# Patient Record
Sex: FEMALE | Race: WHITE | Employment: OTHER | URBAN - METROPOLITAN AREA
[De-identification: names, ages, dates, MRNs, and addresses within clinical notes are randomized per-mention and may not be internally consistent; named-entity substitution may affect disease eponyms.]

---

## 2017-01-02 PROBLEM — R29.6 MULTIPLE FALLS: Status: ACTIVE | Noted: 2017-01-02

## 2018-07-27 ENCOUNTER — HOSPITAL ENCOUNTER (OUTPATIENT)
Age: 63
Discharge: HOME | End: 2018-07-27
Payer: COMMERCIAL

## 2018-07-27 ENCOUNTER — HOSPITAL ENCOUNTER (OUTPATIENT)
Dept: VASCULAR LAB | Age: 63
Discharge: HOME | End: 2018-07-27
Payer: COMMERCIAL

## 2018-07-27 ENCOUNTER — HOSPITAL ENCOUNTER (OUTPATIENT)
Dept: GENERAL RADIOLOGY | Age: 63
Discharge: HOME | End: 2018-07-27
Payer: COMMERCIAL

## 2018-07-27 ENCOUNTER — OFFICE VISIT (OUTPATIENT)
Dept: FAMILY MEDICINE CLINIC | Age: 63
End: 2018-07-27

## 2018-07-27 VITALS
TEMPERATURE: 98 F | DIASTOLIC BLOOD PRESSURE: 86 MMHG | HEIGHT: 62 IN | BODY MASS INDEX: 26.31 KG/M2 | HEART RATE: 84 BPM | WEIGHT: 143 LBS | SYSTOLIC BLOOD PRESSURE: 134 MMHG | OXYGEN SATURATION: 98 % | RESPIRATION RATE: 18 BRPM

## 2018-07-27 DIAGNOSIS — Z86.718 HISTORY OF DVT (DEEP VEIN THROMBOSIS): ICD-10-CM

## 2018-07-27 DIAGNOSIS — I10 ESSENTIAL HYPERTENSION: ICD-10-CM

## 2018-07-27 DIAGNOSIS — M79.661 RIGHT CALF PAIN: ICD-10-CM

## 2018-07-27 DIAGNOSIS — R06.02 SHORTNESS OF BREATH: ICD-10-CM

## 2018-07-27 DIAGNOSIS — Z86.73 HISTORY OF TIA (TRANSIENT ISCHEMIC ATTACK): ICD-10-CM

## 2018-07-27 DIAGNOSIS — E78.2 MIXED HYPERLIPIDEMIA: ICD-10-CM

## 2018-07-27 DIAGNOSIS — Z76.89 ENCOUNTER TO ESTABLISH CARE WITH NEW DOCTOR: Primary | ICD-10-CM

## 2018-07-27 DIAGNOSIS — R09.89 DIMINISHED PULSES IN LOWER EXTREMITY: ICD-10-CM

## 2018-07-27 DIAGNOSIS — Z98.890 HISTORY OF FASCIOTOMY: ICD-10-CM

## 2018-07-27 DIAGNOSIS — M25.561 CHRONIC PAIN OF BOTH KNEES: ICD-10-CM

## 2018-07-27 DIAGNOSIS — M89.8X8 STERNAL MASS: ICD-10-CM

## 2018-07-27 DIAGNOSIS — F17.210 CIGARETTE SMOKER: ICD-10-CM

## 2018-07-27 DIAGNOSIS — G89.29 CHRONIC PAIN OF BOTH KNEES: ICD-10-CM

## 2018-07-27 DIAGNOSIS — M25.562 CHRONIC PAIN OF BOTH KNEES: ICD-10-CM

## 2018-07-27 DIAGNOSIS — M25.472 LEFT ANKLE SWELLING: ICD-10-CM

## 2018-07-27 DIAGNOSIS — Z12.31 ENCOUNTER FOR SCREENING MAMMOGRAM FOR BREAST CANCER: ICD-10-CM

## 2018-07-27 DIAGNOSIS — Z00.00 HEALTHCARE MAINTENANCE: ICD-10-CM

## 2018-07-27 LAB
A/G RATIO: 1.4 (ref 1.1–2.2)
ALBUMIN SERPL-MCNC: 4.6 G/DL (ref 3.4–5)
ALP BLD-CCNC: 96 U/L (ref 40–129)
ALT SERPL-CCNC: 10 U/L (ref 10–40)
ANION GAP SERPL CALCULATED.3IONS-SCNC: 12 MMOL/L (ref 3–16)
AST SERPL-CCNC: 14 U/L (ref 15–37)
BASOPHILS ABSOLUTE: 0.1 K/UL (ref 0–0.2)
BASOPHILS RELATIVE PERCENT: 0.8 %
BILIRUB SERPL-MCNC: <0.2 MG/DL (ref 0–1)
BUN BLDV-MCNC: 10 MG/DL (ref 7–20)
CALCIUM SERPL-MCNC: 10.1 MG/DL (ref 8.3–10.6)
CHLORIDE BLD-SCNC: 98 MMOL/L (ref 99–110)
CHOLESTEROL, TOTAL: 214 MG/DL (ref 0–199)
CO2: 28 MMOL/L (ref 21–32)
CREAT SERPL-MCNC: 0.6 MG/DL (ref 0.6–1.2)
D DIMER: <200 NG/ML DDU (ref 0–229)
EOSINOPHILS ABSOLUTE: 0.1 K/UL (ref 0–0.6)
EOSINOPHILS RELATIVE PERCENT: 0.6 %
GFR AFRICAN AMERICAN: >60
GFR NON-AFRICAN AMERICAN: >60
GLOBULIN: 3.4 G/DL
GLUCOSE BLD-MCNC: 94 MG/DL (ref 70–99)
HCT VFR BLD CALC: 43.6 % (ref 36–48)
HDLC SERPL-MCNC: 48 MG/DL (ref 40–60)
HEMOGLOBIN: 14.5 G/DL (ref 12–16)
HEPATITIS C ANTIBODY INTERPRETATION: NORMAL
LDL CHOLESTEROL CALCULATED: ABNORMAL MG/DL
LDL CHOLESTEROL DIRECT: 122 MG/DL
LYMPHOCYTES ABSOLUTE: 2.6 K/UL (ref 1–5.1)
LYMPHOCYTES RELATIVE PERCENT: 24.8 %
MCH RBC QN AUTO: 30.7 PG (ref 26–34)
MCHC RBC AUTO-ENTMCNC: 33.2 G/DL (ref 31–36)
MCV RBC AUTO: 92.4 FL (ref 80–100)
MONOCYTES ABSOLUTE: 0.6 K/UL (ref 0–1.3)
MONOCYTES RELATIVE PERCENT: 5.7 %
NEUTROPHILS ABSOLUTE: 7.2 K/UL (ref 1.7–7.7)
NEUTROPHILS RELATIVE PERCENT: 68.1 %
PDW BLD-RTO: 14.6 % (ref 12.4–15.4)
PLATELET # BLD: 345 K/UL (ref 135–450)
PMV BLD AUTO: 8.6 FL (ref 5–10.5)
POTASSIUM SERPL-SCNC: 4.8 MMOL/L (ref 3.5–5.1)
RBC # BLD: 4.72 M/UL (ref 4–5.2)
SODIUM BLD-SCNC: 138 MMOL/L (ref 136–145)
TOTAL PROTEIN: 8 G/DL (ref 6.4–8.2)
TRIGL SERPL-MCNC: 360 MG/DL (ref 0–150)
TSH SERPL DL<=0.05 MIU/L-ACNC: 2.45 UIU/ML (ref 0.27–4.2)
VITAMIN D 25-HYDROXY: 27.2 NG/ML
VLDLC SERPL CALC-MCNC: ABNORMAL MG/DL
WBC # BLD: 10.6 K/UL (ref 4–11)

## 2018-07-27 PROCEDURE — 71046 X-RAY EXAM CHEST 2 VIEWS: CPT

## 2018-07-27 PROCEDURE — 93970 EXTREMITY STUDY: CPT

## 2018-07-27 PROCEDURE — 99205 OFFICE O/P NEW HI 60 MIN: CPT | Performed by: FAMILY MEDICINE

## 2018-07-27 PROCEDURE — 36415 COLL VENOUS BLD VENIPUNCTURE: CPT | Performed by: FAMILY MEDICINE

## 2018-07-27 PROCEDURE — 4004F PT TOBACCO SCREEN RCVD TLK: CPT | Performed by: FAMILY MEDICINE

## 2018-07-27 PROCEDURE — G8427 DOCREV CUR MEDS BY ELIG CLIN: HCPCS | Performed by: FAMILY MEDICINE

## 2018-07-27 PROCEDURE — 3017F COLORECTAL CA SCREEN DOC REV: CPT | Performed by: FAMILY MEDICINE

## 2018-07-27 PROCEDURE — G8419 CALC BMI OUT NRM PARAM NOF/U: HCPCS | Performed by: FAMILY MEDICINE

## 2018-07-27 RX ORDER — GABAPENTIN 400 MG/1
400 CAPSULE ORAL 3 TIMES DAILY
Qty: 90 CAPSULE | Refills: 5 | Status: SHIPPED | OUTPATIENT
Start: 2018-07-27 | End: 2019-02-07 | Stop reason: SDUPTHER

## 2018-07-27 RX ORDER — ATORVASTATIN CALCIUM 40 MG/1
40 TABLET, FILM COATED ORAL DAILY
Qty: 90 TABLET | Refills: 0 | Status: CANCELLED | OUTPATIENT
Start: 2018-07-27

## 2018-07-27 RX ORDER — ATORVASTATIN CALCIUM 20 MG/1
20 TABLET, FILM COATED ORAL NIGHTLY
Qty: 30 TABLET | Refills: 1 | Status: SHIPPED | OUTPATIENT
Start: 2018-07-27 | End: 2018-08-03 | Stop reason: SDUPTHER

## 2018-07-27 RX ORDER — LISINOPRIL 10 MG/1
10 TABLET ORAL 2 TIMES DAILY
COMMUNITY
End: 2018-07-27 | Stop reason: SDUPTHER

## 2018-07-27 RX ORDER — LISINOPRIL 10 MG/1
10 TABLET ORAL 2 TIMES DAILY
Qty: 30 TABLET | Refills: 1 | Status: SHIPPED | OUTPATIENT
Start: 2018-07-27 | End: 2019-02-07 | Stop reason: SDUPTHER

## 2018-07-27 RX ORDER — GABAPENTIN 400 MG/1
400 CAPSULE ORAL 3 TIMES DAILY
COMMUNITY
End: 2018-07-27 | Stop reason: SDUPTHER

## 2018-07-27 ASSESSMENT — PATIENT HEALTH QUESTIONNAIRE - PHQ9
SUM OF ALL RESPONSES TO PHQ9 QUESTIONS 1 & 2: 2
1. LITTLE INTEREST OR PLEASURE IN DOING THINGS: 0
2. FEELING DOWN, DEPRESSED OR HOPELESS: 2
SUM OF ALL RESPONSES TO PHQ QUESTIONS 1-9: 2

## 2018-07-27 ASSESSMENT — ENCOUNTER SYMPTOMS
APNEA: 0
CHOKING: 0
WHEEZING: 0
CONSTIPATION: 0
SHORTNESS OF BREATH: 1
TROUBLE SWALLOWING: 0
STRIDOR: 0
VOMITING: 0
BLOOD IN STOOL: 0
CHEST TIGHTNESS: 0
RHINORRHEA: 0
VOICE CHANGE: 0
SORE THROAT: 0
ABDOMINAL PAIN: 0
COLOR CHANGE: 1
BACK PAIN: 1
COUGH: 0
NAUSEA: 0
DIARRHEA: 0

## 2018-07-27 NOTE — PROGRESS NOTES
present. No thyromegaly present. Cardiovascular: Normal rate, regular rhythm, normal heart sounds and intact distal pulses. Exam reveals no gallop and no friction rub. No murmur heard. Pulmonary/Chest: Effort normal and breath sounds normal. No stridor. No respiratory distress. She has no wheezes. She has no rales. She exhibits mass and deformity. She exhibits no tenderness, no bony tenderness, no edema and no swelling. Abdominal: Soft. Bowel sounds are normal. She exhibits no distension. There is no tenderness. There is no rebound and no guarding. Musculoskeletal: She exhibits edema, tenderness and deformity. Right knee: She exhibits decreased range of motion. She exhibits no swelling, no effusion, no ecchymosis, no deformity, no laceration, no erythema, normal alignment, no LCL laxity, normal patellar mobility, no bony tenderness, normal meniscus and no MCL laxity. Tenderness (posteriorly) found. Medial joint line tenderness noted. No lateral joint line, no MCL, no LCL and no patellar tendon tenderness noted. Left knee: She exhibits normal range of motion, no swelling, no effusion, no ecchymosis, no deformity, no laceration, no erythema, normal alignment, no LCL laxity, normal patellar mobility, no bony tenderness, normal meniscus and no MCL laxity. Tenderness found. Medial joint line and lateral joint line tenderness noted. No MCL, no LCL and no patellar tendon tenderness noted. Lymphadenopathy:     She has no cervical adenopathy. Neurological: She is alert. No cranial nerve deficit. Skin: Skin is dry. No rash noted. She is not diaphoretic. There is erythema. No pallor. Psychiatric: She has a normal mood and affect. Her behavior is normal. Judgment and thought content normal.   Anxious, pressured speech sporadically. Nursing note and vitals reviewed. Assessment/Plan:  1. Encounter to establish care with new doctor    2.  Shortness of breath  5-6mo Hx with gradual

## 2018-07-28 LAB
ESTIMATED AVERAGE GLUCOSE: 122.6 MG/DL
HBA1C MFR BLD: 5.9 %
HIV AG/AB: NORMAL
HIV ANTIGEN: NORMAL
HIV-1 ANTIBODY: NORMAL
HIV-2 AB: NORMAL

## 2018-08-02 ENCOUNTER — HOSPITAL ENCOUNTER (OUTPATIENT)
Age: 63
Discharge: HOME | End: 2018-08-02
Payer: COMMERCIAL

## 2018-08-02 ENCOUNTER — HOSPITAL ENCOUNTER (OUTPATIENT)
Dept: GENERAL RADIOLOGY | Age: 63
Discharge: HOME | End: 2018-08-02
Payer: COMMERCIAL

## 2018-08-02 DIAGNOSIS — M25.561 CHRONIC PAIN OF BOTH KNEES: ICD-10-CM

## 2018-08-02 DIAGNOSIS — M25.562 CHRONIC PAIN OF BOTH KNEES: ICD-10-CM

## 2018-08-02 DIAGNOSIS — G89.29 CHRONIC PAIN OF BOTH KNEES: ICD-10-CM

## 2018-08-02 PROCEDURE — 73562 X-RAY EXAM OF KNEE 3: CPT

## 2018-08-02 NOTE — PROGRESS NOTES
states she took two hits for new years so she could sleep    Sexual activity: Not Currently     Other Topics Concern    Not on file     Social History Narrative    No narrative on file       Family History   Problem Relation Age of Onset    High Blood Pressure Mother     High Cholesterol Mother     Heart Disease Mother     Coronary Art Dis Mother     Heart Attack Father         59    Cirrhosis Sister     Alcohol Abuse Sister     Other Sister         \"colostomy bag due to hole in her colon\"   [employer] Sudden Death Brother         \"suicide\"    Colon Cancer Other         maternal uncle    Breast Cancer Other         multiple maternal aunts along with uterine cancer       Current Outpatient Prescriptions   Medication Sig Dispense Refill    DULoxetine (CYMBALTA) 30 MG extended release capsule Take 1 capsule by mouth daily 30 capsule 1    atorvastatin (LIPITOR) 20 MG tablet Take 1 tablet by mouth nightly 30 tablet 1    aspirin 81 MG tablet Take 1 tablet by mouth daily (with breakfast) 90 tablet 1    lisinopril (PRINIVIL;ZESTRIL) 10 MG tablet Take 1 tablet by mouth 2 times daily 30 tablet 1    gabapentin (NEURONTIN) 400 MG capsule Take 1 capsule by mouth 3 times daily for 30 days. . 90 capsule 5     No current facility-administered medications for this visit. There is no immunization history for the selected administration types on file for this patient. Allergies   Allergen Reactions    Codeine Hives     Tolerates Norco.       Review of Systems    /72 (Site: Left Arm, Position: Sitting, Cuff Size: Large Adult)   Pulse 89   Temp 98 °F (36.7 °C)   Wt 146 lb (66.2 kg)   SpO2 98%   BMI 27.14 kg/m²     Physical Exam   Constitutional: She is oriented to person, place, and time. She appears well-developed and well-nourished. No distress. HENT:   Head: Normocephalic and atraumatic. Eyes: EOM are normal. Pupils are equal, round, and reactive to light. Right eye exhibits no discharge.  Left eye exhibits no discharge. Neck: Normal range of motion. Neck supple. No thyromegaly present. Cardiovascular: Normal rate, regular rhythm, normal heart sounds and intact distal pulses. No murmur heard. Pulmonary/Chest: Effort normal and breath sounds normal. No respiratory distress. She has no wheezes. She has no rales. Abdominal: Soft. She exhibits no distension and no mass. There is tenderness (mild, during bimanual, central lower abd). There is no rebound and no guarding. Genitourinary: Vagina normal. Rectal exam shows external hemorrhoid. No breast swelling, tenderness, discharge or bleeding. There is no rash, tenderness, lesion or injury on the right labia. There is no rash, tenderness, lesion or injury on the left labia. Uterus is enlarged (mild) and tender (mild on bimanual). Uterus is not deviated and not fixed. Cervix exhibits friability (minimal). Cervix exhibits no motion tenderness and no discharge. Right adnexum displays no mass, no tenderness and no fullness. Left adnexum displays no mass, no tenderness and no fullness. No erythema, tenderness or bleeding in the vagina. No foreign body in the vagina. No signs of injury around the vagina. No vaginal discharge found. Musculoskeletal: Normal range of motion. She exhibits tenderness. She exhibits no edema or deformity. R knee wrapped with ACE, medial joint line tenderness. No change from last visit. Lymphadenopathy:     She has no cervical adenopathy. Right: Inguinal adenopathy present. Left: No inguinal adenopathy present. Neurological: She is alert and oriented to person, place, and time. No cranial nerve deficit. Skin: Skin is warm and dry. No rash noted. She is not diaphoretic. No erythema. No pallor. Scars from previous fasciotomy    Psychiatric: She has a normal mood and affect. Her behavior is normal.   Nursing note and vitals reviewed. Plan  1.  Recurrent pain of right knee  This was her biggest concern today, along with her chronic back pain. Reviewed b/l knee XRs in detail with pt: mild OA. Pain appears to be more patellofemoral pain. Encouraged stretching and PT, PRN tylenol (pt states she can not take NSAIDs). Will follow up in 2 weeks after PT, and consider intra-articular injection at that point +/- MRI given persistent medial joint line tenderness. - Ambulatory referral to Physical Therapy    2. Healthcare maintenance  - PAP SMEAR    3. Abnormality of cervix  Inc friability. Last abnormal PAP 20 years prior. - VAGINAL PATHOGENS PROBE *A  - C.trachomatis N.gonorrhoeae DNA, Thin Prep    4. Sternal mass  - CT CHEST LOW DOSE; Future  5. Current smoker  Lung Ca screening. Cut down but not ready to quit, smoked for 20 years  - CT CHEST LOW DOSE; Future    6. LAD (lymphadenopathy), inguinal  R sided palpated today on exam.  B/l noted on very recent US of LEs. No current infectious Sxs to explain. PAP done today without obvious abnormality. Colonscopy scheduled for 8/21, per pt. - CT ABDOMEN PELVIS W WO CONTRAST Additional Contrast? None; Future    7. Essential hypertension  WNL today. No current ACS sxs. 8. Chronic pain syndrome  - Avoid opiates. - DULoxetine (CYMBALTA) 30 MG extended release capsule; Take 1 capsule by mouth daily  Dispense: 30 capsule; Refill: 1    9. Chronic bilateral low back pain with bilateral sciatica  Pt states she has a Hx of ruptured disks and repeatedly requests MRI. No red flag Sxs to order at this time. PT referral made today and will evaluate bony structures on CT abd.   - She requests Lyrica today. She is already on Gabapentin, so will defer for now. She requests an increase in this as well, stating her \"last NP said she was going to increase this. \" I've recently refilled this. This will need CS paperwork completed at next visit + OARRS, and we will reassess Sxs after PT and CT. Should perform monofilament in the future as well.    - CT ABDOMEN PELVIS W WO CONTRAST Additional Contrast? None; Future    10. Moderate single current episode of major depressive disorder (HCC)  No SI/HI. Pt states she has tried \"all the medications and they do not work,\" including Prozac, Zoloft, Celexa, Lexapro. Refuses counseling. Stated that Katalina Olson 13 worked best for her anxiety. Will likely need increase to 60 mg on Cymbalta in the future  - DULoxetine (CYMBALTA) 30 MG extended release capsule; Take 1 capsule by mouth daily  Dispense: 30 capsule; Refill: 1    11. Prediabetes  Discussed Dx and healthy eating. Provided h/o.     12. Vitamin D deficiency  rec'd daily supplement D3 2551-2570 IU daily    Mixed Hyperlipidemia  , ASCVD 10 year risk 9.6%. Will increase statin to high intensity. 13. History of fasciotomy  14. History of DVT in adulthood  1/17, taking daily ASA. Completed       Return in about 2 weeks (around 8/17/2018) for right knee pain, if not improved. .   Will need CS agreement signed and OARRs report at next visit for Gabapentin. Follow up 4-5 weeks for Cymbalta/Depression/Anxiety. Avoid Benzos.

## 2018-08-03 ENCOUNTER — OFFICE VISIT (OUTPATIENT)
Dept: FAMILY MEDICINE CLINIC | Age: 63
End: 2018-08-03

## 2018-08-03 VITALS
WEIGHT: 146 LBS | BODY MASS INDEX: 27.14 KG/M2 | HEART RATE: 89 BPM | SYSTOLIC BLOOD PRESSURE: 118 MMHG | OXYGEN SATURATION: 98 % | TEMPERATURE: 98 F | DIASTOLIC BLOOD PRESSURE: 72 MMHG

## 2018-08-03 DIAGNOSIS — F32.1 MODERATE SINGLE CURRENT EPISODE OF MAJOR DEPRESSIVE DISORDER (HCC): ICD-10-CM

## 2018-08-03 DIAGNOSIS — N88.9 ABNORMALITY OF CERVIX: ICD-10-CM

## 2018-08-03 DIAGNOSIS — Z00.00 HEALTHCARE MAINTENANCE: ICD-10-CM

## 2018-08-03 DIAGNOSIS — I10 ESSENTIAL HYPERTENSION: ICD-10-CM

## 2018-08-03 DIAGNOSIS — M54.42 CHRONIC BILATERAL LOW BACK PAIN WITH BILATERAL SCIATICA: ICD-10-CM

## 2018-08-03 DIAGNOSIS — E55.9 VITAMIN D DEFICIENCY: ICD-10-CM

## 2018-08-03 DIAGNOSIS — Z86.73 PERSONAL HISTORY OF TIA (TRANSIENT ISCHEMIC ATTACK): ICD-10-CM

## 2018-08-03 DIAGNOSIS — Z86.718 HISTORY OF DVT IN ADULTHOOD: ICD-10-CM

## 2018-08-03 DIAGNOSIS — E78.2 MIXED HYPERLIPIDEMIA: ICD-10-CM

## 2018-08-03 DIAGNOSIS — F17.200 CURRENT SMOKER: ICD-10-CM

## 2018-08-03 DIAGNOSIS — M25.561 RECURRENT PAIN OF RIGHT KNEE: Primary | ICD-10-CM

## 2018-08-03 DIAGNOSIS — R59.0 LAD (LYMPHADENOPATHY), INGUINAL: ICD-10-CM

## 2018-08-03 DIAGNOSIS — G89.29 CHRONIC BILATERAL LOW BACK PAIN WITH BILATERAL SCIATICA: ICD-10-CM

## 2018-08-03 DIAGNOSIS — M89.8X8 STERNAL MASS: ICD-10-CM

## 2018-08-03 DIAGNOSIS — R73.03 PREDIABETES: ICD-10-CM

## 2018-08-03 DIAGNOSIS — G89.4 CHRONIC PAIN SYNDROME: ICD-10-CM

## 2018-08-03 DIAGNOSIS — Z98.890 HISTORY OF FASCIOTOMY: ICD-10-CM

## 2018-08-03 DIAGNOSIS — M54.41 CHRONIC BILATERAL LOW BACK PAIN WITH BILATERAL SCIATICA: ICD-10-CM

## 2018-08-03 PROCEDURE — 4004F PT TOBACCO SCREEN RCVD TLK: CPT | Performed by: FAMILY MEDICINE

## 2018-08-03 PROCEDURE — 3017F COLORECTAL CA SCREEN DOC REV: CPT | Performed by: FAMILY MEDICINE

## 2018-08-03 PROCEDURE — G8427 DOCREV CUR MEDS BY ELIG CLIN: HCPCS | Performed by: FAMILY MEDICINE

## 2018-08-03 PROCEDURE — G8419 CALC BMI OUT NRM PARAM NOF/U: HCPCS | Performed by: FAMILY MEDICINE

## 2018-08-03 PROCEDURE — 99214 OFFICE O/P EST MOD 30 MIN: CPT | Performed by: FAMILY MEDICINE

## 2018-08-03 RX ORDER — DULOXETIN HYDROCHLORIDE 30 MG/1
30 CAPSULE, DELAYED RELEASE ORAL DAILY
Qty: 30 CAPSULE | Refills: 1 | Status: SHIPPED | OUTPATIENT
Start: 2018-08-03 | End: 2018-10-15 | Stop reason: SDUPTHER

## 2018-08-03 RX ORDER — ATORVASTATIN CALCIUM 40 MG/1
40 TABLET, FILM COATED ORAL NIGHTLY
Qty: 90 TABLET | Refills: 1 | Status: SHIPPED | OUTPATIENT
Start: 2018-08-03 | End: 2019-02-08 | Stop reason: SDUPTHER

## 2018-08-03 NOTE — PATIENT INSTRUCTIONS
Vitamin D3 4662-0439 IU a day    Eat 5 - 6 servings of fruit per day. Locally grown fresh fruit has the most antioxidants. If they are not available, frozen fruit is the next best.     Eat more fresh vegetables, olive oil, and a handful of nuts (unsalted) every day. Make all your grains (breads, pasta, rice) 'whole grain' only to increase natural fiber in your diet     Fish has healthy omega-3 oils. Eating fish twice a week has been shown to improve health. If you take fish oil, take at least 1,000mg EPA + DHA a day (you must look at the food label on the back of the bottle and add up these omega-3s to know how much of this beneficial nutrient is contained in the product). There is more evidence that eating fish improves health more than taking fish oi supplements. Eating eggs has benefit if you eat the high omega-3 eggs. In these eggs, the yolks are good for you. At [employer], go to the [employer] food section and get the 660mg omega-3 eggs. These are really good for you. The chickens are fed fish, and the benefits of the fish are imparted into the egg yolk. If you have diabetes, you should probably avoid eggs. Current research shows that a pesco-vegetarian diet (eating a plant based diet with fish) is the best for improving longevity and reducing cardiovascular disease. Limit saturated fats, sugar and red meat. Avoid trans-fats and processed meat (e.g. Salami). Avoid fried foods, potato dishes and white (or enriched, processed) flour. Foods to avoid! Trans-fats - increases risk of heart disease, stroke, and development of diabetes     Processed meats (lunch meat, goetta, sausage, pepperoni, etc.. ) - increases risk of cancers     High fructose corn syrup (fructose, corn syrup) - increases risk of high blood pressure, obesity and diabetes     More information on healthful diets and recipes is available at www. choosemyplate.gov, or ww.mypyramid.gov     Enhance your foods with spices.  They are full of nutritional benefit and antioxidants. New research shows that fasting helps lower risk of breast cancer, diabetes, inflammation, cardiovascular disease, Alzheimers, and increases longevity. We don't have a lot of information on how much to fast, but even overnight fasts of 13 hours makes a difference. Consider skipping snacks after dinner. Exercise 1 hour a day at least 5 days a week. If you do not currently exercise, start slow by maybe walking 5 minutes out, 5 minutes back. Increase the amount of time you exercise every day by 2 - 5 minutes, as tolerated. Your goal should be to get to 1/2 - 1 hour a day. Exercise will help you control metabolic diseases, maintain independence, and reduce your risk for dementia. Weight training and resistance exercises have been shown to help preserve muscle mass and strength. It is recommended that these be done twice a week. Balance is important to prevent falls. An easy way to improve this is to stand on one leg at a time while you brush your teeth. Gently stretch your joints to maintain flexibilty. And maintain good posture to protect your spine. Patient Education        Prediabetes: Care Instructions  Your Care Instructions    Prediabetes is a warning sign that you are at risk for getting type 2 diabetes. It means that your blood sugar is higher than it should be. The food you eat turns into sugar, which your body uses for energy. Normally, an organ called the pancreas makes insulin, which allows the sugar in your blood to get into your body's cells. But when your body can't use insulin the right way, the sugar doesn't move into cells. It stays in your blood instead. This is called insulin resistance. The buildup of sugar in the blood causes prediabetes. The good news is that lifestyle changes may help you get your blood sugar back to normal and help you avoid or delay diabetes. Follow-up care is a key part of your treatment and safety.  Be sure If you do not have an account, please click on the \"Sign Up Now\" link. Current as of: December 7, 2017  Content Version: 11.6  © [id number] [employer]. Care instructions adapted under license by [facility] ([facility]). If you have questions about a medical condition or this instruction, always ask your healthcare professional. [employer] any warranty or liability for your use of this information. Patient Education          duloxetine  Pronunciation:  maricruz LACY 25 e teen  Brand:  Morales Grover  What is the most important information I should know about duloxetine? Do not take duloxetine within 5 days before or 14 days after you have used an MAO inhibitor, such as isocarboxazid, linezolid, methylene blue injection, phenelzine, rasagiline, selegiline, or tranylcypromine. Some young people have thoughts about suicide when first taking an antidepressant. Stay alert to changes in your mood or symptoms. Report any new or worsening symptoms to your doctor. Do not stop using duloxetine without first talking to your doctor. What is duloxetine? Duloxetine is a selective serotonin and norepinephrine reuptake inhibitor antidepressant (SSNRI). Duloxetine affects chemicals in the brain that may be unbalanced in people with depression. Duloxetine is used to treat major depressive disorder in adults. Duloxetine is also used to treat general anxiety disorder in adults and children who are at least 9years old. Duloxetine is also used in adults to treat fibromyalgia (a chronic pain disorder), or chronic muscle or joint pain (such as low back pain and osteoarthritis pain). Duloxetine is also used to treat pain caused by nerve damage in adults with diabetes (diabetic neuropathy). Duloxetine may also be used for purposes not listed in this medication guide. What should I discuss with my healthcare provider before taking duloxetine? You should not use duloxetine if you are allergic to it.   Do not take duloxetine within 5 days before or 14 days after you have used an MAO inhibitor, such as isocarboxazid, linezolid, methylene blue injection, phenelzine, rasagiline, selegiline, or tranylcypromine. A dangerous drug interaction could occur. Some medicines can interact with duloxetine and cause a serious condition called serotonin syndrome. Be sure your doctor knows if you also take stimulant medicine, opioid medicine, herbal products, or medicine for depression, mental illness, Parkinson's disease, migraine headaches, serious infections, or prevention of nausea and vomiting. Ask your doctor before making any changes in how or when you take your medications. To make sure duloxetine is safe for you, tell your doctor if you have ever had:  · liver or kidney disease;  · seizures or epilepsy;  · a bleeding or blood clotting disorder;  · high blood pressure;  · narrow-angle glaucoma;  · bipolar disorder (manic depression); or  · drug addiction or suicidal thoughts. Some young people have thoughts about suicide when first taking an antidepressant. Your doctor will need to check your progress at regular visits while you are using duloxetine. Your family or other caregivers should also be alert to changes in your mood or symptoms. It is not known whether duloxetine will harm an unborn baby. However, duloxetine may cause problems in a  if you take the medicine during the third trimester of pregnancy. Tell your doctor if you are pregnant or plan to become pregnant while using this medicine. If you are pregnant, your name may be listed on a pregnancy registry. This is to track the outcome of the pregnancy and to evaluate any effects of duloxetine on the baby. Duloxetine can pass into breast milk, but effects on the nursing baby are not known. Tell your doctor if you are breast-feeding. Duloxetine is not approved for use by anyone younger than 25years old. How should I take duloxetine?   Follow all

## 2018-08-04 LAB
CANDIDA SPECIES, DNA PROBE: NORMAL
GARDNERELLA VAGINALIS, DNA PROBE: NORMAL
TRICHOMONAS VAGINALIS DNA: NORMAL

## 2018-08-07 LAB
HPV COMMENT: NORMAL
HPV TYPE 16: NOT DETECTED
HPV TYPE 18: NOT DETECTED
HPVOH (OTHER TYPES): NOT DETECTED

## 2018-08-10 LAB
C. TRACHOMATIS DNA,THIN PREP: NEGATIVE
N. GONORRHOEAE DNA, THIN PREP: NEGATIVE

## 2018-08-20 ENCOUNTER — TELEPHONE (OUTPATIENT)
Dept: GASTROENTEROLOGY | Age: 63
End: 2018-08-20

## 2018-08-22 ENCOUNTER — TELEPHONE (OUTPATIENT)
Dept: FAMILY MEDICINE CLINIC | Age: 63
End: 2018-08-22

## 2018-08-23 NOTE — TELEPHONE ENCOUNTER
[facility] scheduling called again regarding the previous message  Louisa needs the order before pts test tomorrow

## 2018-08-24 DIAGNOSIS — Z12.2 ENCOUNTER FOR SCREENING FOR LUNG CANCER: Primary | ICD-10-CM

## 2018-08-28 ENCOUNTER — TELEPHONE (OUTPATIENT)
Dept: FAMILY MEDICINE CLINIC | Age: 63
End: 2018-08-28

## 2018-08-28 DIAGNOSIS — I10 ESSENTIAL HYPERTENSION: Primary | ICD-10-CM

## 2018-08-28 NOTE — TELEPHONE ENCOUNTER
Mayela from [address] [place] [address] called. Requesting order for BUN and Creatinine for pt. They are scheduled for a CT and previous blood work results are too old.

## 2018-08-30 ENCOUNTER — HOSPITAL ENCOUNTER (OUTPATIENT)
Dept: CT IMAGING | Age: 63
Discharge: HOME | End: 2018-08-30
Payer: COMMERCIAL

## 2018-08-30 DIAGNOSIS — R59.0 LAD (LYMPHADENOPATHY), INGUINAL: ICD-10-CM

## 2018-08-30 DIAGNOSIS — G89.29 CHRONIC BILATERAL LOW BACK PAIN WITH BILATERAL SCIATICA: ICD-10-CM

## 2018-08-30 DIAGNOSIS — F17.200 CURRENT SMOKER: ICD-10-CM

## 2018-08-30 DIAGNOSIS — M54.42 CHRONIC BILATERAL LOW BACK PAIN WITH BILATERAL SCIATICA: ICD-10-CM

## 2018-08-30 DIAGNOSIS — M54.41 CHRONIC BILATERAL LOW BACK PAIN WITH BILATERAL SCIATICA: ICD-10-CM

## 2018-08-30 DIAGNOSIS — M89.8X8 STERNAL MASS: ICD-10-CM

## 2018-08-30 PROCEDURE — 6360000004 HC RX CONTRAST MEDICATION: Performed by: FAMILY MEDICINE

## 2018-08-30 PROCEDURE — 74178 CT ABD&PLV WO CNTR FLWD CNTR: CPT

## 2018-08-30 PROCEDURE — 71250 CT THORAX DX C-: CPT

## 2018-08-30 PROCEDURE — G0297 LDCT FOR LUNG CA SCREEN: HCPCS

## 2018-08-30 RX ADMIN — IOPAMIDOL 75 ML: 755 INJECTION, SOLUTION INTRAVENOUS at 15:52

## 2018-08-31 ENCOUNTER — TELEPHONE (OUTPATIENT)
Dept: FAMILY MEDICINE CLINIC | Age: 63
End: 2018-08-31

## 2018-08-31 ENCOUNTER — TELEPHONE (OUTPATIENT)
Dept: PULMONOLOGY | Age: 63
End: 2018-08-31

## 2018-08-31 DIAGNOSIS — R91.1 LUNG NODULE: Primary | ICD-10-CM

## 2018-08-31 DIAGNOSIS — R91.1 PULMONARY NODULE, LEFT: Primary | ICD-10-CM

## 2018-09-02 PROBLEM — Z00.00 HEALTHCARE MAINTENANCE: Status: RESOLVED | Noted: 2018-08-03 | Resolved: 2018-09-02

## 2018-09-05 ENCOUNTER — TELEPHONE (OUTPATIENT)
Dept: CASE MANAGEMENT | Age: 63
End: 2018-09-05

## 2018-09-06 ENCOUNTER — TELEPHONE (OUTPATIENT)
Dept: PULMONOLOGY | Age: 63
End: 2018-09-06

## 2018-09-06 NOTE — TELEPHONE ENCOUNTER
Patient no showed for appt for 9/6/18. Called patient spoke with her sister to have her call office back regarding appt.

## 2018-09-11 ENCOUNTER — HOSPITAL ENCOUNTER (OUTPATIENT)
Dept: PET IMAGING | Age: 63
Discharge: HOME | End: 2018-09-11
Payer: COMMERCIAL

## 2018-09-11 ENCOUNTER — HOSPITAL ENCOUNTER (OUTPATIENT)
Age: 63
End: 2018-09-11
Payer: COMMERCIAL

## 2018-09-11 VITALS — WEIGHT: 154 LBS | BODY MASS INDEX: 25.66 KG/M2 | HEIGHT: 65 IN

## 2018-09-11 DIAGNOSIS — R91.1 PULMONARY NODULE, LEFT: ICD-10-CM

## 2018-09-11 PROCEDURE — A9552 F18 FDG: HCPCS | Performed by: INTERNAL MEDICINE

## 2018-09-11 PROCEDURE — 78815 PET IMAGE W/CT SKULL-THIGH: CPT

## 2018-09-11 PROCEDURE — 3430000000 HC RX DIAGNOSTIC RADIOPHARMACEUTICAL: Performed by: INTERNAL MEDICINE

## 2018-09-11 RX ORDER — FLUDEOXYGLUCOSE F 18 200 MCI/ML
15.37 INJECTION, SOLUTION INTRAVENOUS
Status: COMPLETED | OUTPATIENT
Start: 2018-09-11 | End: 2018-09-11

## 2018-09-11 RX ADMIN — FLUDEOXYGLUCOSE F 18 15.37 MILLICURIE: 200 INJECTION, SOLUTION INTRAVENOUS at 09:02

## 2018-09-11 NOTE — TELEPHONE ENCOUNTER
Patient aware of date time and prep of PET. Watch for results. Need to call patient to [facility] appt for results.

## 2018-09-27 ENCOUNTER — OFFICE VISIT (OUTPATIENT)
Dept: PULMONOLOGY | Age: 63
End: 2018-09-27
Payer: COMMERCIAL

## 2018-09-27 VITALS
SYSTOLIC BLOOD PRESSURE: 176 MMHG | HEIGHT: 65 IN | OXYGEN SATURATION: 98 % | DIASTOLIC BLOOD PRESSURE: 105 MMHG | BODY MASS INDEX: 22.99 KG/M2 | TEMPERATURE: 98.1 F | RESPIRATION RATE: 16 BRPM | HEART RATE: 70 BPM | WEIGHT: 138 LBS

## 2018-09-27 DIAGNOSIS — R06.09 DOE (DYSPNEA ON EXERTION): Primary | ICD-10-CM

## 2018-09-27 DIAGNOSIS — R91.1 PULMONARY NODULE: ICD-10-CM

## 2018-09-27 PROCEDURE — G8420 CALC BMI NORM PARAMETERS: HCPCS | Performed by: INTERNAL MEDICINE

## 2018-09-27 PROCEDURE — 99245 OFF/OP CONSLTJ NEW/EST HI 55: CPT | Performed by: INTERNAL MEDICINE

## 2018-09-27 PROCEDURE — G8427 DOCREV CUR MEDS BY ELIG CLIN: HCPCS | Performed by: INTERNAL MEDICINE

## 2018-09-27 PROCEDURE — 3017F COLORECTAL CA SCREEN DOC REV: CPT | Performed by: INTERNAL MEDICINE

## 2018-09-27 RX ORDER — NICOTINE 21 MG/24HR
1 PATCH, TRANSDERMAL 24 HOURS TRANSDERMAL EVERY 24 HOURS
Qty: 30 PATCH | Refills: 3 | Status: SHIPPED | OUTPATIENT
Start: 2018-09-27 | End: 2019-03-07

## 2018-09-27 NOTE — PATIENT INSTRUCTIONS
few pounds more. Plus, you can help prevent some weight gain by being more active and eating less. Taking the medicine bupropion might help control weight gain. What else can I do to improve my chances of quitting? -- You can:  ?Start exercising. ?Stay away from smokers and places that you associate with smoking. If people close to you smoke, ask them to quit with you. ? Keep gum, hard candy, or something to put in your mouth handy. If you get a craving for a cigarette, try one of these instead. ?Dont give up, even if you start smoking again. It takes most people a few tries before they succeed. You can also get help from a free phone line (0-686-QUIT-NOW) or go online to www.smokefree.gov. Your pulmonary team is here to help you quit.   Call for assistance [phone]

## 2018-09-27 NOTE — PROGRESS NOTES
Chief Complaint: lung nodule    Consulting provider: Allen Eisenberg DO    HPI: 66 y.o. female patient is being seen at the request of Mahsa Singh DO for a consultation regarding a lung nodule. The patient has a history of Smoking. The patient does not have SOB at rest but has LENTZ. Exercise tolerance on level ground is unlimited Stairs. She denies a chronic cough and denies wheezing. No weight loss. No fever/sweats. No family h/o lung cancer. The patient has smoked  1/2 PPD for 15 years.  and parents smoke. Environmental/chemical exposure: Asbestos exposure: no   Patient worked as a clerical worker. TB exposure: no    The information above included the review and summarization of old records. The history was obtained from these old records and from the patient directly. Outside information from Dr. Allison Person regarding  The nodule was reviewed. REVIEW OF SYSTEMS:    See HPI and scanned Review of Systems sheet. Past Medical History:   Diagnosis Date    Arterial occlusion     Chronic bilateral low back pain with bilateral sciatica 8/3/2018    Chronic bilateral low back pain with bilateral sciatica     Hyperlipidemia     Hypertension     Moderate single current episode of major depressive disorder ([address] 8/3/2018    Ruptured disk      Past Surgical History      Procedure Laterality Date     SECTION  1981    LEG SURGERY  2017    fasciotomy due to DVT, LLE     Social History:    TOBACCO:   reports that she has been smoking Cigarettes. She has a 20.00 pack-year smoking history. She has never used smokeless tobacco.  ETOH:   reports that she does not drink alcohol.     Family History  Family History   Problem Relation Age of Onset    High Blood Pressure Mother     High Cholesterol Mother     Heart Disease Mother     Coronary Art Dis Mother     Heart Attack Father         59    Cirrhosis Sister     Alcohol Abuse Sister     Other Sister         \"colostomy bag due to hole in her

## 2018-10-10 ENCOUNTER — TELEPHONE (OUTPATIENT)
Dept: CASE MANAGEMENT | Age: 63
End: 2018-10-10

## 2018-10-15 DIAGNOSIS — G89.4 CHRONIC PAIN SYNDROME: ICD-10-CM

## 2018-10-15 DIAGNOSIS — F32.1 MODERATE SINGLE CURRENT EPISODE OF MAJOR DEPRESSIVE DISORDER (HCC): ICD-10-CM

## 2018-10-15 RX ORDER — DULOXETIN HYDROCHLORIDE 30 MG/1
30 CAPSULE, DELAYED RELEASE ORAL DAILY
Qty: 30 CAPSULE | Refills: 1 | Status: SHIPPED | OUTPATIENT
Start: 2018-10-15 | End: 2019-02-07 | Stop reason: SDUPTHER

## 2018-10-15 NOTE — TELEPHONE ENCOUNTER
Last office visit 09/07/2018     Last written 08/03/2018, 30-days with 1 refill. Next office visit scheduled No future appt scheduled.      Requested Prescriptions     Pending Prescriptions Disp Refills    DULoxetine (CYMBALTA) 30 MG extended release capsule 30 capsule 1     Sig: Take 1 capsule by mouth daily

## 2018-10-23 ENCOUNTER — TELEPHONE (OUTPATIENT)
Dept: CASE MANAGEMENT | Age: 63
End: 2018-10-23

## 2018-12-12 ENCOUNTER — TELEPHONE (OUTPATIENT)
Dept: PULMONOLOGY | Age: 63
End: 2018-12-12

## 2018-12-12 NOTE — TELEPHONE ENCOUNTER
Per overdue result notes pt did not complete ct chest as scheduled  Pt is scheduled for an appt today 12/12/18. Attempted to reach pt no working numbers. 9/28/18    ASSESSMENT:  · LLL pulmonary nodule: 1.3 x 0.8 cm. Indeterminate etiology. No hypermetabolism on PET/CT. Malignancy is not completely excluded although is less likely now given the PET/CT results. · Emphysema  · LENTZ  · Tobacco abuse  · 9/11/18 PET CT:   Left lower lobe pulmonary nodule seen on recent CT scan is not significantly   hypermetabolic.  Recommend continued CT follow-up given that it is a new   finding and there is underlying emphysema.      PLAN:   · Repeat Chest CT in 3 months  · PFTs  · Tobacco cessation - she started at age 41. Interested in quitting  · Start NRT 14mg patch.

## 2018-12-13 ENCOUNTER — TELEPHONE (OUTPATIENT)
Dept: CASE MANAGEMENT | Age: 63
End: 2018-12-13

## 2019-02-07 ENCOUNTER — OFFICE VISIT (OUTPATIENT)
Dept: FAMILY MEDICINE CLINIC | Age: 64
End: 2019-02-07
Payer: COMMERCIAL

## 2019-02-07 VITALS
DIASTOLIC BLOOD PRESSURE: 90 MMHG | BODY MASS INDEX: 22.96 KG/M2 | OXYGEN SATURATION: 98 % | SYSTOLIC BLOOD PRESSURE: 150 MMHG | WEIGHT: 138 LBS | HEART RATE: 83 BPM | TEMPERATURE: 97.9 F

## 2019-02-07 DIAGNOSIS — G89.29 CHRONIC PAIN OF BOTH KNEES: ICD-10-CM

## 2019-02-07 DIAGNOSIS — Z72.0 TOBACCO ABUSE: ICD-10-CM

## 2019-02-07 DIAGNOSIS — Z00.00 HEALTHCARE MAINTENANCE: ICD-10-CM

## 2019-02-07 DIAGNOSIS — F32.1 MODERATE SINGLE CURRENT EPISODE OF MAJOR DEPRESSIVE DISORDER (HCC): ICD-10-CM

## 2019-02-07 DIAGNOSIS — R91.1 LUNG NODULE: ICD-10-CM

## 2019-02-07 DIAGNOSIS — F51.01 PRIMARY INSOMNIA: ICD-10-CM

## 2019-02-07 DIAGNOSIS — Z98.890 HISTORY OF FASCIOTOMY: ICD-10-CM

## 2019-02-07 DIAGNOSIS — G89.4 CHRONIC PAIN SYNDROME: ICD-10-CM

## 2019-02-07 DIAGNOSIS — M25.562 CHRONIC PAIN OF BOTH KNEES: ICD-10-CM

## 2019-02-07 DIAGNOSIS — J45.20 MILD INTERMITTENT ASTHMA WITHOUT COMPLICATION: Primary | ICD-10-CM

## 2019-02-07 DIAGNOSIS — I10 ESSENTIAL HYPERTENSION: ICD-10-CM

## 2019-02-07 DIAGNOSIS — E78.2 MIXED HYPERLIPIDEMIA: ICD-10-CM

## 2019-02-07 DIAGNOSIS — J06.9 VIRAL URI WITH COUGH: ICD-10-CM

## 2019-02-07 DIAGNOSIS — E55.9 VITAMIN D DEFICIENCY: ICD-10-CM

## 2019-02-07 DIAGNOSIS — Z12.31 ENCOUNTER FOR SCREENING MAMMOGRAM FOR BREAST CANCER: ICD-10-CM

## 2019-02-07 DIAGNOSIS — M25.561 CHRONIC PAIN OF BOTH KNEES: ICD-10-CM

## 2019-02-07 PROBLEM — G62.9 NEUROPATHY: Status: ACTIVE | Noted: 2017-05-02

## 2019-02-07 PROBLEM — I82.402 DEEP VEIN THROMBOSIS (DVT) OF LEFT LOWER EXTREMITY (HCC): Status: ACTIVE | Noted: 2017-03-02

## 2019-02-07 LAB
CHOLESTEROL, TOTAL: 206 MG/DL (ref 0–199)
HDLC SERPL-MCNC: 61 MG/DL (ref 40–60)
LDL CHOLESTEROL CALCULATED: 131 MG/DL
TRIGL SERPL-MCNC: 68 MG/DL (ref 0–150)
VLDLC SERPL CALC-MCNC: 14 MG/DL

## 2019-02-07 PROCEDURE — 90472 IMMUNIZATION ADMIN EACH ADD: CPT | Performed by: FAMILY MEDICINE

## 2019-02-07 PROCEDURE — 90732 PPSV23 VACC 2 YRS+ SUBQ/IM: CPT | Performed by: FAMILY MEDICINE

## 2019-02-07 PROCEDURE — 90471 IMMUNIZATION ADMIN: CPT | Performed by: FAMILY MEDICINE

## 2019-02-07 PROCEDURE — G8482 FLU IMMUNIZE ORDER/ADMIN: HCPCS | Performed by: FAMILY MEDICINE

## 2019-02-07 PROCEDURE — 90715 TDAP VACCINE 7 YRS/> IM: CPT | Performed by: FAMILY MEDICINE

## 2019-02-07 PROCEDURE — 3017F COLORECTAL CA SCREEN DOC REV: CPT | Performed by: FAMILY MEDICINE

## 2019-02-07 PROCEDURE — 99215 OFFICE O/P EST HI 40 MIN: CPT | Performed by: FAMILY MEDICINE

## 2019-02-07 PROCEDURE — 90686 IIV4 VACC NO PRSV 0.5 ML IM: CPT | Performed by: FAMILY MEDICINE

## 2019-02-07 PROCEDURE — G8420 CALC BMI NORM PARAMETERS: HCPCS | Performed by: FAMILY MEDICINE

## 2019-02-07 PROCEDURE — 4004F PT TOBACCO SCREEN RCVD TLK: CPT | Performed by: FAMILY MEDICINE

## 2019-02-07 PROCEDURE — G8427 DOCREV CUR MEDS BY ELIG CLIN: HCPCS | Performed by: FAMILY MEDICINE

## 2019-02-07 RX ORDER — TRAZODONE HYDROCHLORIDE 50 MG/1
50 TABLET ORAL NIGHTLY
Qty: 90 TABLET | Refills: 1 | Status: SHIPPED | OUTPATIENT
Start: 2019-02-07 | End: 2019-07-31 | Stop reason: SDUPTHER

## 2019-02-07 RX ORDER — ERGOCALCIFEROL 1.25 MG/1
50000 CAPSULE ORAL WEEKLY
Qty: 12 CAPSULE | Refills: 0 | Status: SHIPPED | OUTPATIENT
Start: 2019-02-07 | End: 2019-05-08 | Stop reason: SDUPTHER

## 2019-02-07 RX ORDER — CETIRIZINE HYDROCHLORIDE 10 MG/1
10 TABLET ORAL DAILY
Qty: 30 TABLET | Refills: 0 | Status: SHIPPED | OUTPATIENT
Start: 2019-02-07 | End: 2019-03-07 | Stop reason: SDUPTHER

## 2019-02-07 RX ORDER — GUAIFENESIN 600 MG/1
1200 TABLET, EXTENDED RELEASE ORAL 2 TIMES DAILY
Qty: 20 TABLET | Refills: 0 | Status: SHIPPED | OUTPATIENT
Start: 2019-02-07 | End: 2019-02-17

## 2019-02-07 RX ORDER — FLUTICASONE PROPIONATE 50 MCG
2 SPRAY, SUSPENSION (ML) NASAL DAILY
Qty: 1 BOTTLE | Refills: 0 | Status: SHIPPED | OUTPATIENT
Start: 2019-02-07 | End: 2019-03-07 | Stop reason: SDUPTHER

## 2019-02-07 RX ORDER — BENZONATATE 100 MG/1
100-200 CAPSULE ORAL 3 TIMES DAILY PRN
Qty: 60 CAPSULE | Refills: 0 | Status: SHIPPED | OUTPATIENT
Start: 2019-02-07 | End: 2019-02-14

## 2019-02-07 RX ORDER — DULOXETIN HYDROCHLORIDE 30 MG/1
30 CAPSULE, DELAYED RELEASE ORAL DAILY
Qty: 7 CAPSULE | Refills: 0 | Status: SHIPPED | OUTPATIENT
Start: 2019-02-07 | End: 2019-03-29 | Stop reason: ALTCHOICE

## 2019-02-07 RX ORDER — NICOTINE 21 MG/24HR
1 PATCH, TRANSDERMAL 24 HOURS TRANSDERMAL EVERY 24 HOURS
Qty: 30 PATCH | Refills: 0 | Status: SHIPPED | OUTPATIENT
Start: 2019-02-07 | End: 2019-03-07 | Stop reason: SDUPTHER

## 2019-02-07 RX ORDER — LISINOPRIL 40 MG/1
40 TABLET ORAL DAILY
Qty: 90 TABLET | Refills: 1 | Status: SHIPPED | OUTPATIENT
Start: 2019-02-07 | End: 2019-07-31 | Stop reason: SDUPTHER

## 2019-02-07 RX ORDER — DULOXETIN HYDROCHLORIDE 60 MG/1
60 CAPSULE, DELAYED RELEASE ORAL DAILY
Qty: 90 CAPSULE | Refills: 1 | Status: SHIPPED | OUTPATIENT
Start: 2019-02-07 | End: 2019-07-31 | Stop reason: SDUPTHER

## 2019-02-07 RX ORDER — ALBUTEROL SULFATE 90 UG/1
2 AEROSOL, METERED RESPIRATORY (INHALATION) EVERY 6 HOURS PRN
Qty: 1 INHALER | Refills: 3 | Status: SHIPPED | OUTPATIENT
Start: 2019-02-07 | End: 2019-05-30 | Stop reason: SDUPTHER

## 2019-02-07 ASSESSMENT — ENCOUNTER SYMPTOMS
EYE DISCHARGE: 0
PHOTOPHOBIA: 0
EYE PAIN: 0
SINUS PAIN: 0
ABDOMINAL PAIN: 0
APNEA: 0
BACK PAIN: 0
EYE ITCHING: 0
SHORTNESS OF BREATH: 0
BLOOD IN STOOL: 0
CHOKING: 0
SORE THROAT: 0
VOICE CHANGE: 0
VOMITING: 0
COLOR CHANGE: 0
STRIDOR: 0
TROUBLE SWALLOWING: 0
NAUSEA: 0
EYE REDNESS: 0
COUGH: 1
SINUS PRESSURE: 0
RHINORRHEA: 1
CHEST TIGHTNESS: 0
CONSTIPATION: 0
DIARRHEA: 0
WHEEZING: 0

## 2019-02-08 DIAGNOSIS — E78.2 MIXED HYPERLIPIDEMIA: ICD-10-CM

## 2019-02-08 RX ORDER — ATORVASTATIN CALCIUM 80 MG/1
80 TABLET, FILM COATED ORAL NIGHTLY
Qty: 90 TABLET | Refills: 1 | Status: SHIPPED | OUTPATIENT
Start: 2019-02-08 | End: 2019-07-31 | Stop reason: SDUPTHER

## 2019-02-08 RX ORDER — GABAPENTIN 400 MG/1
400 CAPSULE ORAL 3 TIMES DAILY
Qty: 90 CAPSULE | Refills: 5 | Status: SHIPPED | OUTPATIENT
Start: 2019-02-08 | End: 2019-07-31 | Stop reason: SDUPTHER

## 2019-03-29 ENCOUNTER — OFFICE VISIT (OUTPATIENT)
Dept: FAMILY MEDICINE CLINIC | Age: 64
End: 2019-03-29
Payer: COMMERCIAL

## 2019-03-29 VITALS
TEMPERATURE: 98 F | SYSTOLIC BLOOD PRESSURE: 130 MMHG | WEIGHT: 146 LBS | HEART RATE: 87 BPM | BODY MASS INDEX: 24.3 KG/M2 | DIASTOLIC BLOOD PRESSURE: 80 MMHG | OXYGEN SATURATION: 94 %

## 2019-03-29 DIAGNOSIS — M54.41 CHRONIC BILATERAL LOW BACK PAIN WITH BILATERAL SCIATICA: ICD-10-CM

## 2019-03-29 DIAGNOSIS — G89.29 CHRONIC BILATERAL LOW BACK PAIN WITH BILATERAL SCIATICA: ICD-10-CM

## 2019-03-29 DIAGNOSIS — M21.372 FOOT DROP, LEFT: ICD-10-CM

## 2019-03-29 DIAGNOSIS — R29.898 UPPER EXTREMITY WEAKNESS: Primary | ICD-10-CM

## 2019-03-29 DIAGNOSIS — M54.42 CHRONIC BILATERAL LOW BACK PAIN WITH BILATERAL SCIATICA: ICD-10-CM

## 2019-03-29 PROCEDURE — G8482 FLU IMMUNIZE ORDER/ADMIN: HCPCS | Performed by: FAMILY MEDICINE

## 2019-03-29 PROCEDURE — G8420 CALC BMI NORM PARAMETERS: HCPCS | Performed by: FAMILY MEDICINE

## 2019-03-29 PROCEDURE — 3017F COLORECTAL CA SCREEN DOC REV: CPT | Performed by: FAMILY MEDICINE

## 2019-03-29 PROCEDURE — 4004F PT TOBACCO SCREEN RCVD TLK: CPT | Performed by: FAMILY MEDICINE

## 2019-03-29 PROCEDURE — 99214 OFFICE O/P EST MOD 30 MIN: CPT | Performed by: FAMILY MEDICINE

## 2019-03-29 PROCEDURE — G8427 DOCREV CUR MEDS BY ELIG CLIN: HCPCS | Performed by: FAMILY MEDICINE

## 2019-03-29 RX ORDER — TRAMADOL HYDROCHLORIDE 50 MG/1
50 TABLET ORAL EVERY 4 HOURS PRN
Qty: 42 TABLET | Refills: 0 | Status: SHIPPED | OUTPATIENT
Start: 2019-03-29 | End: 2019-05-09 | Stop reason: SDUPTHER

## 2019-03-29 RX ORDER — PREDNISONE 50 MG/1
50 TABLET ORAL DAILY
Qty: 5 TABLET | Refills: 0 | Status: SHIPPED | OUTPATIENT
Start: 2019-03-29 | End: 2019-04-03

## 2019-03-29 NOTE — PROGRESS NOTES
3/29/2019    This is a 66 y.o. 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  Gabapentin not helping    Kept dropping stuff  Pen just fell out of her hands     Has to work to  her feet when walking  No incontinence  No saddle anesthesia  No unexplained fevers       Past Medical History:   Diagnosis Date    Arterial occlusion     Chronic bilateral low back pain with bilateral sciatica 8/3/2018    Chronic bilateral low back pain with bilateral sciatica     Hyperlipidemia     Hypertension     Moderate single current episode of major depressive disorder ([address] 8/3/2018    Ruptured disk        Past Surgical History:   Procedure Laterality Date     SECTION  1981    LEG SURGERY  2017    fasciotomy due to DVT, LLE       Social History     Socioeconomic History    Marital status:      Spouse name: Not on file    Number of children: Not on file    Years of education: Not on file    Highest education level: Not on file   Occupational History    Not on file   Social Needs    Financial resource strain: Not on file    Food insecurity:     Worry: Not on file     Inability: Not on file    Transportation needs:     Medical: Not on file     Non-medical: Not on file   Tobacco Use    Smoking status: Current Every Day Smoker     Packs/day: 1.00     Years: 20.00     Pack years: 20.00     Types: Cigarettes    Smokeless tobacco: Never Used    Tobacco comment: 20 - smokes 1/2 ppd   Substance and Sexual Activity    Alcohol use: No    Drug use: Yes     Types: Marijuana, Opiates      Comment: pt states she took two hits for new years so she could sleep    Sexual activity: Not Currently   Lifestyle    Physical activity:     Days per week: Not on file     Minutes per session: Not on file    Stress: Not on file   Relationships    Social connections:     Talks on phone: Not on file     Gets together: Not on file     Attends Orthodox service: Not on file     Active member of club or organization: Not on file     Attends meetings of clubs or organizations: Not on file     Relationship status: Not on file    Intimate partner violence:     Fear of current or ex partner: Not on file     Emotionally abused: Not on file     Physically abused: Not on file     Forced sexual activity: Not on file   Other Topics Concern    Not on file   Social History Narrative    Not on file       Family History   Problem Relation Age of Onset    High Blood Pressure Mother     High Cholesterol Mother     Heart Disease Mother     Coronary Art Dis Mother     Heart Attack Father         59    Cirrhosis Sister     Alcohol Abuse Sister     Other Sister         \"colostomy bag due to hole in her colon\"   [employer] Sudden Death Brother         \"suicide\"    Colon Cancer Other         maternal uncle    Breast Cancer Other         multiple maternal aunts along with uterine cancer       Current Outpatient Medications   Medication Sig Dispense Refill    gabapentin (NEURONTIN) 400 MG capsule Take 1 capsule by mouth 3 times daily for 30 days. . 90 capsule 5    atorvastatin (LIPITOR) 80 MG tablet Take 1 tablet by mouth nightly 90 tablet 1    DULoxetine (CYMBALTA) 60 MG extended release capsule Take 1 capsule by mouth daily 90 capsule 1    albuterol sulfate HFA (VENTOLIN HFA) 108 (90 Base) MCG/ACT inhaler Inhale 2 puffs into the lungs every 6 hours as needed for Wheezing 1 Inhaler 3    lisinopril (PRINIVIL;ZESTRIL) 40 MG tablet Take 1 tablet by mouth daily 90 tablet 1    vitamin D (ERGOCALCIFEROL) 94400 units CAPS capsule Take 1 capsule by mouth once a week for 12 doses 12 capsule 0    traZODone (DESYREL) 50 MG tablet Take 1 tablet by mouth nightly 90 tablet 1    aspirin 81 MG tablet Take 1 tablet by mouth daily (with breakfast) 90 tablet 1    ASPIRIN LOW DOSE 81 MG EC tablet TAKE 1 TABLET BY MOUTH DAILY 90 tablet 3    nicotine (NICODERM CQ) 14 MG/24HR Place 1 patch onto the skin every 24 hours 30 patch 0    cetirizine (ZYRTEC) 10 MG tablet TAKE 1 TABLET BY MOUTH DAILY 90 tablet 1    fluticasone (FLONASE) 50 MCG/ACT nasal spray SQUIRT 2 SPRAYS IN EACH NOSTRIL DAILY 16 g 2     No current facility-administered medications for this visit. Immunization History   Administered Date(s) Administered    Influenza, Quadv, 3 yrs and older, IM, PF (Fluzone 3 yrs and older or Afluria 5 yrs and older) 02/07/2019    Pneumococcal Polysaccharide ([id number]) 02/07/2019    Tdap (Boostrix, Adacel) 02/07/2019       Allergies   Allergen Reactions    Codeine Hives     Tolerates Norco.       Review of Systems   Constitutional: Negative for activity change, chills, diaphoresis, fatigue, fever and unexpected weight change. Respiratory: Negative for shortness of breath. Cardiovascular: Negative for chest pain and leg swelling. Gastrointestinal: Negative for abdominal pain, nausea and vomiting. Genitourinary: Negative for difficulty urinating. Musculoskeletal: Positive for arthralgias, back pain, gait problem, joint swelling and myalgias. Negative for neck pain and neck stiffness. Skin: Negative for color change, pallor, rash and wound. Neurological: Positive for weakness and numbness. Negative for headaches. Psychiatric/Behavioral: Positive for sleep disturbance. Negative for dysphoric mood. The patient is not nervous/anxious. /80 (Site: Left Upper Arm, Position: Sitting, Cuff Size: Medium Adult)   Pulse 87   Temp 98 °F (36.7 °C) (Oral)   Wt 146 lb (66.2 kg)   SpO2 94%   BMI 24.30 kg/m²     Physical Exam   Constitutional: She is oriented to person, place, and time. She appears well-developed and well-nourished. No distress. HENT:   Head: Normocephalic and atraumatic. Eyes: Pupils are equal, round, and reactive to light.  EOM are normal.   Neck: Normal

## 2019-04-08 ENCOUNTER — HOSPITAL ENCOUNTER (OUTPATIENT)
Dept: MRI IMAGING | Age: 64
Discharge: HOME | End: 2019-04-08
Payer: COMMERCIAL

## 2019-04-08 ENCOUNTER — HOSPITAL ENCOUNTER (OUTPATIENT)
Dept: CT IMAGING | Age: 64
Discharge: HOME | End: 2019-04-08
Payer: COMMERCIAL

## 2019-04-08 DIAGNOSIS — R29.898 UPPER EXTREMITY WEAKNESS: ICD-10-CM

## 2019-04-08 DIAGNOSIS — R91.1 LUNG NODULE: ICD-10-CM

## 2019-04-08 DIAGNOSIS — M21.372 FOOT DROP, LEFT: ICD-10-CM

## 2019-04-08 PROCEDURE — 72146 MRI CHEST SPINE W/O DYE: CPT

## 2019-04-08 PROCEDURE — 72148 MRI LUMBAR SPINE W/O DYE: CPT

## 2019-04-08 PROCEDURE — 71250 CT THORAX DX C-: CPT

## 2019-04-10 ENCOUNTER — TELEPHONE (OUTPATIENT)
Dept: FAMILY MEDICINE CLINIC | Age: 64
End: 2019-04-10

## 2019-04-19 ASSESSMENT — ENCOUNTER SYMPTOMS
ABDOMINAL PAIN: 0
COLOR CHANGE: 0
VOMITING: 0
NAUSEA: 0
SHORTNESS OF BREATH: 0
BACK PAIN: 1

## 2019-04-19 NOTE — PATIENT INSTRUCTIONS
For your back pain, at this time, I would advise:    -Gentle stretching, especially in the evening.   -Heat packs in the morning to loosen musculature, 20 minutes on and then 20 minutes off, as tolerated  -Ice packs in the evening, 20 minutes on and then 20 minutes off, as tolerated  -You can trial anti-inflammatory medication that is found over-the-counter: Advil, Motrin, Ibuprofen, OR Aleve 400-600 mg WITH FOOD every 6-8 hours as needed (unless you have a history of any abdominal bleeding, kidney dysfunction, or heart attack and have been told not to take this group of medications). -Continue to be as active as possible. -Improve core and leg strength, as tolerated. Please be seen in the office if:   -pain persists or worsens longer than 2 weeks despite all of your above efforts   -you have urinary or bowel incontinence  -you lose sensation in your inner thighs  -you have any unexplained weight loss/ fevers/ or night sweats  -you have difficulties walking due to weakness.

## 2019-05-09 DIAGNOSIS — M54.41 CHRONIC BILATERAL LOW BACK PAIN WITH BILATERAL SCIATICA: ICD-10-CM

## 2019-05-09 DIAGNOSIS — G89.29 CHRONIC BILATERAL LOW BACK PAIN WITH BILATERAL SCIATICA: ICD-10-CM

## 2019-05-09 DIAGNOSIS — M54.42 CHRONIC BILATERAL LOW BACK PAIN WITH BILATERAL SCIATICA: ICD-10-CM

## 2019-05-09 NOTE — TELEPHONE ENCOUNTER
.  Last office visit 2/7/2019     Last written  3-29-19 42 with 0     Next office visit scheduled 5/20/2019    Requested Prescriptions     Pending Prescriptions Disp Refills    traMADol (ULTRAM) 50 MG tablet [Pharmacy Med Name: traMADol HCl 50 MG Oral Tablet] 42 tablet      Sig: Take 1 tablet by mouth every 4 hours as needed for Pain for up to 7 days. Intended supply: 7 days.  Take lowest dose possible to manage pain

## 2019-05-10 ENCOUNTER — TELEPHONE (OUTPATIENT)
Dept: FAMILY MEDICINE CLINIC | Age: 64
End: 2019-05-10

## 2019-05-10 RX ORDER — TRAMADOL HYDROCHLORIDE 50 MG/1
50 TABLET ORAL EVERY 4 HOURS PRN
Qty: 42 TABLET | Refills: 0 | Status: SHIPPED | OUTPATIENT
Start: 2019-05-10 | End: 2019-06-06 | Stop reason: SDUPTHER

## 2019-05-10 NOTE — TELEPHONE ENCOUNTER
[facility] Report of Consultation    Patient Name: Zeferino Maxwell   YOB: 1966   Medical Record Number: [id number]   CSN: [id number]   Consulting Physician: Reinadlo Fitch M.D. Referring Physician: No ref.  provider found    Date of Cons This is not a long term prescription. I will refill one more time. Please let her know. Thanks. immunodeficiency virus infection) ([facility] [address] 2004   • Asthma    • Hyperlipidemia    • Hypothyroid        Past Surgical History:  History reviewed. No pertinent past surgical history.     Gynecologic History:  Obstetric History    No data available      Family Hi Gastrointestinal Normal - No nausea, vomiting, diarrhea, GI bleeding, or constipation. No change in bowel habits, no heartburn or early satiety. Genitorurinary  Normal - No hematuria, dysuria, increased frequency, urgency, hesitancy or incontinence. Normal - Alert and oriented times three. Coherent speech. Verbalizes understanding of our discussions today.        Laboratory:    Lab Results  Component Value Date   WBC 5.0 01/26/2017   RBC 3.87 01/26/2017   HGB 11.7 01/26/2017   HCT 36.4 01/26/2017   [facility] hospitalization and treatment could be accomplished in [facility]. I explained that her insurance situation severely limits where she can be treated and that [facility] may be the closest facility that has an oncologist that can see her.   I ordered a STAT ech

## 2019-05-20 ENCOUNTER — TELEPHONE (OUTPATIENT)
Dept: ORTHOPEDIC SURGERY | Age: 64
End: 2019-05-20

## 2019-05-20 ENCOUNTER — TELEPHONE (OUTPATIENT)
Dept: FAMILY MEDICINE CLINIC | Age: 64
End: 2019-05-20

## 2019-05-20 ENCOUNTER — OFFICE VISIT (OUTPATIENT)
Dept: FAMILY MEDICINE CLINIC | Age: 64
End: 2019-05-20
Payer: COMMERCIAL

## 2019-05-20 VITALS
SYSTOLIC BLOOD PRESSURE: 150 MMHG | OXYGEN SATURATION: 98 % | HEART RATE: 85 BPM | WEIGHT: 145 LBS | BODY MASS INDEX: 24.13 KG/M2 | DIASTOLIC BLOOD PRESSURE: 100 MMHG

## 2019-05-20 DIAGNOSIS — M54.40 CHRONIC BILATERAL LOW BACK PAIN WITH SCIATICA, SCIATICA LATERALITY UNSPECIFIED: ICD-10-CM

## 2019-05-20 DIAGNOSIS — I10 ESSENTIAL HYPERTENSION: ICD-10-CM

## 2019-05-20 DIAGNOSIS — G89.29 CHRONIC BILATERAL LOW BACK PAIN WITH SCIATICA, SCIATICA LATERALITY UNSPECIFIED: ICD-10-CM

## 2019-05-20 DIAGNOSIS — R91.1 LUNG NODULE: ICD-10-CM

## 2019-05-20 DIAGNOSIS — R93.7 BONE MARROW EDEMA: Primary | ICD-10-CM

## 2019-05-20 DIAGNOSIS — J43.2 CENTRILOBULAR EMPHYSEMA (HCC): ICD-10-CM

## 2019-05-20 DIAGNOSIS — R93.7 BONE MARROW EDEMA: ICD-10-CM

## 2019-05-20 LAB
A/G RATIO: 1.3 (ref 1.1–2.2)
ALBUMIN SERPL-MCNC: 4.6 G/DL (ref 3.4–5)
ALP BLD-CCNC: 94 U/L (ref 40–129)
ALT SERPL-CCNC: 12 U/L (ref 10–40)
ANION GAP SERPL CALCULATED.3IONS-SCNC: 15 MMOL/L (ref 3–16)
AST SERPL-CCNC: 18 U/L (ref 15–37)
BASOPHILS ABSOLUTE: 0.1 K/UL (ref 0–0.2)
BASOPHILS RELATIVE PERCENT: 0.7 %
BILIRUB SERPL-MCNC: 0.3 MG/DL (ref 0–1)
BUN BLDV-MCNC: 5 MG/DL (ref 7–20)
C-REACTIVE PROTEIN: 0.6 MG/L (ref 0–5.1)
CALCIUM SERPL-MCNC: 9.6 MG/DL (ref 8.3–10.6)
CHLORIDE BLD-SCNC: 106 MMOL/L (ref 99–110)
CO2: 25 MMOL/L (ref 21–32)
CREAT SERPL-MCNC: 0.5 MG/DL (ref 0.6–1.2)
EOSINOPHILS ABSOLUTE: 0.2 K/UL (ref 0–0.6)
EOSINOPHILS RELATIVE PERCENT: 3.2 %
FERRITIN: 26.3 NG/ML (ref 15–150)
FOLATE: 11.15 NG/ML (ref 4.78–24.2)
GFR AFRICAN AMERICAN: >60
GFR NON-AFRICAN AMERICAN: >60
GLOBULIN: 3.6 G/DL
GLUCOSE BLD-MCNC: 102 MG/DL (ref 70–99)
HCT VFR BLD CALC: 43 % (ref 36–48)
HEMOGLOBIN: 14.1 G/DL (ref 12–16)
IRON SATURATION: 23 % (ref 15–50)
IRON: 84 UG/DL (ref 37–145)
LACTATE DEHYDROGENASE: 214 U/L (ref 100–190)
LYMPHOCYTES ABSOLUTE: 2.1 K/UL (ref 1–5.1)
LYMPHOCYTES RELATIVE PERCENT: 27.8 %
MCH RBC QN AUTO: 30.9 PG (ref 26–34)
MCHC RBC AUTO-ENTMCNC: 32.9 G/DL (ref 31–36)
MCV RBC AUTO: 93.9 FL (ref 80–100)
MONOCYTES ABSOLUTE: 0.4 K/UL (ref 0–1.3)
MONOCYTES RELATIVE PERCENT: 6 %
NEUTROPHILS ABSOLUTE: 4.6 K/UL (ref 1.7–7.7)
NEUTROPHILS RELATIVE PERCENT: 62.3 %
PDW BLD-RTO: 14.8 % (ref 12.4–15.4)
PLATELET # BLD: 292 K/UL (ref 135–450)
PMV BLD AUTO: 8.4 FL (ref 5–10.5)
POTASSIUM SERPL-SCNC: 4.3 MMOL/L (ref 3.5–5.1)
PROTEIN PROTEIN: 0.01 G/DL
PROTEIN PROTEIN: 5 MG/DL
RBC # BLD: 4.58 M/UL (ref 4–5.2)
SEDIMENTATION RATE, ERYTHROCYTE: 10 MM/HR (ref 0–30)
SODIUM BLD-SCNC: 146 MMOL/L (ref 136–145)
TOTAL IRON BINDING CAPACITY: 365 UG/DL (ref 260–445)
TOTAL PROTEIN: 8.2 G/DL (ref 6.4–8.2)
TSH REFLEX: 1.82 UIU/ML (ref 0.27–4.2)
VITAMIN B-12: 264 PG/ML (ref 211–911)
WBC # BLD: 7.4 K/UL (ref 4–11)

## 2019-05-20 PROCEDURE — 99214 OFFICE O/P EST MOD 30 MIN: CPT | Performed by: FAMILY MEDICINE

## 2019-05-20 PROCEDURE — 3017F COLORECTAL CA SCREEN DOC REV: CPT | Performed by: FAMILY MEDICINE

## 2019-05-20 PROCEDURE — G8926 SPIRO NO PERF OR DOC: HCPCS | Performed by: FAMILY MEDICINE

## 2019-05-20 PROCEDURE — G8427 DOCREV CUR MEDS BY ELIG CLIN: HCPCS | Performed by: FAMILY MEDICINE

## 2019-05-20 PROCEDURE — 4004F PT TOBACCO SCREEN RCVD TLK: CPT | Performed by: FAMILY MEDICINE

## 2019-05-20 PROCEDURE — G8420 CALC BMI NORM PARAMETERS: HCPCS | Performed by: FAMILY MEDICINE

## 2019-05-20 PROCEDURE — 3023F SPIROM DOC REV: CPT | Performed by: FAMILY MEDICINE

## 2019-05-20 RX ORDER — NAPROXEN 500 MG/1
500 TABLET ORAL 2 TIMES DAILY WITH MEALS
Qty: 60 TABLET | Refills: 1 | Status: SHIPPED | OUTPATIENT
Start: 2019-05-20 | End: 2019-07-18 | Stop reason: SDUPTHER

## 2019-05-20 RX ORDER — HYDROCHLOROTHIAZIDE 12.5 MG/1
12.5 TABLET ORAL DAILY
Qty: 30 TABLET | Refills: 1 | Status: SHIPPED | OUTPATIENT
Start: 2019-05-20 | End: 2019-07-18 | Stop reason: SDUPTHER

## 2019-05-20 RX ORDER — LIDOCAINE 50 MG/G
1 PATCH TOPICAL DAILY
Qty: 20 PATCH | Refills: 1 | Status: SHIPPED | OUTPATIENT
Start: 2019-05-20 | End: 2019-06-04

## 2019-05-20 ASSESSMENT — ENCOUNTER SYMPTOMS
VOMITING: 0
CHEST TIGHTNESS: 0
SHORTNESS OF BREATH: 1
WHEEZING: 1
COLOR CHANGE: 0
APNEA: 0
NAUSEA: 0
COUGH: 1
BACK PAIN: 1
ABDOMINAL PAIN: 0
STRIDOR: 0
CHOKING: 0

## 2019-05-20 NOTE — PROGRESS NOTES
2019    This is a 66 y.o. female who presents for  Chief Complaint   Patient presents with    Results     Discuss results of MRI, and also tumors on the lungs.         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  Has tried multiple courses of the years of NSAIDs, pain medications including fentanyl patches  Has tried PT in the past, goes to her limits of pain and stops      Past Medical History:   Diagnosis Date    Arterial occlusion     Chronic bilateral low back pain with bilateral sciatica 8/3/2018    Chronic bilateral low back pain with bilateral sciatica     Hyperlipidemia     Hypertension     Moderate single current episode of major depressive disorder ([facility] [address] 8/3/2018    Ruptured disk        Past Surgical History:   Procedure Laterality Date     SECTION  1981    LEG SURGERY  2017    fasciotomy due to DVT, LLE       Social History     Socioeconomic History    Marital status:      Spouse name: Not on file    Number of children: Not on file    Years of education: Not on file    Highest education level: Not on file   Occupational History    Not on file   Social Needs    Financial resource strain: Not on file    Food insecurity:     Worry: Not on file     Inability: Not on file    Transportation needs:     Medical: Not on file     Non-medical: Not on file   Tobacco Use    Smoking status: Current Every Day Smoker     Packs/day: 1.00     Years: 20.00     Pack years: 20.00     Types: Cigarettes    Smokeless tobacco: Never Used    Tobacco comment: 20 - smokes 1/2 ppd   Substance and Sexual Activity    Alcohol use: No    Drug use: Yes     Types: Marijuana, Opiates      Comment: pt states she took two hits for new cetirizine (ZYRTEC) 10 MG tablet TAKE 1 TABLET BY MOUTH DAILY 90 tablet 1    fluticasone (FLONASE) 50 MCG/ACT nasal spray SQUIRT 2 SPRAYS IN EACH NOSTRIL DAILY 16 g 2    atorvastatin (LIPITOR) 80 MG tablet Take 1 tablet by mouth nightly 90 tablet 1    DULoxetine (CYMBALTA) 60 MG extended release capsule Take 1 capsule by mouth daily 90 capsule 1    albuterol sulfate HFA (VENTOLIN HFA) 108 (90 Base) MCG/ACT inhaler Inhale 2 puffs into the lungs every 6 hours as needed for Wheezing 1 Inhaler 3    lisinopril (PRINIVIL;ZESTRIL) 40 MG tablet Take 1 tablet by mouth daily 90 tablet 1    traZODone (DESYREL) 50 MG tablet Take 1 tablet by mouth nightly 90 tablet 1    aspirin 81 MG tablet Take 1 tablet by mouth daily (with breakfast) 90 tablet 1    gabapentin (NEURONTIN) 400 MG capsule Take 1 capsule by mouth 3 times daily for 30 days. . 90 capsule 5     No current facility-administered medications for this visit. Immunization History   Administered Date(s) Administered    Influenza, Quadv, 3 yrs and older, IM, PF (Fluzone 3 yrs and older or Afluria 5 yrs and older) 02/07/2019    Pneumococcal Polysaccharide ([id number]) 02/07/2019    Tdap (Boostrix, Adacel) 02/07/2019       Allergies   Allergen Reactions    Codeine Hives     Tolerates Norco.       Review of Systems   Constitutional: Negative for activity change, appetite change, chills, diaphoresis, fatigue, fever and unexpected weight change. Eyes: Negative for visual disturbance. Respiratory: Positive for cough, shortness of breath and wheezing. Negative for apnea, choking, chest tightness and stridor. Cardiovascular: Negative for chest pain, palpitations and leg swelling. Gastrointestinal: Negative for abdominal pain, nausea and vomiting. Genitourinary: Negative for decreased urine volume and difficulty urinating. Musculoskeletal: Positive for arthralgias, back pain, gait problem and myalgias.  Negative for joint swelling, neck pain and neck stiffness. Skin: Negative for color change, pallor, rash and wound. Neurological: Negative for dizziness, weakness, light-headedness, numbness and headaches. Psychiatric/Behavioral: Negative for dysphoric mood and sleep disturbance. The patient is not nervous/anxious. BP (!) 150/100   Pulse 85   Wt 145 lb (65.8 kg)   SpO2 98%   BMI 24.13 kg/m²     Physical Exam   Constitutional: She is oriented to person, place, and time. She appears well-developed and well-nourished. No distress. HENT:   Head: Normocephalic and atraumatic. Eyes: Pupils are equal, round, and reactive to light. Conjunctivae and EOM are normal.   Neck: Normal range of motion. Neck supple. No JVD present. Cardiovascular: Normal rate, regular rhythm, normal heart sounds and intact distal pulses. Exam reveals no gallop and no friction rub. No murmur heard. Pulmonary/Chest: Effort normal. No respiratory distress. She has wheezes. She has no rales. She exhibits no tenderness. Abdominal: Soft. There is no tenderness. Musculoskeletal: Normal range of motion. She exhibits tenderness. She exhibits no edema or deformity. Neurological: She is alert and oriented to person, place, and time. No cranial nerve deficit or sensory deficit. Coordination normal.   Skin: Skin is warm and dry. Capillary refill takes 2 to 3 seconds. No rash noted. She is not diaphoretic. No erythema. No pallor. Psychiatric: She has a normal mood and affect. Her behavior is normal. Judgment and thought content normal.   Nursing note and vitals reviewed. Plan  1. Bone marrow edema  MRI reviewed with pt in detail, was completed for chronic back pain. Last labs in 7/18 with normal CBC, TSH, renal fxn, total protein. Given progressive back pain out of proportion with exam and new MRI findings, will repeat blood work and assess for Multiple Myeloma. - CBC Auto Differential; Future  - Comprehensive Metabolic Panel; Future  - Ferritin;  Future  - Folate; Future  - Iron and TIBC; Future  - Vitamin B12; Future  - TSH with Reflex; Future  - PROTEIN ELECTROPHORESIS, SERUM; Future  - PROTEIN ELECTROPHORESIS, URINE; Future  - LACTATE DEHYDROGENASE; Future  - C-REACTIVE PROTEIN; Future  - SEDIMENTATION RATE; Future  - BETA 2 MICROGLOBULIN, SERUM; Future    2. Chronic bilateral low back pain with sciatica, sciatica laterality unspecified  Long standing Hx. Per pt, has tried \"10 years of medications, including fentanyl patches. \" PT referral placed. C/w Cymbalta 60 mg daily, she states compliance. NSAIDs, Tylenol, stretching nightly, heat/ice encouraged every visit. Recent short term Tramadol prescription helped mildly initially with Sxs.   - Jennifer Barnard MD, Spine Surgery, [facility]  - [facility] - [facility]  - naproxen (EC NAPROSYN) 500 MG EC tablet; Take 1 tablet by mouth 2 times daily (with meals)  Dispense: 60 tablet; Refill: 1  - lidocaine (LIDODERM) 5 %; Place 1 patch onto the skin daily 12 hours on, 12 hours off. Dispense: 20 patch; Refill: 1    3. Lung nodule  CT chest in 4/19 with stable nodules. Largest partially spiculated nodule (13 mm by 6 mm) was stable and not hypermetabolic on PET scan in 9/67. Will repeat imaging in 9 mo, discussed today. No new worrisome Sxs. Follows with Pulm, Dr. Ramonita Jaramillo, but has not been compliant with follow-up visits. I stressed these today. 4. Essential hypertension  Elevated on two readings. Is taking her ACE, will add HCTZ. Repeat check in 4 weeks. No acute concerning ACS Sxs.   - hydrochlorothiazide (HYDRODIURIL) 12.5 MG tablet; Take 1 tablet by mouth daily  Dispense: 30 tablet; Refill: 1    5. Centrilobular emphysema (HCC)  Taking albuterol inhaler daily. Will add a controller inhaler. Discussed PRN Albuterol use, and stressed baseline control. C/w smoking cessation. - tiotropium (SPIRIVA HANDIHALER) 18 MCG inhalation capsule; Inhale 1 capsule into the lungs daily  Dispense: 30 capsule;  Refill: 0        While assessing care for this patient, I have reviewed all pertinent lab work/imaging/ specialist notes and care in reference to those problems addressed above in detail. Appropriate medical decision making was based on this. Please note that portions of this note may have been completed with a voice recognition program. Efforts were made to edit the dictations but occasionally words are mis-transcribed. No follow-ups on file.

## 2019-05-20 NOTE — Clinical Note
Good morning, This patient has missed follow up appointments with you. I was finally able to get her in today to discuss her back MRI results. She had a CT of her chest in 4/19 with stable nodules, although one is partially spiculated at 13 mm by 6 mm. Her PET scan was in 9/18. If you get time, please review and let me know if you have any other thoughts since we see her infrequently unless it's back pain related. I am planning on repeating her chest CT in 1/19. I'd appreciate it. Thanks! Radha Mcdonald MD

## 2019-05-21 ENCOUNTER — TELEPHONE (OUTPATIENT)
Dept: FAMILY MEDICINE CLINIC | Age: 64
End: 2019-05-21

## 2019-05-21 NOTE — TELEPHONE ENCOUNTER
PA needed for Spiriva  [employer]  KEY:  [id number]      PA needed for Lidocaine 5% patches  [employer]  KEY:  [id number]

## 2019-05-22 LAB
ALBUMIN SERPL-MCNC: 3.6 G/DL (ref 3.1–4.9)
ALPHA-1-GLOBULIN: 0.3 G/DL (ref 0.2–0.4)
ALPHA-2-GLOBULIN: 0.8 G/DL (ref 0.4–1.1)
BETA GLOBULIN: 1.5 G/DL (ref 0.9–1.6)
BETA-2 MICROGLOBULIN: 2.4 MG/L (ref 1.1–2.4)
GAMMA GLOBULIN: 2 G/DL (ref 0.6–1.8)
SPE/IFE INTERPRETATION: NORMAL
URINE ELECTROPHORESIS INTERP: NORMAL

## 2019-05-23 NOTE — TELEPHONE ENCOUNTER
Submitted PA for Lidocaine 5% patches, Key: [id number]. Status PENDING. Submitted PA for Spiriva HandiHaler 18MCG capsules, Key: [id number]. Status PENDING.

## 2019-05-28 NOTE — TELEPHONE ENCOUNTER
Received DENIAL for Lidocaine 5% patches. Denial letter attached. Received DENIAL for Spiriva HandiHaler 18MCG capsules. Denial letter attached. Please advise patient. Thank you.

## 2019-06-04 ENCOUNTER — HOSPITAL ENCOUNTER (EMERGENCY)
Age: 64
Discharge: HOME | End: 2019-06-04
Payer: COMMERCIAL

## 2019-06-04 ENCOUNTER — APPOINTMENT (OUTPATIENT)
Dept: GENERAL RADIOLOGY | Age: 64
End: 2019-06-04
Payer: COMMERCIAL

## 2019-06-04 VITALS
BODY MASS INDEX: 26.43 KG/M2 | TEMPERATURE: 98.2 F | RESPIRATION RATE: 16 BRPM | WEIGHT: 140 LBS | OXYGEN SATURATION: 99 % | HEART RATE: 92 BPM | DIASTOLIC BLOOD PRESSURE: 106 MMHG | SYSTOLIC BLOOD PRESSURE: 172 MMHG | HEIGHT: 61 IN

## 2019-06-04 DIAGNOSIS — S20.211A CONTUSION OF RIB ON RIGHT SIDE, INITIAL ENCOUNTER: Primary | ICD-10-CM

## 2019-06-04 DIAGNOSIS — J45.20 MILD INTERMITTENT ASTHMA WITHOUT COMPLICATION: ICD-10-CM

## 2019-06-04 DIAGNOSIS — R07.89 CHEST WALL PAIN: ICD-10-CM

## 2019-06-04 PROCEDURE — 73030 X-RAY EXAM OF SHOULDER: CPT

## 2019-06-04 PROCEDURE — 71101 X-RAY EXAM UNILAT RIBS/CHEST: CPT

## 2019-06-04 PROCEDURE — 99283 EMERGENCY DEPT VISIT LOW MDM: CPT

## 2019-06-04 RX ORDER — INDOMETHACIN 50 MG/1
50 CAPSULE ORAL
Qty: 21 CAPSULE | Refills: 0 | Status: SHIPPED | OUTPATIENT
Start: 2019-06-04 | End: 2019-09-18

## 2019-06-04 ASSESSMENT — PAIN DESCRIPTION - LOCATION: LOCATION: RIB CAGE;SHOULDER

## 2019-06-04 ASSESSMENT — PAIN DESCRIPTION - ORIENTATION: ORIENTATION: RIGHT

## 2019-06-04 ASSESSMENT — PAIN SCALES - GENERAL: PAINLEVEL_OUTOF10: 8

## 2019-06-04 NOTE — ED PROVIDER NOTES
[facility]ENC[facility] ENCOUNTER      Pt Name: Cole Hicks  GWK:[id number]  [place] 1955  Date of evaluation: 2019  Provider: Yoko Alba PA-C      Chief Complaint:    Chief Complaint   Patient presents with    Rib Pain     past week. right side. pt states that she slipped on plexi glass and 2 5 gallon buckets she was carrying came down on her    Shoulder Pain       Nursing Notes, Past Medical Hx, Past Surgical Hx, Social Hx, Allergies, and Family Hx were all reviewed and agreed with or any disagreements were addressed in the HPI.    HPI:  (Location, Duration, Timing, Severity,Quality, Assoc Sx, Context, Modifying factors)  This is a  60 y.o. female patient presenting with complaint of right chest wall pain. Patient reports about 1 week ago she was carrying 2  5 gallon buckets of water across a street. She apparently slipped falling onto the bucket. This caused hyperextension mechanism injury to the dominant right shoulder. At this time she shows good range of motion without obvious disability or immobility. She is complaining of pain on the right chest wall worse with deep breath or movement. She does have an ecchymotic area she shows me. She indicates no shortness of breath the pain with deep breath due to the injury mechanism. She is concerned about possible fracture. She indicates no cough or congestion. She does smoke about 2 cigarettes daily.     PastMedical/Surgical History:      Diagnosis Date    Arterial occlusion     Centrilobular emphysema ([facility] [address] 2019    Chronic bilateral low back pain with bilateral sciatica 8/3/2018    Chronic bilateral low back pain with bilateral sciatica     Hyperlipidemia     Hypertension     Moderate single current episode of major depressive disorder ([address] 8/3/2018    Ruptured disk          Procedure Laterality Date     SECTION  1981    LEG SURGERY 01/2017    fasciotomy due to DVT, LLE       Medications:  Discharge Medication List as of 6/4/2019  6:06 PM      CONTINUE these medications which have NOT CHANGED    Details   nicotine (NICODERM CQ) 7 MG/24HR Place 1 patch onto the skin daily for 14 days, Disp-14 patch, R-0Normal      VENTOLIN  (90 Base) MCG/ACT inhaler Inhale 2 puffs into the lungs every 6 hours as needed for Wheezing, Disp-1 Inhaler, R-3Normal      naproxen (EC NAPROSYN) 500 MG EC tablet Take 1 tablet by mouth 2 times daily (with meals), Disp-60 tablet, R-1Normal      tiotropium (SPIRIVA HANDIHALER) 18 MCG inhalation capsule Inhale 1 capsule into the lungs daily, Disp-30 capsule, R-0Normal      hydrochlorothiazide (HYDRODIURIL) 12.5 MG tablet Take 1 tablet by mouth daily, Disp-30 tablet, R-1Normal      vitamin D (ERGOCALCIFEROL) 04691 units CAPS capsule Take 1 capsule by mouth once a week for 12 doses, Disp-12 capsule, R-0Normal      cetirizine (ZYRTEC) 10 MG tablet TAKE 1 TABLET BY MOUTH DAILY, Disp-90 tablet, R-1Normal      fluticasone (FLONASE) 50 MCG/ACT nasal spray SQUIRT 2 SPRAYS IN EACH NOSTRIL DAILY, Disp-16 g, R-2Normal      gabapentin (NEURONTIN) 400 MG capsule Take 1 capsule by mouth 3 times daily for 30 days. ., Disp-90 capsule, R-5Normal      atorvastatin (LIPITOR) 80 MG tablet Take 1 tablet by mouth nightly, Disp-90 tablet, R-1Normal      DULoxetine (CYMBALTA) 60 MG extended release capsule Take 1 capsule by mouth daily, Disp-90 capsule, R-1Normal      traZODone (DESYREL) 50 MG tablet Take 1 tablet by mouth nightly, Disp-90 tablet, R-1Normal      aspirin 81 MG tablet Take 1 tablet by mouth daily (with breakfast), Disp-90 tablet, R-1Normal      lisinopril (PRINIVIL;ZESTRIL) 40 MG tablet Take 1 tablet by mouth daily, Disp-90 tablet, R-1Normal               Review of Systems:  Review of Systems  Positives and Pertinent negatives as per HPI.   Except as noted above in the ROS, problem specific ROS was completed and is negative. Physical Exam:  Physical Exam   Constitutional: She is oriented to person, place, and time. She appears well-developed and well-nourished. HENT:   Head: Normocephalic and atraumatic. Right Ear: External ear normal.   Left Ear: External ear normal.   Eyes: Conjunctivae are normal. Right eye exhibits no discharge. Left eye exhibits no discharge. No scleral icterus. Neck: Normal range of motion. Cardiovascular: Normal rate, regular rhythm and normal heart sounds. Pulmonary/Chest: Effort normal and breath sounds normal. She exhibits tenderness. I observe ecchymotic area on the lateral anterior axillary line right chest wall. No instability or crepitation noted. Breath sounds are equal bilaterally. Abdominal: Soft. Bowel sounds are normal. There is no tenderness. Musculoskeletal: Normal range of motion. She exhibits tenderness. The patient good range of motion right shoulder. Tenderness appreciated. Strong radial pulse. Sensory intact to the fingertips. Neurological: She is alert and oriented to person, place, and time. Skin: Skin is warm and dry. Psychiatric: She has a normal mood and affect. Her behavior is normal. Judgment and thought content normal.   Nursing note and vitals reviewed. MEDICAL DECISION MAKING    Vitals:    Vitals:    06/04/19 1644 06/04/19 1649 06/04/19 1825   BP: (!) 208/113 (!) 174/104 (!) 172/106   Pulse: 90  92   Resp: 16  16   Temp: 98.2 °F (36.8 °C)     TempSrc: Oral     SpO2: 100%  99%   Weight: 140 lb (63.5 kg)     Height: 5' 1\" (1.549 m)         LABS:Labs Reviewed - No data to display     Remainder of labs reviewed and werenegative at this time or not returned at the time of this note.     RADIOLOGY:   Non-plain film images such as CT, Ultrasound and MRI are read by the radiologist. Jaspreet Comer PA-C have directly visualized the radiologic plain film image(s) with the below findings:        Interpretation per the Radiologist below, if available at the time of thisnote:    XR RIBS RIGHT INCLUDE CHEST (MIN 3 VIEWS)   Final Result   1. No acute fracture or dislocation of the right shoulder. 2. No evidence of a displaced right-sided rib fracture. 3. The pulmonary nodules described on the chest CT from 04/08/2019 are not   appreciated on the current radiographs, please refer to that report for   additional information. XR SHOULDER RIGHT (MIN 2 VIEWS)   Final Result   1. No acute fracture or dislocation of the right shoulder. 2. No evidence of a displaced right-sided rib fracture. 3. The pulmonary nodules described on the chest CT from 04/08/2019 are not   appreciated on the current radiographs, please refer to that report for   additional information. Xr Ribs Right Include Chest (min 3 Views)    Result Date: 6/4/2019  EXAMINATION: 3 XRAY VIEWS OF THE RIGHT SHOULDER; 2 XRAY VIEWS OF THE RIGHT RIBS WITH FRONTAL XRAY VIEW OF THE CHEST 6/4/2019 5:06 pm; 6/4/2019 5:14 pm COMPARISON: Radiographs of the right shoulder 09/02/2014, CT thorax 04/08/2019. HISTORY: ORDERING SYSTEM PROVIDED HISTORY: injury TECHNOLOGIST PROVIDED HISTORY: Reason for exam:->injury Ordering Physician Provided Reason for Exam: fell holding a 5 gallon bucket of water hit on right side; ORDERING SYSTEM PROVIDED HISTORY: injury TECHNOLOGIST PROVIDED HISTORY: Reason for exam:->injury Ordering Physician Provided Reason for Exam: patient fell with a 5 gallon bucket of water hitting on right side FINDINGS: RIGHT SHOULDER: There is no acute fracture or dislocation. There is mild acromioclavicular and glenohumeral joint osteoarthritis. There are no radiopaque foreign bodies. RIGHT RIBS: The cardiopericardial silhouette is within normal limits. Atherosclerotic calcification of the thoracic aorta is again noted. The visible lungs are without pneumothorax, pleural effusion or focal airspace opacity.  The pulmonary nodules described on the chest CT from 04/08/2019 are not appreciated on the current radiographs, please refer to that report for additional information. No evidence of a displaced right-sided rib fracture. 1. No acute fracture or dislocation of the right shoulder. 2. No evidence of a displaced right-sided rib fracture. 3. The pulmonary nodules described on the chest CT from 04/08/2019 are not appreciated on the current radiographs, please refer to that report for additional information. Xr Shoulder Right (min 2 Views)    Result Date: 6/4/2019  EXAMINATION: 3 XRAY VIEWS OF THE RIGHT SHOULDER; 2 XRAY VIEWS OF THE RIGHT RIBS WITH FRONTAL XRAY VIEW OF THE CHEST 6/4/2019 5:06 pm; 6/4/2019 5:14 pm COMPARISON: Radiographs of the right shoulder 09/02/2014, CT thorax 04/08/2019. HISTORY: ORDERING SYSTEM PROVIDED HISTORY: injury TECHNOLOGIST PROVIDED HISTORY: Reason for exam:->injury Ordering Physician Provided Reason for Exam: fell holding a 5 gallon bucket of water hit on right side; ORDERING SYSTEM PROVIDED HISTORY: injury TECHNOLOGIST PROVIDED HISTORY: Reason for exam:->injury Ordering Physician Provided Reason for Exam: patient fell with a 5 gallon bucket of water hitting on right side FINDINGS: RIGHT SHOULDER: There is no acute fracture or dislocation. There is mild acromioclavicular and glenohumeral joint osteoarthritis. There are no radiopaque foreign bodies. RIGHT RIBS: The cardiopericardial silhouette is within normal limits. Atherosclerotic calcification of the thoracic aorta is again noted. The visible lungs are without pneumothorax, pleural effusion or focal airspace opacity. The pulmonary nodules described on the chest CT from 04/08/2019 are not appreciated on the current radiographs, please refer to that report for additional information. No evidence of a displaced right-sided rib fracture. 1. No acute fracture or dislocation of the right shoulder. 2. No evidence of a displaced right-sided rib fracture.  3. The pulmonary nodules described on the chest MEDICATIONS:  Discharge Medication List as of 6/4/2019  6:06 PM      STOP taking these medications       lidocaine (LIDODERM) 5 % Comments:   Reason for Stopping:                      (Please note the MDM and HPI sections of this note were completed with a voice recognition program.  Efforts weremade to edit the dictations but occasionally words are mis-transcribed.)    Electronically signed, Sullivan Huguenin, Drucie Baumgarten, PA-C  06/04/19 1064

## 2019-06-05 NOTE — TELEPHONE ENCOUNTER
.  Last office visit 5/20/2019     Last written  5-31-19 1 with 3 refilss      Next office visit scheduled no future ov     Requested Prescriptions     Pending Prescriptions Disp Refills    nicotine ([address]) 7 MG/24HR [Pharmacy Med Name: Nicotine 7 MG/24HR Transdermal Patch 24 Hour] 14 patch 0     Sig: Place 1 patch onto the skin daily for 14 days    VENTOLIN  (90 Base) MCG/ACT inhaler [Pharmacy Med Name: Albuterol Sulfate  (90 Base) MCG/ACT Inhalation Aerosol Solution] 1 Inhaler 3     Sig: Inhale 2 puffs into the lungs every 6 hours as needed for Wheezing

## 2019-07-01 DIAGNOSIS — J06.9 VIRAL URI WITH COUGH: ICD-10-CM

## 2019-07-01 DIAGNOSIS — J43.2 CENTRILOBULAR EMPHYSEMA (HCC): ICD-10-CM

## 2019-07-01 RX ORDER — FLUTICASONE PROPIONATE 50 MCG
SPRAY, SUSPENSION (ML) NASAL
Qty: 16 G | Refills: 2 | Status: SHIPPED | OUTPATIENT
Start: 2019-07-01 | End: 2019-07-31 | Stop reason: SDUPTHER

## 2019-07-01 RX ORDER — TIOTROPIUM BROMIDE 18 UG/1
CAPSULE ORAL; RESPIRATORY (INHALATION)
Qty: 30 CAPSULE | Refills: 0 | Status: SHIPPED | OUTPATIENT
Start: 2019-07-01 | End: 2019-07-31 | Stop reason: SDUPTHER

## 2019-07-18 DIAGNOSIS — M54.40 CHRONIC BILATERAL LOW BACK PAIN WITH SCIATICA, SCIATICA LATERALITY UNSPECIFIED: ICD-10-CM

## 2019-07-18 DIAGNOSIS — G89.29 CHRONIC BILATERAL LOW BACK PAIN WITH SCIATICA, SCIATICA LATERALITY UNSPECIFIED: ICD-10-CM

## 2019-07-18 DIAGNOSIS — I10 ESSENTIAL HYPERTENSION: ICD-10-CM

## 2019-07-18 RX ORDER — HYDROCHLOROTHIAZIDE 12.5 MG/1
12.5 TABLET ORAL DAILY
Qty: 30 TABLET | Refills: 1 | Status: SHIPPED | OUTPATIENT
Start: 2019-07-18 | End: 2019-09-18

## 2019-07-30 ENCOUNTER — APPOINTMENT (OUTPATIENT)
Dept: CT IMAGING | Age: 64
End: 2019-07-30
Payer: COMMERCIAL

## 2019-07-30 ENCOUNTER — APPOINTMENT (OUTPATIENT)
Dept: GENERAL RADIOLOGY | Age: 64
End: 2019-07-30
Payer: COMMERCIAL

## 2019-07-30 ENCOUNTER — HOSPITAL ENCOUNTER (OUTPATIENT)
Age: 64
Setting detail: OBSERVATION
Discharge: HOME | End: 2019-07-31
Attending: EMERGENCY MEDICINE | Admitting: INTERNAL MEDICINE
Payer: COMMERCIAL

## 2019-07-30 DIAGNOSIS — R07.9 CHEST PAIN, UNSPECIFIED TYPE: Primary | ICD-10-CM

## 2019-07-30 PROBLEM — E78.5 HLD (HYPERLIPIDEMIA): Status: ACTIVE | Noted: 2019-07-30

## 2019-07-30 LAB
A/G RATIO: 1.1 (ref 1.1–2.2)
ALBUMIN SERPL-MCNC: 4 G/DL (ref 3.4–5)
ALP BLD-CCNC: 73 U/L (ref 40–129)
ALT SERPL-CCNC: 21 U/L (ref 10–40)
ANION GAP SERPL CALCULATED.3IONS-SCNC: 10 MMOL/L (ref 3–16)
AST SERPL-CCNC: 21 U/L (ref 15–37)
BASOPHILS ABSOLUTE: 0.1 K/UL (ref 0–0.2)
BASOPHILS RELATIVE PERCENT: 1.1 %
BILIRUB SERPL-MCNC: 0.6 MG/DL (ref 0–1)
BUN BLDV-MCNC: 5 MG/DL (ref 7–20)
CALCIUM SERPL-MCNC: 9.2 MG/DL (ref 8.3–10.6)
CHLORIDE BLD-SCNC: 100 MMOL/L (ref 99–110)
CO2: 28 MMOL/L (ref 21–32)
CREAT SERPL-MCNC: <0.5 MG/DL (ref 0.6–1.2)
EOSINOPHILS ABSOLUTE: 0.2 K/UL (ref 0–0.6)
EOSINOPHILS RELATIVE PERCENT: 2.4 %
GFR AFRICAN AMERICAN: >60
GFR NON-AFRICAN AMERICAN: >60
GLOBULIN: 3.7 G/DL
GLUCOSE BLD-MCNC: 137 MG/DL (ref 70–99)
HCT VFR BLD CALC: 41.9 % (ref 36–48)
HEMOGLOBIN: 14.2 G/DL (ref 12–16)
LYMPHOCYTES ABSOLUTE: 2.2 K/UL (ref 1–5.1)
LYMPHOCYTES RELATIVE PERCENT: 29.1 %
MCH RBC QN AUTO: 31.7 PG (ref 26–34)
MCHC RBC AUTO-ENTMCNC: 34 G/DL (ref 31–36)
MCV RBC AUTO: 93.3 FL (ref 80–100)
MONOCYTES ABSOLUTE: 0.5 K/UL (ref 0–1.3)
MONOCYTES RELATIVE PERCENT: 7.1 %
NEUTROPHILS ABSOLUTE: 4.5 K/UL (ref 1.7–7.7)
NEUTROPHILS RELATIVE PERCENT: 60.3 %
PDW BLD-RTO: 13.7 % (ref 12.4–15.4)
PLATELET # BLD: 295 K/UL (ref 135–450)
PMV BLD AUTO: 8.2 FL (ref 5–10.5)
POTASSIUM SERPL-SCNC: 3.2 MMOL/L (ref 3.5–5.1)
RBC # BLD: 4.49 M/UL (ref 4–5.2)
SODIUM BLD-SCNC: 138 MMOL/L (ref 136–145)
SPECIMEN STATUS: NORMAL
TOTAL PROTEIN: 7.7 G/DL (ref 6.4–8.2)
TROPONIN: <0.01 NG/ML
TROPONIN: <0.01 NG/ML
WBC # BLD: 7.4 K/UL (ref 4–11)

## 2019-07-30 PROCEDURE — 96372 THER/PROPH/DIAG INJ SC/IM: CPT

## 2019-07-30 PROCEDURE — 94150 VITAL CAPACITY TEST: CPT

## 2019-07-30 PROCEDURE — 6360000004 HC RX CONTRAST MEDICATION: Performed by: EMERGENCY MEDICINE

## 2019-07-30 PROCEDURE — 36415 COLL VENOUS BLD VENIPUNCTURE: CPT

## 2019-07-30 PROCEDURE — 6370000000 HC RX 637 (ALT 250 FOR IP): Performed by: EMERGENCY MEDICINE

## 2019-07-30 PROCEDURE — 99285 EMERGENCY DEPT VISIT HI MDM: CPT

## 2019-07-30 PROCEDURE — 6370000000 HC RX 637 (ALT 250 FOR IP): Performed by: INTERNAL MEDICINE

## 2019-07-30 PROCEDURE — 71260 CT THORAX DX C+: CPT

## 2019-07-30 PROCEDURE — G0378 HOSPITAL OBSERVATION PER HR: HCPCS

## 2019-07-30 PROCEDURE — 85025 COMPLETE CBC W/AUTO DIFF WBC: CPT

## 2019-07-30 PROCEDURE — 2580000003 HC RX 258: Performed by: INTERNAL MEDICINE

## 2019-07-30 PROCEDURE — 93005 ELECTROCARDIOGRAM TRACING: CPT | Performed by: EMERGENCY MEDICINE

## 2019-07-30 PROCEDURE — 84484 ASSAY OF TROPONIN QUANT: CPT

## 2019-07-30 PROCEDURE — 71046 X-RAY EXAM CHEST 2 VIEWS: CPT

## 2019-07-30 PROCEDURE — 6360000002 HC RX W HCPCS: Performed by: INTERNAL MEDICINE

## 2019-07-30 PROCEDURE — 80053 COMPREHEN METABOLIC PANEL: CPT

## 2019-07-30 RX ORDER — GABAPENTIN 400 MG/1
400 CAPSULE ORAL 3 TIMES DAILY
Status: DISCONTINUED | OUTPATIENT
Start: 2019-07-30 | End: 2019-07-31 | Stop reason: HOSPADM

## 2019-07-30 RX ORDER — SODIUM CHLORIDE 0.9 % (FLUSH) 0.9 %
10 SYRINGE (ML) INJECTION PRN
Status: DISCONTINUED | OUTPATIENT
Start: 2019-07-30 | End: 2019-07-31 | Stop reason: HOSPADM

## 2019-07-30 RX ORDER — ATORVASTATIN CALCIUM 80 MG/1
80 TABLET, FILM COATED ORAL NIGHTLY
Status: DISCONTINUED | OUTPATIENT
Start: 2019-07-30 | End: 2019-07-31 | Stop reason: HOSPADM

## 2019-07-30 RX ORDER — LISINOPRIL 20 MG/1
40 TABLET ORAL DAILY
Status: DISCONTINUED | OUTPATIENT
Start: 2019-07-31 | End: 2019-07-31 | Stop reason: HOSPADM

## 2019-07-30 RX ORDER — NITROGLYCERIN 0.4 MG/1
0.4 TABLET SUBLINGUAL EVERY 5 MIN PRN
Status: DISCONTINUED | OUTPATIENT
Start: 2019-07-30 | End: 2019-07-31 | Stop reason: HOSPADM

## 2019-07-30 RX ORDER — POTASSIUM CHLORIDE 20 MEQ/1
40 TABLET, EXTENDED RELEASE ORAL ONCE
Status: COMPLETED | OUTPATIENT
Start: 2019-07-30 | End: 2019-07-30

## 2019-07-30 RX ORDER — DULOXETIN HYDROCHLORIDE 60 MG/1
60 CAPSULE, DELAYED RELEASE ORAL DAILY
Status: DISCONTINUED | OUTPATIENT
Start: 2019-07-31 | End: 2019-07-31 | Stop reason: HOSPADM

## 2019-07-30 RX ORDER — ALBUTEROL SULFATE 90 UG/1
2 AEROSOL, METERED RESPIRATORY (INHALATION) EVERY 6 HOURS PRN
Status: DISCONTINUED | OUTPATIENT
Start: 2019-07-30 | End: 2019-07-31 | Stop reason: HOSPADM

## 2019-07-30 RX ORDER — CETIRIZINE HYDROCHLORIDE 10 MG/1
10 TABLET ORAL DAILY
Status: DISCONTINUED | OUTPATIENT
Start: 2019-07-31 | End: 2019-07-31 | Stop reason: HOSPADM

## 2019-07-30 RX ORDER — HYDROCHLOROTHIAZIDE 25 MG/1
12.5 TABLET ORAL DAILY
Status: DISCONTINUED | OUTPATIENT
Start: 2019-07-31 | End: 2019-07-31 | Stop reason: HOSPADM

## 2019-07-30 RX ORDER — TRAZODONE HYDROCHLORIDE 50 MG/1
50 TABLET ORAL NIGHTLY
Status: DISCONTINUED | OUTPATIENT
Start: 2019-07-30 | End: 2019-07-31 | Stop reason: HOSPADM

## 2019-07-30 RX ORDER — ONDANSETRON 2 MG/ML
4 INJECTION INTRAMUSCULAR; INTRAVENOUS EVERY 6 HOURS PRN
Status: DISCONTINUED | OUTPATIENT
Start: 2019-07-30 | End: 2019-07-31 | Stop reason: HOSPADM

## 2019-07-30 RX ORDER — SODIUM CHLORIDE 0.9 % (FLUSH) 0.9 %
10 SYRINGE (ML) INJECTION EVERY 12 HOURS SCHEDULED
Status: DISCONTINUED | OUTPATIENT
Start: 2019-07-30 | End: 2019-07-31 | Stop reason: HOSPADM

## 2019-07-30 RX ORDER — MORPHINE SULFATE 2 MG/ML
2 INJECTION, SOLUTION INTRAMUSCULAR; INTRAVENOUS EVERY 4 HOURS PRN
Status: DISCONTINUED | OUTPATIENT
Start: 2019-07-30 | End: 2019-07-31 | Stop reason: HOSPADM

## 2019-07-30 RX ORDER — ASPIRIN 81 MG/1
81 TABLET ORAL
Status: DISCONTINUED | OUTPATIENT
Start: 2019-07-31 | End: 2019-07-31 | Stop reason: HOSPADM

## 2019-07-30 RX ORDER — FLUTICASONE PROPIONATE 50 MCG
2 SPRAY, SUSPENSION (ML) NASAL DAILY
Status: DISCONTINUED | OUTPATIENT
Start: 2019-07-31 | End: 2019-07-31 | Stop reason: HOSPADM

## 2019-07-30 RX ADMIN — GABAPENTIN 400 MG: 400 CAPSULE ORAL at 21:27

## 2019-07-30 RX ADMIN — NITROGLYCERIN 1 INCH: 20 OINTMENT TOPICAL at 12:11

## 2019-07-30 RX ADMIN — POTASSIUM CHLORIDE 40 MEQ: 20 TABLET, EXTENDED RELEASE ORAL at 12:11

## 2019-07-30 RX ADMIN — TRAZODONE HYDROCHLORIDE 50 MG: 50 TABLET ORAL at 21:27

## 2019-07-30 RX ADMIN — ENOXAPARIN SODIUM 40 MG: 40 INJECTION SUBCUTANEOUS at 21:28

## 2019-07-30 RX ADMIN — ATORVASTATIN CALCIUM 80 MG: 80 TABLET, FILM COATED ORAL at 21:27

## 2019-07-30 RX ADMIN — GABAPENTIN 400 MG: 400 CAPSULE ORAL at 18:44

## 2019-07-30 RX ADMIN — Medication 10 ML: at 21:27

## 2019-07-30 RX ADMIN — IOPAMIDOL 75 ML: 755 INJECTION, SOLUTION INTRAVENOUS at 13:02

## 2019-07-30 ASSESSMENT — HEART SCORE: ECG: 0

## 2019-07-30 ASSESSMENT — PAIN DESCRIPTION - DESCRIPTORS
DESCRIPTORS: HEADACHE
DESCRIPTORS: PRESSURE

## 2019-07-30 ASSESSMENT — PAIN DESCRIPTION - LOCATION
LOCATION: HEAD
LOCATION: CHEST

## 2019-07-30 ASSESSMENT — PAIN SCALES - GENERAL
PAINLEVEL_OUTOF10: 7
PAINLEVEL_OUTOF10: 0
PAINLEVEL_OUTOF10: 6
PAINLEVEL_OUTOF10: 0

## 2019-07-30 ASSESSMENT — PAIN DESCRIPTION - PAIN TYPE: TYPE: ACUTE PAIN

## 2019-07-30 ASSESSMENT — PAIN DESCRIPTION - ORIENTATION: ORIENTATION: MID

## 2019-07-30 NOTE — H&P
Hospital Medicine History & Physical      PCP: Joann Mendez MD    Date of Admission: 2019    Date of Service: Pt seen/examined on 19 and Placed in Observation. Chief Complaint:  CP      History Of Present Illness:    60 y.o. female who presented to [facility] with CP  Chest Pain  Allie Kingsley complains of chest pain. Onset was 4 days ago. Symptoms have been unchanged since that time. The patient's pain is constant. The patient describes the pain as pressure and does not radiate. Patient rates pain as a 6/10 in intensity. Associated symptoms are: dyspnea, exertional chest pressure/discomfort, near-syncope and diaphoresis, nausea. Aggravating factors are: walking. Alleviating factors are: nitroglycerin 3 tablets and nitro paste given in ER. Patient's cardiac risk factors are: dyslipidemia, hypertension and smoking/ tobacco exposure. Previous cardiac testing: stress test in  was negative      Past Medical History:          Diagnosis Date    Arterial occlusion     Centrilobular emphysema ([facility] [address] 2019    Chronic bilateral low back pain with bilateral sciatica 8/3/2018    Chronic bilateral low back pain with bilateral sciatica     Hyperlipidemia     Hypertension     Moderate single current episode of major depressive disorder ([address] 8/3/2018    Ruptured disk        Past Surgical History:          Procedure Laterality Date     SECTION  1981    LEG SURGERY  2017    fasciotomy due to DVT, LLE       Medications Prior to Admission:      Prior to Admission medications    Medication Sig Start Date End Date Taking?  Authorizing Provider   NAPROXEN 500 MG EC tablet Take 1 tablet by mouth 2 times daily (with meals) 19  Yes Samara Chavez MD   hydrochlorothiazide (HYDRODIURIL) 12.5 MG tablet Take 1 tablet by mouth daily 19  Yes Samara Chavez MD   nicotine (NICODERM CQ) 7 MG/24HR Place 1 patch onto the skin daily for 14 days 19 Yes Samara Chavez MD alcohol. Family History:  Positive as follows:        Problem Relation Age of Onset    High Blood Pressure Mother     High Cholesterol Mother     Heart Disease Mother     Coronary Art Dis Mother     Heart Attack Father         59    Cirrhosis Sister     Alcohol Abuse Sister     Other Sister         \"colostomy bag due to hole in her colon\"   Wilber Vieyra Sudden Death Brother         \"suicide\"    Colon Cancer Other         maternal uncle    Breast Cancer Other         multiple maternal aunts along with uterine cancer       REVIEW OF SYSTEMS:   Pertinent positives as noted in the HPI. All other systems reviewed and negative. PHYSICAL EXAM PERFORMED:    /80   Pulse 72   Temp 97.6 °F (36.4 °C) (Oral)   Resp 20   Wt 120 lb (54.4 kg)   SpO2 98%   BMI 22.67 kg/m²     General appearance:  No apparent distress, appears stated age and cooperative. HEENT:  Normal cephalic, atraumatic without obvious deformity. Pupils equal, round, and reactive to light. Extra ocular muscles intact. Conjunctivae/corneas clear. Neck: Supple, with full range of motion. No jugular venous distention. Trachea midline. Respiratory:  Normal respiratory effort. Clear to auscultation, bilaterally without Rales/Wheezes/Rhonchi. Cardiovascular:  Regular rate and rhythm with normal S1/S2 without murmurs, rubs or gallops. Abdomen: Soft, non-tender, non-distended with normal bowel sounds. Musculoskeletal:  No clubbing, cyanosis or edema bilaterally. Full range of motion without deformity. Skin: Skin color, texture, turgor normal.  No rashes or lesions. Neurologic:  Neurovascularly intact without any focal sensory/motor deficits.  Cranial nerves: II-XII intact, grossly non-focal.  Psychiatric:  Alert and oriented, thought content appropriate, normal insight  Capillary Refill: Brisk,< 3 seconds   Peripheral Pulses: +2 palpable, equal bilaterally       Labs:     Recent Labs     07/30/19  1127   WBC 7.4   HGB 14.2   HCT 41.9    Recent Labs     07/30/19  1127      K 3.2*      CO2 28   BUN 5*   CREATININE <0.5*   CALCIUM 9.2     Recent Labs     07/30/19  1127   AST 21   ALT 21   BILITOT 0.6   ALKPHOS 73     No results for input(s): INR in the last 72 hours. Recent Labs     07/30/19  1127   TROPONINI <0.01       Radiology:     I have reviewed the CT chest and CXR personally and also reviewed the radiologists interpretation  '  EKG:  I have reviewed the EKG with the following interpretation: nsr, rate 72 bpm, no acute ischemic changes    CT CHEST PULMONARY EMBOLISM W CONTRAST   Final Result   No pulmonary embolus identified      Underlying emphysema. Stable dominant pulmonary nodule left lower lobe. Small axillary and supraclavicular nodes also appears similar      There are fractures of the right anterior 6th and 7th ribs, not clearly   present previously. XR CHEST STANDARD (2 VW)   Final Result   No acute process.              ASSESSMENT:PLAN:     Diagnosis    Chest pain - Typical  Location retrosternal - YES, aggravated by exertion - YES, relieved with NTG/rest - YES  Risk factors for CAD include - HTN,smoker, HLD  Heart Score in ER --> 5  Previous testing - stress test in 2016 neg  EKG shows no acute ischemic changes, 1st troponin was negative  Currently chest pain free - Yes  - Rule out ACS with serial cardiac enzymes and EKG, initial set was negative  - Monitor on telemetry for arrhythmia  - PRN nitroglycerin, nitropaste applied, morphine ordered, supplemental O2 as required  - Cardiology not consulted yet  - Stress test ordered for in the AM      HLD (hyperlipidemia) - resume statin      Chronic pain syndrome     Chronic bilateral low back pain with sciatica   - controlled, resume prn naprosyn, cymbalta, gabapentin      Emphysema on CT ? probable COPD  Has significant smoking history  -Needs o/p pulm referral, has not had PFTs  -resume inhalers at home      Current smoker - counseled cessation     

## 2019-07-30 NOTE — PROGRESS NOTES
unassisted = 0    Respiratory Pattern: Regular Pattern; RR 8-20 = 0    Breath Sounds: Clear = 0    Sputum   ,  ,    Cough: Strong, spontaneous, non-productive = 0    Vital Signs   /81   Pulse 65   Temp 97.4 °F (36.3 °C) (Oral)   Resp 20   Ht 5' 1\" (1.549 m)   Wt 120 lb (54.4 kg)   SpO2 96%   BMI 22.67 kg/m²   SPO2 (COPD values may differ): Greater than or equal to 92% on room air = 0    Peak Flow (asthma only): not applicable = 0    RSI: 0-4 = See once and convert to home regimen or discontinue        Plan       Goals: medication delivery    Patient/caregiver was educated on the proper method of use for Respiratory Care Devices:  Yes      Level of patient/caregiver understanding able to:   ? Verbalize understanding   ? Demonstrate understanding       ? Teach back        ? Needs reinforcement       ? No available caregiver               ? Other:     Response to education:  Excellent     Is patient being placed on Home Treatment Regimen? Yes     Does the patient have everything they need prior to discharge? NA     Comments: Chart and patient reviewed    Plan of Care: Home regimen    Electronically signed by Gloria Calabrese RCP on 7/30/2019 at 4:56 PM    Respiratory Protocol Guidelines     1. Assessment and treatment by Respiratory Therapy will be initiated for medication and therapeutic interventions upon initiation of aerosolized medication. 2. Physician will be contacted for respiratory rate (RR) greater than 35 breaths per minute. Therapy will be held for heart rate (HR) greater than 140 beats per minute, pending direction from physician. 3. Bronchodilators will be administered via Metered Dose Inhaler (MDI) with spacer when the following criteria are met:  a. Alert and cooperative     b. HR < 140 bpm  c. RR < 30 bpm                d. Can demonstrate a 2-3 second inspiratory hold  4.  Bronchodilators will be administered via Hand Held Nebulizer ESTEL[facility]) to patients when ANY of the following criteria are met  a. Incognizant or uncooperative          b. Patients treated with HHN at Home        c. Unable to demonstrate proper use of MDI with spacer     d. RR > 30 bpm   5. Bronchodilators will be delivered via Metered Dose Inhaler (MDI), HHN, Aerogen to intubated patients on mechanical ventilation. 6. Inhalation medication orders will be delivered and/or substituted as outlined below. Aerosolized Medications Ordering and Administration Guidelines:    1. All Medications will be ordered by a physician, and their frequency and/or modality will be adjusted as defined by the patients Respiratory Severity Index (RSI) score. 2. If the patient does not have documented COPD, consider discontinuing anticholinergics when RSI is less than 9.  3. If the bronchospasm worsens (increased RSI), then the bronchodilator frequency can be increased to a maximum of every 4 hours. If greater than every 4 hours is required, the physician will be contacted. 4. If the bronchospasm improves, the frequency of the bronchodilator can be decreased, based on the patient's RSI, but not less than home treatment regimen frequency. 5. Bronchodilator(s) will be discontinued if patient has a RSI less than 9 and has received no scheduled or as needed treatment for 72  Hrs. Patients Ordered on a Mucolytic Agent:    1. Must always be administered with a bronchodilator. 2. Discontinue if patient experiences worsened bronchospasm, or secretions have lessened to the point that the patient is able to clear them with a cough. Anti-inflammatory and Combination Medications:    1. If the patient lacks prior history of lung disease, is not using inhaled anti-inflammatory medication at home, and lacks wheezing by examination or by history for at least 24 hours, contact physician for possible discontinuation.

## 2019-07-30 NOTE — ED PROVIDER NOTES
No intimal flap in aorta. Small hiatal hernia seen. There is nonspecific thickening at the GE junction. No embolus is seen in the central, right, or left main pulmonary artery. No embolus is seen on the right or left, at least to the subsegmental level There is underlying emphysema, mild in degree. De pendant opacity is seen at the lung bases, likely atelectasis. Irregular nodule is seen in the left lower lobe, measuring 1.5 cm x 9 mm, similar prior study when remeasured. No pleural effusions Limited images of adrenal glands appear normal. Spurring is seen in the spine. Spurring is seen in the shoulder joints. A few small axillary nodes are also noted. There are fractures of the right anterior 6th and 7th ribs, not clearly present previously. No pulmonary embolus identified Underlying emphysema. Stable dominant pulmonary nodule left lower lobe. Small axillary and supraclavicular nodes also appears similar There are fractures of the right anterior 6th and 7th ribs, not clearly present previously. The Ekg interpreted by me shows  normal sinus rhythm with a rate of 72  Axis is   Normal  QTc is  normal  Intervals and Durations are unremarkable.       ST Segments: nonspecific changes          Particia RomeokyleDO  07/30/19 [id number]
HISTORY:       Social History     Socioeconomic History    Marital status:      Spouse name: None    Number of children: None    Years of education: None    Highest education level: None   Occupational History    None   Social Needs    Financial resource strain: None    Food insecurity:     Worry: None     Inability: None    Transportation needs:     Medical: None     Non-medical: None   Tobacco Use    Smoking status: Current Every Day Smoker     Packs/day: 0.25     Years: 20.00     Pack years: 5.00     Types: Cigarettes    Smokeless tobacco: Never Used    Tobacco comment: 9/27/18 - smokes 1/2 ppd   Substance and Sexual Activity    Alcohol use: No    Drug use: Yes     Types: Marijuana, Opiates      Comment: pt states she took two hits for new years so she could sleep    Sexual activity: Not Currently   Lifestyle    Physical activity:     Days per week: None     Minutes per session: None    Stress: None   Relationships    Social connections:     Talks on phone: None     Gets together: None     Attends [employer] service: None     Active member of club or organization: None     Attends meetings of clubs or organizations: None     Relationship status: None    Intimate partner violence:     Fear of current or ex partner: None     Emotionally abused: None     Physically abused: None     Forced sexual activity: None   Other Topics Concern    None   Social History Narrative    None       SCREENINGS:   [place] Coma Scale  Eye Opening: Spontaneous  Best Verbal Response: Oriented  Best Motor Response: Obeys commands  [place] Coma Scale Score: 15 Heart Score for chest pain patients  History:  Moderately Suspicious  ECG: Normal  Patient Age: > 39 and < 65 years  *Risk factors for Atherosclerotic disease: Hypertension, Hypercholesterolemia, Cigarette smoking  Risk Factors: > 3 Risk factors or history of atherosclerotic disease*  Troponin: < 1X normal limit  Heart Score Total: 4      PHYSICAL EXAM:

## 2019-07-31 ENCOUNTER — APPOINTMENT (OUTPATIENT)
Dept: NUCLEAR MEDICINE | Age: 64
End: 2019-07-31
Payer: COMMERCIAL

## 2019-07-31 VITALS
HEIGHT: 61 IN | SYSTOLIC BLOOD PRESSURE: 138 MMHG | OXYGEN SATURATION: 98 % | BODY MASS INDEX: 22.66 KG/M2 | HEART RATE: 65 BPM | WEIGHT: 120 LBS | DIASTOLIC BLOOD PRESSURE: 80 MMHG | TEMPERATURE: 97.8 F | RESPIRATION RATE: 16 BRPM

## 2019-07-31 DIAGNOSIS — M25.561 CHRONIC PAIN OF BOTH KNEES: ICD-10-CM

## 2019-07-31 DIAGNOSIS — G89.4 CHRONIC PAIN SYNDROME: ICD-10-CM

## 2019-07-31 DIAGNOSIS — Z98.890 HISTORY OF FASCIOTOMY: ICD-10-CM

## 2019-07-31 DIAGNOSIS — M25.562 CHRONIC PAIN OF BOTH KNEES: ICD-10-CM

## 2019-07-31 DIAGNOSIS — E55.9 VITAMIN D DEFICIENCY: ICD-10-CM

## 2019-07-31 DIAGNOSIS — G89.29 CHRONIC PAIN OF BOTH KNEES: ICD-10-CM

## 2019-07-31 DIAGNOSIS — F32.1 MODERATE SINGLE CURRENT EPISODE OF MAJOR DEPRESSIVE DISORDER (HCC): ICD-10-CM

## 2019-07-31 DIAGNOSIS — J45.20 MILD INTERMITTENT ASTHMA WITHOUT COMPLICATION: ICD-10-CM

## 2019-07-31 DIAGNOSIS — J06.9 VIRAL URI WITH COUGH: ICD-10-CM

## 2019-07-31 DIAGNOSIS — J43.2 CENTRILOBULAR EMPHYSEMA (HCC): ICD-10-CM

## 2019-07-31 DIAGNOSIS — F51.01 PRIMARY INSOMNIA: ICD-10-CM

## 2019-07-31 DIAGNOSIS — E78.2 MIXED HYPERLIPIDEMIA: ICD-10-CM

## 2019-07-31 DIAGNOSIS — I10 ESSENTIAL HYPERTENSION: ICD-10-CM

## 2019-07-31 LAB
EKG ATRIAL RATE: 72 BPM
EKG DIAGNOSIS: NORMAL
EKG P AXIS: 52 DEGREES
EKG P-R INTERVAL: 128 MS
EKG Q-T INTERVAL: 424 MS
EKG QRS DURATION: 90 MS
EKG QTC CALCULATION (BAZETT): 464 MS
EKG R AXIS: 48 DEGREES
EKG T AXIS: 53 DEGREES
EKG VENTRICULAR RATE: 72 BPM
LV EF: 60 %
LVEF MODALITY: NORMAL
TROPONIN: <0.01 NG/ML

## 2019-07-31 PROCEDURE — 3430000000 HC RX DIAGNOSTIC RADIOPHARMACEUTICAL: Performed by: INTERNAL MEDICINE

## 2019-07-31 PROCEDURE — 6370000000 HC RX 637 (ALT 250 FOR IP): Performed by: INTERNAL MEDICINE

## 2019-07-31 PROCEDURE — 96374 THER/PROPH/DIAG INJ IV PUSH: CPT

## 2019-07-31 PROCEDURE — G0378 HOSPITAL OBSERVATION PER HR: HCPCS

## 2019-07-31 PROCEDURE — 78452 HT MUSCLE IMAGE SPECT MULT: CPT

## 2019-07-31 PROCEDURE — 93017 CV STRESS TEST TRACING ONLY: CPT

## 2019-07-31 PROCEDURE — 2580000003 HC RX 258: Performed by: INTERNAL MEDICINE

## 2019-07-31 PROCEDURE — A9502 TC99M TETROFOSMIN: HCPCS | Performed by: INTERNAL MEDICINE

## 2019-07-31 PROCEDURE — 6360000002 HC RX W HCPCS: Performed by: INTERNAL MEDICINE

## 2019-07-31 PROCEDURE — 93010 ELECTROCARDIOGRAM REPORT: CPT | Performed by: INTERNAL MEDICINE

## 2019-07-31 PROCEDURE — 6360000002 HC RX W HCPCS: Performed by: NURSE PRACTITIONER

## 2019-07-31 RX ORDER — AMINOPHYLLINE DIHYDRATE 25 MG/ML
75 INJECTION, SOLUTION INTRAVENOUS ONCE
Status: COMPLETED | OUTPATIENT
Start: 2019-07-31 | End: 2019-07-31

## 2019-07-31 RX ORDER — NITROGLYCERIN 0.4 MG/1
TABLET SUBLINGUAL
Qty: 25 TABLET | Refills: 3 | Status: SHIPPED | OUTPATIENT
Start: 2019-07-31 | End: 2020-09-25

## 2019-07-31 RX ORDER — LIDOCAINE 50 MG/G
1 PATCH TOPICAL DAILY
Qty: 30 PATCH | Refills: 0 | Status: SHIPPED | OUTPATIENT
Start: 2019-07-31 | End: 2019-08-29 | Stop reason: SDUPTHER

## 2019-07-31 RX ADMIN — TETROFOSMIN 32.4 MILLICURIE: 1.38 INJECTION, POWDER, LYOPHILIZED, FOR SOLUTION INTRAVENOUS at 10:43

## 2019-07-31 RX ADMIN — REGADENOSON 0.4 MG: 0.08 INJECTION, SOLUTION INTRAVENOUS at 10:40

## 2019-07-31 RX ADMIN — CETIRIZINE HYDROCHLORIDE 10 MG: 10 TABLET, FILM COATED ORAL at 13:00

## 2019-07-31 RX ADMIN — HYDROCHLOROTHIAZIDE 12.5 MG: 25 TABLET ORAL at 12:59

## 2019-07-31 RX ADMIN — ASPIRIN 81 MG: 81 TABLET ORAL at 13:00

## 2019-07-31 RX ADMIN — DULOXETINE HYDROCHLORIDE 60 MG: 60 CAPSULE, DELAYED RELEASE ORAL at 13:00

## 2019-07-31 RX ADMIN — FLUTICASONE PROPIONATE 2 SPRAY: 50 SPRAY, METERED NASAL at 13:00

## 2019-07-31 RX ADMIN — TETROFOSMIN 11 MILLICURIE: 1.38 INJECTION, POWDER, LYOPHILIZED, FOR SOLUTION INTRAVENOUS at 09:25

## 2019-07-31 RX ADMIN — GABAPENTIN 400 MG: 400 CAPSULE ORAL at 12:59

## 2019-07-31 RX ADMIN — AMINOPHYLLINE 75 MG: 25 INJECTION, SOLUTION INTRAVENOUS at 10:43

## 2019-07-31 RX ADMIN — Medication 10 ML: at 13:00

## 2019-07-31 RX ADMIN — LISINOPRIL 40 MG: 20 TABLET ORAL at 12:59

## 2019-07-31 ASSESSMENT — PAIN SCALES - GENERAL: PAINLEVEL_OUTOF10: 0

## 2019-08-01 RX ORDER — LISINOPRIL 40 MG/1
40 TABLET ORAL DAILY
Qty: 90 TABLET | Refills: 1 | Status: SHIPPED | OUTPATIENT
Start: 2019-08-01 | End: 2020-01-13

## 2019-08-01 RX ORDER — TRAZODONE HYDROCHLORIDE 50 MG/1
50 TABLET ORAL NIGHTLY
Qty: 90 TABLET | Refills: 1 | Status: SHIPPED | OUTPATIENT
Start: 2019-08-01 | End: 2020-01-13

## 2019-08-01 RX ORDER — TIOTROPIUM BROMIDE 18 UG/1
CAPSULE ORAL; RESPIRATORY (INHALATION)
Qty: 30 CAPSULE | Refills: 0 | Status: SHIPPED | OUTPATIENT
Start: 2019-08-01 | End: 2019-08-26 | Stop reason: SDUPTHER

## 2019-08-01 RX ORDER — FLUTICASONE PROPIONATE 50 MCG
SPRAY, SUSPENSION (ML) NASAL
Qty: 16 G | Refills: 2 | Status: SHIPPED | OUTPATIENT
Start: 2019-08-01 | End: 2019-10-21 | Stop reason: SDUPTHER

## 2019-08-01 RX ORDER — DULOXETIN HYDROCHLORIDE 60 MG/1
60 CAPSULE, DELAYED RELEASE ORAL DAILY
Qty: 90 CAPSULE | Refills: 1 | Status: SHIPPED | OUTPATIENT
Start: 2019-08-01 | End: 2020-01-13

## 2019-08-01 RX ORDER — GABAPENTIN 400 MG/1
400 CAPSULE ORAL 3 TIMES DAILY
Qty: 90 CAPSULE | Refills: 5 | Status: SHIPPED | OUTPATIENT
Start: 2019-08-01 | End: 2020-01-13

## 2019-08-01 RX ORDER — TRAMADOL HYDROCHLORIDE 50 MG/1
TABLET ORAL
Qty: 42 TABLET | OUTPATIENT
Start: 2019-08-01

## 2019-08-01 RX ORDER — ERGOCALCIFEROL 1.25 MG/1
50000 CAPSULE ORAL WEEKLY
Qty: 12 CAPSULE | Refills: 0 | Status: SHIPPED | OUTPATIENT
Start: 2019-08-01 | End: 2019-10-21 | Stop reason: SDUPTHER

## 2019-08-01 RX ORDER — ATORVASTATIN CALCIUM 80 MG/1
80 TABLET, FILM COATED ORAL NIGHTLY
Qty: 90 TABLET | Refills: 1 | Status: SHIPPED | OUTPATIENT
Start: 2019-08-01 | End: 2020-01-13

## 2019-08-01 RX ORDER — ALBUTEROL SULFATE 90 UG/1
AEROSOL, METERED RESPIRATORY (INHALATION)
Qty: 18 G | Refills: 3 | Status: SHIPPED | OUTPATIENT
Start: 2019-08-01 | End: 2019-11-06 | Stop reason: SDUPTHER

## 2019-08-13 ENCOUNTER — TELEPHONE (OUTPATIENT)
Dept: FAMILY MEDICINE CLINIC | Age: 64
End: 2019-08-13

## 2019-08-14 ENCOUNTER — TELEPHONE (OUTPATIENT)
Dept: FAMILY MEDICINE CLINIC | Age: 64
End: 2019-08-14

## 2019-08-14 NOTE — TELEPHONE ENCOUNTER
She has an upcoming appmnt with me for her blood pressure. She has a new appmnt with Ortho the day prior. I discussed that this medicine would not be long term. I'm happy to fit her in my schedule if she needs to be seen sooner so we can make sure that nothing has changed from her last exam. Please let her know. Thanks.

## 2019-08-15 ENCOUNTER — TELEPHONE (OUTPATIENT)
Dept: ORTHOPEDIC SURGERY | Age: 64
End: 2019-08-15

## 2019-08-15 ENCOUNTER — TELEPHONE (OUTPATIENT)
Dept: FAMILY MEDICINE CLINIC | Age: 64
End: 2019-08-15

## 2019-08-16 ENCOUNTER — TELEPHONE (OUTPATIENT)
Dept: FAMILY MEDICINE CLINIC | Age: 64
End: 2019-08-16

## 2019-08-26 DIAGNOSIS — J43.2 CENTRILOBULAR EMPHYSEMA (HCC): ICD-10-CM

## 2019-08-26 RX ORDER — TIOTROPIUM BROMIDE 18 UG/1
CAPSULE ORAL; RESPIRATORY (INHALATION)
Qty: 30 CAPSULE | Refills: 0 | Status: SHIPPED | OUTPATIENT
Start: 2019-08-26 | End: 2019-09-24 | Stop reason: SDUPTHER

## 2019-08-26 NOTE — TELEPHONE ENCOUNTER
.  Last office visit 5/20/2019     Last written     Next office visit scheduled none    Requested Prescriptions     Pending Prescriptions Disp Refills    nicotine ([address]) 7 MG/24HR [Pharmacy Med Name: NICOTINE 7 MG TRANS PATCH]       Sig: PLACE 1 PATCH ONTO THE SKIN DAILY FOR 14 DAYS    Babara Favre 18 MCG inhalation capsule [Pharmacy Med Name: Babara Favre 30 capsule 0     Sig: INHALE 1 CAPSULE INTO THE LUNGS DAILY

## 2019-09-09 ENCOUNTER — HOSPITAL ENCOUNTER (EMERGENCY)
Age: 64
Discharge: HOME | End: 2019-09-09
Payer: COMMERCIAL

## 2019-09-09 VITALS
BODY MASS INDEX: 25.49 KG/M2 | DIASTOLIC BLOOD PRESSURE: 89 MMHG | RESPIRATION RATE: 18 BRPM | HEIGHT: 61 IN | SYSTOLIC BLOOD PRESSURE: 127 MMHG | OXYGEN SATURATION: 98 % | HEART RATE: 83 BPM | TEMPERATURE: 98.1 F | WEIGHT: 135 LBS

## 2019-09-09 DIAGNOSIS — L03.114 CELLULITIS OF LEFT UPPER EXTREMITY: ICD-10-CM
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
## 2019-09-09 PROCEDURE — 99281 EMR DPT VST MAYX REQ PHY/QHP: CPT

## 2019-09-09 PROCEDURE — 96372 THER/PROPH/DIAG INJ SC/IM: CPT

## 2019-09-09 PROCEDURE — 6360000002 HC RX W HCPCS: Performed by: PHYSICIAN ASSISTANT

## 2019-09-09 PROCEDURE — 6370000000 HC RX 637 (ALT 250 FOR IP): Performed by: PHYSICIAN ASSISTANT

## 2019-09-09 RX ORDER — DIPHENHYDRAMINE HCL 25 MG
25 TABLET ORAL ONCE
Status: COMPLETED | OUTPATIENT
Start: 2019-09-09 | End: 2019-09-09

## 2019-09-09 RX ORDER — CEPHALEXIN 500 MG/1
500 CAPSULE ORAL 4 TIMES DAILY
Qty: 40 CAPSULE | Refills: 0 | Status: SHIPPED | OUTPATIENT
Start: 2019-09-09 | End: 2019-09-18

## 2019-09-09 RX ORDER — METHYLPREDNISOLONE 4 MG/1
TABLET ORAL
Qty: 1 KIT | Refills: 0 | Status: SHIPPED | OUTPATIENT
Start: 2019-09-09 | End: 2019-09-18

## 2019-09-09 RX ORDER — FAMOTIDINE 20 MG/1
20 TABLET, FILM COATED ORAL 2 TIMES DAILY
Qty: 60 TABLET | Refills: 0 | Status: SHIPPED | OUTPATIENT
Start: 2019-09-09 | End: 2019-10-08 | Stop reason: SDUPTHER

## 2019-09-09 RX ORDER — NAPROXEN 500 MG/1
500 TABLET ORAL ONCE
Status: COMPLETED | OUTPATIENT
Start: 2019-09-09 | End: 2019-09-09

## 2019-09-09 RX ORDER — CEPHALEXIN 500 MG/1
500 CAPSULE ORAL ONCE
Status: COMPLETED | OUTPATIENT
Start: 2019-09-09 | End: 2019-09-09

## 2019-09-09 RX ORDER — SULFAMETHOXAZOLE AND TRIMETHOPRIM 800; 160 MG/1; MG/1
1 TABLET ORAL 2 TIMES DAILY
Qty: 20 TABLET | Refills: 0 | Status: SHIPPED | OUTPATIENT
Start: 2019-09-09 | End: 2019-09-18

## 2019-09-09 RX ORDER — FAMOTIDINE 20 MG/1
40 TABLET, FILM COATED ORAL ONCE
Status: COMPLETED | OUTPATIENT
Start: 2019-09-09 | End: 2019-09-09

## 2019-09-09 RX ORDER — NAPROXEN 500 MG/1
500 TABLET ORAL 2 TIMES DAILY
Qty: 20 TABLET | Refills: 0 | Status: SHIPPED | OUTPATIENT
Start: 2019-09-09 | End: 2019-09-18

## 2019-09-09 RX ORDER — SULFAMETHOXAZOLE AND TRIMETHOPRIM 800; 160 MG/1; MG/1
1 TABLET ORAL ONCE
Status: COMPLETED | OUTPATIENT
Start: 2019-09-09 | End: 2019-09-09

## 2019-09-09 RX ORDER — DEXAMETHASONE SODIUM PHOSPHATE 10 MG/ML
4 INJECTION INTRAMUSCULAR; INTRAVENOUS ONCE
Status: COMPLETED | OUTPATIENT
Start: 2019-09-09 | End: 2019-09-09

## 2019-09-09 RX ORDER — DIPHENHYDRAMINE HCL 25 MG
25 CAPSULE ORAL EVERY 4 HOURS PRN
Qty: 1 CAPSULE | Refills: 0 | Status: SHIPPED | OUTPATIENT
Start: 2019-09-09 | End: 2019-09-18

## 2019-09-09 RX ORDER — METHYLPREDNISOLONE SODIUM SUCCINATE 125 MG/2ML
125 INJECTION, POWDER, LYOPHILIZED, FOR SOLUTION INTRAMUSCULAR; INTRAVENOUS ONCE
Status: DISCONTINUED | OUTPATIENT
Start: 2019-09-09 | End: 2019-09-09

## 2019-09-09 RX ADMIN — DIPHENHYDRAMINE HCL 25 MG: 25 TABLET ORAL at 15:20

## 2019-09-09 RX ADMIN — DEXAMETHASONE SODIUM PHOSPHATE 4 MG: 10 INJECTION, SOLUTION INTRAMUSCULAR; INTRAVENOUS at 15:19

## 2019-09-09 RX ADMIN — CEPHALEXIN 500 MG: 500 CAPSULE ORAL at 15:20

## 2019-09-09 RX ADMIN — NAPROXEN 500 MG: 500 TABLET ORAL at 15:20

## 2019-09-09 RX ADMIN — SULFAMETHOXAZOLE AND TRIMETHOPRIM 1 TABLET: 800; 160 TABLET ORAL at 15:20

## 2019-09-09 RX ADMIN — FAMOTIDINE 40 MG: 20 TABLET, FILM COATED ORAL at 15:20

## 2019-09-09 ASSESSMENT — PAIN DESCRIPTION - LOCATION: LOCATION: ARM

## 2019-09-09 ASSESSMENT — PAIN DESCRIPTION - ORIENTATION: ORIENTATION: LEFT

## 2019-09-09 ASSESSMENT — PAIN SCALES - WONG BAKER: WONGBAKER_NUMERICALRESPONSE: 0

## 2019-09-09 ASSESSMENT — PAIN SCALES - GENERAL
PAINLEVEL_OUTOF10: 9
PAINLEVEL_OUTOF10: 9

## 2019-09-09 ASSESSMENT — PAIN DESCRIPTION - DESCRIPTORS: DESCRIPTORS: BURNING

## 2019-09-09 ASSESSMENT — PAIN DESCRIPTION - PAIN TYPE: TYPE: ACUTE PAIN

## 2019-09-10 ENCOUNTER — NURSE TRIAGE (OUTPATIENT)
Dept: OTHER | Facility: CLINIC | Age: 64
End: 2019-09-10

## 2019-09-10 ENCOUNTER — OFFICE VISIT (OUTPATIENT)
Dept: FAMILY MEDICINE CLINIC | Age: 64
End: 2019-09-10
Payer: COMMERCIAL

## 2019-09-10 VITALS
DIASTOLIC BLOOD PRESSURE: 76 MMHG | OXYGEN SATURATION: 96 % | SYSTOLIC BLOOD PRESSURE: 116 MMHG | HEART RATE: 87 BPM | TEMPERATURE: 98.6 F | WEIGHT: 143 LBS | BODY MASS INDEX: 27.02 KG/M2

## 2019-09-10 DIAGNOSIS — L03.114 LEFT ARM CELLULITIS: Primary | ICD-10-CM
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
## 2019-09-10 PROCEDURE — 3017F COLORECTAL CA SCREEN DOC REV: CPT | Performed by: INTERNAL MEDICINE

## 2019-09-10 PROCEDURE — 4004F PT TOBACCO SCREEN RCVD TLK: CPT | Performed by: INTERNAL MEDICINE

## 2019-09-10 PROCEDURE — G8428 CUR MEDS NOT DOCUMENT: HCPCS | Performed by: INTERNAL MEDICINE

## 2019-09-10 PROCEDURE — G8419 CALC BMI OUT NRM PARAM NOF/U: HCPCS | Performed by: INTERNAL MEDICINE

## 2019-09-10 PROCEDURE — 99213 OFFICE O/P EST LOW 20 MIN: CPT | Performed by: INTERNAL MEDICINE

## 2019-09-10 RX ORDER — HYDROCODONE BITARTRATE AND ACETAMINOPHEN 5; 325 MG/1; MG/1
1 TABLET ORAL EVERY 6 HOURS PRN
Qty: 10 TABLET | Refills: 0 | Status: CANCELLED | OUTPATIENT
Start: 2019-09-10 | End: 2019-09-13

## 2019-09-10 ASSESSMENT — ENCOUNTER SYMPTOMS
VOMITING: 0
NAUSEA: 0
SHORTNESS OF BREATH: 0
COLOR CHANGE: 0
COUGH: 0

## 2019-09-10 NOTE — ED PROVIDER NOTES
Evaluated by [id number] [facility] Provider          [facility] ED  EMERGENCY DEPARTMENT ENCOUNTER        Pt Name: Jeremías Cummings  MRN: [id number]  [place] 1955  Dateof evaluation: 9/9/2019  Provider: Edith Ng PA-C  PCP: Cher Chavira MD  ED Attending: No att. providers found    [address] [facility]       Chief Complaint   Patient presents with    Insect Bite     wasp sting to left arm 2 days ago, with increased swelling and pain 9/10. Pt took bendaryl and prednisone yesterday with no relief. HISTORY OF PRESENTILLNESS   (Location/Symptom, Timing/Onset, Context/Setting, Quality, Duration, Modifying Factors, Severity)  Note limiting factors. Jeremías Cummings is a 60 y.o. female for 3 days of painful sting that is itching to left UE, uncertain what stung her. Patient states she is having swelling and pain and is a 9 out of 10 states that she tried leftover prednisone with no improvement in her symptoms. Gradual onset of worsening symptoms duration to the present time area is weeping patient denies any difficulty with breathing or swallowing. *  Nursing Notes were all reviewed and agreed with or any disagreements were addressed  in the HPI. REVIEW OF SYSTEMS    (2-9 systems for level 4, 10 or more for level 5)     Review of Systems   Constitutional: Negative for chills and fever. HENT: Negative. Respiratory: Negative for cough and shortness of breath. Cardiovascular: Negative for chest pain and palpitations. Gastrointestinal: Negative for nausea and vomiting. Genitourinary: Negative for difficulty urinating, dysuria and frequency. Musculoskeletal: Negative. Skin: Positive for wound. Negative for color change, pallor and rash. Hematological: Negative. Psychiatric/Behavioral: Negative. All other systems reviewed and are negative. Positives and Pertinent negatives as per HPI. Except as noted above in the ROS, all other systems were reviewed and negative.        PAST Disp-90 tablet, R-1Normal      nitroGLYCERIN (NITROSTAT) 0.4 MG SL tablet up to max of 3 total doses. If no relief after 1 dose, call 911., Disp-25 tablet, R-3Normal      hydrochlorothiazide (HYDRODIURIL) 12.5 MG tablet Take 1 tablet by mouth daily, Disp-30 tablet, R-1Normal      indomethacin (INDOCIN) 50 MG capsule Take 1 capsule by mouth 3 times daily (with meals) for 7 days, Disp-21 capsule, R-0Print      !! VENTOLIN  (90 Base) MCG/ACT inhaler Inhale 2 puffs into the lungs every 6 hours as needed for Wheezing, Disp-1 Inhaler, R-3Normal      cetirizine (ZYRTEC) 10 MG tablet TAKE 1 TABLET BY MOUTH DAILY, Disp-90 tablet, R-1Normal      aspirin 81 MG tablet Take 1 tablet by mouth daily (with breakfast), Disp-90 tablet, R-1Normal       !! - Potential duplicate medications found. Please discuss with provider.             ALLERGIES     Codeine    FAMILY HISTORY       Family History   Problem Relation Age of Onset    High Blood Pressure Mother     High Cholesterol Mother     Heart Disease Mother     Coronary Art Dis Mother     Heart Attack Father         59    Cirrhosis Sister     Alcohol Abuse Sister     Other Sister         \"colostomy bag due to hole in her colon\"   [place]lin  Sudden Death Brother         \"suicide\"    Colon Cancer Other         maternal uncle    Breast Cancer Other         multiple maternal aunts along with uterine cancer          SOCIAL HISTORY       Social History     Socioeconomic History    Marital status:      Spouse name: None    Number of children: None    Years of education: None    Highest education level: None   Occupational History    None   Social Needs    Financial resource strain: None    Food insecurity:     Worry: None     Inability: None    Transportation needs:     Medical: None     Non-medical: None   Tobacco Use    Smoking status: Current Every Day Smoker     Packs/day: 0.25     Years: 20.00     Pack years: 5.00     Types: Cigarettes    Smokeless tobacco: Never Used    Tobacco comment: 9/27/18 - smokes 1/2 ppd   Substance and Sexual Activity    Alcohol use: No    Drug use: Yes     Types: Marijuana, Opiates      Comment: pt states she took two hits for new years so she could sleep    Sexual activity: Not Currently   Lifestyle    Physical activity:     Days per week: None     Minutes per session: None    Stress: None   Relationships    Social connections:     Talks on phone: None     Gets together: None     Attends Scientology service: None     Active member of club or organization: None     Attends meetings of clubs or organizations: None     Relationship status: None    Intimate partner violence:     Fear of current or ex partner: None     Emotionally abused: None     Physically abused: None     Forced sexual activity: None   Other Topics Concern    None   Social History Narrative    None       SCREENINGS     NIH Score       [facility]     Heart Score         PHYSICAL EXAM  (up to 7 for level 4, 8 or more for level 5)     ED Triage Vitals [09/09/19 1319]   BP Temp Temp Source Pulse Resp SpO2 Height Weight   127/89 98.1 °F (36.7 °C) Oral 83 18 98 % 5' 1\" (1.549 m) 135 lb (61.2 kg)       Physical Exam   Constitutional: She appears well-developed and well-nourished. HENT:   Head: Normocephalic. Nose: Nose normal.   Mouth/Throat: Oropharynx is clear and moist. No oropharyngeal exudate. Eyes: Pupils are equal, round, and reactive to light. Conjunctivae and EOM are normal. Right eye exhibits no discharge. Left eye exhibits no discharge. Neck: Normal range of motion. Cardiovascular: Normal rate, regular rhythm and normal heart sounds. Exam reveals no gallop and no friction rub. No murmur heard. Pulmonary/Chest: Effort normal and breath sounds normal. No respiratory distress. She has no wheezes. Abdominal: Soft. Bowel sounds are normal. She exhibits no distension. There is no tenderness. Musculoskeletal: Normal range of motion. Neurological: She is alert. Skin: Skin is warm and dry. Rash noted. Rash is urticarial. She is not diaphoretic. Psychiatric: She has a normal mood and affect. Her behavior is normal.   Nursing note and vitals reviewed. DIAGNOSTIC RESULTS   LABS:    Labs Reviewed - No data to display    All other labs werewithin normal range or not returned as of this dictation. EKG: All EKG's are interpreted by the Emergency Department Physician who either signs or Co-signs this chart in the absence of acardiologist.  Please see their note for interpretation of EKG. RADIOLOGY:           Interpretation per the Radiologist below, if available at the time of this note:    No orders to display     No results found. PROCEDURES   Unless otherwise noted below, none     Procedures    CRITICAL CARE TIME   N/A    CONSULTS:  None      EMERGENCYDEPARTMENT COURSE and DIFFERENTIAL DIAGNOSIS/MDM:   Vitals:    Vitals:    09/09/19 1319   BP: 127/89   Pulse: 83   Resp: 18   Temp: 98.1 °F (36.7 °C)   TempSrc: Oral   SpO2: 98%   Weight: 135 lb (61.2 kg)   Height: 5' 1\" (1.549 m)       Patient was given the following medications:  Medications   naproxen (NAPROSYN) tablet 500 mg (500 mg Oral Given 9/9/19 1520)   sulfamethoxazole-trimethoprim (BACTRIM DS;SEPTRA DS) 800-160 MG per tablet 1 tablet (1 tablet Oral Given 9/9/19 1520)   cephALEXin (KEFLEX) capsule 500 mg (500 mg Oral Given 9/9/19 1520)   dexamethasone (DECADRON) injection 4 mg (4 mg Intramuscular Given 9/9/19 1519)   famotidine (PEPCID) tablet 40 mg (40 mg Oral Given 9/9/19 1520)   diphenhydrAMINE (BENADRYL) tablet 25 mg (25 mg Oral Given 9/9/19 1520)           Afebrile, stable, patient presents to the ED for evaluation. Seen on my own, per my scope of practice, with attending ED provider available for consultation who agrees with assessment and plan.    Patient's SPO2 on room air of 98% patient is not hypoxic nontoxic in no acute distress provided with antibiotics in addition to steroids Benadryl and Pepcid. Patient is advised to clip her fingernails and to stop scratching the area. All questions are answered. Indications for return to the ED are discussed. Patient is advised if any new or worsening symptoms arise they should immediately return to the emergency room. Follow-up with primary care in 1-2 days. The patient tolerated their visit well. I have evaluated this patient. My supervising physician was available for consultation. The patient and / or the family were informed of the results of any tests, a time was given to answer questions, a plan was proposed and they agreed Beverlyn Boas. No results found for this visit on 09/09/19. I estimate there is LOW risk for COMPARTMENT SYNDROME, DEEP VENOUS THROMBOSIS, SEPTIC ARTHRITIS, TENDON OR NEUROVASCULAR INJURY, thus I consider the discharge disposition reasonable. Gómez Led and I have discussed the diagnosis and risks, and we agree with discharging home to follow-up with their primary doctor or the referral orthopedist. We also discussed returning to the Emergency Department immediately if new or worsening symptoms occur. We have discussed the symptoms which are most concerning (e.g., changing or worsening pain, numbness, weakness) that necessitate immediate return. FINAL IMPRESSION      1. Bug bite, initial encounter    2.  Cellulitis of left upper extremity          DISPOSITION/PLAN   DISPOSITION Decision To Discharge 09/09/2019 02:52:20 PM      PATIENT REFERRED TO:  Alva Roth MD  [address]  [phone]    Schedule an appointment as soon as possible for a visit   for a recheck in 2-3  days      DISCHARGE MEDICATIONS:  Discharge Medication List as of 9/9/2019  2:53 PM      START taking these medications    Details   sulfamethoxazole-trimethoprim (BACTRIM DS) 800-160 MG per tablet Take 1 tablet by mouth 2 times daily for 10 days, Disp-20 tablet, R-0Print

## 2019-09-11 RX ORDER — ACETAMINOPHEN AND CODEINE PHOSPHATE 300; 30 MG/1; MG/1
1 TABLET ORAL EVERY 6 HOURS PRN
Qty: 12 TABLET | Refills: 0 | Status: SHIPPED | OUTPATIENT
Start: 2019-09-11 | End: 2019-09-14

## 2019-09-18 ENCOUNTER — OFFICE VISIT (OUTPATIENT)
Dept: FAMILY MEDICINE CLINIC | Age: 64
End: 2019-09-18
Payer: COMMERCIAL

## 2019-09-18 VITALS
BODY MASS INDEX: 25.7 KG/M2 | DIASTOLIC BLOOD PRESSURE: 110 MMHG | WEIGHT: 136 LBS | HEART RATE: 50 BPM | OXYGEN SATURATION: 91 % | SYSTOLIC BLOOD PRESSURE: 190 MMHG

## 2019-09-18 DIAGNOSIS — G89.29 CHRONIC BILATERAL LOW BACK PAIN WITHOUT SCIATICA: ICD-10-CM

## 2019-09-18 DIAGNOSIS — R25.2 MUSCLE CRAMPS: ICD-10-CM

## 2019-09-18 DIAGNOSIS — R13.12 OROPHARYNGEAL DYSPHAGIA: ICD-10-CM

## 2019-09-18 DIAGNOSIS — I16.0 HYPERTENSIVE URGENCY: Primary | ICD-10-CM

## 2019-09-18 DIAGNOSIS — M54.50 CHRONIC BILATERAL LOW BACK PAIN WITHOUT SCIATICA: ICD-10-CM

## 2019-09-18 DIAGNOSIS — R05.9 COUGH: ICD-10-CM

## 2019-09-18 DIAGNOSIS — I10 ESSENTIAL HYPERTENSION: ICD-10-CM

## 2019-09-18 LAB
A/G RATIO: 1.4 (ref 1.1–2.2)
ALBUMIN SERPL-MCNC: 4.9 G/DL (ref 3.4–5)
ALP BLD-CCNC: 82 U/L (ref 40–129)
ALT SERPL-CCNC: 12 U/L (ref 10–40)
ANION GAP SERPL CALCULATED.3IONS-SCNC: 17 MMOL/L (ref 3–16)
AST SERPL-CCNC: 18 U/L (ref 15–37)
BASOPHILS ABSOLUTE: 0.1 K/UL (ref 0–0.2)
BASOPHILS RELATIVE PERCENT: 0.5 %
BILIRUB SERPL-MCNC: 0.4 MG/DL (ref 0–1)
BUN BLDV-MCNC: 9 MG/DL (ref 7–20)
CALCIUM SERPL-MCNC: 9.9 MG/DL (ref 8.3–10.6)
CHLORIDE BLD-SCNC: 98 MMOL/L (ref 99–110)
CHOLESTEROL, TOTAL: 194 MG/DL (ref 0–199)
CO2: 23 MMOL/L (ref 21–32)
CREAT SERPL-MCNC: 0.6 MG/DL (ref 0.6–1.2)
EOSINOPHILS ABSOLUTE: 0.2 K/UL (ref 0–0.6)
EOSINOPHILS RELATIVE PERCENT: 1.2 %
FOLATE: 15.25 NG/ML (ref 4.78–24.2)
GFR AFRICAN AMERICAN: >60
GFR NON-AFRICAN AMERICAN: >60
GLOBULIN: 3.4 G/DL
GLUCOSE BLD-MCNC: 139 MG/DL (ref 70–99)
HCT VFR BLD CALC: 50.2 % (ref 36–48)
HDLC SERPL-MCNC: 85 MG/DL (ref 40–60)
HEMOGLOBIN: 16.4 G/DL (ref 12–16)
LDL CHOLESTEROL CALCULATED: 88 MG/DL
LYMPHOCYTES ABSOLUTE: 4.8 K/UL (ref 1–5.1)
LYMPHOCYTES RELATIVE PERCENT: 30.6 %
MAGNESIUM: 1.8 MG/DL (ref 1.8–2.4)
MCH RBC QN AUTO: 31.6 PG (ref 26–34)
MCHC RBC AUTO-ENTMCNC: 32.6 G/DL (ref 31–36)
MCV RBC AUTO: 96.8 FL (ref 80–100)
MONOCYTES ABSOLUTE: 0.9 K/UL (ref 0–1.3)
MONOCYTES RELATIVE PERCENT: 6 %
NEUTROPHILS ABSOLUTE: 9.6 K/UL (ref 1.7–7.7)
NEUTROPHILS RELATIVE PERCENT: 61.7 %
PDW BLD-RTO: 14.4 % (ref 12.4–15.4)
PLATELET # BLD: 439 K/UL (ref 135–450)
PMV BLD AUTO: 8.4 FL (ref 5–10.5)
POTASSIUM SERPL-SCNC: 3.8 MMOL/L (ref 3.5–5.1)
RBC # BLD: 5.19 M/UL (ref 4–5.2)
SODIUM BLD-SCNC: 138 MMOL/L (ref 136–145)
TOTAL PROTEIN: 8.3 G/DL (ref 6.4–8.2)
TRIGL SERPL-MCNC: 106 MG/DL (ref 0–150)
TSH REFLEX: 2.65 UIU/ML (ref 0.27–4.2)
VITAMIN B-12: 265 PG/ML (ref 211–911)
VITAMIN D 25-HYDROXY: 37.4 NG/ML
VLDLC SERPL CALC-MCNC: 21 MG/DL
WBC # BLD: 15.5 K/UL (ref 4–11)

## 2019-09-18 PROCEDURE — 4004F PT TOBACCO SCREEN RCVD TLK: CPT | Performed by: FAMILY MEDICINE

## 2019-09-18 PROCEDURE — 99215 OFFICE O/P EST HI 40 MIN: CPT | Performed by: FAMILY MEDICINE

## 2019-09-18 PROCEDURE — G8427 DOCREV CUR MEDS BY ELIG CLIN: HCPCS | Performed by: FAMILY MEDICINE

## 2019-09-18 PROCEDURE — 36415 COLL VENOUS BLD VENIPUNCTURE: CPT | Performed by: FAMILY MEDICINE

## 2019-09-18 PROCEDURE — G8419 CALC BMI OUT NRM PARAM NOF/U: HCPCS | Performed by: FAMILY MEDICINE

## 2019-09-18 PROCEDURE — 3017F COLORECTAL CA SCREEN DOC REV: CPT | Performed by: FAMILY MEDICINE

## 2019-09-18 RX ORDER — HYDROCODONE BITARTRATE AND ACETAMINOPHEN 5; 325 MG/1; MG/1
1 TABLET ORAL EVERY 6 HOURS PRN
Qty: 10 TABLET | Refills: 0 | Status: SHIPPED | OUTPATIENT
Start: 2019-09-18 | End: 2019-09-21

## 2019-09-18 RX ORDER — BENZONATATE 100 MG/1
100-200 CAPSULE ORAL 3 TIMES DAILY PRN
Qty: 60 CAPSULE | Refills: 0 | Status: SHIPPED | OUTPATIENT
Start: 2019-09-18 | End: 2019-09-25

## 2019-09-18 RX ORDER — HYDROCHLOROTHIAZIDE 50 MG/1
50 TABLET ORAL DAILY
Qty: 30 TABLET | Refills: 1 | Status: SHIPPED | OUTPATIENT
Start: 2019-09-18 | End: 2019-11-06 | Stop reason: SDUPTHER

## 2019-09-18 ASSESSMENT — ENCOUNTER SYMPTOMS
SHORTNESS OF BREATH: 0
NAUSEA: 0
CHEST TIGHTNESS: 0
COLOR CHANGE: 0
ABDOMINAL PAIN: 0
VOMITING: 0

## 2019-09-18 NOTE — PROGRESS NOTES
for activity change, appetite change, chills, diaphoresis, fatigue and fever. Eyes: Negative for visual disturbance. Respiratory: Negative for chest tightness and shortness of breath. Cardiovascular: Negative for chest pain, palpitations and leg swelling. Gastrointestinal: Negative for abdominal pain, nausea and vomiting. Genitourinary: Negative for decreased urine volume. Skin: Negative for color change. Neurological: Negative for dizziness, weakness, light-headedness, numbness and headaches. BP (!) 190/110 (Site: Right Upper Arm, Position: Sitting, Cuff Size: Medium Adult)   Pulse 50   Wt 136 lb (61.7 kg)   SpO2 91%   BMI 25.70 kg/m²     Physical Exam   Constitutional: She is oriented to person, place, and time. She appears well-developed and well-nourished. No distress. HENT:   Head: Normocephalic and atraumatic. Eyes: Pupils are equal, round, and reactive to light. Conjunctivae and EOM are normal.   Neck: Normal range of motion. Neck supple. No JVD present. Cardiovascular: Regular rhythm, normal heart sounds and intact distal pulses. Exam reveals no gallop and no friction rub. No murmur heard. Pulmonary/Chest: Effort normal and breath sounds normal. No respiratory distress. She has no wheezes. She has no rales. She exhibits no tenderness. Abdominal: Soft. There is no tenderness. Musculoskeletal: Normal range of motion. Neurological: She is alert and oriented to person, place, and time. Skin: Skin is warm and dry. Capillary refill takes 2 to 3 seconds. She is not diaphoretic. No erythema. Psychiatric: She has a normal mood and affect. Her behavior is normal. Judgment and thought content normal.   Tearful but wouldn't discuss reasoning    Nursing note and vitals reviewed. Plan  1.  Essential hypertension  Did not take her medications this morning  Slight HA but asymptomatic otherwise  Inc HCTZ and c/w ACE   See back in 2-4 days for recheck  Stressed importance of

## 2019-09-18 NOTE — PATIENT INSTRUCTIONS
Call Dr. Elijah Sutton tomorrow and make an appointment as soon as you can    Increase your hydrochlorothiazide medication to 50 mg daily    Schedule your esophagram for your trouble swallowing

## 2019-09-19 ENCOUNTER — TELEPHONE (OUTPATIENT)
Dept: FAMILY MEDICINE CLINIC | Age: 64
End: 2019-09-19

## 2019-09-30 ENCOUNTER — HOSPITAL ENCOUNTER (OUTPATIENT)
Dept: GENERAL RADIOLOGY | Age: 64
Discharge: HOME | End: 2019-09-30
Payer: COMMERCIAL

## 2019-09-30 DIAGNOSIS — R13.12 OROPHARYNGEAL DYSPHAGIA: ICD-10-CM

## 2019-09-30 PROBLEM — R25.2 MUSCLE CRAMPS: Status: ACTIVE | Noted: 2019-09-30

## 2019-09-30 PROBLEM — I16.0 HYPERTENSIVE URGENCY: Status: ACTIVE | Noted: 2019-09-30

## 2019-09-30 PROBLEM — M54.50 CHRONIC BILATERAL LOW BACK PAIN WITHOUT SCIATICA: Status: ACTIVE | Noted: 2018-08-03

## 2019-09-30 PROCEDURE — 74220 X-RAY XM ESOPHAGUS 1CNTRST: CPT

## 2019-10-02 ENCOUNTER — OFFICE VISIT (OUTPATIENT)
Dept: FAMILY MEDICINE CLINIC | Age: 64
End: 2019-10-02
Payer: COMMERCIAL

## 2019-10-02 ENCOUNTER — TELEPHONE (OUTPATIENT)
Dept: FAMILY MEDICINE CLINIC | Age: 64
End: 2019-10-02

## 2019-10-02 VITALS
HEART RATE: 70 BPM | OXYGEN SATURATION: 91 % | SYSTOLIC BLOOD PRESSURE: 112 MMHG | WEIGHT: 141 LBS | BODY MASS INDEX: 26.64 KG/M2 | DIASTOLIC BLOOD PRESSURE: 80 MMHG

## 2019-10-02 DIAGNOSIS — R41.3 MEMORY LOSS: Primary | ICD-10-CM

## 2019-10-02 DIAGNOSIS — R05.9 COUGH: ICD-10-CM

## 2019-10-02 DIAGNOSIS — R04.2 HEMOPTYSIS: ICD-10-CM

## 2019-10-02 DIAGNOSIS — I73.9 PVD (PERIPHERAL VASCULAR DISEASE) WITH CLAUDICATION (HCC): ICD-10-CM

## 2019-10-02 DIAGNOSIS — R20.0 LEFT ARM NUMBNESS: ICD-10-CM

## 2019-10-02 DIAGNOSIS — R29.6 FALLS FREQUENTLY: ICD-10-CM

## 2019-10-02 PROCEDURE — 99214 OFFICE O/P EST MOD 30 MIN: CPT | Performed by: FAMILY MEDICINE

## 2019-10-02 PROCEDURE — G8484 FLU IMMUNIZE NO ADMIN: HCPCS | Performed by: FAMILY MEDICINE

## 2019-10-02 PROCEDURE — 4004F PT TOBACCO SCREEN RCVD TLK: CPT | Performed by: FAMILY MEDICINE

## 2019-10-02 PROCEDURE — 3017F COLORECTAL CA SCREEN DOC REV: CPT | Performed by: FAMILY MEDICINE

## 2019-10-02 PROCEDURE — G8419 CALC BMI OUT NRM PARAM NOF/U: HCPCS | Performed by: FAMILY MEDICINE

## 2019-10-02 PROCEDURE — G8427 DOCREV CUR MEDS BY ELIG CLIN: HCPCS | Performed by: FAMILY MEDICINE

## 2019-10-02 RX ORDER — TIZANIDINE 2 MG/1
2 TABLET ORAL 2 TIMES DAILY PRN
Qty: 30 TABLET | Refills: 0 | Status: SHIPPED | OUTPATIENT
Start: 2019-10-02 | End: 2019-10-16 | Stop reason: SDUPTHER

## 2019-10-03 ENCOUNTER — TELEPHONE (OUTPATIENT)
Dept: FAMILY MEDICINE CLINIC | Age: 64
End: 2019-10-03

## 2019-10-03 RX ORDER — HYDROXYZINE HYDROCHLORIDE 25 MG/1
25 TABLET, FILM COATED ORAL EVERY 8 HOURS PRN
Qty: 90 TABLET | Refills: 0 | Status: SHIPPED | OUTPATIENT
Start: 2019-10-03 | End: 2019-12-02 | Stop reason: SDUPTHER

## 2019-10-07 ENCOUNTER — TELEPHONE (OUTPATIENT)
Dept: FAMILY MEDICINE CLINIC | Age: 64
End: 2019-10-07

## 2019-10-09 ENCOUNTER — TELEPHONE (OUTPATIENT)
Dept: FAMILY MEDICINE CLINIC | Age: 64
End: 2019-10-09

## 2019-10-09 RX ORDER — FAMOTIDINE 20 MG/1
TABLET, FILM COATED ORAL
Qty: 60 TABLET | Refills: 0 | Status: SHIPPED | OUTPATIENT
Start: 2019-10-09 | End: 2019-11-06 | Stop reason: SDUPTHER

## 2019-10-16 DIAGNOSIS — M54.40 CHRONIC BILATERAL LOW BACK PAIN WITH SCIATICA, SCIATICA LATERALITY UNSPECIFIED: ICD-10-CM

## 2019-10-16 DIAGNOSIS — G89.29 CHRONIC BILATERAL LOW BACK PAIN WITH SCIATICA, SCIATICA LATERALITY UNSPECIFIED: ICD-10-CM

## 2019-10-16 RX ORDER — CETIRIZINE HYDROCHLORIDE 10 MG/1
TABLET ORAL
Qty: 90 TABLET | Refills: 1 | Status: SHIPPED | OUTPATIENT
Start: 2019-10-16 | End: 2020-04-13

## 2019-10-16 RX ORDER — TIZANIDINE 2 MG/1
2 TABLET ORAL 2 TIMES DAILY PRN
Qty: 30 TABLET | Refills: 0 | Status: SHIPPED | OUTPATIENT
Start: 2019-10-16 | End: 2019-11-06 | Stop reason: SDUPTHER

## 2019-10-16 RX ORDER — NAPROXEN 500 MG/1
TABLET ORAL
Qty: 60 TABLET | Refills: 0 | Status: SHIPPED | OUTPATIENT
Start: 2019-10-16 | End: 2019-11-06 | Stop reason: SDUPTHER

## 2019-10-21 DIAGNOSIS — J06.9 VIRAL URI WITH COUGH: ICD-10-CM

## 2019-10-21 DIAGNOSIS — E55.9 VITAMIN D DEFICIENCY: ICD-10-CM

## 2019-10-21 DIAGNOSIS — J43.2 CENTRILOBULAR EMPHYSEMA (HCC): ICD-10-CM

## 2019-10-21 DIAGNOSIS — F32.1 MODERATE SINGLE CURRENT EPISODE OF MAJOR DEPRESSIVE DISORDER (HCC): ICD-10-CM

## 2019-10-21 RX ORDER — ERGOCALCIFEROL 1.25 MG/1
50000 CAPSULE ORAL WEEKLY
Qty: 12 CAPSULE | Refills: 0 | Status: SHIPPED | OUTPATIENT
Start: 2019-10-21 | End: 2020-01-13

## 2019-10-21 RX ORDER — TIOTROPIUM BROMIDE 18 UG/1
CAPSULE ORAL; RESPIRATORY (INHALATION)
Qty: 90 CAPSULE | Refills: 1 | Status: SHIPPED | OUTPATIENT
Start: 2019-10-21 | End: 2020-04-13

## 2019-10-21 RX ORDER — FLUTICASONE PROPIONATE 50 MCG
SPRAY, SUSPENSION (ML) NASAL
Qty: 16 G | Refills: 2 | Status: SHIPPED | OUTPATIENT
Start: 2019-10-21 | End: 2020-01-13

## 2019-11-07 ENCOUNTER — TELEPHONE (OUTPATIENT)
Dept: FAMILY MEDICINE CLINIC | Age: 64
End: 2019-11-07

## 2019-11-07 DIAGNOSIS — I10 ESSENTIAL HYPERTENSION: Primary | ICD-10-CM

## 2019-11-08 ASSESSMENT — ENCOUNTER SYMPTOMS
APNEA: 0
CHEST TIGHTNESS: 0
CONSTIPATION: 0
NAUSEA: 0
SHORTNESS OF BREATH: 0
WHEEZING: 0
ABDOMINAL PAIN: 1
BLOOD IN STOOL: 0
COLOR CHANGE: 0
STRIDOR: 0
COUGH: 1
VOMITING: 0
BACK PAIN: 0
DIARRHEA: 0
TROUBLE SWALLOWING: 0

## 2019-11-10 ENCOUNTER — APPOINTMENT (OUTPATIENT)
Dept: CT IMAGING | Age: 64
End: 2019-11-10
Payer: COMMERCIAL

## 2019-11-10 ENCOUNTER — HOSPITAL ENCOUNTER (EMERGENCY)
Age: 64
Discharge: HOME | End: 2019-11-10
Attending: EMERGENCY MEDICINE
Payer: COMMERCIAL

## 2019-11-10 VITALS
OXYGEN SATURATION: 94 % | BODY MASS INDEX: 21.5 KG/M2 | HEIGHT: 67 IN | SYSTOLIC BLOOD PRESSURE: 132 MMHG | TEMPERATURE: 97.9 F | HEART RATE: 79 BPM | RESPIRATION RATE: 17 BRPM | WEIGHT: 137 LBS | DIASTOLIC BLOOD PRESSURE: 68 MMHG

## 2019-11-10 DIAGNOSIS — R51.9 NONINTRACTABLE HEADACHE, UNSPECIFIED CHRONICITY PATTERN, UNSPECIFIED HEADACHE TYPE: ICD-10-CM

## 2019-11-10 DIAGNOSIS — K57.90 DIVERTICULOSIS: ICD-10-CM

## 2019-11-10 DIAGNOSIS — J02.9 ACUTE PHARYNGITIS, UNSPECIFIED ETIOLOGY: ICD-10-CM

## 2019-11-10 DIAGNOSIS — J06.9 ACUTE UPPER RESPIRATORY INFECTION: ICD-10-CM

## 2019-11-10 DIAGNOSIS — N30.00 ACUTE CYSTITIS WITHOUT HEMATURIA: Primary | ICD-10-CM

## 2019-11-10 DIAGNOSIS — R11.2 NON-INTRACTABLE VOMITING WITH NAUSEA, UNSPECIFIED VOMITING TYPE: ICD-10-CM

## 2019-11-10 DIAGNOSIS — R07.89 ATYPICAL CHEST PAIN: ICD-10-CM

## 2019-11-10 LAB
A/G RATIO: 1.1 (ref 1.1–2.2)
ALBUMIN SERPL-MCNC: 4.9 G/DL (ref 3.4–5)
ALP BLD-CCNC: 102 U/L (ref 40–129)
ALT SERPL-CCNC: 10 U/L (ref 10–40)
ANION GAP SERPL CALCULATED.3IONS-SCNC: 13 MMOL/L (ref 3–16)
AST SERPL-CCNC: 18 U/L (ref 15–37)
BACTERIA: ABNORMAL /HPF
BASOPHILS ABSOLUTE: 0.1 K/UL (ref 0–0.2)
BASOPHILS RELATIVE PERCENT: 1.2 %
BILIRUB SERPL-MCNC: 0.5 MG/DL (ref 0–1)
BILIRUBIN URINE: NEGATIVE
BLOOD, URINE: NEGATIVE
BUN BLDV-MCNC: 12 MG/DL (ref 7–20)
CALCIUM SERPL-MCNC: 10.1 MG/DL (ref 8.3–10.6)
CASTS: ABNORMAL /LPF
CHLORIDE BLD-SCNC: 93 MMOL/L (ref 99–110)
CLARITY: CLEAR
CO2: 29 MMOL/L (ref 21–32)
COLOR: YELLOW
CREAT SERPL-MCNC: 1 MG/DL (ref 0.6–1.2)
EOSINOPHILS ABSOLUTE: 0.1 K/UL (ref 0–0.6)
EOSINOPHILS RELATIVE PERCENT: 1.2 %
EPITHELIAL CELLS, UA: ABNORMAL /HPF
GFR AFRICAN AMERICAN: >60
GFR NON-AFRICAN AMERICAN: 56
GLOBULIN: 4.5 G/DL
GLUCOSE BLD-MCNC: 119 MG/DL (ref 70–99)
GLUCOSE URINE: NEGATIVE MG/DL
HCT VFR BLD CALC: 46 % (ref 36–48)
HEMOGLOBIN: 15.8 G/DL (ref 12–16)
KETONES, URINE: NEGATIVE MG/DL
LACTIC ACID: 2 MMOL/L (ref 0.4–2)
LEUKOCYTE ESTERASE, URINE: ABNORMAL
LIPASE: 16 U/L (ref 13–60)
LYMPHOCYTES ABSOLUTE: 3.9 K/UL (ref 1–5.1)
LYMPHOCYTES RELATIVE PERCENT: 36.2 %
MAGNESIUM: 1.8 MG/DL (ref 1.8–2.4)
MCH RBC QN AUTO: 31.6 PG (ref 26–34)
MCHC RBC AUTO-ENTMCNC: 34.3 G/DL (ref 31–36)
MCV RBC AUTO: 92.2 FL (ref 80–100)
MICROSCOPIC EXAMINATION: YES
MONOCYTES ABSOLUTE: 0.7 K/UL (ref 0–1.3)
MONOCYTES RELATIVE PERCENT: 6.3 %
MUCUS: ABNORMAL /LPF
NEUTROPHILS ABSOLUTE: 5.9 K/UL (ref 1.7–7.7)
NEUTROPHILS RELATIVE PERCENT: 55.1 %
NITRITE, URINE: NEGATIVE
PDW BLD-RTO: 14.7 % (ref 12.4–15.4)
PH UA: 6 (ref 5–8)
PLATELET # BLD: 448 K/UL (ref 135–450)
PMV BLD AUTO: 7.2 FL (ref 5–10.5)
POTASSIUM SERPL-SCNC: 3.5 MMOL/L (ref 3.5–5.1)
PROTEIN UA: NEGATIVE MG/DL
RBC # BLD: 4.99 M/UL (ref 4–5.2)
RBC UA: ABNORMAL /HPF (ref 0–2)
S PYO AG THROAT QL: NEGATIVE
SODIUM BLD-SCNC: 135 MMOL/L (ref 136–145)
SPECIFIC GRAVITY UA: 1.02 (ref 1–1.03)
TOTAL PROTEIN: 9.4 G/DL (ref 6.4–8.2)
TROPONIN: <0.01 NG/ML
TROPONIN: <0.01 NG/ML
URINE REFLEX TO CULTURE: YES
URINE TYPE: ABNORMAL
UROBILINOGEN, URINE: 0.2 E.U./DL
WBC # BLD: 10.7 K/UL (ref 4–11)
WBC UA: ABNORMAL /HPF (ref 0–5)

## 2019-11-10 PROCEDURE — 99284 EMERGENCY DEPT VISIT MOD MDM: CPT

## 2019-11-10 PROCEDURE — 96361 HYDRATE IV INFUSION ADD-ON: CPT

## 2019-11-10 PROCEDURE — 96374 THER/PROPH/DIAG INJ IV PUSH: CPT

## 2019-11-10 PROCEDURE — 6360000004 HC RX CONTRAST MEDICATION: Performed by: PHYSICIAN ASSISTANT

## 2019-11-10 PROCEDURE — 84484 ASSAY OF TROPONIN QUANT: CPT

## 2019-11-10 PROCEDURE — 85025 COMPLETE CBC W/AUTO DIFF WBC: CPT

## 2019-11-10 PROCEDURE — 87081 CULTURE SCREEN ONLY: CPT

## 2019-11-10 PROCEDURE — 2500000003 HC RX 250 WO HCPCS: Performed by: EMERGENCY MEDICINE

## 2019-11-10 PROCEDURE — 83690 ASSAY OF LIPASE: CPT

## 2019-11-10 PROCEDURE — 80053 COMPREHEN METABOLIC PANEL: CPT

## 2019-11-10 PROCEDURE — 83735 ASSAY OF MAGNESIUM: CPT

## 2019-11-10 PROCEDURE — 93005 ELECTROCARDIOGRAM TRACING: CPT | Performed by: PHYSICIAN ASSISTANT

## 2019-11-10 PROCEDURE — 6370000000 HC RX 637 (ALT 250 FOR IP): Performed by: PHYSICIAN ASSISTANT

## 2019-11-10 PROCEDURE — 2580000003 HC RX 258: Performed by: PHYSICIAN ASSISTANT

## 2019-11-10 PROCEDURE — 83605 ASSAY OF LACTIC ACID: CPT

## 2019-11-10 PROCEDURE — 96375 TX/PRO/DX INJ NEW DRUG ADDON: CPT

## 2019-11-10 PROCEDURE — 6360000002 HC RX W HCPCS: Performed by: EMERGENCY MEDICINE

## 2019-11-10 PROCEDURE — 87880 STREP A ASSAY W/OPTIC: CPT

## 2019-11-10 PROCEDURE — 74177 CT ABD & PELVIS W/CONTRAST: CPT

## 2019-11-10 PROCEDURE — 87086 URINE CULTURE/COLONY COUNT: CPT

## 2019-11-10 PROCEDURE — 6370000000 HC RX 637 (ALT 250 FOR IP): Performed by: EMERGENCY MEDICINE

## 2019-11-10 PROCEDURE — 81001 URINALYSIS AUTO W/SCOPE: CPT

## 2019-11-10 RX ORDER — 0.9 % SODIUM CHLORIDE 0.9 %
500 INTRAVENOUS SOLUTION INTRAVENOUS ONCE
Status: COMPLETED | OUTPATIENT
Start: 2019-11-10 | End: 2019-11-10

## 2019-11-10 RX ORDER — ONDANSETRON 4 MG/1
4 TABLET, FILM COATED ORAL EVERY 8 HOURS PRN
Qty: 20 TABLET | Refills: 0 | Status: SHIPPED | OUTPATIENT
Start: 2019-11-10 | End: 2020-06-10 | Stop reason: ALTCHOICE

## 2019-11-10 RX ORDER — MENTHOL/CETYLPYRD CL 4.5 MG
1 LOZENGE MUCOUS MEMBRANE PRN
Qty: 15 LOZENGE | Refills: 0 | Status: SHIPPED | OUTPATIENT
Start: 2019-11-10 | End: 2019-11-13

## 2019-11-10 RX ORDER — NITROFURANTOIN 25; 75 MG/1; MG/1
100 CAPSULE ORAL 2 TIMES DAILY
Qty: 10 CAPSULE | Refills: 0 | Status: SHIPPED | OUTPATIENT
Start: 2019-11-10 | End: 2019-11-15

## 2019-11-10 RX ORDER — NITROFURANTOIN 25; 75 MG/1; MG/1
100 CAPSULE ORAL ONCE
Status: COMPLETED | OUTPATIENT
Start: 2019-11-10 | End: 2019-11-10

## 2019-11-10 RX ORDER — METOCLOPRAMIDE HYDROCHLORIDE 5 MG/ML
10 INJECTION INTRAMUSCULAR; INTRAVENOUS ONCE
Status: COMPLETED | OUTPATIENT
Start: 2019-11-10 | End: 2019-11-10

## 2019-11-10 RX ORDER — DIPHENHYDRAMINE HYDROCHLORIDE 50 MG/ML
12.5 INJECTION INTRAMUSCULAR; INTRAVENOUS ONCE
Status: COMPLETED | OUTPATIENT
Start: 2019-11-10 | End: 2019-11-10

## 2019-11-10 RX ORDER — ONDANSETRON 4 MG/1
4 TABLET, ORALLY DISINTEGRATING ORAL ONCE
Status: COMPLETED | OUTPATIENT
Start: 2019-11-10 | End: 2019-11-10

## 2019-11-10 RX ADMIN — FAMOTIDINE 20 MG: 10 INJECTION, SOLUTION INTRAVENOUS at 12:22

## 2019-11-10 RX ADMIN — NITROFURANTOIN (MONOHYDRATE/MACROCRYSTALS) 100 MG: 75; 25 CAPSULE ORAL at 13:59

## 2019-11-10 RX ADMIN — SODIUM CHLORIDE 500 ML: 9 INJECTION, SOLUTION INTRAVENOUS at 11:17

## 2019-11-10 RX ADMIN — METOCLOPRAMIDE 10 MG: 5 INJECTION, SOLUTION INTRAMUSCULAR; INTRAVENOUS at 12:25

## 2019-11-10 RX ADMIN — ONDANSETRON 4 MG: 4 TABLET, ORALLY DISINTEGRATING ORAL at 11:17

## 2019-11-10 RX ADMIN — LIDOCAINE HYDROCHLORIDE: 20 SOLUTION ORAL; TOPICAL at 12:20

## 2019-11-10 RX ADMIN — DIPHENHYDRAMINE HYDROCHLORIDE 12.5 MG: 50 INJECTION, SOLUTION INTRAMUSCULAR; INTRAVENOUS at 12:24

## 2019-11-10 RX ADMIN — IOPAMIDOL 75 ML: 755 INJECTION, SOLUTION INTRAVENOUS at 11:20

## 2019-11-10 ASSESSMENT — PAIN DESCRIPTION - DESCRIPTORS: DESCRIPTORS: ACHING

## 2019-11-10 ASSESSMENT — PAIN DESCRIPTION - PAIN TYPE: TYPE: ACUTE PAIN

## 2019-11-10 ASSESSMENT — PAIN DESCRIPTION - FREQUENCY: FREQUENCY: CONTINUOUS

## 2019-11-10 ASSESSMENT — HEART SCORE: ECG: 0

## 2019-11-10 ASSESSMENT — PAIN DESCRIPTION - LOCATION: LOCATION: ABDOMEN;HEAD;THROAT

## 2019-11-11 LAB
EKG ATRIAL RATE: 78 BPM
EKG DIAGNOSIS: NORMAL
EKG P AXIS: 52 DEGREES
EKG P-R INTERVAL: 148 MS
EKG Q-T INTERVAL: 376 MS
EKG QRS DURATION: 90 MS
EKG QTC CALCULATION (BAZETT): 428 MS
EKG R AXIS: 36 DEGREES
EKG T AXIS: 48 DEGREES
EKG VENTRICULAR RATE: 78 BPM
URINE CULTURE, ROUTINE: NORMAL

## 2019-11-11 PROCEDURE — 93010 ELECTROCARDIOGRAM REPORT: CPT | Performed by: INTERNAL MEDICINE

## 2019-11-12 LAB — S PYO THROAT QL CULT: NORMAL

## 2019-11-14 ENCOUNTER — TELEPHONE (OUTPATIENT)
Dept: PULMONOLOGY | Age: 64
End: 2019-11-14

## 2019-12-10 ENCOUNTER — OFFICE VISIT (OUTPATIENT)
Dept: PULMONOLOGY | Age: 64
End: 2019-12-10
Payer: COMMERCIAL

## 2019-12-10 VITALS
OXYGEN SATURATION: 98 % | SYSTOLIC BLOOD PRESSURE: 178 MMHG | BODY MASS INDEX: 27.6 KG/M2 | HEART RATE: 90 BPM | HEIGHT: 62 IN | RESPIRATION RATE: 16 BRPM | WEIGHT: 150 LBS | DIASTOLIC BLOOD PRESSURE: 110 MMHG

## 2019-12-10 DIAGNOSIS — F17.200 CURRENT SMOKER: ICD-10-CM

## 2019-12-10 DIAGNOSIS — R06.02 SOB (SHORTNESS OF BREATH): ICD-10-CM

## 2019-12-10 DIAGNOSIS — R91.1 LUNG NODULE: Primary | ICD-10-CM

## 2019-12-10 PROCEDURE — 99214 OFFICE O/P EST MOD 30 MIN: CPT | Performed by: INTERNAL MEDICINE

## 2019-12-10 PROCEDURE — 3017F COLORECTAL CA SCREEN DOC REV: CPT | Performed by: INTERNAL MEDICINE

## 2019-12-10 PROCEDURE — 4004F PT TOBACCO SCREEN RCVD TLK: CPT | Performed by: INTERNAL MEDICINE

## 2019-12-10 PROCEDURE — G8427 DOCREV CUR MEDS BY ELIG CLIN: HCPCS | Performed by: INTERNAL MEDICINE

## 2019-12-10 PROCEDURE — G8484 FLU IMMUNIZE NO ADMIN: HCPCS | Performed by: INTERNAL MEDICINE

## 2019-12-10 PROCEDURE — 99406 BEHAV CHNG SMOKING 3-10 MIN: CPT | Performed by: INTERNAL MEDICINE

## 2019-12-10 PROCEDURE — G8419 CALC BMI OUT NRM PARAM NOF/U: HCPCS | Performed by: INTERNAL MEDICINE

## 2020-01-13 RX ORDER — TRAZODONE HYDROCHLORIDE 50 MG/1
50 TABLET ORAL NIGHTLY
Qty: 90 TABLET | Refills: 1 | Status: SHIPPED | OUTPATIENT
Start: 2020-01-13 | End: 2020-07-10

## 2020-01-13 RX ORDER — ERGOCALCIFEROL 1.25 MG/1
50000 CAPSULE ORAL WEEKLY
Qty: 12 CAPSULE | Refills: 0 | Status: SHIPPED | OUTPATIENT
Start: 2020-01-13 | End: 2020-04-13

## 2020-01-13 RX ORDER — DULOXETIN HYDROCHLORIDE 60 MG/1
60 CAPSULE, DELAYED RELEASE ORAL DAILY
Qty: 90 CAPSULE | Refills: 1 | Status: SHIPPED | OUTPATIENT
Start: 2020-01-13 | End: 2020-07-10

## 2020-01-13 RX ORDER — GABAPENTIN 400 MG/1
400 CAPSULE ORAL 3 TIMES DAILY
Qty: 90 CAPSULE | Refills: 5 | Status: SHIPPED | OUTPATIENT
Start: 2020-01-13 | End: 2020-07-10

## 2020-01-13 RX ORDER — ATORVASTATIN CALCIUM 80 MG/1
80 TABLET, FILM COATED ORAL NIGHTLY
Qty: 90 TABLET | Refills: 1 | Status: ON HOLD | OUTPATIENT
Start: 2020-01-13 | End: 2020-06-18 | Stop reason: HOSPADM

## 2020-01-13 RX ORDER — LISINOPRIL 40 MG/1
40 TABLET ORAL DAILY
Qty: 90 TABLET | Refills: 1 | Status: ON HOLD | OUTPATIENT
Start: 2020-01-13 | End: 2020-06-18 | Stop reason: HOSPADM

## 2020-01-13 RX ORDER — FLUTICASONE PROPIONATE 50 MCG
SPRAY, SUSPENSION (ML) NASAL
Qty: 16 G | Refills: 2 | Status: SHIPPED | OUTPATIENT
Start: 2020-01-13 | End: 2020-04-13

## 2020-01-13 NOTE — TELEPHONE ENCOUNTER
Requested Prescriptions     Pending Prescriptions Disp Refills    fluticasone (FLONASE) 50 MCG/ACT nasal spray [Pharmacy Med Name: FLUTICASONE 0.05% NASAL SPRAY] 16 g 2     Sig: USE 2 SPRAYS IN EACH NOSTRIL DAILY    vitamin D (ERGOCALCIFEROL) 1.25 MG (03577 UT) CAPS capsule [Pharmacy Med Name: VIT D (ERGOCALCIFEROL) 50,000 IU CAPS] 12 capsule 0     Sig: TAKE 1 CAPSULE BY MOUTH ONCE A WEEK FOR 12 DOSES    atorvastatin (LIPITOR) 80 MG tablet [Pharmacy Med Name: ATORVASTATIN AUGUSTA 80 MG TABS] 90 tablet 1     Sig: TAKE 1 TABLET BY MOUTH NIGHTLY    gabapentin (NEURONTIN) 400 MG capsule [Pharmacy Med Name: GABAPENTIN 400 MG CAPS] 90 capsule 5     Sig: Take 1 capsule by mouth 3 times daily for 30 days.  DULoxetine (CYMBALTA) 60 MG extended release capsule [Pharmacy Med Name: DULOXETINE HCL DR 60 MG CAPS] 90 capsule 1     Sig: TAKE 1 CAPSULE BY MOUTH DAILY    lisinopril (PRINIVIL;ZESTRIL) 40 MG tablet [Pharmacy Med Name: LISINOPRIL 40 MG TABS] 90 tablet 1     Sig: TAKE 1 TABLET BY MOUTH DAILY    traZODone (DESYREL) 50 MG tablet [Pharmacy Med Name: TRAZODONE 50 MG TABS] 90 tablet 1     Sig: TAKE 1 TABLET BY MOUTH NIGHTLY     Last seen: 10-2-19  Last filled: 11-7-19  Last Lipid: 9-18-19   Last CMP: 11-10-19  Next Appointment: No future appointment scheduled at this time.

## 2020-01-24 ENCOUNTER — OFFICE VISIT (OUTPATIENT)
Dept: ENT CLINIC | Age: 65
End: 2020-01-24
Payer: COMMERCIAL

## 2020-01-24 VITALS
SYSTOLIC BLOOD PRESSURE: 135 MMHG | WEIGHT: 155.6 LBS | DIASTOLIC BLOOD PRESSURE: 77 MMHG | HEIGHT: 62 IN | HEART RATE: 92 BPM | BODY MASS INDEX: 28.63 KG/M2

## 2020-01-24 PROCEDURE — G8484 FLU IMMUNIZE NO ADMIN: HCPCS | Performed by: OTOLARYNGOLOGY

## 2020-01-24 PROCEDURE — 99204 OFFICE O/P NEW MOD 45 MIN: CPT | Performed by: OTOLARYNGOLOGY

## 2020-01-24 PROCEDURE — 31575 DIAGNOSTIC LARYNGOSCOPY: CPT | Performed by: OTOLARYNGOLOGY

## 2020-01-24 PROCEDURE — G8419 CALC BMI OUT NRM PARAM NOF/U: HCPCS | Performed by: OTOLARYNGOLOGY

## 2020-01-24 PROCEDURE — 4004F PT TOBACCO SCREEN RCVD TLK: CPT | Performed by: OTOLARYNGOLOGY

## 2020-01-24 PROCEDURE — 3017F COLORECTAL CA SCREEN DOC REV: CPT | Performed by: OTOLARYNGOLOGY

## 2020-01-24 PROCEDURE — G8427 DOCREV CUR MEDS BY ELIG CLIN: HCPCS | Performed by: OTOLARYNGOLOGY

## 2020-01-24 NOTE — PROGRESS NOTES
[address], [address], [address]  P: [phone]       Patient     Mino Mason  1955    ChiefComplaint     Chief Complaint   Patient presents with    Other     Pt states when she eats certain things it gets stuck in her throat, started 6 months ago, Pt states she has lumps outside of her neck that is getting bigger       History of Present Illness     Stan Preston is 82-year-old female presenting with multiple complaints:  1. Reports progressive bilateral hearing loss and worsening balance. History significant for noise exposure throughout the life. No previous ear surgeries or recurrent ear infections. She has not tried any balance therapy or undergone any evaluation for this. She feels her left ear is worse than her right. 2.  Not significant dysphagia with crackers and breads. She states that soon as she attempts to swallow them they get stuck up high. She is eventually able to clear these with a significant amount of liquid. She had an esophagram performed by PCP which demonstrated small hiatal hernia. He is a current smoker but is actively working to quit. Denies hemoptysis, odynophagia, change in voice. 3.  Reports swelling of bilateral neck that comes and goes. She describes this as migratory and painful. It gives her significant headaches. She then also reports 2 lumps in her right clavicle that have not resolved and are incredibly painful. States manipulation since shooting pains through her neck. She had a CT scan of the chest done which commented on these as being areas of lymphadenopathy. Denies fever, chills, weight loss, night sweats.     Past Medical History     Past Medical History:   Diagnosis Date    Arterial occlusion     Centrilobular emphysema ([address] 5/20/2019    Chronic bilateral low back pain with bilateral sciatica 8/3/2018    Chronic bilateral low back pain with bilateral sciatica     Hyperlipidemia     Hypertension     (DESYREL) 50 MG tablet TAKE 1 TABLET BY MOUTH NIGHTLY 90 tablet 1    nicotine (NICODERM CQ) 7 MG/24HR PLACE 1 PATCH ONTO THE SKIN DAILY (ROTATE SITES) 14 patch 0    ondansetron (ZOFRAN) 4 MG tablet Take 1 tablet by mouth every 8 hours as needed for Nausea 20 tablet 0    tiZANidine (ZANAFLEX) 2 MG tablet TAKE 1 TABLET BY MOUTH 2 TIMES DAILY AS NEEDED (MUSCLE SPASM AND PAIN) 30 tablet 2    naproxen (NAPROSYN) 500 MG tablet TAKE 1 TABLET (500 MG) BY MOUTH 2 TIMES DAILY (WITH MEALS) 60 tablet 2    hydrochlorothiazide (HYDRODIURIL) 50 MG tablet TAKE 1 TABLET BY MOUTH DAILY 30 tablet 2    albuterol sulfate  (90 Base) MCG/ACT inhaler INHALE 2 PUFFS INTO THE LUNGS EVERY 6 HOURS AS NEEDED FOR WHEEZING 8.5 g 2    famotidine (PEPCID) 20 MG tablet TAKE 1 TABLET BY MOUTH TWICE DAILY 60 tablet 2    SPIRIVA HANDIHALER 18 MCG inhalation capsule INHALE 1 CAPSULE INTO THE LUNGS DAILY 90 capsule 1    cetirizine (ZYRTEC) 10 MG tablet TAKE 1 TABLET BY MOUTH DAILY 90 tablet 1    VENTOLIN  (90 Base) MCG/ACT inhaler Inhale 2 puffs into the lungs every 6 hours as needed for Wheezing 1 Inhaler 3    aspirin 81 MG tablet Take 1 tablet by mouth daily (with breakfast) 90 tablet 1    nitroGLYCERIN (NITROSTAT) 0.4 MG SL tablet up to max of 3 total doses. If no relief after 1 dose, call 911. (Patient not taking: Reported on 12/10/2019) 25 tablet 3    nicotine (NICODERM CQ) 7 MG/24HR Place 1 patch onto the skin daily for 14 days 14 patch 0     No current facility-administered medications for this visit. Review of Systems     Review of Systems   Constitutional: Negative for appetite change, chills, fatigue, fever and unexpected weight change. HENT: Positive for hearing loss and trouble swallowing. Negative for congestion, ear discharge, ear pain, facial swelling, nosebleeds, postnasal drip, sinus pressure, sneezing, sore throat, tinnitus and voice change. Eyes: Negative for itching.    Respiratory: Negative for apnea, cough and shortness of breath. Gastrointestinal:        Negative for dysphasia   Endocrine: Negative for cold intolerance and heat intolerance. Musculoskeletal: Positive for neck pain. Negative for myalgias. Skin: Negative for rash. Allergic/Immunologic: Negative for environmental allergies. Neurological: Negative for dizziness and headaches. Hematological: Positive for adenopathy. Psychiatric/Behavioral: Negative for confusion, decreased concentration and sleep disturbance. PhysicalExam     Vitals:    20 1451   BP: 135/77   Site: Right Upper Arm   Position: Sitting   Cuff Size: Medium Adult   Pulse: 92   Weight: 155 lb 9.6 oz (70.6 kg)   Height: 5' 2\" (1.575 m)       Physical Exam  Constitutional:       General: She is not in acute distress. Appearance: She is well-developed. HENT:      Head: Normocephalic and atraumatic. Right Ear: Tympanic membrane, ear canal and external ear normal. No drainage. No middle ear effusion. Tympanic membrane has normal mobility. Left Ear: Tympanic membrane, ear canal and external ear normal. No drainage. No middle ear effusion. Tympanic membrane has normal mobility. Ears:      Headley exam findings: lateralizes left. Right Rinne: AC > BC. Left Rinne: AC > BC. Nose: Septal deviation present. No mucosal edema or rhinorrhea. Mouth/Throat:      Pharynx: Uvula midline. Tonsils: No tonsillar exudate. Swellin+ on the right. 2+ on the left. Eyes:      Pupils: Pupils are equal, round, and reactive to light. Neck:      Musculoskeletal: Full passive range of motion without pain. Thyroid: No thyroid mass or thyromegaly. Trachea: Trachea and phonation normal.     Cardiovascular:      Pulses: Normal pulses. Pulmonary:      Effort: Pulmonary effort is normal. No accessory muscle usage or respiratory distress. Breath sounds: No stridor.    Chest:       Lymphadenopathy:      Head:      Right side of states they are present  2. Hearing loss, unspecified hearing loss type, unspecified laterality  -Chronic, slowly progressing  -Needs formal audiogram  - Stephani Jackson [place]. D., [facility]    3. Imbalance  -Chronic, denies true room spinning vertigo  -Requires hearing test for further evaluation  - Alfredo Snell, Audiology, [place]    4. Oropharyngeal dysphagia  -Only reports difficulty with dry foods such as crackers and bread  -Discussed that these are the most difficult foods to swallow  -Discussed possibly obtaining a modified barium swallow but I am unsure how much information this will provide us as her difficulty in swallowing is so localized to specific foods  -I encouraged her to either avoid the foods or eat them with a significant amount of water or sauce  -She will proceed with this and is not interested in additional swallow studies at this time      She return after MRI has been performed and hearing test is been performed and we will review all results at that time    Vernell Marshall DO  1/28/20      Portions of this note were dictated using Dragon.  There may be linguistic errors secondary to the use of this program.

## 2020-01-28 ASSESSMENT — ENCOUNTER SYMPTOMS
APNEA: 0
COUGH: 0
SHORTNESS OF BREATH: 0
EYE ITCHING: 0
FACIAL SWELLING: 0
SORE THROAT: 0
SINUS PRESSURE: 0
VOICE CHANGE: 0
TROUBLE SWALLOWING: 1

## 2020-01-30 RX ORDER — TIZANIDINE 2 MG/1
2 TABLET ORAL 2 TIMES DAILY PRN
Qty: 30 TABLET | Refills: 2 | Status: SHIPPED | OUTPATIENT
Start: 2020-01-30 | End: 2020-05-11

## 2020-01-30 NOTE — TELEPHONE ENCOUNTER
Refill Request     Last Seen: 10/2/2019    Last Written: 11/07/2019    Next Appointment:   Future Appointments   Date Time Provider Elia Shasha   7/31/2020 10:00 AM [facility] MAIN [facility] Marisa Braxton   8/4/2020 10:00 AM Phill Calixto MD [facility]           Requested Prescriptions     Pending Prescriptions Disp Refills    tiZANidine (ZANAFLEX) 2 MG tablet [Pharmacy Med Name: TIZANIDINE HCL 2 MG TABS] 30 tablet 2     Sig: TAKE 1 TABLET BY MOUTH 2 TIMES DAILY AS NEEDED (MUSCLE SPASM AND PAIN)

## 2020-02-10 RX ORDER — ALBUTEROL SULFATE 90 UG/1
AEROSOL, METERED RESPIRATORY (INHALATION)
Qty: 8.5 G | Refills: 2 | Status: SHIPPED | OUTPATIENT
Start: 2020-02-10 | End: 2020-05-11

## 2020-02-10 NOTE — TELEPHONE ENCOUNTER
Last Seen: 10/2/2019    Last Writen: 11/7/19    Next Appointment: Visit not scheduled    Requested Prescriptions     Pending Prescriptions Disp Refills    albuterol sulfate  (90 Base) MCG/ACT inhaler [Pharmacy Med Name: ALBUTEROL HFA 90MCG INH (PROAIR)] 8.5 g 2     Sig: INHALE 2 PUFFS INTO THE LUNGS EVERY 6 HOURS AS NEEDED FOR WHEEZING

## 2020-02-12 RX ORDER — FAMOTIDINE 20 MG/1
TABLET ORAL
Qty: 60 TABLET | Refills: 2 | Status: SHIPPED | OUTPATIENT
Start: 2020-02-12 | End: 2020-05-11

## 2020-02-18 NOTE — TELEPHONE ENCOUNTER
.  Refill Request HYDROCHLOROTHIAZIDE 50 MG TABS, LIDOCAINE 5% PATCH, NAPROXEN 500 MG TABS  Last Seen: 10/2/2019    Last Written: 11/7/2019 hydrochlorothiazide 30 tablets 2 refills  11/7/2019 Naproxen 60 tablets with 2 refills  11/7/2019 Lidocaine 30 patches with 2 refills    Next Appointment:   Future Appointments   Date Time Provider Elia Pulliam   7/31/2020 10:00 AM [facility] [place] [facility] [place] [facility]   8/4/2020 10:00 AM MD ANJUM Crump [facility]           Requested Prescriptions     Pending Prescriptions Disp Refills    naproxen (NAPROSYN) 500 MG tablet [Pharmacy Med Name: NAPROXEN 500 MG TABS] 60 tablet 2     Sig: TAKE 1 TABLET (500 MG) BY MOUTH 2 TIMES DAILY (WITH MEALS)    hydrochlorothiazide (HYDRODIURIL) 50 MG tablet [Pharmacy Med Name: HYDROCHLOROTHIAZIDE 50 MG TABS] 30 tablet 2     Sig: TAKE 1 TABLET BY MOUTH DAILY    lidocaine (LIDODERM) 5 % [Pharmacy Med Name: LIDOCAINE 5% PATCH] 30 patch 2     Sig: PLACE 1 PATCH ONTO THE SKIN DAILY 12 HOURS ON, 12 HOURS OFF.

## 2020-02-19 RX ORDER — NAPROXEN 500 MG/1
TABLET ORAL
Qty: 60 TABLET | Refills: 2 | Status: ON HOLD | OUTPATIENT
Start: 2020-02-19 | End: 2020-04-28 | Stop reason: HOSPADM

## 2020-02-19 RX ORDER — HYDROCHLOROTHIAZIDE 50 MG/1
50 TABLET ORAL DAILY
Qty: 30 TABLET | Refills: 2 | Status: ON HOLD | OUTPATIENT
Start: 2020-02-19 | End: 2020-04-28 | Stop reason: HOSPADM

## 2020-02-19 RX ORDER — LIDOCAINE 50 MG/G
1 PATCH TOPICAL DAILY
Qty: 30 PATCH | Refills: 2 | Status: SHIPPED | OUTPATIENT
Start: 2020-02-19 | End: 2020-05-11

## 2020-02-27 ENCOUNTER — APPOINTMENT (OUTPATIENT)
Dept: GENERAL RADIOLOGY | Age: 65
End: 2020-02-27
Payer: COMMERCIAL

## 2020-02-27 ENCOUNTER — APPOINTMENT (OUTPATIENT)
Dept: CT IMAGING | Age: 65
End: 2020-02-27
Payer: COMMERCIAL

## 2020-02-27 ENCOUNTER — HOSPITAL ENCOUNTER (EMERGENCY)
Age: 65
Discharge: HOME | End: 2020-02-27
Attending: EMERGENCY MEDICINE
Payer: COMMERCIAL

## 2020-02-27 VITALS
SYSTOLIC BLOOD PRESSURE: 121 MMHG | BODY MASS INDEX: 27.75 KG/M2 | HEIGHT: 61 IN | WEIGHT: 147 LBS | RESPIRATION RATE: 16 BRPM | DIASTOLIC BLOOD PRESSURE: 96 MMHG | OXYGEN SATURATION: 95 % | HEART RATE: 88 BPM | TEMPERATURE: 97.9 F

## 2020-02-27 PROBLEM — J98.4: Status: ACTIVE | Noted: 2020-02-27

## 2020-02-27 LAB
A/G RATIO: 0.8 (ref 1.1–2.2)
ALBUMIN SERPL-MCNC: 3.3 G/DL (ref 3.4–5)
ALP BLD-CCNC: 139 U/L (ref 40–129)
ALT SERPL-CCNC: 8 U/L (ref 10–40)
ANION GAP SERPL CALCULATED.3IONS-SCNC: 15 MMOL/L (ref 3–16)
AST SERPL-CCNC: 14 U/L (ref 15–37)
BACTERIA: ABNORMAL /HPF
BASOPHILS ABSOLUTE: 0 K/UL (ref 0–0.2)
BASOPHILS RELATIVE PERCENT: 0.3 %
BILIRUB SERPL-MCNC: 0.3 MG/DL (ref 0–1)
BILIRUBIN URINE: NEGATIVE
BLOOD, URINE: ABNORMAL
BUN BLDV-MCNC: 15 MG/DL (ref 7–20)
CALCIUM SERPL-MCNC: 9.5 MG/DL (ref 8.3–10.6)
CHLORIDE BLD-SCNC: 95 MMOL/L (ref 99–110)
CLARITY: ABNORMAL
CO2: 24 MMOL/L (ref 21–32)
COLOR: YELLOW
CREAT SERPL-MCNC: 1.3 MG/DL (ref 0.6–1.2)
D DIMER: 2556 NG/ML DDU (ref 0–229)
EKG ATRIAL RATE: 97 BPM
EKG DIAGNOSIS: NORMAL
EKG P AXIS: 59 DEGREES
EKG P-R INTERVAL: 118 MS
EKG Q-T INTERVAL: 362 MS
EKG QRS DURATION: 92 MS
EKG QTC CALCULATION (BAZETT): 459 MS
EKG R AXIS: 48 DEGREES
EKG T AXIS: 37 DEGREES
EKG VENTRICULAR RATE: 97 BPM
EOSINOPHILS ABSOLUTE: 0.1 K/UL (ref 0–0.6)
EOSINOPHILS RELATIVE PERCENT: 0.7 %
EPITHELIAL CELLS, UA: ABNORMAL /HPF (ref 0–5)
GFR AFRICAN AMERICAN: 50
GFR NON-AFRICAN AMERICAN: 41
GLOBULIN: 4.3 G/DL
GLUCOSE BLD-MCNC: 96 MG/DL (ref 70–99)
GLUCOSE URINE: NEGATIVE MG/DL
HCT VFR BLD CALC: 35.4 % (ref 36–48)
HEMOGLOBIN: 11.4 G/DL (ref 12–16)
KETONES, URINE: NEGATIVE MG/DL
LACTIC ACID: 0.8 MMOL/L (ref 0.4–2)
LEUKOCYTE ESTERASE, URINE: ABNORMAL
LYMPHOCYTES ABSOLUTE: 1.7 K/UL (ref 1–5.1)
LYMPHOCYTES RELATIVE PERCENT: 12.1 %
MCH RBC QN AUTO: 29.8 PG (ref 26–34)
MCHC RBC AUTO-ENTMCNC: 32.3 G/DL (ref 31–36)
MCV RBC AUTO: 92.3 FL (ref 80–100)
MICROSCOPIC EXAMINATION: YES
MONOCYTES ABSOLUTE: 1.2 K/UL (ref 0–1.3)
MONOCYTES RELATIVE PERCENT: 8.3 %
NEUTROPHILS ABSOLUTE: 11 K/UL (ref 1.7–7.7)
NEUTROPHILS RELATIVE PERCENT: 78.6 %
NITRITE, URINE: NEGATIVE
PDW BLD-RTO: 14.2 % (ref 12.4–15.4)
PH UA: 6 (ref 5–8)
PLATELET # BLD: 302 K/UL (ref 135–450)
PMV BLD AUTO: 8.5 FL (ref 5–10.5)
POTASSIUM REFLEX MAGNESIUM: 3.9 MMOL/L (ref 3.5–5.1)
PRO-BNP: 2585 PG/ML (ref 0–124)
PROTEIN UA: ABNORMAL MG/DL
RBC # BLD: 3.83 M/UL (ref 4–5.2)
RBC UA: ABNORMAL /HPF (ref 0–4)
SODIUM BLD-SCNC: 134 MMOL/L (ref 136–145)
SPECIFIC GRAVITY UA: <=1.005 (ref 1–1.03)
TOTAL PROTEIN: 7.6 G/DL (ref 6.4–8.2)
TROPONIN: <0.01 NG/ML
URINE REFLEX TO CULTURE: YES
URINE TYPE: ABNORMAL
UROBILINOGEN, URINE: 0.2 E.U./DL
WBC # BLD: 14 K/UL (ref 4–11)
WBC UA: ABNORMAL /HPF (ref 0–5)

## 2020-02-27 PROCEDURE — 2580000003 HC RX 258: Performed by: NURSE PRACTITIONER

## 2020-02-27 PROCEDURE — 87186 SC STD MICRODIL/AGAR DIL: CPT

## 2020-02-27 PROCEDURE — 99284 EMERGENCY DEPT VISIT MOD MDM: CPT

## 2020-02-27 PROCEDURE — 96366 THER/PROPH/DIAG IV INF ADDON: CPT

## 2020-02-27 PROCEDURE — 93010 ELECTROCARDIOGRAM REPORT: CPT | Performed by: INTERNAL MEDICINE

## 2020-02-27 PROCEDURE — 6370000000 HC RX 637 (ALT 250 FOR IP): Performed by: NURSE PRACTITIONER

## 2020-02-27 PROCEDURE — 84484 ASSAY OF TROPONIN QUANT: CPT

## 2020-02-27 PROCEDURE — 83880 ASSAY OF NATRIURETIC PEPTIDE: CPT

## 2020-02-27 PROCEDURE — 71046 X-RAY EXAM CHEST 2 VIEWS: CPT

## 2020-02-27 PROCEDURE — 93005 ELECTROCARDIOGRAM TRACING: CPT | Performed by: EMERGENCY MEDICINE

## 2020-02-27 PROCEDURE — 6360000002 HC RX W HCPCS: Performed by: EMERGENCY MEDICINE

## 2020-02-27 PROCEDURE — 85025 COMPLETE CBC W/AUTO DIFF WBC: CPT

## 2020-02-27 PROCEDURE — 87077 CULTURE AEROBIC IDENTIFY: CPT

## 2020-02-27 PROCEDURE — 96367 TX/PROPH/DG ADDL SEQ IV INF: CPT

## 2020-02-27 PROCEDURE — 87040 BLOOD CULTURE FOR BACTERIA: CPT

## 2020-02-27 PROCEDURE — 80053 COMPREHEN METABOLIC PANEL: CPT

## 2020-02-27 PROCEDURE — 96375 TX/PRO/DX INJ NEW DRUG ADDON: CPT

## 2020-02-27 PROCEDURE — 81001 URINALYSIS AUTO W/SCOPE: CPT

## 2020-02-27 PROCEDURE — 6360000002 HC RX W HCPCS: Performed by: NURSE PRACTITIONER

## 2020-02-27 PROCEDURE — 85379 FIBRIN DEGRADATION QUANT: CPT

## 2020-02-27 PROCEDURE — 6360000004 HC RX CONTRAST MEDICATION: Performed by: NURSE PRACTITIONER

## 2020-02-27 PROCEDURE — 87086 URINE CULTURE/COLONY COUNT: CPT

## 2020-02-27 PROCEDURE — 83605 ASSAY OF LACTIC ACID: CPT

## 2020-02-27 PROCEDURE — 74177 CT ABD & PELVIS W/CONTRAST: CPT

## 2020-02-27 PROCEDURE — 71260 CT THORAX DX C+: CPT

## 2020-02-27 PROCEDURE — 96365 THER/PROPH/DIAG IV INF INIT: CPT

## 2020-02-27 PROCEDURE — 6370000000 HC RX 637 (ALT 250 FOR IP): Performed by: EMERGENCY MEDICINE

## 2020-02-27 RX ORDER — AZITHROMYCIN 250 MG/1
250 TABLET, FILM COATED ORAL DAILY
Qty: 4 TABLET | Refills: 0 | Status: SHIPPED | OUTPATIENT
Start: 2020-02-27 | End: 2020-03-02

## 2020-02-27 RX ORDER — 0.9 % SODIUM CHLORIDE 0.9 %
30 INTRAVENOUS SOLUTION INTRAVENOUS ONCE
Status: COMPLETED | OUTPATIENT
Start: 2020-02-27 | End: 2020-02-27

## 2020-02-27 RX ORDER — DEXAMETHASONE SODIUM PHOSPHATE 10 MG/ML
8 INJECTION INTRAMUSCULAR; INTRAVENOUS ONCE
Status: COMPLETED | OUTPATIENT
Start: 2020-02-27 | End: 2020-02-27

## 2020-02-27 RX ORDER — OXYBUTYNIN CHLORIDE 5 MG/1
5 TABLET ORAL ONCE
Status: COMPLETED | OUTPATIENT
Start: 2020-02-27 | End: 2020-02-27

## 2020-02-27 RX ORDER — CEFDINIR 300 MG/1
300 CAPSULE ORAL 2 TIMES DAILY
Qty: 14 CAPSULE | Refills: 0 | Status: SHIPPED | OUTPATIENT
Start: 2020-02-27 | End: 2020-03-05

## 2020-02-27 RX ORDER — IPRATROPIUM BROMIDE AND ALBUTEROL SULFATE 2.5; .5 MG/3ML; MG/3ML
1 SOLUTION RESPIRATORY (INHALATION) ONCE
Status: COMPLETED | OUTPATIENT
Start: 2020-02-27 | End: 2020-02-27

## 2020-02-27 RX ORDER — HYDROCODONE BITARTRATE AND ACETAMINOPHEN 5; 325 MG/1; MG/1
1 TABLET ORAL ONCE
Status: COMPLETED | OUTPATIENT
Start: 2020-02-27 | End: 2020-02-27

## 2020-02-27 RX ADMIN — IPRATROPIUM BROMIDE AND ALBUTEROL SULFATE 1 AMPULE: .5; 3 SOLUTION RESPIRATORY (INHALATION) at 20:17

## 2020-02-27 RX ADMIN — IOPAMIDOL 85 ML: 755 INJECTION, SOLUTION INTRAVENOUS at 15:32

## 2020-02-27 RX ADMIN — HYDROCODONE BITARTRATE AND ACETAMINOPHEN 1 TABLET: 5; 325 TABLET ORAL at 16:30

## 2020-02-27 RX ADMIN — OXYBUTYNIN CHLORIDE 5 MG: 5 TABLET ORAL at 16:45

## 2020-02-27 RX ADMIN — DEXAMETHASONE SODIUM PHOSPHATE 8 MG: 10 INJECTION, SOLUTION INTRAMUSCULAR; INTRAVENOUS at 20:15

## 2020-02-27 RX ADMIN — DEXTROSE MONOHYDRATE 500 MG: 50 INJECTION, SOLUTION INTRAVENOUS at 16:31

## 2020-02-27 RX ADMIN — CEFTRIAXONE SODIUM 1 G: 1 INJECTION, POWDER, FOR SOLUTION INTRAMUSCULAR; INTRAVENOUS at 15:55

## 2020-02-27 RX ADMIN — SODIUM CHLORIDE 2001 ML: 9 INJECTION, SOLUTION INTRAVENOUS at 15:54

## 2020-02-27 ASSESSMENT — ENCOUNTER SYMPTOMS
COLOR CHANGE: 0
SHORTNESS OF BREATH: 0
COUGH: 1
SORE THROAT: 0
ABDOMINAL PAIN: 0
NAUSEA: 1
RHINORRHEA: 0
VOMITING: 1

## 2020-02-27 ASSESSMENT — PAIN DESCRIPTION - DESCRIPTORS
DESCRIPTORS: SHARP
DESCRIPTORS: SHARP

## 2020-02-27 ASSESSMENT — PAIN SCALES - GENERAL
PAINLEVEL_OUTOF10: 9
PAINLEVEL_OUTOF10: 5
PAINLEVEL_OUTOF10: 8

## 2020-02-27 ASSESSMENT — PAIN DESCRIPTION - ORIENTATION
ORIENTATION: LOWER;LEFT
ORIENTATION: LEFT

## 2020-02-27 ASSESSMENT — PAIN DESCRIPTION - PAIN TYPE: TYPE: ACUTE PAIN

## 2020-02-27 ASSESSMENT — PAIN DESCRIPTION - FREQUENCY
FREQUENCY: CONTINUOUS
FREQUENCY: CONTINUOUS

## 2020-02-27 ASSESSMENT — PAIN DESCRIPTION - LOCATION: LOCATION: ABDOMEN;BACK

## 2020-02-27 NOTE — ED PROVIDER NOTES
I independently performed a history and physical on Elias. All diagnostic, treatment, and disposition decisions were made by myself in conjunction with the advanced practice provider. For further details of [facility] emergency department encounter, please see Bubba Moreno NP's documentation. Patient is a 60-year-old female presenting today due to concern for roughly 3 to 4 days of left lower back and hip pain that occasionally wraps around to her abdomen associated with vomiting but also having a headache for the last 3 days. She does not normally get headaches but states 15 to 20 years ago she believes she had worse headaches than this although this 1 is up in intensity. It did come on gradually. It is not the worst of her life. She also complains of a cough for the last 2 weeks along with subjective fevers. She also complains of some dysuria and is wondering if she may have a kidney stone. She had a prior DVT but is currently not on anticoagulation. She also reports that 3 days ago she did trip on the stairs and fell against the wall on her left hip and ultimately landed on the ground on her left buttocks. She does have some numbness to the left lower leg but that is chronic from prior fasciotomy and no new changes today. She denies any urinary incontinence or saddle anesthesia. Physical:   Gen: No acute distress. AOx3.   Psych: Normal mood and affect  HEENT: NCAT, PERRL, MMM  Neck: supple, NTTP  Back: no midline tenderness, mild left lumbar paraspinal tenderness noted  Cardiac: RRR, pulses 2+ in all 4 extremities  Lungs: No respiratory distress, bilateral breath sounds present with significant wheezing and rhonchi noted throughout all lung fields  Abdomen: soft and mild LLQ tenderness with no R/D/G  Neuro: no focal neuro deficits with strength and sensation 5/5 in upper extremities and RLE, normal strength to LLE but limited sensation to left leg due to prior surgery per

## 2020-02-27 NOTE — ED PROVIDER NOTES
[address] ED  eMERGENCY dEPARTMENT eNCOUnter        Pt Name: Pamella Cummings  MRN: [id number]  [place] 1955  Date of evaluation: 2/27/2020  Provider: SHAHAB Giles - CNP  PCP: Narciso Sow MD  ED Attending: No att. providers found    [facility]       Chief Complaint   Patient presents with    Cough     Productive cough    Hip Pain     Left hip pain,  no known injury       HISTORY OF PRESENT ILLNESS   (Location/Symptom, Timing/Onset, Context/Setting, Quality, Duration, Modifying Factors, Severity)  Note limiting factors. Pamella Cummings is a 60 y.o. female for a constellation of complaints including left-sided pain nausea vomiting and intermittent chest pain. Onset was the last 4 days. Duration has been since the onset. Context includes patient reports that she has left side pain since Sunday. She reports she fell down 3 stairs on Sunday however caught herself. Patient reports that she started developing left-sided pain after that. Patient also complains of intermittent chest pain for the last 2 days that comes and goes. Patient reports that her chest pain is worse with a deep breath and with exertion. Patient has multiple positive pertinence in multiple complaints and appears to be unreliable in her story. Alleviating factors include nothing. Aggravating factors include nothing. Pain is 9/10. Nothing has been used for pain today. Nursing Notes were all reviewed and agreed with or any disagreements were addressed  in the HPI. REVIEW OF SYSTEMS  (2-9 systems for level 4, 10 or more for level 5)     Review of Systems   Constitutional: Negative for fever. HENT: Negative for congestion, rhinorrhea and sore throat. Respiratory: Positive for cough. Negative for shortness of breath. Cardiovascular: Positive for chest pain. Gastrointestinal: Positive for nausea and vomiting. Negative for abdominal pain.         Left side pain   Genitourinary: Negative ED Triage Vitals [02/27/20 1213]   BP Temp Temp Source Pulse Resp SpO2 Height Weight   109/64 97.9 °F (36.6 °C) Oral 95 14 98 % 5' 1\" (1.549 m) 147 lb (66.7 kg)       Physical Exam  Constitutional:       Appearance: She is well-developed. HENT:      Head: Normocephalic and atraumatic. Eyes:      Extraocular Movements: Extraocular movements intact. Pupils: Pupils are equal, round, and reactive to light. Neck:      Musculoskeletal: Normal range of motion. Cardiovascular:      Rate and Rhythm: Normal rate. Pulmonary:      Effort: Pulmonary effort is normal. No respiratory distress. Breath sounds: Normal breath sounds. Abdominal:      General: There is no distension. Palpations: Abdomen is soft. Tenderness: There is no abdominal tenderness. Musculoskeletal: Normal range of motion. General: Tenderness present. No swelling, deformity or signs of injury. Comments: Decreased range of motion due to pain elicited with movement back. Tenderness with palpation to the paraspinal lumbar region. Patient has left sided radiculopathy. Patient denies any caudal anesthesia. Sensation strength and pulses intact to lower extremities. Deep tendon reflexes intact. Skin:     General: Skin is warm and dry. Neurological:      General: No focal deficit present. Mental Status: She is alert and oriented to person, place, and time.          DIAGNOSTIC RESULTS   LABS:    Labs Reviewed   CULTURE, URINE - Abnormal; Notable for the following components:       Result Value    Organism Escherichia coli (*)     All other components within normal limits    Narrative:     ORDER#: [id number]                          ORDERED BY: Chela Link  SOURCE: Urine Clean Catch                  COLLECTED:  02/27/20 13:45  ANTIBIOTICS AT LEXX.:                      RECEIVED :  02/27/20 14:22  Performed at:  [facility]  [address] [place] [address] Phone [phone]   CBC WITH AUTO DIFFERENTIAL - Abnormal; Notable for the following components:    WBC 14.0 (*)     RBC 3.83 (*)     Hemoglobin 11.4 (*)     Hematocrit 35.4 (*)     Neutrophils Absolute 11.0 (*)     All other components within normal limits    Narrative:     Performed at:  [facility] [place],  [place]   Phone [phone]   COMPREHENSIVE METABOLIC PANEL W/ REFLEX TO MG FOR LOW K - Abnormal; Notable for the following components:    Sodium 134 (*)     Chloride 95 (*)     CREATININE 1.3 (*)     GFR Non- 41 (*)     GFR  50 (*)     Alb 3.3 (*)     Albumin/Globulin Ratio 0.8 (*)     Alkaline Phosphatase 139 (*)     ALT 8 (*)     AST 14 (*)     All other components within normal limits    Narrative:     Performed at:  [facility],  [place]   Phone [phone]   URINE RT REFLEX TO CULTURE - Abnormal; Notable for the following components:    Clarity, UA CLOUDY (*)     Blood, Urine SMALL (*)     Protein, UA TRACE (*)     Leukocyte Esterase, Urine LARGE (*)     All other components within normal limits    Narrative:     Performed at:  [facility]) [facility] [place],  [place]   Phone [phone]   BRAIN NATRIURETIC PEPTIDE - Abnormal; Notable for the following components:    Pro-BNP 2,585 (*)     All other components within normal limits    Narrative:     Performed at:  [facility]) [facility],  [place]   Phone [phone]   D-DIMER, QUANTITATIVE - Abnormal; Notable for the following components:    D-Dimer, Quant 2556 (*)     All other components within normal limits    Narrative:     Performed at:  [facility],  [place]   Phone [phone]   MICROSCOPIC URINALYSIS - Abnormal; Notable for the following components:    WBC, UA  (*)     RBC, UA 5-10 (*)     Bacteria, UA 3+ (*)     All other components within normal limits    Narrative:     Performed at:  [facility],  [place], [facility]   Phone [phone]   CULTURE, BLOOD 1    Narrative:     ORDER#: [id number]                          ORDERED BY: Lesly Mendez  SOURCE: Blood No site given                COLLECTED:  02/27/20 16:00  ANTIBIOTICS AT LEXX.:                      RECEIVED :  02/28/20 02:49  If child <=2 yrs old please draw pediatric bottle. ~Blood Culture #1  Performed at:  [facility]  [address] [place], [place] [address]   Phone [phone]   CULTURE, BLOOD 2    Narrative:     ORDER#: [id number]                          ORDERED BY: Lesly Mendez  SOURCE: Blood No site given                COLLECTED:  02/27/20 16:04  ANTIBIOTICS AT LEXX.:                      RECEIVED :  02/28/20 02:49  If child <=2 yrs old please draw pediatric bottle. ~Blood Culture #2  Performed at:  [facility]  [address] [place], [place] [address]   Phone [phone]   TROPONIN    Narrative:     Performed at:  [facility]) - [place] [address],  [place], [facility]   Phone [phone]   LACTIC ACID, PLASMA    Narrative:     Performed at:  [facility]) [place] [address],  [place], [facility]   Phone [phone]       All other labs were within normal range or notreturned as of this dictation. EKG: All EKG's are interpreted by the Emergency Department Physician who either signs or Co-signs this chart in the absence of a cardiologist.  Please see their note for interpretation of EKG.       RADIOLOGY:         Interpretation per the Radiologist below, if available at the time of this note:    CT ABDOMEN PELVIS W IV CONTRAST Additional Contrast? None   Final Result   No acute thoracic, abdominal, or pelvic abnormality. Spiculated nodule in the left lower lobe posteriorly and further evaluation   with follow-up noncontrast chest CT in 3 months, PET-CT, or tissue sampling   is recommended. Prominent bilateral axillary lymph nodes of uncertain etiology or   significance. These may be reactive although attention is recommended on   follow-up. Uncomplicated colonic diverticulosis. CT Chest Pulmonary Embolism W Contrast   Final Result   No acute thoracic, abdominal, or pelvic abnormality. Spiculated nodule in the left lower lobe posteriorly and further evaluation   with follow-up noncontrast chest CT in 3 months, PET-CT, or tissue sampling   is recommended. Prominent bilateral axillary lymph nodes of uncertain etiology or   significance. These may be reactive although attention is recommended on   follow-up. Uncomplicated colonic diverticulosis. XR CHEST STANDARD (2 VW)   Final Result   New mild right basilar airspace disease, atelectasis versus pneumonia. Xr Chest Standard (2 Vw)    Result Date: 2/27/2020  EXAMINATION: TWO XRAY VIEWS OF THE CHEST 2/27/2020 2:08 pm COMPARISON: Chest radiograph July 30, 2019. HISTORY: ORDERING SYSTEM PROVIDED HISTORY: Chest Discomfort TECHNOLOGIST PROVIDED HISTORY: Reason for exam:->Chest Discomfort Reason for Exam: chest discomfort Acuity: Acute Type of Exam: Initial FINDINGS: There is new mild right basilar airspace disease. The left lung is clear. There is no effusion or pneumothorax. The cardiomediastinal silhouette is without acute process. The osseous structures are without acute process. New mild right basilar airspace disease, atelectasis versus pneumonia.          PROCEDURES   Unless otherwise noted below, none     Procedures    CRITICAL CARE TIME   N/A    CONSULTS:  None      EMERGENCY DEPARTMENT COURSE and DIFFERENTIAL DIAGNOSIS/MDM:   Vitals:    Vitals:    02/27/20 1213 02/27/20 1717   BP: 109/64 (!) 121/96   Pulse: 95 88   Resp: 14 16   Temp: 97.9 °F (36.6 °C)    TempSrc: Oral    SpO2: 98% 95%   Weight: 147 lb (66.7 kg)    Height: 5' 1\" (1.549 m)        Patient was given the following medications:  Medications   0.9 % sodium chloride bolus (0 mL/kg × 66.7 kg Intravenous Stopped 2/27/20 1929)   cefTRIAXone (ROCEPHIN) 1 g IVPB in 50 mL D5W minibag (0 g Intravenous Stopped 2/27/20 1631)   azithromycin (ZITHROMAX) 500 mg in D5W 250ml addavial (0 mg Intravenous Stopped 2/27/20 1831)   iopamidol (ISOVUE-370) 76 % injection 85 mL (85 mLs Intravenous Given 2/27/20 1532)   oxybutynin (DITROPAN) tablet 5 mg (5 mg Oral Given 2/27/20 1645)   HYDROcodone-acetaminophen (NORCO) 5-325 MG per tablet 1 tablet (1 tablet Oral Given 2/27/20 1630)   dexamethasone (DECADRON) injection 8 mg (8 mg Intravenous Given 2/27/20 2015)   ipratropium-albuterol (DUONEB) nebulizer solution 1 ampule (1 ampule Inhalation Given 2/27/20 2017)       Patient was seen and evaluated by myself and Dr. Lee Yanez. .  Patient here for complaints of cough and hip pain. On exam she is awake and alert hemodynamically stable nontoxic in appearance. Lab values are currently ordered and pending. Patient did have a lesion in her chest that she was notified of in the ED. Dr. Lee Yanez to follow-up with the additional lab work and final disposition of the patient. The patient tolerated their visit well. They were seen and evaluated by the attendingphysician, No att. providers found who agreed with the assessment and plan. The patient and / or the family were informed of the results of any tests, a time was given to answer questions, a plan was proposed and they agreed Aleja Stark. FINAL IMPRESSION      1. Acute cystitis with hematuria    2. Pulmonary lesion of left side of chest    3. Atypical pneumonia    4. Reactive airway disease with acute exacerbation, unspecified asthma severity, unspecified whether persistent    5.  Left hip pain    6.  Chronic kidney disease, unspecified CKD stage          DISPOSITION/PLAN   DISPOSITION Decision To Discharge 02/27/2020 07:53:35 PM      PATIENT [facility]  [facility]  [address] [address]  [phone]  Go to   If symptoms worsen    Tutu Mckay MD  [facility]  [address] [place] [address] [facility]  [phone]    Schedule an appointment as soon as possible for a visit in 2 days        DISCHARGE MEDICATIONS:  Discharge Medication List as of 2/27/2020  8:14 PM      START taking these medications    Details   cefdinir (OMNICEF) 300 MG capsule Take 1 capsule by mouth 2 times daily for 7 days, Disp-14 capsule, R-0Print      azithromycin (ZITHROMAX) 250 MG tablet Take 1 tablet by mouth daily for 4 days, Disp-4 tablet, R-0Print             DISCONTINUED MEDICATIONS:  Discharge Medication List as of 2/27/2020  8:14 PM                 (Please note that portions of this note were completed with a voice recognition program.  Efforts were made to edit the dictations but occasionally words are mis-transcribed.)    SHAHAB Cabrera CNP (electronically signed)       SHAHAB Cabrera CNP  03/09/20 6483

## 2020-02-29 LAB
ORGANISM: ABNORMAL
URINE CULTURE, ROUTINE: ABNORMAL

## 2020-03-02 LAB
BLOOD CULTURE, ROUTINE: NORMAL
CULTURE, BLOOD 2: NORMAL

## 2020-04-13 RX ORDER — CETIRIZINE HYDROCHLORIDE 10 MG/1
TABLET ORAL
Qty: 90 TABLET | Refills: 1 | Status: SHIPPED | OUTPATIENT
Start: 2020-04-13 | End: 2020-10-05

## 2020-04-13 RX ORDER — FLUTICASONE PROPIONATE 50 MCG
SPRAY, SUSPENSION (ML) NASAL
Qty: 16 G | Refills: 2 | Status: SHIPPED | OUTPATIENT
Start: 2020-04-13 | End: 2020-07-10

## 2020-04-13 RX ORDER — ERGOCALCIFEROL 1.25 MG/1
50000 CAPSULE ORAL WEEKLY
Qty: 12 CAPSULE | Refills: 0 | Status: SHIPPED | OUTPATIENT
Start: 2020-04-13 | End: 2020-09-18

## 2020-04-13 RX ORDER — HYDROXYZINE HYDROCHLORIDE 25 MG/1
25 TABLET, FILM COATED ORAL EVERY 8 HOURS PRN
Qty: 90 TABLET | Refills: 3 | Status: SHIPPED | OUTPATIENT
Start: 2020-04-13 | End: 2020-08-10

## 2020-04-13 RX ORDER — TIOTROPIUM BROMIDE 18 UG/1
CAPSULE ORAL; RESPIRATORY (INHALATION)
Qty: 90 CAPSULE | Refills: 1 | Status: SHIPPED | OUTPATIENT
Start: 2020-04-13 | End: 2020-10-05

## 2020-04-24 ENCOUNTER — HOSPITAL ENCOUNTER (INPATIENT)
Age: 65
LOS: 4 days | Discharge: HOME | DRG: 816 | End: 2020-04-28
Attending: EMERGENCY MEDICINE | Admitting: INTERNAL MEDICINE
Payer: COMMERCIAL

## 2020-04-24 ENCOUNTER — APPOINTMENT (OUTPATIENT)
Dept: GENERAL RADIOLOGY | Age: 65
DRG: 816 | End: 2020-04-24
Payer: COMMERCIAL

## 2020-04-24 ENCOUNTER — APPOINTMENT (OUTPATIENT)
Dept: ULTRASOUND IMAGING | Age: 65
DRG: 816 | End: 2020-04-24
Payer: COMMERCIAL

## 2020-04-24 ENCOUNTER — APPOINTMENT (OUTPATIENT)
Dept: CT IMAGING | Age: 65
DRG: 816 | End: 2020-04-24
Payer: COMMERCIAL

## 2020-04-24 PROBLEM — N17.9 AKI (ACUTE KIDNEY INJURY) (HCC): Status: ACTIVE | Noted: 2020-04-24

## 2020-04-24 LAB
A/G RATIO: 0.9 (ref 1.1–2.2)
ABO/RH: NORMAL
ABO/RH: NORMAL
ALBUMIN SERPL-MCNC: 3.1 G/DL (ref 3.4–5)
ALBUMIN SERPL-MCNC: 3.1 G/DL (ref 3.4–5)
ALBUMIN SERPL-MCNC: 3.6 G/DL (ref 3.4–5)
ALP BLD-CCNC: 116 U/L (ref 40–129)
ALT SERPL-CCNC: 15 U/L (ref 10–40)
AMPHETAMINE SCREEN, URINE: POSITIVE
ANION GAP SERPL CALCULATED.3IONS-SCNC: 15 MMOL/L (ref 3–16)
ANION GAP SERPL CALCULATED.3IONS-SCNC: 20 MMOL/L (ref 3–16)
ANION GAP SERPL CALCULATED.3IONS-SCNC: 25 MMOL/L (ref 3–16)
ANTIBODY SCREEN: NORMAL
APTT: 30.4 SEC (ref 24.2–36.2)
AST SERPL-CCNC: 34 U/L (ref 15–37)
BACTERIA: ABNORMAL /HPF
BANDED NEUTROPHILS RELATIVE PERCENT: 7 % (ref 0–7)
BARBITURATE SCREEN URINE: ABNORMAL
BASOPHILS ABSOLUTE: 0 K/UL (ref 0–0.2)
BASOPHILS RELATIVE PERCENT: 0 %
BENZODIAZEPINE SCREEN, URINE: ABNORMAL
BILIRUB SERPL-MCNC: 0.7 MG/DL (ref 0–1)
BILIRUBIN URINE: ABNORMAL
BLOOD, URINE: ABNORMAL
BUN BLDV-MCNC: 55 MG/DL (ref 7–20)
BUN BLDV-MCNC: 58 MG/DL (ref 7–20)
BUN BLDV-MCNC: 60 MG/DL (ref 7–20)
CALCIUM SERPL-MCNC: 7.7 MG/DL (ref 8.3–10.6)
CALCIUM SERPL-MCNC: 7.7 MG/DL (ref 8.3–10.6)
CALCIUM SERPL-MCNC: 8.4 MG/DL (ref 8.3–10.6)
CANNABINOID SCREEN URINE: ABNORMAL
CHLORIDE BLD-SCNC: 83 MMOL/L (ref 99–110)
CHLORIDE BLD-SCNC: 88 MMOL/L (ref 99–110)
CHLORIDE BLD-SCNC: 91 MMOL/L (ref 99–110)
CLARITY: CLEAR
CO2: 21 MMOL/L (ref 21–32)
CO2: 22 MMOL/L (ref 21–32)
CO2: 24 MMOL/L (ref 21–32)
COCAINE METABOLITE SCREEN URINE: POSITIVE
COLOR: ABNORMAL
CREAT SERPL-MCNC: 5.8 MG/DL (ref 0.6–1.2)
CREAT SERPL-MCNC: 6.9 MG/DL (ref 0.6–1.2)
CREAT SERPL-MCNC: 9.1 MG/DL (ref 0.6–1.2)
CREATININE URINE: 157 MG/DL (ref 28–259)
EKG ATRIAL RATE: 92 BPM
EKG DIAGNOSIS: NORMAL
EKG P AXIS: 40 DEGREES
EKG P-R INTERVAL: 136 MS
EKG Q-T INTERVAL: 380 MS
EKG QRS DURATION: 104 MS
EKG QTC CALCULATION (BAZETT): 469 MS
EKG R AXIS: 10 DEGREES
EKG T AXIS: 27 DEGREES
EKG VENTRICULAR RATE: 92 BPM
EOSINOPHILS ABSOLUTE: 0.2 K/UL (ref 0–0.6)
EOSINOPHILS RELATIVE PERCENT: 1 %
EPITHELIAL CELLS, UA: ABNORMAL /HPF (ref 0–5)
ETHANOL: NORMAL MG/DL (ref 0–0.08)
GFR AFRICAN AMERICAN: 5
GFR AFRICAN AMERICAN: 7
GFR AFRICAN AMERICAN: 9
GFR NON-AFRICAN AMERICAN: 4
GFR NON-AFRICAN AMERICAN: 6
GFR NON-AFRICAN AMERICAN: 7
GLOBULIN: 4.1 G/DL
GLUCOSE BLD-MCNC: 106 MG/DL (ref 70–99)
GLUCOSE BLD-MCNC: 119 MG/DL (ref 70–99)
GLUCOSE BLD-MCNC: 137 MG/DL (ref 70–99)
GLUCOSE BLD-MCNC: 99 MG/DL (ref 70–99)
GLUCOSE URINE: NEGATIVE MG/DL
HAV IGM SER IA-ACNC: NORMAL
HCT VFR BLD CALC: 33.5 % (ref 36–48)
HEMOGLOBIN: 11 G/DL (ref 12–16)
HEPATITIS B CORE IGM ANTIBODY: NORMAL
HEPATITIS B SURFACE ANTIGEN INTERPRETATION: NORMAL
HEPATITIS B SURFACE ANTIGEN INTERPRETATION: NORMAL
HEPATITIS C ANTIBODY INTERPRETATION: NORMAL
HEPATITIS C ANTIBODY INTERPRETATION: NORMAL
HYALINE CASTS: ABNORMAL /LPF (ref 0–2)
INR BLD: 1.2 (ref 0.86–1.14)
KETONES, URINE: ABNORMAL MG/DL
LACTIC ACID, SEPSIS: 1.2 MMOL/L (ref 0.4–1.9)
LACTIC ACID: 0.9 MMOL/L (ref 0.4–2)
LEUKOCYTE ESTERASE, URINE: NEGATIVE
LIPASE: 7 U/L (ref 13–60)
LYMPHOCYTES ABSOLUTE: 1.3 K/UL (ref 1–5.1)
LYMPHOCYTES RELATIVE PERCENT: 7 %
Lab: ABNORMAL
MCH RBC QN AUTO: 29.2 PG (ref 26–34)
MCHC RBC AUTO-ENTMCNC: 33 G/DL (ref 31–36)
MCV RBC AUTO: 88.6 FL (ref 80–100)
METAMYELOCYTES RELATIVE PERCENT: 1 %
METHADONE SCREEN, URINE: ABNORMAL
MICROSCOPIC EXAMINATION: YES
MONOCYTES ABSOLUTE: 1.3 K/UL (ref 0–1.3)
MONOCYTES RELATIVE PERCENT: 7 %
NEUTROPHILS ABSOLUTE: 15.6 K/UL (ref 1.7–7.7)
NEUTROPHILS RELATIVE PERCENT: 77 %
NITRITE, URINE: POSITIVE
OPIATE SCREEN URINE: ABNORMAL
OSMOLALITY URINE: 281 MOSM/KG (ref 390–1070)
OSMOLALITY: 288 MOSM/KG (ref 280–301)
OXYCODONE URINE: ABNORMAL
PDW BLD-RTO: 14.8 % (ref 12.4–15.4)
PERFORMED ON: ABNORMAL
PH UA: 5
PH UA: 5 (ref 5–8)
PHENCYCLIDINE SCREEN URINE: ABNORMAL
PHOSPHORUS: 7.1 MG/DL (ref 2.5–4.9)
PHOSPHORUS: 7.4 MG/DL (ref 2.5–4.9)
PLATELET # BLD: 335 K/UL (ref 135–450)
PLATELET SLIDE REVIEW: ADEQUATE
PMV BLD AUTO: 7.9 FL (ref 5–10.5)
POTASSIUM REFLEX MAGNESIUM: 3.9 MMOL/L (ref 3.5–5.1)
POTASSIUM SERPL-SCNC: 3.5 MMOL/L (ref 3.5–5.1)
POTASSIUM SERPL-SCNC: 3.6 MMOL/L (ref 3.5–5.1)
PROPOXYPHENE SCREEN: ABNORMAL
PROTEIN UA: 30 MG/DL
PROTHROMBIN TIME: 14 SEC (ref 10–13.2)
RBC # BLD: 3.78 M/UL (ref 4–5.2)
RBC # BLD: NORMAL 10*6/UL
RBC UA: ABNORMAL /HPF (ref 0–4)
SLIDE REVIEW: ABNORMAL
SODIUM BLD-SCNC: 129 MMOL/L (ref 136–145)
SODIUM BLD-SCNC: 130 MMOL/L (ref 136–145)
SODIUM BLD-SCNC: 130 MMOL/L (ref 136–145)
SODIUM URINE: 28 MMOL/L
SPECIFIC GRAVITY UA: 1.02 (ref 1–1.03)
TOTAL CK: 492 U/L (ref 26–192)
TOTAL PROTEIN: 7.7 G/DL (ref 6.4–8.2)
TROPONIN: <0.01 NG/ML
URINE REFLEX TO CULTURE: YES
URINE TYPE: ABNORMAL
UROBILINOGEN, URINE: 0.2 E.U./DL
WAXY CASTS: ABNORMAL /LPF
WBC # BLD: 18.3 K/UL (ref 4–11)
WBC UA: ABNORMAL /HPF (ref 0–5)

## 2020-04-24 PROCEDURE — 87086 URINE CULTURE/COLONY COUNT: CPT

## 2020-04-24 PROCEDURE — 51702 INSERT TEMP BLADDER CATH: CPT

## 2020-04-24 PROCEDURE — 86701 HIV-1ANTIBODY: CPT

## 2020-04-24 PROCEDURE — 82550 ASSAY OF CK (CPK): CPT

## 2020-04-24 PROCEDURE — 2580000003 HC RX 258: Performed by: INTERNAL MEDICINE

## 2020-04-24 PROCEDURE — 86900 BLOOD TYPING SEROLOGIC ABO: CPT

## 2020-04-24 PROCEDURE — 87040 BLOOD CULTURE FOR BACTERIA: CPT

## 2020-04-24 PROCEDURE — 6360000002 HC RX W HCPCS: Performed by: INTERNAL MEDICINE

## 2020-04-24 PROCEDURE — 99291 CRITICAL CARE FIRST HOUR: CPT

## 2020-04-24 PROCEDURE — 6370000000 HC RX 637 (ALT 250 FOR IP): Performed by: INTERNAL MEDICINE

## 2020-04-24 PROCEDURE — 84300 ASSAY OF URINE SODIUM: CPT

## 2020-04-24 PROCEDURE — 1200000000 HC SEMI PRIVATE

## 2020-04-24 PROCEDURE — 83935 ASSAY OF URINE OSMOLALITY: CPT

## 2020-04-24 PROCEDURE — 70450 CT HEAD/BRAIN W/O DYE: CPT

## 2020-04-24 PROCEDURE — 2700000000 HC OXYGEN THERAPY PER DAY

## 2020-04-24 PROCEDURE — 80307 DRUG TEST PRSMV CHEM ANLYZR: CPT

## 2020-04-24 PROCEDURE — 36415 COLL VENOUS BLD VENIPUNCTURE: CPT

## 2020-04-24 PROCEDURE — 93005 ELECTROCARDIOGRAM TRACING: CPT | Performed by: EMERGENCY MEDICINE

## 2020-04-24 PROCEDURE — 93010 ELECTROCARDIOGRAM REPORT: CPT | Performed by: INTERNAL MEDICINE

## 2020-04-24 PROCEDURE — 76705 ECHO EXAM OF ABDOMEN: CPT

## 2020-04-24 PROCEDURE — 83690 ASSAY OF LIPASE: CPT

## 2020-04-24 PROCEDURE — 80053 COMPREHEN METABOLIC PANEL: CPT

## 2020-04-24 PROCEDURE — 94761 N-INVAS EAR/PLS OXIMETRY MLT: CPT

## 2020-04-24 PROCEDURE — 87390 HIV-1 AG IA: CPT

## 2020-04-24 PROCEDURE — 6360000002 HC RX W HCPCS: Performed by: EMERGENCY MEDICINE

## 2020-04-24 PROCEDURE — 80074 ACUTE HEPATITIS PANEL: CPT

## 2020-04-24 PROCEDURE — 86901 BLOOD TYPING SEROLOGIC RH(D): CPT

## 2020-04-24 PROCEDURE — 82570 ASSAY OF URINE CREATININE: CPT

## 2020-04-24 PROCEDURE — 84484 ASSAY OF TROPONIN QUANT: CPT

## 2020-04-24 PROCEDURE — 81001 URINALYSIS AUTO W/SCOPE: CPT

## 2020-04-24 PROCEDURE — G0480 DRUG TEST DEF 1-7 CLASSES: HCPCS

## 2020-04-24 PROCEDURE — 85025 COMPLETE CBC W/AUTO DIFF WBC: CPT

## 2020-04-24 PROCEDURE — 86850 RBC ANTIBODY SCREEN: CPT

## 2020-04-24 PROCEDURE — 85610 PROTHROMBIN TIME: CPT

## 2020-04-24 PROCEDURE — 6370000000 HC RX 637 (ALT 250 FOR IP)

## 2020-04-24 PROCEDURE — 85730 THROMBOPLASTIN TIME PARTIAL: CPT

## 2020-04-24 PROCEDURE — 83605 ASSAY OF LACTIC ACID: CPT

## 2020-04-24 PROCEDURE — 96365 THER/PROPH/DIAG IV INF INIT: CPT

## 2020-04-24 PROCEDURE — 2580000003 HC RX 258: Performed by: EMERGENCY MEDICINE

## 2020-04-24 PROCEDURE — 71045 X-RAY EXAM CHEST 1 VIEW: CPT

## 2020-04-24 PROCEDURE — 74176 CT ABD & PELVIS W/O CONTRAST: CPT

## 2020-04-24 PROCEDURE — 83930 ASSAY OF BLOOD OSMOLALITY: CPT

## 2020-04-24 PROCEDURE — 86702 HIV-2 ANTIBODY: CPT

## 2020-04-24 RX ORDER — HYDROMORPHONE HCL 110MG/55ML
0.5 PATIENT CONTROLLED ANALGESIA SYRINGE INTRAVENOUS
Status: DISCONTINUED | OUTPATIENT
Start: 2020-04-24 | End: 2020-04-28 | Stop reason: HOSPADM

## 2020-04-24 RX ORDER — HEPARIN SODIUM 5000 [USP'U]/ML
5000 INJECTION, SOLUTION INTRAVENOUS; SUBCUTANEOUS EVERY 8 HOURS SCHEDULED
Status: DISCONTINUED | OUTPATIENT
Start: 2020-04-24 | End: 2020-04-28 | Stop reason: HOSPADM

## 2020-04-24 RX ORDER — 0.9 % SODIUM CHLORIDE 0.9 %
30 INTRAVENOUS SOLUTION INTRAVENOUS ONCE
Status: COMPLETED | OUTPATIENT
Start: 2020-04-24 | End: 2020-04-24

## 2020-04-24 RX ORDER — ACETAMINOPHEN 325 MG/1
650 TABLET ORAL EVERY 6 HOURS PRN
Status: DISCONTINUED | OUTPATIENT
Start: 2020-04-24 | End: 2020-04-28 | Stop reason: HOSPADM

## 2020-04-24 RX ORDER — SODIUM CHLORIDE 9 MG/ML
INJECTION, SOLUTION INTRAVENOUS CONTINUOUS
Status: DISCONTINUED | OUTPATIENT
Start: 2020-04-24 | End: 2020-04-26

## 2020-04-24 RX ORDER — PROMETHAZINE HYDROCHLORIDE 25 MG/1
12.5 TABLET ORAL EVERY 6 HOURS PRN
Status: DISCONTINUED | OUTPATIENT
Start: 2020-04-24 | End: 2020-04-28 | Stop reason: HOSPADM

## 2020-04-24 RX ORDER — ACETAMINOPHEN 650 MG/1
650 SUPPOSITORY RECTAL EVERY 6 HOURS PRN
Status: DISCONTINUED | OUTPATIENT
Start: 2020-04-24 | End: 2020-04-28 | Stop reason: HOSPADM

## 2020-04-24 RX ORDER — SODIUM CHLORIDE 0.9 % (FLUSH) 0.9 %
10 SYRINGE (ML) INJECTION PRN
Status: DISCONTINUED | OUTPATIENT
Start: 2020-04-24 | End: 2020-04-28 | Stop reason: HOSPADM

## 2020-04-24 RX ORDER — SODIUM CHLORIDE 0.9 % (FLUSH) 0.9 %
10 SYRINGE (ML) INJECTION EVERY 12 HOURS SCHEDULED
Status: DISCONTINUED | OUTPATIENT
Start: 2020-04-24 | End: 2020-04-28 | Stop reason: HOSPADM

## 2020-04-24 RX ORDER — ONDANSETRON 2 MG/ML
4 INJECTION INTRAMUSCULAR; INTRAVENOUS EVERY 6 HOURS PRN
Status: DISCONTINUED | OUTPATIENT
Start: 2020-04-24 | End: 2020-04-28 | Stop reason: HOSPADM

## 2020-04-24 RX ADMIN — SODIUM CHLORIDE 2001 ML: 9 INJECTION, SOLUTION INTRAVENOUS at 06:41

## 2020-04-24 RX ADMIN — CEFEPIME 500 MG: 1 INJECTION, POWDER, FOR SOLUTION INTRAMUSCULAR; INTRAVENOUS at 13:23

## 2020-04-24 RX ADMIN — Medication: at 22:46

## 2020-04-24 RX ADMIN — SODIUM CHLORIDE: 9 INJECTION, SOLUTION INTRAVENOUS at 12:00

## 2020-04-24 RX ADMIN — HEPARIN SODIUM 5000 UNITS: 5000 INJECTION INTRAVENOUS; SUBCUTANEOUS at 13:11

## 2020-04-24 RX ADMIN — VANCOMYCIN HYDROCHLORIDE 1250 MG: 10 INJECTION, POWDER, LYOPHILIZED, FOR SOLUTION INTRAVENOUS at 08:36

## 2020-04-24 RX ADMIN — PIPERACILLIN AND TAZOBACTAM 3.38 G: 3; .375 INJECTION, POWDER, FOR SOLUTION INTRAVENOUS at 08:17

## 2020-04-24 RX ADMIN — SODIUM CHLORIDE: 9 INJECTION, SOLUTION INTRAVENOUS at 20:36

## 2020-04-24 RX ADMIN — HEPARIN SODIUM 5000 UNITS: 5000 INJECTION INTRAVENOUS; SUBCUTANEOUS at 22:32

## 2020-04-24 RX ADMIN — ACETAMINOPHEN 650 MG: 325 TABLET ORAL at 22:44

## 2020-04-24 ASSESSMENT — PAIN SCALES - GENERAL
PAINLEVEL_OUTOF10: 4
PAINLEVEL_OUTOF10: 10

## 2020-04-24 NOTE — ED PROVIDER NOTES
[facility]  ED  EMERGENCY DEPARTMENT ENCOUNTER      Pt Name: Giovanni Simon  MRN: [id number]  [place] 1955  Date of evaluation: 4/24/2020  Provider: Joshua Powell MD    CHIEF COMPLAINT       Chief Complaint   Patient presents with    Altered Mental Status     per squad report pt found walking naked altered (pt able to give her name and social security number, glucose WDL)          HISTORY OF PRESENT ILLNESS   (Location/Symptom, Timing/Onset, Context/Setting, Quality, Duration, Modifying Factors, Severity)  Note limiting factors. Giovanni Simon is a 60 y.o. female with past medical history of hypertension, DVT, TIA hyperlipidemia here today for altered mental status. The patient was reportedly found walking down the street naked. She was confused and brought to the hospital.  She states she is not feeling well. She notes some abdominal discomfort. Denies headache. Denies chest pain, cough or shortness of breath. She denies vomiting or diarrhea. Denies dysuria. She is otherwise a very poor historian and gives little insight to her current clinical condition. She is unsure as to why she was walking down the street without close on    HPI    Nursing Notes were reviewed. REVIEW OF SYSTEMS    (2-9 systems for level 4, 10 or more for level 5)     Review of Systems    Please see HPI for pertinent positive and negative review of system findings. A full 10 system ROS was performed and otherwise negative.         PAST MEDICAL HISTORY     Past Medical History:   Diagnosis Date    Arterial occlusion     Centrilobular emphysema ([facility] 5/20/2019    Chronic bilateral low back pain with bilateral sciatica 8/3/2018    Chronic bilateral low back pain with bilateral sciatica     Hyperlipidemia     Hypertension     MDRO (multiple drug resistant organisms) resistance 02/27/2020    Escherichia coli-URINE    Moderate single current episode of major depressive disorder ([facility] 8/3/2018    Ruptured disk MCG/ACT INHALER    Inhale 2 puffs into the lungs every 6 hours as needed for Wheezing    VITAMIN D (ERGOCALCIFEROL) 1.25 MG (71276 UT) CAPS CAPSULE    TAKE 1 CAPSULE BY MOUTH ONCE A WEEK FOR 12 DOSES       ALLERGIES     Codeine    FAMILY HISTORY       Family History   Problem Relation Age of Onset    High Blood Pressure Mother     High Cholesterol Mother     Heart Disease Mother     Coronary Art Dis Mother     Heart Attack Father         59    Cirrhosis Sister     Alcohol Abuse Sister     Other Sister         \"colostomy bag due to hole in her colon\"   [employer] Sudden Death Brother         \"suicide\"    Colon Cancer Other         maternal uncle    Breast Cancer Other         multiple maternal aunts along with uterine cancer          SOCIAL HISTORY       Social History     Socioeconomic History    Marital status:      Spouse name: Not on file    Number of children: Not on file    Years of education: Not on file    Highest education level: Not on file   Occupational History    Not on file   Social Needs    Financial resource strain: Not on file    Food insecurity     Worry: Not on file     Inability: Not on file    Transportation needs     Medical: Not on file     Non-medical: Not on file   Tobacco Use    Smoking status: Current Some Day Smoker     Packs/day: 0.25     Years: 20.00     Pack years: 5.00     Types: Cigarettes    Smokeless tobacco: Never Used   Substance and Sexual Activity    Alcohol use: No    Drug use: Not Currently     Types: Marijuana, Opiates      Comment: pt states she took two hits for new years so she could sleep    Sexual activity: Not Currently   Lifestyle    Physical activity     Days per week: Not on file     Minutes per session: Not on file    Stress: Not on file   Relationships    Social connections     Talks on phone: Not on file     Gets together: Not on file     Attends Gnosticist service: Not on file     Active member of club or organization: Not on file for acute cholecystitis. Consider   further evaluation with ultrasound or nuclear medicine study. Trace free fluid dependently within the pelvis, abnormal for postmenopausal   female. Mild predominantly linear consolidation to right lower lobe felt most likely   to reflect atelectasis. Aspiration pneumonitis or pneumonia not excluded in   the appropriate clinical setting. A previously noted spiculated nodular opacity to the superior segment of left   lower lobe is only minimally imaged on a single image of the study of the   abdomen and pelvis. Reference to the prior CT chest from 02/27/2020 can be   made for additional information and management recommendations. XR CHEST PORTABLE   Final Result   No acute findings. US GALLBLADDER RUQ    (Results Pending)       All other labs/imaging were within normal range or not returned as of this dictation. EMERGENCY DEPARTMENT COURSE and DIFFERENTIAL DIAGNOSIS/MDM:   Vitals:    Vitals:    04/24/20 0609 04/24/20 0614 04/24/20 0656 04/24/20 0730   BP:  (!) 73/55 (!) 79/62 109/61   Pulse: 93  98 97   Resp: 18  21 20   Temp: 97.7 °F (36.5 °C)      TempSrc: Oral      SpO2: 96%  92% 96%   Weight: 147 lb (66.7 kg)      Height: 5' 1\" (1.549 m)          EKG: Sinus rhythm rate of 92 bpm.  No ST elevation or arrhythmia. No prior. Patient presents emergency department today after being found altered wandering down the street without close on. Unclear etiology. No focal neurologic deficits. Found to have significant acute renal failure. Some of her confusion may be due to this and underlying uremia. Does appear dehydrated. Was given IV fluids here. She was complaining of some abdominal discomfort and a CT scan was performed showing some mild inflammatory findings around the gallbladder. Right upper quadrant ultrasound is pending. Liver function studies otherwise unremarkable.   She was found to have significant leukocytosis with underlying

## 2020-04-24 NOTE — CONSULTS
[facility]    [facility]              [phone]                       Plan :     Agree with fluids  Just placed Baeza and  Bag has 500 ml already within hours,  And BP is coming up/stable  No indication for dialysis at present  Will continue to monitor  Check HIV, hep B, C- known to cause renal failure     Assessment :     Acute Kidney Injury  JASPREET likely due to - UTI/sepsis/hypotension/dehydration leading to Acute Tubular necrosis  Cr Peaked to 9  Cr 4/24/2020 - 9  Baseline Cr- 1.3 on 2/20, and 1 on 11/19  Urine output 4/24/2020  by 7 am -NA, but has 500 ml in bag now    UA- +ve for nitrite--- on iv ceftriaxone  Renal Imaging:- CT - no renal abnormaliity,  Echo: 12/16- normal EF, no DD     CPK- 500  No monoclonal gammopathy in labs from 5/20   HiV,hep C-ve from 2010- repeat    Hyponatremia  Due to dehydration  Recheck    Hypotension in patient with known Hypertension  BP: ()/(55-73) Pulse:  [88-99]   BP very low  Likely dehydration  UTI  Getting better    Acidosis  Has high AG  Likely due to renal failure  Lactate not high on admission    Polysubstance abuse  UTI  Cocaine, amphetamines positive      [facility] would like to thank Clarisse Garcia MD   for opportunity to serve this patient      Please call with questions at-   24 Hrs Answering service [phone] or  7 am- 5 pm via Perfect serve or cell phone  Dr.Sudhir Donna Cherry          CC/reason for consult :     JASPREET     HPI :     Nav Willson is a 60 y.o. female presented to   the hospital on 4/24/2020 with found down. She is known to have polysubstance abuse. She was brought to ED, and being admitted. She is unable to provide much details. But  Denies taking ethanol, methanol, antifreeze,  Aspirin, tylenol. She was hypotensive on admission, and got  Fluids. BP coming up and on fluids. Has baeza, and making urine.       Interval History:     Making urine  Has 500 ml in the bag    ROS:     Seen with-

## 2020-04-24 NOTE — H&P
Hospital Medicine History & Physical      PCP: Lisette Mcbride MD    Date of Admission: 4/24/2020    Date of Service: Pt seen/examined on 04/24/20  and Admitted to Inpatient with expected LOS greater than two midnights due to medical therapy. Chief Complaint:   Chief Complaint   Patient presents with    Altered Mental Status     per squad report pt found walking naked altered (pt able to give her name and social security number, glucose WDL)      History Of Present Illness:    60 y.o. female with PMH of hypertension, TIA,  hyperlipidemia  presented to [facility] ED with c/o altered mental status. The patient was reportedly found walking down the street naked. She was confused and brought to the hospital.  She states she is not feeling well. She notes some abdominal discomfort. Denies headache. Denies chest pain, cough or shortness of breath. She denies vomiting or diarrhea. Denies dysuria. She is otherwise a very poor historian and gives little insight to her current clinical condition. She is unsure as to why she was walking down the street without clothes on .     ED course : Patient noted to be hypotensive upon arrival to ED. Basic labs suggestive of elevated BUN/creatinine 60/9.1. Total . LFTs within normal limits. Imaging including CT head, CT abdomen pelvis, ultrasound gallbladder without acute abnormality. UDS positive for amphetamine and cocaine on admission. Patient was given 2 L fluid bolus in ED, empiric vancomycin plus Zosyn for possible UTI and is admitted to the hospitalist service for further evaluation and management    Upon evaluation by me in A2, patient awake, alert, disoriented. Answers simple questions. Unable to give specifics. Holding her abdomen and complains of pain with intermittent myoclonic jerks.      Past Medical History:      Diagnosis Date    JASPREET (acute kidney injury) ([address] 4/24/2020    Arterial occlusion     Centrilobular emphysema ([address] 1/13/20   Aster Logan MD   gabapentin (NEURONTIN) 400 MG capsule Take 1 capsule by mouth 3 times daily for 30 days. 1/13/20 2/27/20  Aster Logan MD   DULoxetine (CYMBALTA) 60 MG extended release capsule TAKE 1 CAPSULE BY MOUTH DAILY 1/13/20   Aster Logan MD   lisinopril (PRINIVIL;ZESTRIL) 40 MG tablet TAKE 1 TABLET BY MOUTH DAILY 1/13/20   Aster Logan MD   traZODone (DESYREL) 50 MG tablet TAKE 1 TABLET BY MOUTH NIGHTLY 1/13/20   Aster Logan MD   nicotine (NICODERM CQ) 7 MG/24HR PLACE 1 PATCH ONTO THE SKIN DAILY (ROTATE SITES) 12/4/19   Aster Logan MD   ondansetron (ZOFRAN) 4 MG tablet Take 1 tablet by mouth every 8 hours as needed for Nausea 11/10/19   Rito Meigs Sikes, PA   nitroGLYCERIN (NITROSTAT) 0.4 MG SL tablet up to max of 3 total doses. If no relief after 1 dose, call 911. Patient not taking: Reported on 12/10/2019 7/31/19   SHAHAB Narayanan CNP   nicotine (NICODERM CQ) 7 MG/24HR Place 1 patch onto the skin daily for 14 days 5/31/19 2/27/20  Aster Logan MD   VENTOLIN  (75 Base) MCG/ACT inhaler Inhale 2 puffs into the lungs every 6 hours as needed for Wheezing 5/31/19   Aster Logan MD   aspirin 81 MG tablet Take 1 tablet by mouth daily (with breakfast) 7/27/18   Aster Logan MD       Allergies:  Codeine    Social History:    The patient currently lives at home and is independent of IADLs    TOBACCO:   reports that she has been smoking cigarettes. She has a 5.00 pack-year smoking history. She has never used smokeless tobacco.  ETOH:   reports no history of alcohol use. Family History:     Reviewed in detail and negative for DM, CAD, Cancer, CVA.  Positive as follows:        Problem Relation Age of Onset    High Blood Pressure Mother     High Cholesterol Mother     Heart Disease Mother     Coronary Art Dis Mother     Heart Attack Father         59    Cirrhosis Sister     Alcohol Abuse Sister     Other Sister         \"colostomy bag due to hole in her colon\"    Sudden Death Brother         \"suicide\"    Colon Cancer Other         maternal uncle    Breast Cancer Other         multiple maternal aunts along with uterine cancer       REVIEW OF SYSTEMS:   Pertinent positives as noted in the HPI. All other systems reviewed and negative. PHYSICAL EXAM PERFORMED:  BP (!) 103/56   Pulse 92   Temp 97.9 °F (36.6 °C) (Oral)   Resp 18   Ht 5' 1\" (1.549 m)   Wt 147 lb (66.7 kg)   SpO2 95%   BMI 27.78 kg/m²     General appearance:  No apparent distress, appears stated age and cooperative. HEENT:  Normal cephalic, atraumatic without obvious deformity. Pupils equal, round, and reactive to light. Extra ocular muscles intact. Conjunctivae/corneas clear. Neck: Supple, with full range of motion. No jugular venous distention. Trachea midline. Respiratory:  Normal respiratory effort. Clear to auscultation, bilaterally without Rales/Wheezes/Rhonchi. Cardiovascular:  Regular rate and rhythm with normal S1/S2 without murmurs, rubs or gallops. Abdomen: Soft, tender in upper abdomen, non-distended with normal bowel sounds. No rigidity/guarding  Musculoskeletal:  No clubbing, cyanosis or edema bilaterally. Full range of motion without deformity. Skin: Skin color, texture, turgor normal.  No rashes or lesions. Neurologic:  Neurovascularly intact without any focal sensory/motor deficits.  Cranial nerves: II-XII intact, grossly non-focal.  Psychiatric:  Alert and confused, thought content appropriate, normal insight  Capillary Refill: Brisk,< 3 seconds   Peripheral Pulses: +2 palpable, equal bilaterally       Labs:   Recent Labs     04/24/20  0635   WBC 18.3*   HGB 11.0*   HCT 33.5*        Recent Labs     04/24/20  0635 04/24/20  1131   * 130*   K 3.9 3.6   CL 83* 88*   CO2 21 22   BUN 60* 58*   CREATININE 9.1* 6.9*   CALCIUM 8.4 7.7*   PHOS  --  7.4*     Recent Labs     04/24/20  0635   AST 34   ALT 15   BILITOT 0.7   ALKPHOS 116     Recent Labs consolidation to right lower lobe felt most likely   to reflect atelectasis. Aspiration pneumonitis or pneumonia not excluded in   the appropriate clinical setting. A previously noted spiculated nodular opacity to the superior segment of left   lower lobe is only minimally imaged on a single image of this study of the   abdomen and pelvis. No assessment for stability or change can be made. Reference to the prior CT chest from 02/27/2020 can be made for additional   information and management recommendations. XR CHEST PORTABLE   Final Result   No acute findings. Active Hospital Problems    Diagnosis Date Noted    JASPREET (acute kidney injury) ([facility] [N17.9] 04/24/2020     ASSESSMENT/PLAN:  1. JASPREET with dehydration and/or ATN due to toxic effects of cocaine/amphetamine abuse   -Check urine electrolytes ; CT abdomen pelvis without hydronephrosis or urinary tract obstruction. No acute pathology  -BUN/creatinine -63/9.1 on admission ; baseline creatinine normal in February 2020   -Received aggressive IV hydration in ED; lactic normal on admission;  ; continue aggressive IV hydration and consult nephrology to assist with management  -No emergent need for dialysis noted   -Avoid nephrotoxic medications. 2.  Acute metabolic encephalopathy -likely due to above  -CT head without acute abnormality; continue supportive care and anticipate improvement with improvement of underlying renal functions. 3.  Hypotension (POA) -most likely due to severe dehydration -hold home blood pressure medicines and BP slightly better with IV fluids in ED; continue and monitor    4. Acute cystitis -localized infection only ; sepsis ruled out  -Received empiric vancomycin/Zosyn in ED ; will follow urine and blood cultures and continue cefepime for now. 5.  Polysubstance abuse (tobacco/amphetamines/cocaine ) -counseled about abstinence and offered nicotine patch    6.  Abdominal Pain - Unclear etiology ; GB

## 2020-04-25 LAB
ALBUMIN SERPL-MCNC: 2.6 G/DL (ref 3.4–5)
ANION GAP SERPL CALCULATED.3IONS-SCNC: 15 MMOL/L (ref 3–16)
BUN BLDV-MCNC: 44 MG/DL (ref 7–20)
CALCIUM SERPL-MCNC: 7.8 MG/DL (ref 8.3–10.6)
CHLORIDE BLD-SCNC: 100 MMOL/L (ref 99–110)
CO2: 20 MMOL/L (ref 21–32)
CREAT SERPL-MCNC: 2.3 MG/DL (ref 0.6–1.2)
GFR AFRICAN AMERICAN: 26
GFR NON-AFRICAN AMERICAN: 21
GLUCOSE BLD-MCNC: 86 MG/DL (ref 70–99)
HCT VFR BLD CALC: 29.2 % (ref 36–48)
HEMOGLOBIN: 10 G/DL (ref 12–16)
HIV AG/AB: NORMAL
HIV ANTIGEN: NORMAL
HIV-1 ANTIBODY: NORMAL
HIV-2 AB: NORMAL
MCH RBC QN AUTO: 30 PG (ref 26–34)
MCHC RBC AUTO-ENTMCNC: 34.3 G/DL (ref 31–36)
MCV RBC AUTO: 87.4 FL (ref 80–100)
PDW BLD-RTO: 15.1 % (ref 12.4–15.4)
PHOSPHORUS: 5.4 MG/DL (ref 2.5–4.9)
PLATELET # BLD: 332 K/UL (ref 135–450)
PMV BLD AUTO: 7.5 FL (ref 5–10.5)
POTASSIUM SERPL-SCNC: 3.4 MMOL/L (ref 3.5–5.1)
RBC # BLD: 3.35 M/UL (ref 4–5.2)
SODIUM BLD-SCNC: 135 MMOL/L (ref 136–145)
URINE CULTURE, ROUTINE: NORMAL
WBC # BLD: 11.4 K/UL (ref 4–11)

## 2020-04-25 PROCEDURE — 2700000000 HC OXYGEN THERAPY PER DAY

## 2020-04-25 PROCEDURE — 2580000003 HC RX 258: Performed by: INTERNAL MEDICINE

## 2020-04-25 PROCEDURE — 6360000002 HC RX W HCPCS: Performed by: INTERNAL MEDICINE

## 2020-04-25 PROCEDURE — 85027 COMPLETE CBC AUTOMATED: CPT

## 2020-04-25 PROCEDURE — 94761 N-INVAS EAR/PLS OXIMETRY MLT: CPT

## 2020-04-25 PROCEDURE — 6370000000 HC RX 637 (ALT 250 FOR IP)

## 2020-04-25 PROCEDURE — 80069 RENAL FUNCTION PANEL: CPT

## 2020-04-25 PROCEDURE — 1200000000 HC SEMI PRIVATE

## 2020-04-25 PROCEDURE — 6370000000 HC RX 637 (ALT 250 FOR IP): Performed by: INTERNAL MEDICINE

## 2020-04-25 RX ORDER — POTASSIUM CHLORIDE 7.45 MG/ML
10 INJECTION INTRAVENOUS
Status: COMPLETED | OUTPATIENT
Start: 2020-04-25 | End: 2020-04-25

## 2020-04-25 RX ADMIN — SODIUM CHLORIDE: 9 INJECTION, SOLUTION INTRAVENOUS at 04:45

## 2020-04-25 RX ADMIN — POTASSIUM CHLORIDE 10 MEQ: 7.46 INJECTION, SOLUTION INTRAVENOUS at 17:20

## 2020-04-25 RX ADMIN — HEPARIN SODIUM 5000 UNITS: 5000 INJECTION INTRAVENOUS; SUBCUTANEOUS at 13:46

## 2020-04-25 RX ADMIN — Medication: at 17:21

## 2020-04-25 RX ADMIN — HEPARIN SODIUM 5000 UNITS: 5000 INJECTION INTRAVENOUS; SUBCUTANEOUS at 05:42

## 2020-04-25 RX ADMIN — POTASSIUM CHLORIDE 10 MEQ: 7.46 INJECTION, SOLUTION INTRAVENOUS at 15:53

## 2020-04-25 RX ADMIN — CEFEPIME 500 MG: 1 INJECTION, POWDER, FOR SOLUTION INTRAMUSCULAR; INTRAVENOUS at 15:50

## 2020-04-25 RX ADMIN — POTASSIUM CHLORIDE 10 MEQ: 7.46 INJECTION, SOLUTION INTRAVENOUS at 15:51

## 2020-04-25 RX ADMIN — SODIUM CHLORIDE: 9 INJECTION, SOLUTION INTRAVENOUS at 13:45

## 2020-04-25 NOTE — PROGRESS NOTES
Pt alert, confused. Assessment completed as charted. Pt without complaints. Will continue to monitor. Call light in reach, bed check on and active, avasys monitor for safety.

## 2020-04-25 NOTE — PROGRESS NOTES
[facility]    [facility]              [phone]                       Plan :     Keep Baeza catheter  Increase IV fluid  Replace potassium for hypokalemia  Creatinine is improving from a peak of 6.9 >>2.3  HIV, hepatitis B and hepatitis C are nonreactive. Assessment :     Acute Kidney Injury  JASPREET likely due to - UTI/sepsis/hypotension/dehydration leading to Acute Tubular necrosis  Cr Peaked to 9  Cr 4/25/2020 - 9  Baseline Cr- 1.3 on 2/20, and 1 on 11/19  Urine output 4/25/2020 2100 mL  UA- +ve for nitrite--- on iv ceftriaxone  Renal Imaging:- CT - no renal abnormaliity,  Echo: 12/16- normal EF, no DD     CPK- 500  No monoclonal gammopathy in labs from 5/20   HiV,hep C-ve from 2010- repeat    Hyponatremia  Due to dehydration  Recheck    Hypotension in patient with known Hypertension      BP very low  Likely dehydration  UTI  Getting better    Acidosis  Has high AG  Likely due to renal failure  Lactate not high on admission    Polysubstance abuse  UTI  Cocaine, amphetamines positive      [facility] would like to thank Deon Turpin MD   for opportunity to serve this patient      Please call with questions at-   24 Hrs Answering service [phone] or  7 am- 5 pm via Perfect [employer] or cell phone  [employer]          CC/reason for consult :     JASPREET     HPI :     Taylor Mcfarland is a 60 y.o. female presented to   the hospital on 4/24/2020 with found down. She is known to have polysubstance abuse. She was brought to ED, and being admitted. She is unable to provide much details. But  Denies taking ethanol, methanol, antifreeze,  Aspirin, tylenol. She was hypotensive on admission, and got  Fluids. BP coming up and on fluids. Has baeza, and making urine. Interval History:     Making urine  Blood pressure is stable.     ROS:     Seen with- RN  Unable to obtain ROS due to  Altered mental status        PMH/PSH/SH/Family History:     Past Medical History: Diagnosis Date    J[facility]PREET [facility]) ([facility] 2020    Arterial occlusion     [facility]ma ([facility] 2019    Chronic bilateral low back pain with bilateral sciatica 8/3/2018    Chronic bilateral low back pain with bilateral sciatica     Hyperlipidemia     Hypertension     MDRO (multiple drug resistant organisms) resistance 2020    Escherichia coli-URINE    Moderate single current episode of major depressive disorder ([facility] 8/3/2018    Ruptured disk        Past Surgical History:   Procedure Laterality Date     SECTION  1981    LEG SURGERY  2017    fasciotomy due to DVT, SEDRICK        reports that she has been smoking cigarettes. She has a 5.00 pack-year smoking history. She has never used smokeless tobacco. She reports previous drug use. Drugs: Marijuana and Opiates . She reports that she does not drink alcohol.    family history includes Alcohol Abuse in her sister; Breast Cancer in an other family member; Cirrhosis in her sister; Jovan Dine in an other family member; Coronary Art Dis in her mother; Heart Attack in her father; Heart Disease in her mother; High Blood Pressure in her mother; High Cholesterol in her mother; Other in her sister; Sudden Death in her brother.          Medication:     Current Facility-Administered Medications: sodium chloride flush 0.9 % injection 10 mL, 10 mL, Intravenous, 2 times per day  sodium chloride flush 0.9 % injection 10 mL, 10 mL, Intravenous, PRN  acetaminophen (TYLENOL) tablet 650 mg, 650 mg, Oral, Q6H PRN **OR** acetaminophen (TYLENOL) suppository 650 mg, 650 mg, Rectal, Q6H PRN  promethazine (PHENERGAN) tablet 12.5 mg, 12.5 mg, Oral, Q6H PRN **OR** ondansetron (ZOFRAN) injection 4 mg, 4 mg, Intravenous, Q6H PRN  heparin (porcine) injection 5,000 Units, 5,000 Units, Subcutaneous, 3 times per day  0.9 % sodium chloride infusion, , Intravenous, Continuous  HYDROmorphone (DILAUDID) injection 0.5 mg, 0.5 mg, Intravenous, Q3H

## 2020-04-26 LAB
ALBUMIN SERPL-MCNC: 2.2 G/DL (ref 3.4–5)
ANION GAP SERPL CALCULATED.3IONS-SCNC: 12 MMOL/L (ref 3–16)
ANION GAP SERPL CALCULATED.3IONS-SCNC: 13 MMOL/L (ref 3–16)
BUN BLDV-MCNC: 22 MG/DL (ref 7–20)
BUN BLDV-MCNC: 24 MG/DL (ref 7–20)
CALCIUM SERPL-MCNC: 8.6 MG/DL (ref 8.3–10.6)
CALCIUM SERPL-MCNC: 8.7 MG/DL (ref 8.3–10.6)
CHLORIDE BLD-SCNC: 103 MMOL/L (ref 99–110)
CHLORIDE BLD-SCNC: 104 MMOL/L (ref 99–110)
CO2: 21 MMOL/L (ref 21–32)
CO2: 22 MMOL/L (ref 21–32)
CREAT SERPL-MCNC: 0.8 MG/DL (ref 0.6–1.2)
CREAT SERPL-MCNC: 0.9 MG/DL (ref 0.6–1.2)
GFR AFRICAN AMERICAN: >60
GFR AFRICAN AMERICAN: >60
GFR NON-AFRICAN AMERICAN: >60
GFR NON-AFRICAN AMERICAN: >60
GLUCOSE BLD-MCNC: 111 MG/DL (ref 70–99)
GLUCOSE BLD-MCNC: 98 MG/DL (ref 70–99)
HCT VFR BLD CALC: 34 % (ref 36–48)
HEMOGLOBIN: 11.2 G/DL (ref 12–16)
MCH RBC QN AUTO: 29.5 PG (ref 26–34)
MCHC RBC AUTO-ENTMCNC: 33 G/DL (ref 31–36)
MCV RBC AUTO: 89.3 FL (ref 80–100)
PDW BLD-RTO: 15 % (ref 12.4–15.4)
PHOSPHORUS: 2.1 MG/DL (ref 2.5–4.9)
PLATELET # BLD: 375 K/UL (ref 135–450)
PMV BLD AUTO: 7.1 FL (ref 5–10.5)
POTASSIUM REFLEX MAGNESIUM: 3.6 MMOL/L (ref 3.5–5.1)
POTASSIUM SERPL-SCNC: 4.2 MMOL/L (ref 3.5–5.1)
RBC # BLD: 3.8 M/UL (ref 4–5.2)
SODIUM BLD-SCNC: 137 MMOL/L (ref 136–145)
SODIUM BLD-SCNC: 138 MMOL/L (ref 136–145)
WBC # BLD: 10.7 K/UL (ref 4–11)

## 2020-04-26 PROCEDURE — 97166 OT EVAL MOD COMPLEX 45 MIN: CPT

## 2020-04-26 PROCEDURE — 97162 PT EVAL MOD COMPLEX 30 MIN: CPT

## 2020-04-26 PROCEDURE — 85027 COMPLETE CBC AUTOMATED: CPT

## 2020-04-26 PROCEDURE — 2580000003 HC RX 258: Performed by: INTERNAL MEDICINE

## 2020-04-26 PROCEDURE — 6370000000 HC RX 637 (ALT 250 FOR IP): Performed by: INTERNAL MEDICINE

## 2020-04-26 PROCEDURE — 94761 N-INVAS EAR/PLS OXIMETRY MLT: CPT

## 2020-04-26 PROCEDURE — 97530 THERAPEUTIC ACTIVITIES: CPT

## 2020-04-26 PROCEDURE — 2700000000 HC OXYGEN THERAPY PER DAY

## 2020-04-26 PROCEDURE — 36415 COLL VENOUS BLD VENIPUNCTURE: CPT

## 2020-04-26 PROCEDURE — 80069 RENAL FUNCTION PANEL: CPT

## 2020-04-26 PROCEDURE — 6360000002 HC RX W HCPCS: Performed by: INTERNAL MEDICINE

## 2020-04-26 PROCEDURE — 97110 THERAPEUTIC EXERCISES: CPT

## 2020-04-26 PROCEDURE — 1200000000 HC SEMI PRIVATE

## 2020-04-26 PROCEDURE — 94640 AIRWAY INHALATION TREATMENT: CPT

## 2020-04-26 RX ORDER — DULOXETIN HYDROCHLORIDE 60 MG/1
60 CAPSULE, DELAYED RELEASE ORAL DAILY
Status: DISCONTINUED | OUTPATIENT
Start: 2020-04-26 | End: 2020-04-28 | Stop reason: HOSPADM

## 2020-04-26 RX ORDER — TIZANIDINE 4 MG/1
2 TABLET ORAL 2 TIMES DAILY PRN
Status: DISCONTINUED | OUTPATIENT
Start: 2020-04-26 | End: 2020-04-28 | Stop reason: HOSPADM

## 2020-04-26 RX ORDER — ALBUTEROL SULFATE 90 UG/1
2 AEROSOL, METERED RESPIRATORY (INHALATION) EVERY 6 HOURS PRN
Status: DISCONTINUED | OUTPATIENT
Start: 2020-04-26 | End: 2020-04-28 | Stop reason: HOSPADM

## 2020-04-26 RX ORDER — ATORVASTATIN CALCIUM 80 MG/1
80 TABLET, FILM COATED ORAL NIGHTLY
Status: DISCONTINUED | OUTPATIENT
Start: 2020-04-26 | End: 2020-04-28 | Stop reason: HOSPADM

## 2020-04-26 RX ORDER — ASPIRIN 81 MG/1
81 TABLET ORAL
Status: DISCONTINUED | OUTPATIENT
Start: 2020-04-26 | End: 2020-04-28 | Stop reason: HOSPADM

## 2020-04-26 RX ORDER — HYDROXYZINE HYDROCHLORIDE 10 MG/1
25 TABLET, FILM COATED ORAL EVERY 8 HOURS PRN
Status: DISCONTINUED | OUTPATIENT
Start: 2020-04-26 | End: 2020-04-28 | Stop reason: HOSPADM

## 2020-04-26 RX ORDER — TRAZODONE HYDROCHLORIDE 50 MG/1
50 TABLET ORAL NIGHTLY
Status: DISCONTINUED | OUTPATIENT
Start: 2020-04-26 | End: 2020-04-28 | Stop reason: HOSPADM

## 2020-04-26 RX ORDER — LISINOPRIL 20 MG/1
40 TABLET ORAL DAILY
Status: DISCONTINUED | OUTPATIENT
Start: 2020-04-26 | End: 2020-04-28 | Stop reason: HOSPADM

## 2020-04-26 RX ADMIN — Medication 10 ML: at 20:48

## 2020-04-26 RX ADMIN — TIOTROPIUM BROMIDE INHALATION SPRAY 2 PUFF: 3.12 SPRAY, METERED RESPIRATORY (INHALATION) at 09:01

## 2020-04-26 RX ADMIN — ATORVASTATIN CALCIUM 80 MG: 80 TABLET, FILM COATED ORAL at 20:48

## 2020-04-26 RX ADMIN — PROMETHAZINE HYDROCHLORIDE 12.5 MG: 25 TABLET ORAL at 18:44

## 2020-04-26 RX ADMIN — ACETAMINOPHEN 650 MG: 325 TABLET ORAL at 08:56

## 2020-04-26 RX ADMIN — ASPIRIN 81 MG: 81 TABLET ORAL at 11:30

## 2020-04-26 RX ADMIN — CEFEPIME 500 MG: 1 INJECTION, POWDER, FOR SOLUTION INTRAMUSCULAR; INTRAVENOUS at 13:57

## 2020-04-26 RX ADMIN — TRAZODONE HYDROCHLORIDE 50 MG: 50 TABLET ORAL at 20:48

## 2020-04-26 RX ADMIN — LISINOPRIL 40 MG: 20 TABLET ORAL at 11:30

## 2020-04-26 RX ADMIN — PROMETHAZINE HYDROCHLORIDE 12.5 MG: 25 TABLET ORAL at 14:00

## 2020-04-26 RX ADMIN — Medication 10 ML: at 08:51

## 2020-04-26 RX ADMIN — DULOXETINE 60 MG: 60 CAPSULE, DELAYED RELEASE ORAL at 11:30

## 2020-04-26 ASSESSMENT — PAIN SCALES - GENERAL
PAINLEVEL_OUTOF10: 5
PAINLEVEL_OUTOF10: 7
PAINLEVEL_OUTOF10: 5

## 2020-04-26 ASSESSMENT — PAIN DESCRIPTION - PROGRESSION
CLINICAL_PROGRESSION: NOT CHANGED

## 2020-04-26 ASSESSMENT — PAIN DESCRIPTION - LOCATION
LOCATION: ABDOMEN
LOCATION: ABDOMEN

## 2020-04-26 ASSESSMENT — PAIN DESCRIPTION - ORIENTATION
ORIENTATION: MID
ORIENTATION: MID

## 2020-04-26 ASSESSMENT — PAIN DESCRIPTION - FREQUENCY: FREQUENCY: CONTINUOUS

## 2020-04-26 ASSESSMENT — PAIN - FUNCTIONAL ASSESSMENT: PAIN_FUNCTIONAL_ASSESSMENT: ACTIVITIES ARE NOT PREVENTED

## 2020-04-26 ASSESSMENT — PAIN DESCRIPTION - PAIN TYPE
TYPE: ACUTE PAIN;CHRONIC PAIN
TYPE: ACUTE PAIN;CHRONIC PAIN

## 2020-04-26 ASSESSMENT — PAIN DESCRIPTION - ONSET: ONSET: ON-GOING

## 2020-04-26 NOTE — PROGRESS NOTES
Diagnosis Date    J[facility]PREET ([facility]) ([facility] 2020    Arterial occlusion     [facility]    Chronic bilateral low back pain with bilateral sciatica 8/3/2018    Chronic bilateral low back pain with bilateral sciatica     Hyperlipidemia     Hypertension     MDRO (multiple drug resistant organisms) resistance 2020    Escherichia coli-URINE    Moderate single current episode of major depressive disorder ([facility] 8/3/2018    Ruptured disk        Past Surgical History:   Procedure Laterality Date     SECTION  1981    LEG SURGERY  2017    fasciotomy due to DVT, LLE        reports that she has been smoking cigarettes. She has a 5.00 pack-year smoking history. She has never used smokeless tobacco. She reports previous drug use. Drugs: Marijuana and Opiates . She reports that she does not drink alcohol.    family history includes Alcohol Abuse in her sister; Breast Cancer in an other family member; Cirrhosis in her sister; Thomasneno Joaquin in an other family member; Coronary Art Dis in her mother; Heart Attack in her father; Heart Disease in her mother; High Blood Pressure in her mother; High Cholesterol in her mother; Other in her sister; Sudden Death in her brother.          Medication:     Current Facility-Administered Medications: lisinopril (PRINIVIL;ZESTRIL) tablet 40 mg, 40 mg, Oral, Daily  DULoxetine (CYMBALTA) extended release capsule 60 mg, 60 mg, Oral, Daily  atorvastatin (LIPITOR) tablet 80 mg, 80 mg, Oral, Nightly  aspirin EC tablet 81 mg, 81 mg, Oral, Daily with breakfast  tiotropium (SPIRIVA RESPIMAT) 2.5 MCG/ACT inhaler 2 puff, 2 puff, Inhalation, Daily  albuterol sulfate  (90 Base) MCG/ACT inhaler 2 puff, 2 puff, Inhalation, Q6H PRN  melatonin ER tablet 2 mg, 2 mg, Oral, Nightly PRN  sodium chloride flush 0.9 % injection 10 mL, 10 mL, Intravenous, 2 times per day  sodium chloride flush 0.9 % injection 10 mL, 10 mL, Intravenous, PRN  acetaminophen (TYLENOL) tablet 650 mg, 650 mg, Oral, Q6H PRN **OR** acetaminophen (TYLENOL) suppository 650 mg, 650 mg, Rectal, Q6H PRN  promethazine (PHENERGAN) tablet 12.5 mg, 12.5 mg, Oral, Q6H PRN **OR** ondansetron (ZOFRAN) injection 4 mg, 4 mg, Intravenous, Q6H PRN  heparin (porcine) injection 5,000 Units, 5,000 Units, Subcutaneous, 3 times per day  HYDROmorphone (DILAUDID) injection 0.5 mg, 0.5 mg, Intravenous, Q3H PRN  cefepime (MAXIPIME) 500 mg in dextrose 5 % 50 mL IVPB, 500 mg, Intravenous, Q24H  nicotine (NICODERM CQ) 7 MG/24HR 1 patch, 1 patch, Transdermal, Daily       Vitals :     Vitals:    04/26/20 0813   BP: (!) 172/87   Pulse: 98   Resp: 18   Temp: 98.3 °F (36.8 °C)   SpO2: 92%       I & O :       Intake/Output Summary (Last 24 hours) at 4/26/2020 0908  Last data filed at 4/26/2020 3521  Gross per 24 hour   Intake 3622 ml   Output 2500 ml   Net 1122 ml        Physical Examination :     General appearance: confused,  HEENT: Lips- normal, teeth- ok , oral mucosa- dry  Neck : Mass- no, appears symmetrical, JVD- not visible  Respiratory: Respiratory effort-  Appear normal , wheeze- no, crackles - no  Cardiovascular:  Ausculation- No M/R/G, Edema none  Abdomen: visible mass- no, distention- no, scar- no, tenderness- no                            hepatosplenomegaly-  No--  Musculoskeletal:  clubbing no,cyanosis- no , digital ischemia- no                           muscle strength- patient unable to co-operate     , tone - patient unable to co-operate   Skin: rashes- no , ulcers- no, induration- no, tightening - no  Psychiatric:   Unresponsive/confused    Has Hassan- with 500 ml urine output       LABS:     Recent Labs     04/24/20  0635 04/25/20  0605 04/26/20  0848   WBC 18.3* 11.4* 10.7   HGB 11.0* 10.0* 11.2*   HCT 33.5* 29.2* 34.0*    332 375     Recent Labs     04/24/20  1549 04/25/20  0605 04/26/20  0700 04/26/20  0848   * 135* 138 137   K 3.5 3.4* 4.2 3.6   CL 91* 100 104 103   CO2

## 2020-04-26 NOTE — PROGRESS NOTES
Hospitalist Progress Note      PCP: Noni Gunter MD    Date of Admission: 4/24/2020    Chief Complaint: [address] Course: Reviewed H&P    Subjective: Patient more awake, alert this morning. Continues to complain of intermittent abdominal pain but unable to give specifics. Denies nausea, vomiting, diarrhea. Renal functions normalized. Requested PT OT evaluation. DC IV fluids and Hassan catheter. Resume home medications  -Patient offers no new complaints at this time  - D/w RN regarding POC     Medications:  Reviewed    Scheduled Medications    lisinopril  40 mg Oral Daily    DULoxetine  60 mg Oral Daily    atorvastatin  80 mg Oral Nightly    aspirin  81 mg Oral Daily with breakfast    tiotropium  2 puff Inhalation Daily    sodium chloride flush  10 mL Intravenous 2 times per day    heparin (porcine)  5,000 Units Subcutaneous 3 times per day    cefepime  500 mg Intravenous Q24H    nicotine  1 patch Transdermal Daily     PRN Meds: albuterol sulfate HFA, melatonin ER, sodium chloride flush, acetaminophen **OR** acetaminophen, promethazine **OR** ondansetron, HYDROmorphone      Intake/Output Summary (Last 24 hours) at 4/26/2020 1055  Last data filed at 4/26/2020 1013  Gross per 24 hour   Intake 3622 ml   Output 2800 ml   Net 822 ml       Physical Exam Performed:  BP (!) 172/87   Pulse 98   Temp 98.3 °F (36.8 °C) (Oral)   Resp 18   Ht 5' 1\" (1.549 m)   Wt 147 lb (66.7 kg)   SpO2 92%   BMI 27.78 kg/m²     General appearance:  No apparent distress, appears stated age and cooperative. HEENT:  Normal cephalic, atraumatic without obvious deformity. Pupils equal, round, and reactive to light. Extra ocular muscles intact. Conjunctivae/corneas clear. Neck: Supple, with full range of motion. No jugular venous distention. Trachea midline. Respiratory:  Normal respiratory effort. Clear to auscultation, bilaterally without Rales/Wheezes/Rhonchi.   Diminished breath sounds at bases  Cardiovascular:  Regular rate and rhythm with normal S1/S2 without murmurs, rubs or gallops. Abdomen: Soft, tender in upper abdomen, non-distended with normal bowel sounds. No rigidity/guarding  Musculoskeletal:  No clubbing, cyanosis or edema bilaterally. Full range of motion without deformity. Skin: Skin color, texture, turgor normal.  No rashes or lesions. Neurologic:  Neurovascularly intact without any focal sensory/motor deficits. Cranial nerves: II-XII intact, grossly non-focal.  Psychiatric:  Alert and confused, thought content appropriate, normal insight  Capillary Refill: Brisk,< 3 seconds   Peripheral Pulses: +2 palpable, equal bilaterally       Labs:   Recent Labs     04/24/20  0635 04/25/20  0605 04/26/20  0848   WBC 18.3* 11.4* 10.7   HGB 11.0* 10.0* 11.2*   HCT 33.5* 29.2* 34.0*    332 375     Recent Labs     04/24/20  1549 04/25/20  0605 04/26/20  0700 04/26/20  0848   * 135* 138 137   K 3.5 3.4* 4.2 3.6   CL 91* 100 104 103   CO2 24 20* 22 21   BUN 55* 44* 24* 22*   CREATININE 5.8* 2.3* 0.9 0.8   CALCIUM 7.7* 7.8* 8.6 8.7   PHOS 7.1* 5.4* 2.1*  --      Recent Labs     04/24/20  0635   AST 34   ALT 15   BILITOT 0.7   ALKPHOS 116     Recent Labs     04/24/20  0635   INR 1.20*     Recent Labs     04/24/20  0635   CKTOTAL 492*   TROPONINI <0.01       Urinalysis:    Lab Results   Component Value Date    NITRU POSITIVE 04/24/2020    WBCUA 6-10 04/24/2020    BACTERIA 3+ 04/24/2020    RBCUA 5-10 04/24/2020    BLOODU TRACE-LYSED 04/24/2020    SPECGRAV 1.025 04/24/2020    GLUCOSEU Negative 04/24/2020    GLUCOSEU NEGATIVE 05/27/2012       Radiology:  US GALLBLADDER RUQ   Final Result   The ultrasound appearance of the liver can be seen with hepatitis. Correlate   with clinical assessment and laboratory values. Intrahepatic biliary   dilation could also give this appearance.       Mild wall thickening of the gallbladder where it contacts the liver, which   supports a liver etiology for gallbladder wall thickening, including   hepatitis. Cholecystitis is much less likely, especially as there is no   sonographic Linda's sign. Moderate amount of gallbladder sludge but no cholelithiasis. Dilation of the common bile duct which may be due to choledocholithiasis or   other causes of lower common bile duct obstruction. Recommend further   evaluation with ERCP or MRCP. CT HEAD WO CONTRAST   Final Result   No acute intracranial abnormality. CT ABDOMEN PELVIS WO CONTRAST Additional Contrast? None   Final Result   Gallbladder appears mildly distended and there may be some mild   pericholecystic fluid or low-attenuation wall thickening to the gallbladder. There is also some mild pericholecystic fat stranding. No stones are   identified. Findings are equivocal for acute cholecystitis. Consider   further evaluation with ultrasound or nuclear medicine study. Trace free fluid dependently within the pelvis, abnormal for postmenopausal   female. Mild predominantly linear consolidation to right lower lobe felt most likely   to reflect atelectasis. Aspiration pneumonitis or pneumonia not excluded in   the appropriate clinical setting. A previously noted spiculated nodular opacity to the superior segment of left   lower lobe is only minimally imaged on a single image of this study of the   abdomen and pelvis. No assessment for stability or change can be made. Reference to the prior CT chest from 02/27/2020 can be made for additional   information and management recommendations. XR CHEST PORTABLE   Final Result   No acute findings. Active Hospital Problems    Diagnosis Date Noted    JASPREET (acute kidney injury) ([facility] [N17.9] 04/24/2020     Assessment/Plan:  1. JASPREET with dehydration and/or ATN due to toxic effects of cocaine/amphetamine abuse - Resolved   -Urine osmolality 281;   CT abdomen pelvis without hydronephrosis or urinary tract obstruction.

## 2020-04-26 NOTE — PROGRESS NOTES
RESPIRATORY THERAPY ASSESSMENT    Name:  Alyce Stewart  Medical Record Number:  [id number]  Age: 60 y.o. Gender: female  : 1955  Today's Date:  2020  Room:  0219/0219-01    Assessment     Is the patient being admitted for a COPD or Asthma exacerbation? No   (If yes the patient will be seen every 4 hours for the first 24 hours and then reassessed)    Patient Admission Diagnosis      Allergies  Allergies   Allergen Reactions    Codeine Hives     Tolerates Norco.       Minimum Predicted Vital Capacity:     na          Actual Vital Capacity:      na              Pulmonary History:COPD  Home Oxygen Therapy:  room air  Home Respiratory Therapy:albuterol, spiriva   Current Respiratory Therapy:  Albuterol mdi prn, spirvia3  Treatment Type: MDI  Medications: Tiotropium    Respiratory Severity Index(RSI)   Patients with orders for inhalation medications, oxygen, or any therapeutic treatment modality will be placed on Respiratory Protocol. They will be assessed with the first treatment and at least every 72 hours thereafter. The following severity scale will be used to determine frequency of treatment intervention.     Smoking History: Pulmonary Disease or Smoking History, Greater than 15 pack year = 2    Social History  Social History     Tobacco Use    Smoking status: Current Some Day Smoker     Packs/day: 0.25     Years: 20.00     Pack years: 5.00     Types: Cigarettes    Smokeless tobacco: Never Used   Substance Use Topics    Alcohol use: No    Drug use: Not Currently     Types: Marijuana, Opiates      Comment: pt states she took two hits for new years so she could sleep       Recent Surgical History: None = 0  Past Surgical History  Past Surgical History:   Procedure Laterality Date     SECTION  0    LEG SURGERY  2017    fasciotomy due to DVT, LLE       Level of Consciousness: Alert, Oriented, and Cooperative = 0    Level of Activity: Walking unassisted = 0    Respiratory Pattern: c. Unable to demonstrate proper use of MDI with spacer     d. RR > 30 bpm   5. Bronchodilators will be delivered via Metered Dose Inhaler (MDI), HHN, Aerogen to intubated patients on mechanical ventilation. 6. Inhalation medication orders will be delivered and/or substituted as outlined below. Aerosolized Medications Ordering and Administration Guidelines:    1. All Medications will be ordered by a physician, and their frequency and/or modality will be adjusted as defined by the patients Respiratory Severity Index (RSI) score. 2. If the patient does not have documented COPD, consider discontinuing anticholinergics when RSI is less than 9.  3. If the bronchospasm worsens (increased RSI), then the bronchodilator frequency can be increased to a maximum of every 4 hours. If greater than every 4 hours is required, the physician will be contacted. 4. If the bronchospasm improves, the frequency of the bronchodilator can be decreased, based on the patient's RSI, but not less than home treatment regimen frequency. 5. Bronchodilator(s) will be discontinued if patient has a RSI less than 9 and has received no scheduled or as needed treatment for 72  Hrs. Patients Ordered on a Mucolytic Agent:    1. Must always be administered with a bronchodilator. 2. Discontinue if patient experiences worsened bronchospasm, or secretions have lessened to the point that the patient is able to clear them with a cough. Anti-inflammatory and Combination Medications:    1. If the patient lacks prior history of lung disease, is not using inhaled anti-inflammatory medication at home, and lacks wheezing by examination or by history for at least 24 hours, contact physician for possible discontinuation.

## 2020-04-26 NOTE — PROGRESS NOTES
Fall Risk  Other position/activity restrictions: 2L O2  Vision/Hearing  Vision: Impaired  Vision Exceptions: Wears glasses for reading  Hearing: Within functional limits     Subjective  Chart Reviewed: Yes  Patient assessed for rehabilitation services?: Yes  Referring Practitioner: Sarah Smith  Referral Date : 04/26/20  Diagnosis: JASPREET  Follows Commands: Within Functional Limits  Comments: RN cleared pt for therapy, pt resting in bed on approach and again on exit  Subjective: pt agrees to therapy  Pain Screening  Patient Currently in Pain: Yes  Pain Assessment  Pain Assessment: 0-10  Pain Level: 5  Pain Type: Acute pain;Chronic pain  Pain Location: Abdomen  Pain Orientation: Mid  Pain Frequency: Continuous  Pain Onset: On-going  Clinical Progression: Not changed  Functional Pain Assessment: Activities are not prevented  Non-Pharmaceutical Pain Intervention(s): Emotional support;Repositioned; Rest  Response to Pain Intervention: Patient Satisfied  Pre Treatment Pain Screening  Intervention List: Patient able to continue with treatment    Orientation  Overall Orientation Status: Within Functional Limits(needed cues to answer orientation questions)  Social/Functional History  Social/Functional History  Lives With: Alone  Type of Home: Apartment  Home Layout: One level(laundry on 2nd floor of apt. 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

## 2020-04-27 LAB
ALBUMIN SERPL-MCNC: 2.7 G/DL (ref 3.4–5)
ANION GAP SERPL CALCULATED.3IONS-SCNC: 13 MMOL/L (ref 3–16)
BUN BLDV-MCNC: 10 MG/DL (ref 7–20)
CALCIUM SERPL-MCNC: 9 MG/DL (ref 8.3–10.6)
CHLORIDE BLD-SCNC: 96 MMOL/L (ref 99–110)
CO2: 27 MMOL/L (ref 21–32)
CREAT SERPL-MCNC: 0.6 MG/DL (ref 0.6–1.2)
GFR AFRICAN AMERICAN: >60
GFR NON-AFRICAN AMERICAN: >60
GLUCOSE BLD-MCNC: 140 MG/DL (ref 70–99)
MAGNESIUM: 0.9 MG/DL (ref 1.8–2.4)
PHOSPHORUS: 1.4 MG/DL (ref 2.5–4.9)
POTASSIUM SERPL-SCNC: 2.8 MMOL/L (ref 3.5–5.1)
SODIUM BLD-SCNC: 136 MMOL/L (ref 136–145)

## 2020-04-27 PROCEDURE — 2500000003 HC RX 250 WO HCPCS: Performed by: INTERNAL MEDICINE

## 2020-04-27 PROCEDURE — 6360000002 HC RX W HCPCS: Performed by: INTERNAL MEDICINE

## 2020-04-27 PROCEDURE — 94640 AIRWAY INHALATION TREATMENT: CPT

## 2020-04-27 PROCEDURE — 83735 ASSAY OF MAGNESIUM: CPT

## 2020-04-27 PROCEDURE — 2580000003 HC RX 258: Performed by: INTERNAL MEDICINE

## 2020-04-27 PROCEDURE — 6370000000 HC RX 637 (ALT 250 FOR IP): Performed by: INTERNAL MEDICINE

## 2020-04-27 PROCEDURE — 1200000000 HC SEMI PRIVATE

## 2020-04-27 PROCEDURE — 36415 COLL VENOUS BLD VENIPUNCTURE: CPT

## 2020-04-27 PROCEDURE — 80069 RENAL FUNCTION PANEL: CPT

## 2020-04-27 RX ORDER — POTASSIUM CHLORIDE 20 MEQ/1
20 TABLET, EXTENDED RELEASE ORAL 2 TIMES DAILY
Status: DISCONTINUED | OUTPATIENT
Start: 2020-04-27 | End: 2020-04-28

## 2020-04-27 RX ORDER — MAGNESIUM SULFATE IN WATER 40 MG/ML
2 INJECTION, SOLUTION INTRAVENOUS
Status: COMPLETED | OUTPATIENT
Start: 2020-04-27 | End: 2020-04-27

## 2020-04-27 RX ADMIN — LISINOPRIL 40 MG: 20 TABLET ORAL at 09:32

## 2020-04-27 RX ADMIN — CEFEPIME 1 G: 1 INJECTION, POWDER, FOR SOLUTION INTRAMUSCULAR; INTRAVENOUS at 16:34

## 2020-04-27 RX ADMIN — POTASSIUM PHOSPHATE, MONOBASIC AND POTASSIUM PHOSPHATE, DIBASIC 10 MMOL: 224; 236 INJECTION, SOLUTION, CONCENTRATE INTRAVENOUS at 10:45

## 2020-04-27 RX ADMIN — ASPIRIN 81 MG: 81 TABLET ORAL at 09:32

## 2020-04-27 RX ADMIN — Medication 10 ML: at 09:33

## 2020-04-27 RX ADMIN — TRAZODONE HYDROCHLORIDE 50 MG: 50 TABLET ORAL at 21:10

## 2020-04-27 RX ADMIN — MAGNESIUM SULFATE IN WATER 2 G: 40 INJECTION, SOLUTION INTRAVENOUS at 19:26

## 2020-04-27 RX ADMIN — POTASSIUM CHLORIDE 20 MEQ: 20 TABLET, EXTENDED RELEASE ORAL at 09:32

## 2020-04-27 RX ADMIN — DULOXETINE 60 MG: 60 CAPSULE, DELAYED RELEASE ORAL at 09:32

## 2020-04-27 RX ADMIN — ATORVASTATIN CALCIUM 80 MG: 80 TABLET, FILM COATED ORAL at 21:10

## 2020-04-27 RX ADMIN — TIOTROPIUM BROMIDE INHALATION SPRAY 2 PUFF: 3.12 SPRAY, METERED RESPIRATORY (INHALATION) at 08:00

## 2020-04-27 RX ADMIN — POTASSIUM CHLORIDE 20 MEQ: 20 TABLET, EXTENDED RELEASE ORAL at 21:10

## 2020-04-27 RX ADMIN — MAGNESIUM SULFATE IN WATER 2 G: 40 INJECTION, SOLUTION INTRAVENOUS at 17:12

## 2020-04-27 ASSESSMENT — PAIN DESCRIPTION - PROGRESSION
CLINICAL_PROGRESSION: NOT CHANGED

## 2020-04-27 ASSESSMENT — PAIN SCALES - GENERAL: PAINLEVEL_OUTOF10: 0

## 2020-04-27 NOTE — PROGRESS NOTES
Hospitalist Progress Note      PCP: Bruno Stafford MD    Date of Admission: 4/24/2020    Chief Complaint: [address] Course: Reviewed H&P    Subjective:   Patient is up in bed, comfortable, not in distress. Denies any chest pain or shortness of breath. Denies any abdominal pain. No new event overnight noted. Medications:  Reviewed    Scheduled Medications    potassium chloride  20 mEq Oral BID    potassium phosphate IVPB  10 mmol Intravenous Once    magnesium sulfate  2 g Intravenous Q2H    cefepime  1 g Intravenous Q12H    lisinopril  40 mg Oral Daily    DULoxetine  60 mg Oral Daily    atorvastatin  80 mg Oral Nightly    aspirin  81 mg Oral Daily with breakfast    tiotropium  2 puff Inhalation Daily    traZODone  50 mg Oral Nightly    sodium chloride flush  10 mL Intravenous 2 times per day    heparin (porcine)  5,000 Units Subcutaneous 3 times per day    nicotine  1 patch Transdermal Daily     PRN Meds: albuterol sulfate HFA, tiZANidine, hydrOXYzine, melatonin ER, sodium chloride flush, acetaminophen **OR** acetaminophen, promethazine **OR** ondansetron, HYDROmorphone      Intake/Output Summary (Last 24 hours) at 4/27/2020 1344  Last data filed at 4/27/2020 4939  Gross per 24 hour   Intake 420 ml   Output 2300 ml   Net -1880 ml       Physical Exam Performed:  BP (!) 153/90   Pulse 88   Temp 97.8 °F (36.6 °C) (Oral)   Resp 16   Ht 5' 1\" (1.549 m)   Wt 147 lb (66.7 kg)   SpO2 94%   BMI 27.78 kg/m²     General appearance:  No apparent distress, appears stated age and cooperative. HEENT:  Normal cephalic, atraumatic without obvious deformity. Pupils equal, round, and reactive to light. Extra ocular muscles intact. Conjunctivae/corneas clear. Neck: Supple, with full range of motion. No jugular venous distention. Trachea midline. Respiratory:  Normal respiratory effort. Clear to auscultation, bilaterally without Rales/Wheezes/Rhonchi.   Diminished breath sounds at bile duct which may be due to choledocholithiasis or   other causes of lower common bile duct obstruction. Recommend further   evaluation with ERCP or MRCP. CT HEAD WO CONTRAST   Final Result   No acute intracranial abnormality. CT ABDOMEN PELVIS WO CONTRAST Additional Contrast? None   Final Result   Gallbladder appears mildly distended and there may be some mild   pericholecystic fluid or low-attenuation wall thickening to the gallbladder. There is also some mild pericholecystic fat stranding. No stones are   identified. Findings are equivocal for acute cholecystitis. Consider   further evaluation with ultrasound or nuclear medicine study. Trace free fluid dependently within the pelvis, abnormal for postmenopausal   female. Mild predominantly linear consolidation to right lower lobe felt most likely   to reflect atelectasis. Aspiration pneumonitis or pneumonia not excluded in   the appropriate clinical setting. A previously noted spiculated nodular opacity to the superior segment of left   lower lobe is only minimally imaged on a single image of this study of the   abdomen and pelvis. No assessment for stability or change can be made. Reference to the prior CT chest from 02/27/2020 can be made for additional   information and management recommendations. XR CHEST PORTABLE   Final Result   No acute findings. Active Hospital Problems    Diagnosis Date Noted    JASPREET (acute kidney injury) ([facility] [address] [N17.9] 04/24/2020     Assessment/Plan:  1. JASPREET with dehydration and/or ATN due to toxic effects of cocaine/amphetamine abuse - Resolved   -Urine osmolality 281;   CT abdomen pelvis without hydronephrosis or urinary tract obstruction. No acute pathology  -BUN/creatinine -63/9.1 on admission ; baseline creatinine normal in February 2020   -Renal functions improved  with aggressive IV hydration; discontinue fluids and remove Hassan catheter today.     - Lactic normal on

## 2020-04-27 NOTE — PROGRESS NOTES
°C)   SpO2: 95%       I & O :       Intake/Output Summary (Last 24 hours) at 4/27/2020 1005  Last data filed at 4/27/2020 9768  Gross per 24 hour   Intake 420 ml   Output 3200 ml   Net -2780 ml        Physical Examination :     General appearance: confused,  HEENT: Lips- normal, teeth- ok , oral mucosa- dry  Neck : Mass- no, appears symmetrical, JVD- not visible  Respiratory: Respiratory effort-  Appear normal , wheeze- no, crackles - no  Cardiovascular:  Ausculation- No M/R/G, Edema none  Abdomen: visible mass- no, distention- no, scar- no, tenderness- no                            hepatosplenomegaly-  No--  Musculoskeletal:  clubbing no,cyanosis- no , digital ischemia- no                           muscle strength- patient unable to co-operate     , tone - patient unable to co-operate   Skin: rashes- no , ulcers- no, induration- no, tightening - no  Psychiatric:   Unresponsive/confused    Has Hassan- with 500 ml urine output       LABS:     Recent Labs     04/25/20  0605 04/26/20  0848   WBC 11.4* 10.7   HGB 10.0* 11.2*   HCT 29.2* 34.0*    375     Recent Labs     04/25/20  0605 04/26/20  0700 04/26/20  0848 04/27/20  0843   * 138 137 136   K 3.4* 4.2 3.6 2.8*    104 103 96*   CO2 20* 22 21 27   BUN 44* 24* 22* 10   CREATININE 2.3* 0.9 0.8 0.6   GLUCOSE 86 98 111* 140*   MG  --   --   --  0.90*   PHOS 5.4* 2.1*  --  1.4* None known

## 2020-04-27 NOTE — CARE COORDINATION
Sunil Navarro RN         Care Coordination   Signed   Date of Service:  4/27/2020 10:08 AM               Signed             Show:Clear all  [x]Manual[x]Template[]Copied    Added by:  Cayla Quinn RN    []Melanie for details                                      CASE MANAGEMENT INITIAL ASSESSMENT        Reviewed chart and completed assessment via telephone with: patient  Explained Case Management role/services.      Primary contact information: Korin Noel     Admit date/status: 4/26/20  Diagnosis: JASPREET  Is this a Readmission?:  y admitted with ovarian mass at that time no DCP needs     Insurance: [employer] required for SNF - Y        3 night stay required - N     Living arrangements, Adls, care needs, prior to admission: lives in apartment alone     Transportation:   TRIXandTRAX at home: Walker__Cane__RTS__ BSC__Shower Chair__  02__ HHN__ CPAP__  BiPap__  Hospital Bed__ W/C___ Other__________     Services in the home and/or outpatient, prior to admission: none     Dialysis Facility (if applicable) none  · Name:  · Address:  · Dialysis Schedule:  · Phone:  · Fax:     PT/OT recs: none     Hospital Exemption Notification (HEN): needed for SNF     Barriers to discharge: none     Plan/comments: patient states lives alone in apartment. Has support of daughter. Will be able to get to any follow up appointments. Denied any DCP needs.  Kvng Arteaga RN        ECOC on chart for MD signature

## 2020-04-27 NOTE — PROGRESS NOTES
Shift assessment completed. Pt A&O x3, confused on where she is. VSS. Bed alarm active for safety. Non skid socks on. Denies any needs at this time. Bed locked and in lowest position. Call light and bedside table within reach. Will continue to monitor.

## 2020-04-28 VITALS
HEART RATE: 83 BPM | SYSTOLIC BLOOD PRESSURE: 129 MMHG | OXYGEN SATURATION: 98 % | RESPIRATION RATE: 18 BRPM | DIASTOLIC BLOOD PRESSURE: 89 MMHG | HEIGHT: 61 IN | TEMPERATURE: 98.3 F | WEIGHT: 147 LBS | BODY MASS INDEX: 27.75 KG/M2

## 2020-04-28 LAB
ALBUMIN SERPL-MCNC: 2.9 G/DL (ref 3.4–5)
ANION GAP SERPL CALCULATED.3IONS-SCNC: 11 MMOL/L (ref 3–16)
BLOOD CULTURE, ROUTINE: NORMAL
BUN BLDV-MCNC: 7 MG/DL (ref 7–20)
CALCIUM SERPL-MCNC: 9 MG/DL (ref 8.3–10.6)
CHLORIDE BLD-SCNC: 94 MMOL/L (ref 99–110)
CO2: 31 MMOL/L (ref 21–32)
CREAT SERPL-MCNC: <0.5 MG/DL (ref 0.6–1.2)
GFR AFRICAN AMERICAN: >60
GFR NON-AFRICAN AMERICAN: >60
GLUCOSE BLD-MCNC: 123 MG/DL (ref 70–99)
MAGNESIUM: 1.7 MG/DL (ref 1.8–2.4)
PHOSPHORUS: 1.8 MG/DL (ref 2.5–4.9)
POTASSIUM SERPL-SCNC: 3 MMOL/L (ref 3.5–5.1)
SODIUM BLD-SCNC: 136 MMOL/L (ref 136–145)

## 2020-04-28 PROCEDURE — 2580000003 HC RX 258: Performed by: INTERNAL MEDICINE

## 2020-04-28 PROCEDURE — 6370000000 HC RX 637 (ALT 250 FOR IP): Performed by: INTERNAL MEDICINE

## 2020-04-28 PROCEDURE — 36415 COLL VENOUS BLD VENIPUNCTURE: CPT

## 2020-04-28 PROCEDURE — 83735 ASSAY OF MAGNESIUM: CPT

## 2020-04-28 PROCEDURE — 94640 AIRWAY INHALATION TREATMENT: CPT

## 2020-04-28 PROCEDURE — 6360000002 HC RX W HCPCS: Performed by: INTERNAL MEDICINE

## 2020-04-28 PROCEDURE — 80069 RENAL FUNCTION PANEL: CPT

## 2020-04-28 RX ORDER — POTASSIUM CHLORIDE 20 MEQ/1
40 TABLET, EXTENDED RELEASE ORAL ONCE
Status: COMPLETED | OUTPATIENT
Start: 2020-04-28 | End: 2020-04-28

## 2020-04-28 RX ORDER — MAGNESIUM SULFATE IN WATER 40 MG/ML
2 INJECTION, SOLUTION INTRAVENOUS
Status: COMPLETED | OUTPATIENT
Start: 2020-04-28 | End: 2020-04-28

## 2020-04-28 RX ADMIN — DIBASIC SODIUM PHOSPHATE, MONOBASIC POTASSIUM PHOSPHATE AND MONOBASIC SODIUM PHOSPHATE 1 TABLET: 852; 155; 130 TABLET ORAL at 10:50

## 2020-04-28 RX ADMIN — ASPIRIN 81 MG: 81 TABLET ORAL at 08:19

## 2020-04-28 RX ADMIN — LISINOPRIL 40 MG: 20 TABLET ORAL at 08:19

## 2020-04-28 RX ADMIN — POTASSIUM CHLORIDE 40 MEQ: 20 TABLET, EXTENDED RELEASE ORAL at 10:50

## 2020-04-28 RX ADMIN — DULOXETINE 60 MG: 60 CAPSULE, DELAYED RELEASE ORAL at 08:20

## 2020-04-28 RX ADMIN — POTASSIUM CHLORIDE 20 MEQ: 20 TABLET, EXTENDED RELEASE ORAL at 08:19

## 2020-04-28 RX ADMIN — MAGNESIUM SULFATE HEPTAHYDRATE 2 G: 40 INJECTION, SOLUTION INTRAVENOUS at 10:51

## 2020-04-28 RX ADMIN — TIOTROPIUM BROMIDE INHALATION SPRAY 2 PUFF: 3.12 SPRAY, METERED RESPIRATORY (INHALATION) at 08:03

## 2020-04-28 RX ADMIN — Medication 10 ML: at 08:20

## 2020-04-28 RX ADMIN — MAGNESIUM SULFATE HEPTAHYDRATE 2 G: 40 INJECTION, SOLUTION INTRAVENOUS at 12:54

## 2020-04-28 RX ADMIN — CEFEPIME 1 G: 1 INJECTION, POWDER, FOR SOLUTION INTRAMUSCULAR; INTRAVENOUS at 01:16

## 2020-04-28 RX ADMIN — MAGNESIUM GLUCONATE 500 MG ORAL TABLET 400 MG: 500 TABLET ORAL at 10:50

## 2020-04-28 NOTE — PROGRESS NOTES
Shift assessment completed per documentation. Pt A&Ox4. VSS. Pt awake in bed. Pt denies pain and discomfort at this time. Bed locked and in lowest position. Bed alarm on. Nonskid slippers on. Side rails up 2/4. Call light and personal belongings within reach. Will continue to monitor.

## 2020-04-28 NOTE — PROGRESS NOTES
urine-  No  Drop in urine output-  No  Any other complaints-  No  All other ROS are reviewed and are Negative     Medication:     Current Facility-Administered Medications: magnesium sulfate 2 g in 50 mL IVPB premix, 2 g, Intravenous, Q2H  potassium chloride (KLOR-CON M) extended release tablet 20 mEq, 20 mEq, Oral, BID  cefepime (MAXIPIME) 1 g IVPB minibag, 1 g, Intravenous, Q12H  lisinopril (PRINIVIL;ZESTRIL) tablet 40 mg, 40 mg, Oral, Daily  DULoxetine (CYMBALTA) extended release capsule 60 mg, 60 mg, Oral, Daily  atorvastatin (LIPITOR) tablet 80 mg, 80 mg, Oral, Nightly  aspirin EC tablet 81 mg, 81 mg, Oral, Daily with breakfast  tiotropium (SPIRIVA RESPIMAT) 2.5 MCG/ACT inhaler 2 puff, 2 puff, Inhalation, Daily  albuterol sulfate  (90 Base) MCG/ACT inhaler 2 puff, 2 puff, Inhalation, Q6H PRN  tiZANidine (ZANAFLEX) tablet 2 mg, 2 mg, Oral, BID PRN  hydrOXYzine (ATARAX) tablet 25 mg, 25 mg, Oral, Q8H PRN  traZODone (DESYREL) tablet 50 mg, 50 mg, Oral, Nightly  melatonin ER tablet 2 mg, 2 mg, Oral, Nightly PRN  sodium chloride flush 0.9 % injection 10 mL, 10 mL, Intravenous, 2 times per day  sodium chloride flush 0.9 % injection 10 mL, 10 mL, Intravenous, PRN  acetaminophen (TYLENOL) tablet 650 mg, 650 mg, Oral, Q6H PRN **OR** acetaminophen (TYLENOL) suppository 650 mg, 650 mg, Rectal, Q6H PRN  promethazine (PHENERGAN) tablet 12.5 mg, 12.5 mg, Oral, Q6H PRN **OR** ondansetron (ZOFRAN) injection 4 mg, 4 mg, Intravenous, Q6H PRN  heparin (porcine) injection 5,000 Units, 5,000 Units, Subcutaneous, 3 times per day  HYDROmorphone (DILAUDID) injection 0.5 mg, 0.5 mg, Intravenous, Q3H PRN  nicotine (NICODERM CQ) 7 MG/24HR 1 patch, 1 patch, Transdermal, Daily       Vitals :     Vitals:    04/28/20 0817   BP: (!) 142/80   Pulse: 105   Resp: 18   Temp: 98.1 °F (36.7 °C)   SpO2: 99%       I & O :       Intake/Output Summary (Last 24 hours) at 4/28/2020 0987  Last data filed at 4/28/2020 0617  Gross per 24 hour

## 2020-04-28 NOTE — DISCHARGE SUMMARY
Final Result   No acute findings. Consults:     IP CONSULT TO HOSPITALIST  IP CONSULT TO NEPHROLOGY    Disposition:  Home     Condition at Discharge: Stable    Discharge Instructions/Follow-up:  PCP     Code Status:  Full Code     Activity: activity as tolerated    Diet: regular diet      Discharge Medications:     Current Discharge Medication List           Details   fluticasone (FLONASE) 50 MCG/ACT nasal spray USE 2 SPRAYS IN EACH NOSTRIL DAILY  Qty: 16 g, Refills: 2    Associated Diagnoses: Viral URI with cough      vitamin D (ERGOCALCIFEROL) 1.25 MG (29804 UT) CAPS capsule TAKE 1 CAPSULE BY MOUTH ONCE A WEEK FOR 12 DOSES  Qty: 12 capsule, Refills: 0    Associated Diagnoses: Moderate single current episode of major depressive disorder ([facility] [address] Vitamin D deficiency      hydrOXYzine (ATARAX) 25 MG tablet TAKE 1 TABLET BY MOUTH EVERY 8 HOURS AS NEEDED FOR ANXIETY  Qty: 90 tablet, Refills: 3      cetirizine (ZYRTEC) 10 MG tablet TAKE 1 TABLET BY MOUTH DAILY  Qty: 90 tablet, Refills: 1      SPIRIVA HANDIHALER 18 MCG inhalation capsule INHALE 1 CAPSULE INTO THE LUNGS DAILY  Qty: 90 capsule, Refills: 1    Associated Diagnoses: Centrilobular emphysema (HCC)      HEARTBURN RELIEF MAX ST 20 MG tablet TAKE 1 TABLET BY MOUTH TWICE DAILY  Qty: 60 tablet, Refills: 2      !! albuterol sulfate  (90 Base) MCG/ACT inhaler INHALE 2 PUFFS INTO THE LUNGS EVERY 6 HOURS AS NEEDED FOR WHEEZING  Qty: 8.5 g, Refills: 2    Associated Diagnoses: Mild intermittent asthma without complication      tiZANidine (ZANAFLEX) 2 MG tablet TAKE 1 TABLET BY MOUTH 2 TIMES DAILY AS NEEDED (MUSCLE SPASM AND PAIN)  Qty: 30 tablet, Refills: 2      atorvastatin (LIPITOR) 80 MG tablet TAKE 1 TABLET BY MOUTH NIGHTLY  Qty: 90 tablet, Refills: 1    Associated Diagnoses: Mixed hyperlipidemia      gabapentin (NEURONTIN) 400 MG capsule Take 1 capsule by mouth 3 times daily for 30 days.   Qty: 90 capsule, Refills: 5    Associated Diagnoses:

## 2020-04-29 ENCOUNTER — CARE COORDINATION (OUTPATIENT)
Dept: CASE MANAGEMENT | Age: 65
End: 2020-04-29

## 2020-04-30 ENCOUNTER — TELEPHONE (OUTPATIENT)
Dept: FAMILY MEDICINE CLINIC | Age: 65
End: 2020-04-30

## 2020-04-30 ENCOUNTER — CARE COORDINATION (OUTPATIENT)
Dept: CASE MANAGEMENT | Age: 65
End: 2020-04-30

## 2020-05-01 ENCOUNTER — VIRTUAL VISIT (OUTPATIENT)
Dept: FAMILY MEDICINE CLINIC | Age: 65
End: 2020-05-01
Payer: COMMERCIAL

## 2020-05-01 VITALS — BODY MASS INDEX: 24.55 KG/M2 | WEIGHT: 130 LBS | HEIGHT: 61 IN

## 2020-05-01 PROCEDURE — 99214 OFFICE O/P EST MOD 30 MIN: CPT | Performed by: NURSE PRACTITIONER

## 2020-05-01 SDOH — ECONOMIC STABILITY: TRANSPORTATION INSECURITY
IN THE PAST 12 MONTHS, HAS LACK OF TRANSPORTATION KEPT YOU FROM MEETINGS, WORK, OR FROM GETTING THINGS NEEDED FOR DAILY LIVING?: NO

## 2020-05-01 SDOH — ECONOMIC STABILITY: FOOD INSECURITY: WITHIN THE PAST 12 MONTHS, YOU WORRIED THAT YOUR FOOD WOULD RUN OUT BEFORE YOU GOT MONEY TO BUY MORE.: NEVER TRUE

## 2020-05-01 SDOH — ECONOMIC STABILITY: INCOME INSECURITY: HOW HARD IS IT FOR YOU TO PAY FOR THE VERY BASICS LIKE FOOD, HOUSING, MEDICAL CARE, AND HEATING?: NOT VERY HARD

## 2020-05-01 SDOH — ECONOMIC STABILITY: FOOD INSECURITY: WITHIN THE PAST 12 MONTHS, THE FOOD YOU BOUGHT JUST DIDN'T LAST AND YOU DIDN'T HAVE MONEY TO GET MORE.: NEVER TRUE

## 2020-05-01 SDOH — ECONOMIC STABILITY: TRANSPORTATION INSECURITY
IN THE PAST 12 MONTHS, HAS THE LACK OF TRANSPORTATION KEPT YOU FROM MEDICAL APPOINTMENTS OR FROM GETTING MEDICATIONS?: NO

## 2020-05-01 ASSESSMENT — ENCOUNTER SYMPTOMS
ABDOMINAL PAIN: 1
NAUSEA: 1
VOMITING: 0
RESPIRATORY NEGATIVE: 1
RECTAL PAIN: 0
DIARRHEA: 0
CONSTIPATION: 0

## 2020-05-01 ASSESSMENT — PATIENT HEALTH QUESTIONNAIRE - PHQ9
SUM OF ALL RESPONSES TO PHQ QUESTIONS 1-9: 1
2. FEELING DOWN, DEPRESSED OR HOPELESS: 1
SUM OF ALL RESPONSES TO PHQ QUESTIONS 1-9: 1
SUM OF ALL RESPONSES TO PHQ9 QUESTIONS 1 & 2: 1
1. LITTLE INTEREST OR PLEASURE IN DOING THINGS: 0

## 2020-05-01 NOTE — PROGRESS NOTES
2020    TELEHEALTH EVALUATION -- Audio/Visual (During XSHDU-67 public health emergency)    Shon Gabriel (:  1955) has requested an audio/video evaluation for the following concern(s):    HPI:  Shon Gabriel is a 60year old female patient of Dr. Zoey Hill scheduled for a VV with me today for hospital f/u. She was unable to turn on her camera, so after 30 minutes of us trying to help the pt connect to a Doxy visit, I decided to just call her and do a telephone visit. The pt arrived to the ER via EMS on 20 for altered mental status, was reportedly found walking down the street naked. She also c/o abdominal pain. When I called her she was very difficult to understand. She was very tearful and her speech was slurred. She immediately started telling me that she \"was not a drug addict and that she passed out and broke her toes and that the hospital wouldn't even look at her feet\". She states that she lives in her apartment alone and \"someone\" must have found her and called 911. The ER report states that the pt was found walking down the street naked. She states that she is still having RUQ pain and is requesting pain medication. Denies painful urination, urinary frequency/urgency, but states that her urine still has a strong odor. Hospital Course:      1. JASPREET with dehydration and/or ATN due to toxic effects of cocaine/amphetamine abuse - Resolved   -Urine osmolality 281;   CT abdomen pelvis without hydronephrosis or urinary tract obstruction.  No acute pathology  -BUN/creatinine -63/9.1 on admission ; baseline creatinine normal in 2020   -Renal functions improved  with aggressive IV hydration; discontinue fluids and remove Hassan catheter today.    - Lactic normal on admission;  ;  -nephrology followed and assisted with management  -Avoid nephrotoxic medications.   Clinically resolved, renal function back to normal.     2.  Acute metabolic encephalopathy -likely due to patient is nervous/anxious. The patient is not hyperactive. Tearful during phone conversation       Prior to Visit Medications    Medication Sig Taking? Authorizing Provider   fluticasone (FLONASE) 50 MCG/ACT nasal spray USE 2 SPRAYS IN EACH NOSTRIL DAILY Yes Dejuan Schuster MD   vitamin D (ERGOCALCIFEROL) 1.25 MG (01923 UT) CAPS capsule TAKE 1 CAPSULE BY MOUTH ONCE A WEEK FOR 12 DOSES Yes Dejuan Schuster MD   cetirizine (ZYRTEC) 10 MG tablet TAKE 1 TABLET BY MOUTH DAILY Yes Dejuan Schuster MD   SPIRIVA HANDIHALER 18 MCG inhalation capsule INHALE 1 CAPSULE INTO THE LUNGS DAILY Yes Dejuan Schuster MD   HEARTBURN RELIEF MAX ST 20 MG tablet TAKE 1 TABLET BY MOUTH TWICE DAILY Yes Dejuan Schuster MD   albuterol sulfate  (90 Base) MCG/ACT inhaler INHALE 2 PUFFS INTO THE LUNGS EVERY 6 HOURS AS NEEDED FOR WHEEZING Yes Dejuan Schuster MD   tiZANidine (ZANAFLEX) 2 MG tablet TAKE 1 TABLET BY MOUTH 2 TIMES DAILY AS NEEDED (MUSCLE SPASM AND PAIN) Yes Dejuan Schuster MD   atorvastatin (LIPITOR) 80 MG tablet TAKE 1 TABLET BY MOUTH NIGHTLY Yes Dejuan Schuster MD   gabapentin (NEURONTIN) 400 MG capsule Take 1 capsule by mouth 3 times daily for 30 days. Yes Dejuan Schuster MD   DULoxetine (CYMBALTA) 60 MG extended release capsule TAKE 1 CAPSULE BY MOUTH DAILY Yes Dejuan Schuster MD   lisinopril (PRINIVIL;ZESTRIL) 40 MG tablet TAKE 1 TABLET BY MOUTH DAILY Yes Dejuan Schuster MD   traZODone (DESYREL) 50 MG tablet TAKE 1 TABLET BY MOUTH NIGHTLY Yes Dejuan Schuster MD   nicotine (NICODERM CQ) 7 MG/24HR PLACE 1 PATCH ONTO THE SKIN DAILY (ROTATE SITES) Yes Dejuan Schuster MD   ondansetron (ZOFRAN) 4 MG tablet Take 1 tablet by mouth every 8 hours as needed for Nausea Yes MELISSA Sesay   nitroGLYCERIN (NITROSTAT) 0.4 MG SL tablet up to max of 3 total doses. If no relief after 1 dose, call 911.  Yes Fabiola Gibson, APRN - CNP   VENTOLIN  (90 Base) MCG/ACT inhaler Inhale 2 puffs into the lungs every 6 hours as needed for Wheezing Yes Tracie Villarreal MD   aspirin 81 MG tablet Take 1 tablet by mouth daily (with breakfast) Yes Tracie Villarreal MD   hydrOXYzine (ATARAX) 25 MG tablet TAKE 1 TABLET BY MOUTH EVERY 8 HOURS AS NEEDED FOR ANXIETY  Patient not taking: Reported on 2020  Tracie Villarreal MD   nicotine (NICODERM CQ) 7 MG/24HR Place 1 patch onto the skin daily for 14 days  Patient not taking: Reported on 2020  Tracie Villarreal MD       Social History     Tobacco Use    Smoking status: Former Smoker     Packs/day: 0.25     Years: 20.00     Pack years: 5.00     Types: Cigarettes     Last attempt to quit: 3/1/2020     Years since quittin.1    Smokeless tobacco: Never Used   Substance Use Topics    Alcohol use: No    Drug use: Yes     Types: Marijuana, Opiates      Comment: pt states she took two hits for new years so she could sleep. Urine drug screen from hospitalization on 20 was positive for amphetamines and cocaine. Urine drug screen from hospitalization on 20 was positive for amphetamines and cocaine.       Allergies   Allergen Reactions    Codeine Hives     Tolerates [place].   ,   Past Medical History:   Diagnosis Date    JASPREET (acute kidney injury) ([address] 2020    JASPREET (acute kidney injury) ([address]     Arterial occlusion     Centrilobular emphysema ([address] 2019    Chronic bilateral low back pain with bilateral sciatica 8/3/2018    Chronic bilateral low back pain with bilateral sciatica     Hyperlipidemia     Hypertension     MDRO (multiple drug resistant organisms) resistance 2020    Escherichia coli-URINE    Metabolic encephalopathy     Moderate single current episode of major depressive disorder ([address] 8/3/2018    Ruptured disk    ,   Past Surgical History:   Procedure Laterality Date     SECTION  1981    LEG SURGERY  2017    fasciotomy due to DVT, LLE   ,   Social History     Tobacco Use    Smoking status: Former Smoker     Packs/day: 0.25 Years: 20.00     Pack years: 5.00     Types: Cigarettes     Last attempt to quit: 3/1/2020     Years since quittin.1    Smokeless tobacco: Never Used   Substance Use Topics    Alcohol use: No    Drug use: Yes     Types: Marijuana, Opiates      Comment: pt states she took two hits for new years so she could sleep. Urine drug screen from hospitalization on 20 was positive for amphetamines and cocaine.      ,   Family History   Problem Relation Age of Onset    High Blood Pressure Mother     High Cholesterol Mother     Heart Disease Mother     Coronary Art Dis Mother     Heart Attack Father         59    Cirrhosis Sister     Alcohol Abuse Sister     Other Sister         \"colostomy bag due to hole in her colon\"   Kylee Davon Sudden Death Brother         \"suicide\"    Colon Cancer Other         maternal uncle    Breast Cancer Other         multiple maternal aunts along with uterine cancer   ,   Immunization History   Administered Date(s) Administered    Influenza, Quadv, IM, PF (6 mo and older Fluzone, Flulaval, Fluarix, and 3 yrs and older Afluria) 2019    Pneumococcal Polysaccharide ([id number]) 2019    Tdap (Boostrix, Adacel) 2019   ,   Health Maintenance   Topic Date Due    Breast cancer screen  2007    Shingles Vaccine (1 of 2) 2007    Colon cancer screen colonoscopy  2007    A1C test (Diabetic or Prediabetic)  2019    Flu vaccine (Season Ended) 2020    Lipid screen  2020    Potassium monitoring  2021    Creatinine monitoring  2021    Cervical cancer screen  2023    DTaP/Tdap/Td vaccine (2 - Td) 2029    Pneumococcal 0-64 years Vaccine  Completed    Hepatitis C screen  Completed    HIV screen  Completed    Hepatitis A vaccine  Aged Out    Hepatitis B vaccine  Aged Out    Hib vaccine  Aged Out    Meningococcal (ACWY) vaccine  Aged Out       PHYSICAL EXAMINATION:  Unable to do PE since the pt could not do a VV.    ASSESSMENT/PLAN:    1. Hospital discharge follow-up  Please review HPI. Krupa historian. Visit done over the telephone and she was very tearful and difficult to understand. 2. RUQ pain  Pt states that she continues to have RUQ pain which is worse with eating. I have rev'd the abd/pelvis CT and US GB. Recommended ERCP. Hepatic function panel was non reactive. Recommended f/u with PCP in the office. GI consult for recommended ERCP. - Ave Daly MD, Gastroenterology, [facility]    3. Malodorous urine  Denies any other urine symptoms. Pt was treated with antibiotics during her hospitalization. Urine culture showed no growth after 18-36 hours. F/u at [facility]. Return in about 1 week (around 5/8/2020) for Hospital f/u, abd pain, foot pain. Lui Spears is a 60 y.o. female being evaluated by a Virtual Visit (video visit) encounter to address concerns as mentioned above. A caregiver was present when appropriate. Due to this being a TeleHealth encounter (During XJTBV-86 public health emergency), evaluation of the following organ systems was limited: Vitals/Constitutional/EENT/Resp/CV/GI//MS/Neuro/Skin/Heme-Lymph-Imm. Pursuant to the emergency declaration under the [address] [place] [address], [address] [place] [address] authority and the [employer] General Act, this Virtual Visit was conducted with patient's (and/or legal guardian's) consent, to reduce the patient's risk of exposure to COVID-19 and provide necessary medical care. The patient (and/or legal guardian) has also been advised to contact this office for worsening conditions or problems, and seek emergency medical treatment and/or call 911 if deemed necessary.      Patient identification was verified at the start of the visit:    Total time spent on this encounter: 39    Services were provided through a video synchronous discussion virtually to substitute for in-person clinic visit. Patient and provider were located at their individual homes. --SHAHAB Ruiz CNP on 5/1/2020 at 11:15 AM    An electronic signature was used to authenticate this note.

## 2020-05-06 ENCOUNTER — CARE COORDINATION (OUTPATIENT)
Dept: CASE MANAGEMENT | Age: 65
End: 2020-05-06

## 2020-05-06 NOTE — CARE COORDINATION
Patient contacted regarding recent discharge and COVID-19 risk   Care Transition Nurse/ Ambulatory Care Manager contacted the patient by telephone to perform post discharge assessment. Verified name and  with patient as identifiers. Patient has following risk factors of: chronic kidney disease. CTN/ACM reviewed discharge instructions, medical action plan and red flags related to discharge diagnosis. Reviewed and educated them on any new and changed medications related to discharge diagnosis. Advised obtaining a 90-day supply of all daily and as-needed medications. Education provided regarding infection prevention, and signs and symptoms of COVID-19 and when to seek medical attention with patient who verbalized understanding. Discussed exposure protocols and quarantine from [address] you at higher risk for severe illness  and given an opportunity for questions and concerns. The patient agrees to contact the COVID-19 hotline [phone] or PCP office for questions related to their healthcare. CTN/ACM provided contact information for future reference. From CDC: Are you at higher risk for severe illness?  Wash your hands often.  Avoid close contact (6 feet, which is about two arm lengths) with people who are sick.  Put distance between yourself and other people if COVID-19 is spreading in your community.  Clean and disinfect frequently touched surfaces.  Avoid all cruise travel and non-essential air travel.  Call your healthcare professional if you have concerns about COVID-19 and your underlying condition or if you are sick. For more information on steps you can take to protect yourself, see CDC's How to [place] for follow-up call in 1-2 days based on severity of symptoms and risk factors. Spoke with patient who stated she isn't doing too well. Patient stated she continues to be nauseated without vomiting and has abd pain.  Patient denied any diarrhea

## 2020-05-07 ENCOUNTER — OFFICE VISIT (OUTPATIENT)
Dept: FAMILY MEDICINE CLINIC | Age: 65
End: 2020-05-07
Payer: COMMERCIAL

## 2020-05-07 VITALS
DIASTOLIC BLOOD PRESSURE: 90 MMHG | TEMPERATURE: 98.6 F | HEART RATE: 98 BPM | OXYGEN SATURATION: 98 % | SYSTOLIC BLOOD PRESSURE: 140 MMHG | WEIGHT: 134 LBS | BODY MASS INDEX: 25.32 KG/M2

## 2020-05-07 DIAGNOSIS — N17.9 AKI (ACUTE KIDNEY INJURY) (HCC): ICD-10-CM

## 2020-05-07 LAB
ANION GAP SERPL CALCULATED.3IONS-SCNC: 13 MMOL/L (ref 3–16)
BASOPHILS ABSOLUTE: 0.1 K/UL (ref 0–0.2)
BASOPHILS RELATIVE PERCENT: 0.9 %
BILIRUBIN, POC: NEGATIVE
BLOOD URINE, POC: NEGATIVE
BUN BLDV-MCNC: 7 MG/DL (ref 7–20)
CALCIUM SERPL-MCNC: 9.8 MG/DL (ref 8.3–10.6)
CHLORIDE BLD-SCNC: 99 MMOL/L (ref 99–110)
CLARITY, POC: CLEAR
CO2: 25 MMOL/L (ref 21–32)
COLOR, POC: YELLOW
CREAT SERPL-MCNC: 0.8 MG/DL (ref 0.6–1.2)
EOSINOPHILS ABSOLUTE: 0.1 K/UL (ref 0–0.6)
EOSINOPHILS RELATIVE PERCENT: 0.6 %
GFR AFRICAN AMERICAN: >60
GFR NON-AFRICAN AMERICAN: >60
GLUCOSE BLD-MCNC: 107 MG/DL (ref 70–99)
GLUCOSE URINE, POC: NEGATIVE
HCT VFR BLD CALC: 34.7 % (ref 36–48)
HEMOGLOBIN: 11.2 G/DL (ref 12–16)
KETONES, POC: NEGATIVE
LEUKOCYTE EST, POC: NEGATIVE
LYMPHOCYTES ABSOLUTE: 2.4 K/UL (ref 1–5.1)
LYMPHOCYTES RELATIVE PERCENT: 27.3 %
MCH RBC QN AUTO: 29.3 PG (ref 26–34)
MCHC RBC AUTO-ENTMCNC: 32.4 G/DL (ref 31–36)
MCV RBC AUTO: 90.6 FL (ref 80–100)
MONOCYTES ABSOLUTE: 0.4 K/UL (ref 0–1.3)
MONOCYTES RELATIVE PERCENT: 5.1 %
NEUTROPHILS ABSOLUTE: 5.7 K/UL (ref 1.7–7.7)
NEUTROPHILS RELATIVE PERCENT: 66.1 %
NITRITE, POC: NEGATIVE
PDW BLD-RTO: 15.9 % (ref 12.4–15.4)
PH, POC: 5.5
PLATELET # BLD: 655 K/UL (ref 135–450)
PMV BLD AUTO: 8 FL (ref 5–10.5)
POTASSIUM SERPL-SCNC: 4.4 MMOL/L (ref 3.5–5.1)
PROTEIN, POC: NEGATIVE
RBC # BLD: 3.82 M/UL (ref 4–5.2)
SODIUM BLD-SCNC: 137 MMOL/L (ref 136–145)
SPECIFIC GRAVITY, POC: 1.02
TSH REFLEX: 2.36 UIU/ML (ref 0.27–4.2)
UROBILINOGEN, POC: 0.2
WBC # BLD: 8.7 K/UL (ref 4–11)

## 2020-05-07 PROCEDURE — 99214 OFFICE O/P EST MOD 30 MIN: CPT | Performed by: FAMILY MEDICINE

## 2020-05-07 PROCEDURE — 81002 URINALYSIS NONAUTO W/O SCOPE: CPT | Performed by: FAMILY MEDICINE

## 2020-05-07 PROCEDURE — G8419 CALC BMI OUT NRM PARAM NOF/U: HCPCS | Performed by: FAMILY MEDICINE

## 2020-05-07 PROCEDURE — G8427 DOCREV CUR MEDS BY ELIG CLIN: HCPCS | Performed by: FAMILY MEDICINE

## 2020-05-07 PROCEDURE — 1036F TOBACCO NON-USER: CPT | Performed by: FAMILY MEDICINE

## 2020-05-07 PROCEDURE — 1111F DSCHRG MED/CURRENT MED MERGE: CPT | Performed by: FAMILY MEDICINE

## 2020-05-07 PROCEDURE — 3017F COLORECTAL CA SCREEN DOC REV: CPT | Performed by: FAMILY MEDICINE

## 2020-05-07 RX ORDER — GUAIFENESIN 600 MG/1
1200 TABLET, EXTENDED RELEASE ORAL 2 TIMES DAILY
Qty: 40 TABLET | Refills: 0 | Status: SHIPPED | OUTPATIENT
Start: 2020-05-07 | End: 2020-05-17

## 2020-05-07 NOTE — PROGRESS NOTES
UT) CAPS capsule TAKE 1 CAPSULE BY MOUTH ONCE A WEEK FOR 12 DOSES 12 capsule 0    cetirizine (ZYRTEC) 10 MG tablet TAKE 1 TABLET BY MOUTH DAILY 90 tablet 1    SPIRIVA HANDIHALER 18 MCG inhalation capsule INHALE 1 CAPSULE INTO THE LUNGS DAILY 90 capsule 1    atorvastatin (LIPITOR) 80 MG tablet TAKE 1 TABLET BY MOUTH NIGHTLY 90 tablet 1    gabapentin (NEURONTIN) 400 MG capsule Take 1 capsule by mouth 3 times daily for 30 days. 90 capsule 5    DULoxetine (CYMBALTA) 60 MG extended release capsule TAKE 1 CAPSULE BY MOUTH DAILY 90 capsule 1    lisinopril (PRINIVIL;ZESTRIL) 40 MG tablet TAKE 1 TABLET BY MOUTH DAILY 90 tablet 1    traZODone (DESYREL) 50 MG tablet TAKE 1 TABLET BY MOUTH NIGHTLY 90 tablet 1    nicotine (NICODERM CQ) 7 MG/24HR PLACE 1 PATCH ONTO THE SKIN DAILY (ROTATE SITES) 14 patch 0    ondansetron (ZOFRAN) 4 MG tablet Take 1 tablet by mouth every 8 hours as needed for Nausea 20 tablet 0    nitroGLYCERIN (NITROSTAT) 0.4 MG SL tablet up to max of 3 total doses. If no relief after 1 dose, call 911. 25 tablet 3    VENTOLIN  (90 Base) MCG/ACT inhaler Inhale 2 puffs into the lungs every 6 hours as needed for Wheezing 1 Inhaler 3    lidocaine (LIDODERM) 5 % PLACE 1 PATCH ONTO THE SKIN DAILY 12 HOURS ON, 12 HOURS OFF. 30 patch 0    famotidine (PEPCID) 20 MG tablet TAKE 1 TABLET BY MOUTH TWICE DAILY 60 tablet 2    tiZANidine (ZANAFLEX) 2 MG tablet TAKE 1 TABLET BY MOUTH 2 TIMES DAILY AS NEEDED (MUSCLE SPASM AND PAIN) 30 tablet 2    albuterol sulfate  (90 Base) MCG/ACT inhaler INHALE 2 PUFFS INTO THE LUNGS EVERY 6 HOURS AS NEEDED FOR WHEEZING 8.5 g 2    ASPIRIN LOW DOSE 81 MG EC tablet TAKE 1 TABLET BY MOUTH DAILY 90 tablet 3    nicotine (NICODERM CQ) 7 MG/24HR Place 1 patch onto the skin daily for 14 days (Patient not taking: Reported on 5/1/2020) 14 patch 0     No current facility-administered medications for this visit.         Immunization History   Administered Date(s) Administered Conjunctiva/sclera: Conjunctivae normal.      Pupils: Pupils are equal, round, and reactive to light. Neck:      Musculoskeletal: Normal range of motion and neck supple. Vascular: No JVD. Cardiovascular:      Rate and Rhythm: Normal rate and regular rhythm. Heart sounds: Normal heart sounds. No murmur. No friction rub. No gallop. Pulmonary:      Effort: Pulmonary effort is normal. No respiratory distress. Breath sounds: Normal breath sounds. No wheezing or rales. Chest:      Chest wall: No tenderness. Abdominal:      General: There is no distension. Palpations: Abdomen is soft. There is no mass. Tenderness: There is no abdominal tenderness. There is no guarding or rebound. Hernia: No hernia is present. Musculoskeletal: Normal range of motion. General: No tenderness or deformity. Feet:    Skin:     General: Skin is warm and dry. Capillary Refill: Capillary refill takes 2 to 3 seconds. Coloration: Skin is not pale. Findings: No erythema or rash. Neurological:      Mental Status: She is alert and oriented to person, place, and time. Psychiatric:         Behavior: Behavior normal.         Thought Content: Thought content normal.         Judgment: Judgment normal.         Plan  1. Foot pain, left  Ice, elevation, rest encouraged  Tylenol PRN  - XR FOOT LEFT (MIN 3 VIEWS); Future    2. JASPREET (acute kidney injury) ([facility] [address]  Improved during her hospital stay, will recheck given her persistent UTI Sxs  - CBC Auto Differential; Future  - Basic Metabolic Panel; Future  - TSH with Reflex; Future    3. Increased urinary frequency  - POCT Urinalysis no Micro  - Culture, Urine    4. Chest congestion  - guaiFENesin (MUCINEX) 600 MG extended release tablet; Take 2 tablets by mouth 2 times daily for 10 days  Dispense: 40 tablet;  Refill: 0        While assessing care for this patient, I have reviewed all pertinent lab work/imaging/ specialist notes and care in

## 2020-05-08 ENCOUNTER — CARE COORDINATION (OUTPATIENT)
Dept: CASE MANAGEMENT | Age: 65
End: 2020-05-08

## 2020-05-08 ENCOUNTER — TELEPHONE (OUTPATIENT)
Dept: FAMILY MEDICINE CLINIC | Age: 65
End: 2020-05-08

## 2020-05-08 LAB — URINE CULTURE, ROUTINE: NORMAL

## 2020-05-08 NOTE — CARE COORDINATION
done and PCP stated everything is going well. Patient stated she is keeping food and fluids down without difficulty. CTN advised patient of use of urgent care or physicians 24 hr access line if assistance is needed after hours.     Toya CARMICHAELN, RN, [facility]  Care Transition Nurse   [phone]

## 2020-05-11 RX ORDER — ASPIRIN 81 MG
TABLET, DELAYED RELEASE (ENTERIC COATED) ORAL
Qty: 90 TABLET | Refills: 3 | Status: ON HOLD | OUTPATIENT
Start: 2020-05-11 | End: 2020-07-16 | Stop reason: HOSPADM

## 2020-05-11 RX ORDER — TIZANIDINE 2 MG/1
2 TABLET ORAL 2 TIMES DAILY PRN
Qty: 30 TABLET | Refills: 2 | Status: SHIPPED | OUTPATIENT
Start: 2020-05-11 | End: 2020-08-10

## 2020-05-11 RX ORDER — LIDOCAINE 50 MG/G
1 PATCH TOPICAL DAILY
Qty: 30 PATCH | Refills: 0 | Status: ON HOLD | OUTPATIENT
Start: 2020-05-11 | End: 2020-06-15

## 2020-05-11 RX ORDER — FAMOTIDINE 20 MG/1
TABLET, FILM COATED ORAL
Qty: 60 TABLET | Refills: 2 | Status: SHIPPED | OUTPATIENT
Start: 2020-05-11 | End: 2020-08-10

## 2020-05-11 RX ORDER — ALBUTEROL SULFATE 90 UG/1
AEROSOL, METERED RESPIRATORY (INHALATION)
Qty: 8.5 G | Refills: 2 | Status: SHIPPED | OUTPATIENT
Start: 2020-05-11 | End: 2020-08-10

## 2020-05-13 ENCOUNTER — NURSE TRIAGE (OUTPATIENT)
Dept: OTHER | Facility: CLINIC | Age: 65
End: 2020-05-13

## 2020-05-14 ASSESSMENT — ENCOUNTER SYMPTOMS
CONSTIPATION: 0
ABDOMINAL PAIN: 0
CHEST TIGHTNESS: 0
COUGH: 0
VOMITING: 0
DIARRHEA: 0
NAUSEA: 0
BLOOD IN STOOL: 0
BACK PAIN: 0
SHORTNESS OF BREATH: 0
COLOR CHANGE: 0
ANAL BLEEDING: 0
ABDOMINAL DISTENTION: 0

## 2020-05-15 ENCOUNTER — CARE COORDINATION (OUTPATIENT)
Dept: CASE MANAGEMENT | Age: 65
End: 2020-05-15

## 2020-05-15 NOTE — CARE COORDINATION
Patient contacted regarding COVID-19 risk and screening. Care Transition Nurse/ Ambulatory Care Manager contacted the patient by telephone to perform follow-up assessment. Verified name and  with patient as identifiers. Patient has following risk factors of: chronic kidney disease. Symptoms reviewed with patient who verbalized the following symptoms: no new symptoms. Due to no new or worsening symptoms encounter was not routed to provider for escalation. Education provided regarding infection prevention, and signs and symptoms of COVID-19 and when to seek medical attention with patient who verbalized understanding. Discussed exposure protocols and quarantine from [address] you at higher risk for severe illness  and given an opportunity for questions and concerns. The patient agrees to contact the COVID-19 hotline [phone] or PCP office for questions related to their healthcare. CTN/ACM provided contact information for future reference. From CDC: Are you at higher risk for severe illness?  Wash your hands often.  Avoid close contact (6 feet, which is about two arm lengths) with people who are sick.  Put distance between yourself and other people if COVID-19 is spreading in your community.  Clean and disinfect frequently touched surfaces.  Avoid all cruise travel and non-essential air travel.  Call your healthcare professional if you have concerns about COVID-19 and your underlying condition or if you are sick. For more information on steps you can take to protect yourself, see CDC's How to [place] with patient who reported she is feeling much better today. Patient reported she did cough up \"stringy like clots\" and was having nausea with vomiting. Patient stated she felt like she had an ulcer and her daughter is a nurse and also thought patient may have an ulcer. Patient stated she called her PCP and was referred to the ED.  Patient stated she took pepto and felt better. Patient stated over the past two days she hasn't had any episodes of nausea, vomiting, and no coughing or vomiting of any blood. Patient denied any blood in stool as well. Patient reported she was even able to eat biscuits in gravy today without issues and has gone fishing. CTN encouraged patient to reach out to PCP to setup follow up apt and patient stated she was in the process of finding a new pcp. Patient stated she has worked in [facility] and [place] and knows when she needs to seek medical attention. Patient denied any further issues or concerns and was agreeable to follow up call next week.      Love CARMICHAELN, RN, [facility]  Care Transition Nurse   [phone]

## 2020-05-18 ENCOUNTER — APPOINTMENT (OUTPATIENT)
Dept: CT IMAGING | Age: 65
End: 2020-05-18
Payer: COMMERCIAL

## 2020-05-18 ENCOUNTER — HOSPITAL ENCOUNTER (EMERGENCY)
Age: 65
Discharge: HOME | End: 2020-05-18
Attending: EMERGENCY MEDICINE
Payer: COMMERCIAL

## 2020-05-18 VITALS
OXYGEN SATURATION: 99 % | RESPIRATION RATE: 16 BRPM | TEMPERATURE: 99.1 F | DIASTOLIC BLOOD PRESSURE: 86 MMHG | HEART RATE: 72 BPM | SYSTOLIC BLOOD PRESSURE: 148 MMHG

## 2020-05-18 LAB
A/G RATIO: 1.1 (ref 1.1–2.2)
ALBUMIN SERPL-MCNC: 3.7 G/DL (ref 3.4–5)
ALP BLD-CCNC: 227 U/L (ref 40–129)
ALT SERPL-CCNC: 91 U/L (ref 10–40)
AMPHETAMINE SCREEN, URINE: NORMAL
ANION GAP SERPL CALCULATED.3IONS-SCNC: 12 MMOL/L (ref 3–16)
AST SERPL-CCNC: 79 U/L (ref 15–37)
BARBITURATE SCREEN URINE: NORMAL
BASOPHILS ABSOLUTE: 0.1 K/UL (ref 0–0.2)
BASOPHILS RELATIVE PERCENT: 1.5 %
BENZODIAZEPINE SCREEN, URINE: NORMAL
BILIRUB SERPL-MCNC: 0.7 MG/DL (ref 0–1)
BILIRUBIN URINE: NEGATIVE
BLOOD, URINE: NEGATIVE
BUN BLDV-MCNC: 4 MG/DL (ref 7–20)
CALCIUM SERPL-MCNC: 8.9 MG/DL (ref 8.3–10.6)
CANNABINOID SCREEN URINE: NORMAL
CHLORIDE BLD-SCNC: 98 MMOL/L (ref 99–110)
CLARITY: CLEAR
CO2: 27 MMOL/L (ref 21–32)
COCAINE METABOLITE SCREEN URINE: NORMAL
COLOR: YELLOW
CREAT SERPL-MCNC: 0.6 MG/DL (ref 0.6–1.2)
EOSINOPHILS ABSOLUTE: 0.2 K/UL (ref 0–0.6)
EOSINOPHILS RELATIVE PERCENT: 3.5 %
GFR AFRICAN AMERICAN: >60
GFR NON-AFRICAN AMERICAN: >60
GLOBULIN: 3.5 G/DL
GLUCOSE BLD-MCNC: 110 MG/DL (ref 70–99)
GLUCOSE URINE: NEGATIVE MG/DL
HCT VFR BLD CALC: 32.9 % (ref 36–48)
HEMOGLOBIN: 10.7 G/DL (ref 12–16)
KETONES, URINE: NEGATIVE MG/DL
LEUKOCYTE ESTERASE, URINE: ABNORMAL
LIPASE: 15 U/L (ref 13–60)
LYMPHOCYTES ABSOLUTE: 2.2 K/UL (ref 1–5.1)
LYMPHOCYTES RELATIVE PERCENT: 34.8 %
Lab: NORMAL
MAGNESIUM: 1.9 MG/DL (ref 1.8–2.4)
MCH RBC QN AUTO: 29.1 PG (ref 26–34)
MCHC RBC AUTO-ENTMCNC: 32.7 G/DL (ref 31–36)
MCV RBC AUTO: 88.9 FL (ref 80–100)
METHADONE SCREEN, URINE: NORMAL
MICROSCOPIC EXAMINATION: YES
MONOCYTES ABSOLUTE: 0.5 K/UL (ref 0–1.3)
MONOCYTES RELATIVE PERCENT: 7.1 %
NEUTROPHILS ABSOLUTE: 3.4 K/UL (ref 1.7–7.7)
NEUTROPHILS RELATIVE PERCENT: 53.1 %
NITRITE, URINE: NEGATIVE
OCCULT BLOOD DIAGNOSTIC: NORMAL
OPIATE SCREEN URINE: NORMAL
OXYCODONE URINE: NORMAL
PDW BLD-RTO: 15.9 % (ref 12.4–15.4)
PH UA: 6.5
PH UA: 6.5 (ref 5–8)
PHENCYCLIDINE SCREEN URINE: NORMAL
PLATELET # BLD: 290 K/UL (ref 135–450)
PMV BLD AUTO: 7.9 FL (ref 5–10.5)
POTASSIUM REFLEX MAGNESIUM: 3.2 MMOL/L (ref 3.5–5.1)
PROPOXYPHENE SCREEN: NORMAL
PROTEIN UA: NEGATIVE MG/DL
RBC # BLD: 3.7 M/UL (ref 4–5.2)
RBC UA: ABNORMAL /HPF (ref 0–4)
SODIUM BLD-SCNC: 137 MMOL/L (ref 136–145)
SPECIFIC GRAVITY UA: <=1.005 (ref 1–1.03)
TOTAL PROTEIN: 7.2 G/DL (ref 6.4–8.2)
URINE REFLEX TO CULTURE: ABNORMAL
URINE TYPE: ABNORMAL
UROBILINOGEN, URINE: 0.2 E.U./DL
WBC # BLD: 6.5 K/UL (ref 4–11)
WBC UA: ABNORMAL /HPF (ref 0–5)

## 2020-05-18 PROCEDURE — 6360000002 HC RX W HCPCS: Performed by: PHYSICIAN ASSISTANT

## 2020-05-18 PROCEDURE — 6370000000 HC RX 637 (ALT 250 FOR IP): Performed by: PHYSICIAN ASSISTANT

## 2020-05-18 PROCEDURE — 83735 ASSAY OF MAGNESIUM: CPT

## 2020-05-18 PROCEDURE — 80307 DRUG TEST PRSMV CHEM ANLYZR: CPT

## 2020-05-18 PROCEDURE — 74177 CT ABD & PELVIS W/CONTRAST: CPT

## 2020-05-18 PROCEDURE — G0328 FECAL BLOOD SCRN IMMUNOASSAY: HCPCS

## 2020-05-18 PROCEDURE — 81001 URINALYSIS AUTO W/SCOPE: CPT

## 2020-05-18 PROCEDURE — 96374 THER/PROPH/DIAG INJ IV PUSH: CPT

## 2020-05-18 PROCEDURE — 80053 COMPREHEN METABOLIC PANEL: CPT

## 2020-05-18 PROCEDURE — 99284 EMERGENCY DEPT VISIT MOD MDM: CPT

## 2020-05-18 PROCEDURE — 83690 ASSAY OF LIPASE: CPT

## 2020-05-18 PROCEDURE — 85025 COMPLETE CBC W/AUTO DIFF WBC: CPT

## 2020-05-18 PROCEDURE — 6360000004 HC RX CONTRAST MEDICATION: Performed by: PHYSICIAN ASSISTANT

## 2020-05-18 RX ORDER — POTASSIUM CHLORIDE 20 MEQ/1
40 TABLET, EXTENDED RELEASE ORAL ONCE
Status: COMPLETED | OUTPATIENT
Start: 2020-05-18 | End: 2020-05-18

## 2020-05-18 RX ORDER — MORPHINE SULFATE 4 MG/ML
4 INJECTION, SOLUTION INTRAMUSCULAR; INTRAVENOUS ONCE
Status: COMPLETED | OUTPATIENT
Start: 2020-05-18 | End: 2020-05-18

## 2020-05-18 RX ORDER — ACETAMINOPHEN 500 MG
500 TABLET ORAL ONCE
Status: DISCONTINUED | OUTPATIENT
Start: 2020-05-18 | End: 2020-05-19 | Stop reason: HOSPADM

## 2020-05-18 RX ADMIN — IOPAMIDOL 75 ML: 755 INJECTION, SOLUTION INTRAVENOUS at 19:21

## 2020-05-18 RX ADMIN — MORPHINE SULFATE 4 MG: 4 INJECTION, SOLUTION INTRAMUSCULAR; INTRAVENOUS at 19:43

## 2020-05-18 RX ADMIN — POTASSIUM CHLORIDE 40 MEQ: 1500 TABLET, EXTENDED RELEASE ORAL at 19:35

## 2020-05-18 ASSESSMENT — ENCOUNTER SYMPTOMS
RESPIRATORY NEGATIVE: 1
NAUSEA: 1
ABDOMINAL PAIN: 1
DIARRHEA: 1
VOMITING: 1

## 2020-05-18 ASSESSMENT — PAIN SCALES - GENERAL
PAINLEVEL_OUTOF10: 8
PAINLEVEL_OUTOF10: 5
PAINLEVEL_OUTOF10: 8

## 2020-05-18 ASSESSMENT — PAIN DESCRIPTION - PAIN TYPE: TYPE: ACUTE PAIN

## 2020-05-18 ASSESSMENT — PAIN DESCRIPTION - LOCATION: LOCATION: ABDOMEN

## 2020-05-18 NOTE — ED PROVIDER NOTES
suprapubic area and left lower quadrant. There is no right CVA tenderness, left CVA tenderness, guarding or rebound. Genitourinary:     Rectum: Normal.   Musculoskeletal: Normal range of motion. General: No deformity. Right lower leg: No edema. Left lower leg: No edema. Skin:     General: Skin is warm and dry. Neurological:      General: No focal deficit present. Mental Status: She is alert and oriented to person, place, and time. GCS: GCS eye subscore is 4. GCS verbal subscore is 5. GCS motor subscore is 6. Cranial Nerves: Cranial nerves are intact. Psychiatric:         Behavior: Behavior normal. Behavior is cooperative.          DIAGNOSTIC RESULTS   LABS:    Labs Reviewed   CBC WITH AUTO DIFFERENTIAL - Abnormal; Notable for the following components:       Result Value    RBC 3.70 (*)     Hemoglobin 10.7 (*)     Hematocrit 32.9 (*)     RDW 15.9 (*)     All other components within normal limits    Narrative:     Performed at:  [place] [address],  [employer], [facility]   Phone [phone]   COMPREHENSIVE METABOLIC PANEL W/ REFLEX TO MG FOR LOW K - Abnormal; Notable for the following components:    Potassium reflex Magnesium 3.2 (*)     Chloride 98 (*)     Glucose 110 (*)     BUN 4 (*)     Alkaline Phosphatase 227 (*)     ALT 91 (*)     AST 79 (*)     All other components within normal limits    Narrative:     Performed at:  [facility],  [employer], [facility]   Phone [phone]   URINE RT REFLEX TO CULTURE - Abnormal; Notable for the following components:    Leukocyte Esterase, Urine SMALL (*)     All other components within normal limits    Narrative:     Performed at:  [facility],  [place], [facility]   Phone [phone]   MICROSCOPIC URINALYSIS - Abnormal; Notable for the following components:    WBC, UA 6-9 (*)     All other components within normal limits    Narrative:     Performed at:  [facility] 75,  [place], [facility]   Phone [phone]   LIPASE    Narrative:     Performed at:  [facility] 75,  [place], [facility]   Phone [phone]   URINE DRUG SCREEN    Narrative:     Performed at:  [facility] 75,  [place], [facility]   Phone [phone]   BLOOD OCCULT STOOL DIAGNOSTIC    Narrative:     ORDER#: [id number]                          ORDERED BY: Greg Hilton  SOURCE: Stool                              COLLECTED:  05/18/20 17:50  ANTIBIOTICS AT LEXX.:                      RECEIVED :  05/18/20 18:26  Performed at:  [facility] 75,  [place], [facility]   Phone [phone]   MAGNESIUM    Narrative:     Performed at:  [facility],  [place], [facility]   Phone [phone]       All other labs were within normal range or not returned as of this dictation. EKG: All EKG's are interpreted by the Emergency Department Physician in the absence of a cardiologist.  Please see their note for interpretation of EKG. RADIOLOGY:   Non-plain film images such as CT, Ultrasound and MRI are read by the radiologist. Plain radiographic images are visualized and preliminarily interpreted by the ED Provider with the below findings:        Interpretation per the Radiologist below, if available at the time of this note:    CT ABDOMEN PELVIS W IV CONTRAST Additional Contrast? None   Final Result   1. Gallbladder wall thickening suggesting chronic gallbladder disease   2. Mildly enlarged external iliac and bilateral groin lymph nodes. Correlate   clinically         US GALLBLADDER RUQ    (Results Pending)     No results found.         PROCEDURES   Unless otherwise noted below, none Procedures    CRITICAL CARE TIME   N/A    CONSULTS:  None      EMERGENCY DEPARTMENT COURSE and DIFFERENTIAL DIAGNOSIS/MDM:   Vitals:    Vitals:    05/18/20 2029 05/18/20 2033 05/18/20 2103 05/18/20 2154   BP:  (!) 158/92 (!) 153/81 (!) 148/86   Pulse: 80 85 77 72   Resp: 16 19 18 16   Temp:       TempSrc:       SpO2: 93% 94% 93% 99%       Patient was given the following medications:  Medications   acetaminophen (TYLENOL) tablet 500 mg (500 mg Oral Not Given 5/18/20 1808)   iopamidol (ISOVUE-370) 76 % injection 75 mL (75 mLs Intravenous Given 5/18/20 1921)   potassium chloride (KLOR-CON M) extended release tablet 40 mEq (40 mEq Oral Given 5/18/20 1935)   morphine sulfate (PF) injection 4 mg (4 mg Intravenous Given 5/18/20 1943)       Patient brought in today for evaluation of lower abdominal pain. On exam she is alert oriented afebrile breathing on room air satting at 97%. Nontoxic in appearance no acute respiratory distress. Old labs and records reviewed. CBC reveals no acute leukocytosis. Hemoglobin of 10.7. Blood occult stool is negative. Urine drug screen is negative. Potassium of 3.2. Supplemented potassium in ED. No other electrolyte abnormalities. Lipase of 15. Urine shows small leukocytes with 6-10 white blood cells. CT abdomen and pelvis reveals gallbladder wall thickening suggesting chronic gallbladder disease. Mildly enlarged external iliac and bilateral groin lymph nodes. Correlate clinically. Awaiting ultrasound patient left prior to treatment completion. I was unable to re-evaluate and to update the patient at this time. Patient was also seen and evaluated by my attending as well. FINAL IMPRESSION      1. Lower abdominal pain          DISPOSITION/PLAN   DISPOSITION Eloped - Left Before Treatment Complete 05/18/2020 10:15:02 PM      PATIENT REFERREDTO:  No follow-up provider specified.     DISCHARGE MEDICATIONS:  Discharge Medication List as of 5/18/2020 10:15 PM

## 2020-05-19 ENCOUNTER — CARE COORDINATION (OUTPATIENT)
Dept: CASE MANAGEMENT | Age: 65
End: 2020-05-19

## 2020-05-20 ENCOUNTER — CARE COORDINATION (OUTPATIENT)
Dept: CASE MANAGEMENT | Age: 65
End: 2020-05-20

## 2020-05-20 ENCOUNTER — TELEPHONE (OUTPATIENT)
Dept: FAMILY MEDICINE CLINIC | Age: 65
End: 2020-05-20

## 2020-05-20 RX ORDER — PANTOPRAZOLE SODIUM 40 MG/1
40 TABLET, DELAYED RELEASE ORAL
Qty: 60 TABLET | Refills: 1 | Status: SHIPPED | OUTPATIENT
Start: 2020-05-20 | End: 2020-05-21 | Stop reason: ALTCHOICE

## 2020-05-20 NOTE — CARE COORDINATION
COVID-19 Monitoring Risk Call:    Second and final attempt made to reach patient for initial post hospital transition call. Received message no voicemail setup at this time.       Nessa Wagner BSN, RN, [facility]  Care Transition Nurse   [phone]

## 2020-05-20 NOTE — TELEPHONE ENCOUNTER
Patient called stating cannot get into GI for 2weeks feels she cannot wait that long due to the pain and discomfort was seen at ER on 5/18/2020 who told her nothing is wrong please advise

## 2020-05-20 NOTE — TELEPHONE ENCOUNTER
I reviewed her information from her ER visit. Her CT showed likely chronic cholecystitis and her liver function tells were elevated. Given her persist symptoms and her findings, we have a few options:    1) she can monitor sxs to see if it was a GI bug  2) she can see a general surgeon to see if she is a candidate for gallbladder removal   3) I can order a HIDA scan, which allows me to see how her gallbladder is functioning, and if it's not functioning appropriately this would not only explain her symptoms but would make her a better candidate for gallbladder removal, which is likely the cause of her problems. Please let her know.  Thanks

## 2020-05-20 NOTE — TELEPHONE ENCOUNTER
Patient informed. She would like a referral to see someone. She states that she is spitting up blood, Chunks of blood, abdomen pain, she was having black stools which has stopped. Burning when she swallows, and Nausea. Called the GI doctor and cannot get in until June 2nd, but did not schedule because it was to long to wait. She states that she cannot wait that long. She is wondering if you can call her in something for pain.

## 2020-05-21 ENCOUNTER — INITIAL CONSULT (OUTPATIENT)
Dept: GASTROENTEROLOGY | Age: 65
End: 2020-05-21
Payer: COMMERCIAL

## 2020-05-21 VITALS
BODY MASS INDEX: 25.86 KG/M2 | WEIGHT: 137 LBS | SYSTOLIC BLOOD PRESSURE: 140 MMHG | DIASTOLIC BLOOD PRESSURE: 104 MMHG | HEIGHT: 61 IN

## 2020-05-21 PROBLEM — R10.13 EPIGASTRIC PAIN: Status: ACTIVE | Noted: 2020-05-21

## 2020-05-21 PROBLEM — R74.8 ABNORMAL LIVER ENZYMES: Status: ACTIVE | Noted: 2020-05-21

## 2020-05-21 PROBLEM — R10.11 RIGHT UPPER QUADRANT ABDOMINAL PAIN: Status: ACTIVE | Noted: 2020-05-21

## 2020-05-21 PROCEDURE — 1036F TOBACCO NON-USER: CPT | Performed by: INTERNAL MEDICINE

## 2020-05-21 PROCEDURE — G8427 DOCREV CUR MEDS BY ELIG CLIN: HCPCS | Performed by: INTERNAL MEDICINE

## 2020-05-21 PROCEDURE — 3017F COLORECTAL CA SCREEN DOC REV: CPT | Performed by: INTERNAL MEDICINE

## 2020-05-21 PROCEDURE — G8419 CALC BMI OUT NRM PARAM NOF/U: HCPCS | Performed by: INTERNAL MEDICINE

## 2020-05-21 PROCEDURE — 99204 OFFICE O/P NEW MOD 45 MIN: CPT | Performed by: INTERNAL MEDICINE

## 2020-05-21 PROCEDURE — 1111F DSCHRG MED/CURRENT MED MERGE: CPT | Performed by: INTERNAL MEDICINE

## 2020-05-21 RX ORDER — OMEPRAZOLE 20 MG/1
20 CAPSULE, DELAYED RELEASE ORAL
Qty: 60 CAPSULE | Refills: 1 | Status: SHIPPED | OUTPATIENT
Start: 2020-05-21 | End: 2020-10-23 | Stop reason: ALTCHOICE

## 2020-05-21 RX ORDER — SUCRALFATE 1 G/1
1 TABLET ORAL 3 TIMES DAILY
Qty: 90 TABLET | Refills: 3 | Status: SHIPPED | OUTPATIENT
Start: 2020-05-21 | End: 2020-09-29 | Stop reason: ALTCHOICE

## 2020-05-21 NOTE — LETTER
EGD PREP [address] ENDOSCOPY    Your EGD is scheduled on: ____6/12/2020 @ [address] [facility]_____    __Belkis/Sukhjinder______  Arrival Time: ___8:45 am____   DO NOT EAT OR DRINK (INCLUDING WATER)  AFTER: midnight, the night before your procedure  's TRICIA OCHOA [facility] Gastroenterology [phone]  [facility]- [phone]    **Please remember to get tested for COVID-19 on 6/5/2020. The testing location information is [place], [place], [address] [place] [address]  [phone]    Keep these papers together; REVIEW ALL OF THEM AT LEAST 7 DAYS BEFORE THE PROCEDURE. Please complete all paperwork; including a current list of your medications, to avoid delays in the admission process. The following instructions must be followed in order to ensure your procedure has optimal outcomes. - KEEP YOUR APPOINTMENT. If for any reason, you are unable to keep your appointment, please notify us within 72 hours before your procedure. - You MUST have a responsible adult to drive you, who MUST remain at our facility the ENTIRE time. If not the procedure will be cancelled. You may go by taxi ONLY if you have a responsible adult with you. You may experience light headedness, dizziness etc., therefore you should have a responsible adult remain with you until the morning after your procedure. - Bring your insurance card and 's license. Call your insurance carrier to verify your benefits, and confirm that our facility is in your network, prior to the procedure date to ensure coverage. The facility name is listed as [facility] or [place] [address] and the tax ID# is [id number].  - Due to the safety and privacy of our patients, only one visitor is allowed in the recovery area after the procedure.  The center will not be

## 2020-05-21 NOTE — PROGRESS NOTES
Nellie Hook    [address] [place] [address] ,  [address] [facility]  Phone: [phone] [facility]     Chief Complaint   Patient presents with    New Patient     epigastric pain, RUQ pain radiates down        HPI     Pt referred for abdominal pain, multiple other complaints - spitting up blood, dark stools. Patient states she has had abdominal pain for 1 month, shows the pain as located over the right UQ and radiates to entire right side of the abdomen and also over the epigastric region, pain is constant, worse with food. Any food that she eats causes burning pain over the epigastric region, nausea present. Intermittently had 3 episodes where she 'spit up a blood clot'. Had some dark stools 4 days ago but these have resolved. Now states she is moving her bowels normally. She denies hematochezia. She was given Pepcid which did not help. Taking Peptobismol daily which helps a little. She was given Pantoprazole by PCP but she tells me 'the pharmacy told her it was not in stock' and she cannot get it till later so she has not done this. She was seen in the ED 5/18/2020 for abdominal pain. She was admitted to [facility] 4/2020 and was admitted then with AMS, JASPREET thought to have been from polysubstance abuse - cocaine/amphetamine and acute cystitis. She tells me she 'only did a couple of shots of crack' as she wanted to sleep and 'she is not a drug user' and says she has not done any drugs since then. Denies alcohol use. Has her ex sister-in-law with her who was present throughout the visit. In the ED 5/18/2020 Hb 10.7, creatinine 0.6, alk phos 227, AST 79, ALT 91, albumin 3.7, total bilirubin 0.7, lipase 15  Negative hep B and C 4/24/2020  CT abdomen and pelvis 5/18/2020  1. Gallbladder wall thickening suggesting chronic gallbladder disease   2. Mildly enlarged external iliac and bilateral groin lymph nodes.   Correlate   clinically       PAST MEDICAL HISTORY     Past Medical History:   Diagnosis Date    [facility]    JASPREET (acute kidney injury) ([facility]     Arterial occlusion     Centrilobular emphysema ([facility].) 2019    Chronic bilateral low back pain with bilateral sciatica 8/3/2018    Chronic bilateral low back pain with bilateral sciatica     Hyperlipidemia     Hypertension     MDRO (multiple drug resistant organisms) resistance 2020    Escherichia coli-URINE    Metabolic encephalopathy     Moderate single current episode of major depressive disorder ([facility].) 8/3/2018    Ruptured disk      FAMILY HISTORY     Family History   Problem Relation Age of Onset    High Blood Pressure Mother     High Cholesterol Mother     Heart Disease Mother     Coronary Art Dis Mother     Heart Attack Father         59    Cirrhosis Sister     Alcohol Abuse Sister     Other Sister         \"colostomy bag due to hole in her colon\"   [place] Sudden Death Brother         \"suicide\"    Colon Cancer Other         maternal uncle    Breast Cancer Other         multiple maternal aunts along with uterine cancer     SOCIAL HISTORY     Social History     Socioeconomic History    Marital status:      Spouse name: Not on file    Number of children: 2    Years of education: Not on file    Highest education level: Some college, no degree   Occupational History    Not on file   Social Needs    Financial resource strain: Not very hard    Food insecurity     Worry: Never true     Inability: Never true    Transportation needs     Medical: No     Non-medical: No   Tobacco Use    Smoking status: Former Smoker     Packs/day: 0.25     Years: 20.00     Pack years: 5.00     Types: Cigarettes     Last attempt to quit: 3/1/2020     Years since quittin.2    Smokeless tobacco: Never Used   Substance and Sexual Activity    Alcohol use: No    Drug use: Yes     Types: Marijuana, Opiates      Comment: pt states she took two hits for new years so she could sleep. Urine drug screen from hospitalization on 4/24/20 was positive for amphetamines and cocaine.  Sexual activity: Not Currently   Lifestyle    Physical activity     Days per week: Not on file     Minutes per session: Not on file    Stress: Not on file   Relationships    Social connections     Talks on phone: Not on file     Gets together: Not on file     Attends Cheondoism service: Not on file     Active member of club or organization: Not on file     Attends meetings of clubs or organizations: Not on file     Relationship status: Not on file    Intimate partner violence     Fear of current or ex partner: Not on file     Emotionally abused: Not on file     Physically abused: Not on file     Forced sexual activity: Not on file   Other Topics Concern    Not on file   Social History Narrative    Not on file     SURGICAL HISTORY     Past Surgical History:   Procedure Laterality Date   [address] [facility]  01/2017    fasciotomy due to DVT, LLE     CURRENT MEDICATIONS   (This list may include medications prescribed during this encounter as epic can not insert only the list prior to this encounter.)  Current Outpatient Rx   Medication Sig Dispense Refill    pantoprazole (PROTONIX) 40 MG tablet Take 1 tablet by mouth 2 times daily (before meals) 60 tablet 1    lidocaine (LIDODERM) 5 % PLACE 1 PATCH ONTO THE SKIN DAILY 12 HOURS ON, 12 HOURS OFF.  30 patch 0    famotidine (PEPCID) 20 MG tablet TAKE 1 TABLET BY MOUTH TWICE DAILY 60 tablet 2    tiZANidine (ZANAFLEX) 2 MG tablet TAKE 1 TABLET BY MOUTH 2 TIMES DAILY AS NEEDED (MUSCLE SPASM AND PAIN) 30 tablet 2    albuterol sulfate  (90 Base) MCG/ACT inhaler INHALE 2 PUFFS INTO THE LUNGS EVERY 6 HOURS AS NEEDED FOR WHEEZING 8.5 g 2    ASPIRIN LOW DOSE 81 MG EC tablet TAKE 1 TABLET BY MOUTH DAILY 90 tablet 3    fluticasone (FLONASE) 50 MCG/ACT nasal spray USE 2 SPRAYS IN EACH NOSTRIL DAILY 16 g 2    vitamin D (ERGOCALCIFEROL) 1.25 MG patient returns to room. Additional Contrast?->None   Reason for exam:->lower abdominal pain   Reason for Exam: Pt c/o abdominal pain   Acuity: Acute   Type of Exam: Initial       FINDINGS:   Lower Chest: Atelectasis in the lung bases       Organs: Solid organs unremarkable.  Wall thickening of the gallbladder.       GI/Bowel: No gastrointestinal abnormality demonstrated.  Appendix not   visualized       Pelvis: Reproductive organs within normal limits.  Urinary bladder   unremarkable.  Mildly enlarged external iliac lymph nodes, 2.1 x 1.0 cm on   the right       Peritoneum/Retroperitoneum: Atherosclerosis of the aorta.  No retroperitoneal   adenopathy.  No ascites       Bones/Soft Tissues: Mildly enlarged bilateral groin lymph nodes.  No   retroperitoneal adenopathy           Impression   1. Gallbladder wall thickening suggesting chronic gallbladder disease   2. Mildly enlarged external iliac and bilateral groin lymph nodes.  Correlate   clinically         US 4/24/2020    The ultrasound appearance of the liver can be seen with hepatitis.  Correlate   with clinical assessment and laboratory values.  Intrahepatic biliary   dilation could also give this appearance.       Mild wall thickening of the gallbladder where it contacts the liver, which   supports a liver etiology for gallbladder wall thickening, including   hepatitis.  Cholecystitis is much less likely, especially as there is no   sonographic Linda's sign.       Moderate amount of gallbladder sludge but no cholelithiasis. Common bile duct dilated measuring 8 mm. FINAL IMPRESSION     1. Abdominal pain, complaints of 'spitting blood clots' - she indicates pain is mostly epigastric and right UQ on visit today. CT showed gallbladder thickening and US 4/2020 showed sludge but no stones. An EGD (needs MAC) will be planned to assess for peptic ulcer disease or other significant UGI pathology.  If EGD is negative consider referral to General Surgery for cholecystectomy evaluation. She needs to take a PPI daily, since she tells me Pantoprazole is 'out of stock' will send a script for Omeprazole daily  Try Carafate 1 gm TID  [place] diet. 2. Elevated liver enzymes - hep B and C were negative 4/2020. History of cocaine and amphetamine use last month but says she has not used any drugs since. Check other routine chronic liver disease tests. US 4/2020 showed a 8 mm CBD. LFTs do not seem obstructive. May consider additional workup based on above testing.     CC:  Daisy Yan MD

## 2020-05-21 NOTE — TELEPHONE ENCOUNTER
[phone] (home)     Called and spoke with pt. Pt is scheduled for today @ 3:30 pm with Dr. Chel Locke.

## 2020-06-09 ENCOUNTER — HOSPITAL ENCOUNTER (OUTPATIENT)
Age: 65
Discharge: HOME | End: 2020-06-09
Payer: COMMERCIAL

## 2020-06-09 ENCOUNTER — OFFICE VISIT (OUTPATIENT)
Dept: PRIMARY CARE CLINIC | Age: 65
End: 2020-06-09

## 2020-06-09 LAB
ALBUMIN SERPL-MCNC: 4.1 G/DL (ref 3.4–5)
ALP BLD-CCNC: 64 U/L (ref 40–129)
ALT SERPL-CCNC: 7 U/L (ref 10–40)
ANION GAP SERPL CALCULATED.3IONS-SCNC: 14 MMOL/L (ref 3–16)
AST SERPL-CCNC: 13 U/L (ref 15–37)
BILIRUB SERPL-MCNC: 0.4 MG/DL (ref 0–1)
BILIRUBIN DIRECT: <0.2 MG/DL (ref 0–0.3)
BILIRUBIN, INDIRECT: ABNORMAL MG/DL (ref 0–1)
BUN BLDV-MCNC: 14 MG/DL (ref 7–20)
CALCIUM SERPL-MCNC: 9 MG/DL (ref 8.3–10.6)
CHLORIDE BLD-SCNC: 100 MMOL/L (ref 99–110)
CO2: 22 MMOL/L (ref 21–32)
CREAT SERPL-MCNC: 1 MG/DL (ref 0.6–1.2)
FERRITIN: 44 NG/ML (ref 15–150)
GAMMA GLUTAMYL TRANSFERASE: 29 U/L (ref 5–36)
GFR AFRICAN AMERICAN: >60
GFR NON-AFRICAN AMERICAN: 56
GLUCOSE BLD-MCNC: 103 MG/DL (ref 70–99)
HCT VFR BLD CALC: 39.3 % (ref 36–48)
HEMOGLOBIN: 13.1 G/DL (ref 12–16)
HEPATITIS C ANTIBODY INTERPRETATION: NORMAL
POTASSIUM REFLEX MAGNESIUM: 4.3 MMOL/L (ref 3.5–5.1)
SODIUM BLD-SCNC: 136 MMOL/L (ref 136–145)
TOTAL PROTEIN: 7.7 G/DL (ref 6.4–8.2)

## 2020-06-09 PROCEDURE — 86803 HEPATITIS C AB TEST: CPT

## 2020-06-09 PROCEDURE — 80076 HEPATIC FUNCTION PANEL: CPT

## 2020-06-09 PROCEDURE — 82728 ASSAY OF FERRITIN: CPT

## 2020-06-09 PROCEDURE — 85018 HEMOGLOBIN: CPT

## 2020-06-09 PROCEDURE — 85014 HEMATOCRIT: CPT

## 2020-06-09 PROCEDURE — 82103 ALPHA-1-ANTITRYPSIN TOTAL: CPT

## 2020-06-09 PROCEDURE — 36415 COLL VENOUS BLD VENIPUNCTURE: CPT

## 2020-06-09 PROCEDURE — 82977 ASSAY OF GGT: CPT

## 2020-06-09 PROCEDURE — 83516 IMMUNOASSAY NONANTIBODY: CPT

## 2020-06-09 PROCEDURE — 80048 BASIC METABOLIC PNL TOTAL CA: CPT

## 2020-06-09 PROCEDURE — 86038 ANTINUCLEAR ANTIBODIES: CPT

## 2020-06-10 ENCOUNTER — APPOINTMENT (OUTPATIENT)
Dept: CT IMAGING | Age: 65
DRG: 720 | End: 2020-06-10
Payer: COMMERCIAL

## 2020-06-10 ENCOUNTER — HOSPITAL ENCOUNTER (EMERGENCY)
Age: 65
Discharge: HOME | End: 2020-06-10
Attending: EMERGENCY MEDICINE
Payer: COMMERCIAL

## 2020-06-10 ENCOUNTER — APPOINTMENT (OUTPATIENT)
Dept: ULTRASOUND IMAGING | Age: 65
End: 2020-06-10
Payer: COMMERCIAL

## 2020-06-10 ENCOUNTER — HOSPITAL ENCOUNTER (INPATIENT)
Age: 65
LOS: 8 days | Discharge: HOME | DRG: 720 | End: 2020-06-18
Attending: EMERGENCY MEDICINE | Admitting: INTERNAL MEDICINE
Payer: COMMERCIAL

## 2020-06-10 VITALS
WEIGHT: 130 LBS | TEMPERATURE: 98.4 F | OXYGEN SATURATION: 95 % | DIASTOLIC BLOOD PRESSURE: 65 MMHG | HEIGHT: 61 IN | RESPIRATION RATE: 18 BRPM | HEART RATE: 88 BPM | BODY MASS INDEX: 24.55 KG/M2 | SYSTOLIC BLOOD PRESSURE: 100 MMHG

## 2020-06-10 PROBLEM — A41.9 SEPSIS (HCC): Status: ACTIVE | Noted: 2020-06-10

## 2020-06-10 LAB
A/G RATIO: 1 (ref 1.1–2.2)
A/G RATIO: 1.1 (ref 1.1–2.2)
ALBUMIN SERPL-MCNC: 3.7 G/DL (ref 3.4–5)
ALBUMIN SERPL-MCNC: 3.9 G/DL (ref 3.4–5)
ALP BLD-CCNC: 91 U/L (ref 40–129)
ALP BLD-CCNC: 95 U/L (ref 40–129)
ALT SERPL-CCNC: 6 U/L (ref 10–40)
ALT SERPL-CCNC: 6 U/L (ref 10–40)
AMPHETAMINE SCREEN, URINE: ABNORMAL
ANION GAP SERPL CALCULATED.3IONS-SCNC: 11 MMOL/L (ref 3–16)
ANION GAP SERPL CALCULATED.3IONS-SCNC: 8 MMOL/L (ref 3–16)
ANTI-NUCLEAR ANTIBODY (ANA): NEGATIVE
AST SERPL-CCNC: 10 U/L (ref 15–37)
AST SERPL-CCNC: 10 U/L (ref 15–37)
BACTERIA: ABNORMAL /HPF
BARBITURATE SCREEN URINE: ABNORMAL
BASOPHILS ABSOLUTE: 0.1 K/UL (ref 0–0.2)
BASOPHILS ABSOLUTE: 0.1 K/UL (ref 0–0.2)
BASOPHILS RELATIVE PERCENT: 0.4 %
BASOPHILS RELATIVE PERCENT: 0.6 %
BENZODIAZEPINE SCREEN, URINE: ABNORMAL
BILIRUB SERPL-MCNC: 0.4 MG/DL (ref 0–1)
BILIRUB SERPL-MCNC: 0.5 MG/DL (ref 0–1)
BILIRUBIN URINE: NEGATIVE
BLOOD, URINE: NEGATIVE
BUN BLDV-MCNC: 18 MG/DL (ref 7–20)
BUN BLDV-MCNC: 20 MG/DL (ref 7–20)
CALCIUM SERPL-MCNC: 8.9 MG/DL (ref 8.3–10.6)
CALCIUM SERPL-MCNC: 9.1 MG/DL (ref 8.3–10.6)
CANNABINOID SCREEN URINE: ABNORMAL
CHLORIDE BLD-SCNC: 102 MMOL/L (ref 99–110)
CHLORIDE BLD-SCNC: 99 MMOL/L (ref 99–110)
CLARITY: CLEAR
CO2: 22 MMOL/L (ref 21–32)
CO2: 24 MMOL/L (ref 21–32)
COCAINE METABOLITE SCREEN URINE: ABNORMAL
COLOR: YELLOW
CREAT SERPL-MCNC: 1.1 MG/DL (ref 0.6–1.2)
CREAT SERPL-MCNC: 1.2 MG/DL (ref 0.6–1.2)
EKG ATRIAL RATE: 87 BPM
EKG DIAGNOSIS: NORMAL
EKG P AXIS: 58 DEGREES
EKG P-R INTERVAL: 126 MS
EKG Q-T INTERVAL: 382 MS
EKG QRS DURATION: 88 MS
EKG QTC CALCULATION (BAZETT): 459 MS
EKG R AXIS: 59 DEGREES
EKG T AXIS: 61 DEGREES
EKG VENTRICULAR RATE: 87 BPM
EOSINOPHILS ABSOLUTE: 0.1 K/UL (ref 0–0.6)
EOSINOPHILS ABSOLUTE: 0.1 K/UL (ref 0–0.6)
EOSINOPHILS RELATIVE PERCENT: 0.4 %
EOSINOPHILS RELATIVE PERCENT: 0.5 %
EPITHELIAL CELLS, UA: ABNORMAL /HPF (ref 0–5)
GFR AFRICAN AMERICAN: 55
GFR AFRICAN AMERICAN: >60
GFR NON-AFRICAN AMERICAN: 45
GFR NON-AFRICAN AMERICAN: 50
GLOBULIN: 3.6 G/DL
GLOBULIN: 3.6 G/DL
GLUCOSE BLD-MCNC: 115 MG/DL (ref 70–99)
GLUCOSE BLD-MCNC: 123 MG/DL (ref 70–99)
GLUCOSE URINE: NEGATIVE MG/DL
HCT VFR BLD CALC: 35.5 % (ref 36–48)
HCT VFR BLD CALC: 36.2 % (ref 36–48)
HEMOGLOBIN: 11.5 G/DL (ref 12–16)
HEMOGLOBIN: 11.9 G/DL (ref 12–16)
KETONES, URINE: NEGATIVE MG/DL
LACTIC ACID, SEPSIS: 1.7 MMOL/L (ref 0.4–1.9)
LEUKOCYTE ESTERASE, URINE: ABNORMAL
LIPASE: 16 U/L (ref 13–60)
LIPASE: 25 U/L (ref 13–60)
LYMPHOCYTES ABSOLUTE: 2.5 K/UL (ref 1–5.1)
LYMPHOCYTES ABSOLUTE: 3 K/UL (ref 1–5.1)
LYMPHOCYTES RELATIVE PERCENT: 17.7 %
LYMPHOCYTES RELATIVE PERCENT: 19.3 %
Lab: ABNORMAL
MCH RBC QN AUTO: 29.6 PG (ref 26–34)
MCH RBC QN AUTO: 29.8 PG (ref 26–34)
MCHC RBC AUTO-ENTMCNC: 32.5 G/DL (ref 31–36)
MCHC RBC AUTO-ENTMCNC: 32.8 G/DL (ref 31–36)
MCV RBC AUTO: 90.3 FL (ref 80–100)
MCV RBC AUTO: 91.7 FL (ref 80–100)
METHADONE SCREEN, URINE: ABNORMAL
MICROSCOPIC EXAMINATION: YES
MONOCYTES ABSOLUTE: 0.8 K/UL (ref 0–1.3)
MONOCYTES ABSOLUTE: 0.9 K/UL (ref 0–1.3)
MONOCYTES RELATIVE PERCENT: 5.6 %
MONOCYTES RELATIVE PERCENT: 5.8 %
MUCUS: ABNORMAL /LPF
NEUTROPHILS ABSOLUTE: 12.7 K/UL (ref 1.7–7.7)
NEUTROPHILS ABSOLUTE: 9.5 K/UL (ref 1.7–7.7)
NEUTROPHILS RELATIVE PERCENT: 73.9 %
NEUTROPHILS RELATIVE PERCENT: 75.8 %
NITRITE, URINE: NEGATIVE
OPIATE SCREEN URINE: POSITIVE
OXYCODONE URINE: ABNORMAL
PDW BLD-RTO: 16.4 % (ref 12.4–15.4)
PDW BLD-RTO: 16.5 % (ref 12.4–15.4)
PH UA: 5
PH UA: 5.5 (ref 5–8)
PHENCYCLIDINE SCREEN URINE: ABNORMAL
PLATELET # BLD: 305 K/UL (ref 135–450)
PLATELET # BLD: 331 K/UL (ref 135–450)
PMV BLD AUTO: 7.7 FL (ref 5–10.5)
PMV BLD AUTO: 7.9 FL (ref 5–10.5)
POTASSIUM REFLEX MAGNESIUM: 3.6 MMOL/L (ref 3.5–5.1)
POTASSIUM SERPL-SCNC: 3.8 MMOL/L (ref 3.5–5.1)
PROPOXYPHENE SCREEN: ABNORMAL
PROTEIN UA: NEGATIVE MG/DL
RBC # BLD: 3.87 M/UL (ref 4–5.2)
RBC # BLD: 4.01 M/UL (ref 4–5.2)
RBC UA: ABNORMAL /HPF (ref 0–4)
SARS-COV-2: NOT DETECTED
SODIUM BLD-SCNC: 132 MMOL/L (ref 136–145)
SODIUM BLD-SCNC: 134 MMOL/L (ref 136–145)
SOURCE: NORMAL
SPECIFIC GRAVITY UA: 1.02 (ref 1–1.03)
TOTAL PROTEIN: 7.3 G/DL (ref 6.4–8.2)
TOTAL PROTEIN: 7.5 G/DL (ref 6.4–8.2)
TROPONIN: <0.01 NG/ML
TROPONIN: <0.01 NG/ML
URINE REFLEX TO CULTURE: YES
URINE TYPE: ABNORMAL
UROBILINOGEN, URINE: 0.2 E.U./DL
WBC # BLD: 12.9 K/UL (ref 4–11)
WBC # BLD: 16.7 K/UL (ref 4–11)
WBC UA: ABNORMAL /HPF (ref 0–5)

## 2020-06-10 PROCEDURE — 96365 THER/PROPH/DIAG IV INF INIT: CPT

## 2020-06-10 PROCEDURE — 6360000004 HC RX CONTRAST MEDICATION: Performed by: EMERGENCY MEDICINE

## 2020-06-10 PROCEDURE — 2500000003 HC RX 250 WO HCPCS: Performed by: EMERGENCY MEDICINE

## 2020-06-10 PROCEDURE — 2500000003 HC RX 250 WO HCPCS: Performed by: INTERNAL MEDICINE

## 2020-06-10 PROCEDURE — 2580000003 HC RX 258: Performed by: EMERGENCY MEDICINE

## 2020-06-10 PROCEDURE — 93005 ELECTROCARDIOGRAM TRACING: CPT | Performed by: PHYSICIAN ASSISTANT

## 2020-06-10 PROCEDURE — 99285 EMERGENCY DEPT VISIT HI MDM: CPT

## 2020-06-10 PROCEDURE — 93005 ELECTROCARDIOGRAM TRACING: CPT | Performed by: EMERGENCY MEDICINE

## 2020-06-10 PROCEDURE — 83690 ASSAY OF LIPASE: CPT

## 2020-06-10 PROCEDURE — 96375 TX/PRO/DX INJ NEW DRUG ADDON: CPT

## 2020-06-10 PROCEDURE — 2580000003 HC RX 258: Performed by: INTERNAL MEDICINE

## 2020-06-10 PROCEDURE — 84484 ASSAY OF TROPONIN QUANT: CPT

## 2020-06-10 PROCEDURE — 85610 PROTHROMBIN TIME: CPT

## 2020-06-10 PROCEDURE — 85025 COMPLETE CBC W/AUTO DIFF WBC: CPT

## 2020-06-10 PROCEDURE — 6360000002 HC RX W HCPCS: Performed by: EMERGENCY MEDICINE

## 2020-06-10 PROCEDURE — 6370000000 HC RX 637 (ALT 250 FOR IP): Performed by: INTERNAL MEDICINE

## 2020-06-10 PROCEDURE — 76705 ECHO EXAM OF ABDOMEN: CPT

## 2020-06-10 PROCEDURE — 74177 CT ABD & PELVIS W/CONTRAST: CPT

## 2020-06-10 PROCEDURE — 96374 THER/PROPH/DIAG INJ IV PUSH: CPT

## 2020-06-10 PROCEDURE — 83605 ASSAY OF LACTIC ACID: CPT

## 2020-06-10 PROCEDURE — 80053 COMPREHEN METABOLIC PANEL: CPT

## 2020-06-10 PROCEDURE — 96361 HYDRATE IV INFUSION ADD-ON: CPT

## 2020-06-10 PROCEDURE — 2060000000 HC ICU INTERMEDIATE R&B

## 2020-06-10 PROCEDURE — 96376 TX/PRO/DX INJ SAME DRUG ADON: CPT

## 2020-06-10 PROCEDURE — 87086 URINE CULTURE/COLONY COUNT: CPT

## 2020-06-10 PROCEDURE — 81001 URINALYSIS AUTO W/SCOPE: CPT

## 2020-06-10 PROCEDURE — 36415 COLL VENOUS BLD VENIPUNCTURE: CPT

## 2020-06-10 PROCEDURE — 80307 DRUG TEST PRSMV CHEM ANLYZR: CPT

## 2020-06-10 PROCEDURE — 93010 ELECTROCARDIOGRAM REPORT: CPT | Performed by: INTERNAL MEDICINE

## 2020-06-10 RX ORDER — FLUTICASONE PROPIONATE 50 MCG
1 SPRAY, SUSPENSION (ML) NASAL DAILY
Status: DISCONTINUED | OUTPATIENT
Start: 2020-06-11 | End: 2020-06-17

## 2020-06-10 RX ORDER — ACETAMINOPHEN 325 MG/1
650 TABLET ORAL EVERY 6 HOURS PRN
Status: DISCONTINUED | OUTPATIENT
Start: 2020-06-10 | End: 2020-06-18 | Stop reason: HOSPADM

## 2020-06-10 RX ORDER — SODIUM CHLORIDE 0.9 % (FLUSH) 0.9 %
10 SYRINGE (ML) INJECTION PRN
Status: DISCONTINUED | OUTPATIENT
Start: 2020-06-10 | End: 2020-06-13 | Stop reason: SDUPTHER

## 2020-06-10 RX ORDER — LIDOCAINE 4 G/G
1 PATCH TOPICAL DAILY
Status: DISCONTINUED | OUTPATIENT
Start: 2020-06-11 | End: 2020-06-17

## 2020-06-10 RX ORDER — GABAPENTIN 400 MG/1
400 CAPSULE ORAL 3 TIMES DAILY
Status: DISCONTINUED | OUTPATIENT
Start: 2020-06-10 | End: 2020-06-10 | Stop reason: DRUGHIGH

## 2020-06-10 RX ORDER — ACETAMINOPHEN 650 MG/1
650 SUPPOSITORY RECTAL EVERY 6 HOURS PRN
Status: DISCONTINUED | OUTPATIENT
Start: 2020-06-10 | End: 2020-06-18 | Stop reason: HOSPADM

## 2020-06-10 RX ORDER — DULOXETIN HYDROCHLORIDE 60 MG/1
60 CAPSULE, DELAYED RELEASE ORAL DAILY
Status: DISCONTINUED | OUTPATIENT
Start: 2020-06-11 | End: 2020-06-18 | Stop reason: HOSPADM

## 2020-06-10 RX ORDER — FENTANYL CITRATE 50 UG/ML
75 INJECTION, SOLUTION INTRAMUSCULAR; INTRAVENOUS ONCE
Status: COMPLETED | OUTPATIENT
Start: 2020-06-10 | End: 2020-06-10

## 2020-06-10 RX ORDER — SUCRALFATE 1 G/1
1 TABLET ORAL
Status: DISCONTINUED | OUTPATIENT
Start: 2020-06-11 | End: 2020-06-18 | Stop reason: HOSPADM

## 2020-06-10 RX ORDER — GABAPENTIN 300 MG/1
300 CAPSULE ORAL 3 TIMES DAILY
Status: DISCONTINUED | OUTPATIENT
Start: 2020-06-10 | End: 2020-06-12

## 2020-06-10 RX ORDER — NICOTINE 21 MG/24HR
1 PATCH, TRANSDERMAL 24 HOURS TRANSDERMAL DAILY
Status: DISCONTINUED | OUTPATIENT
Start: 2020-06-10 | End: 2020-06-18 | Stop reason: HOSPADM

## 2020-06-10 RX ORDER — MORPHINE SULFATE 4 MG/ML
4 INJECTION, SOLUTION INTRAMUSCULAR; INTRAVENOUS ONCE
Status: DISCONTINUED | OUTPATIENT
Start: 2020-06-10 | End: 2020-06-10 | Stop reason: HOSPADM

## 2020-06-10 RX ORDER — NITROGLYCERIN 0.4 MG/1
0.4 TABLET SUBLINGUAL EVERY 5 MIN PRN
Status: DISCONTINUED | OUTPATIENT
Start: 2020-06-10 | End: 2020-06-18 | Stop reason: HOSPADM

## 2020-06-10 RX ORDER — ALBUTEROL SULFATE 2.5 MG/3ML
2.5 SOLUTION RESPIRATORY (INHALATION) EVERY 4 HOURS PRN
Status: DISCONTINUED | OUTPATIENT
Start: 2020-06-10 | End: 2020-06-18 | Stop reason: HOSPADM

## 2020-06-10 RX ORDER — 0.9 % SODIUM CHLORIDE 0.9 %
1000 INTRAVENOUS SOLUTION INTRAVENOUS ONCE
Status: COMPLETED | OUTPATIENT
Start: 2020-06-10 | End: 2020-06-10

## 2020-06-10 RX ORDER — ONDANSETRON 2 MG/ML
4 INJECTION INTRAMUSCULAR; INTRAVENOUS ONCE
Status: COMPLETED | OUTPATIENT
Start: 2020-06-10 | End: 2020-06-10

## 2020-06-10 RX ORDER — PANTOPRAZOLE SODIUM 40 MG/1
40 TABLET, DELAYED RELEASE ORAL
Status: DISCONTINUED | OUTPATIENT
Start: 2020-06-11 | End: 2020-06-18 | Stop reason: HOSPADM

## 2020-06-10 RX ORDER — SODIUM CHLORIDE 9 MG/ML
INJECTION, SOLUTION INTRAVENOUS CONTINUOUS
Status: DISCONTINUED | OUTPATIENT
Start: 2020-06-10 | End: 2020-06-13

## 2020-06-10 RX ORDER — SODIUM CHLORIDE 0.9 % (FLUSH) 0.9 %
10 SYRINGE (ML) INJECTION EVERY 12 HOURS SCHEDULED
Status: DISCONTINUED | OUTPATIENT
Start: 2020-06-10 | End: 2020-06-13 | Stop reason: SDUPTHER

## 2020-06-10 RX ORDER — ASPIRIN 81 MG/1
81 TABLET ORAL DAILY
Status: DISCONTINUED | OUTPATIENT
Start: 2020-06-11 | End: 2020-06-18 | Stop reason: HOSPADM

## 2020-06-10 RX ORDER — PROCHLORPERAZINE EDISYLATE 5 MG/ML
10 INJECTION INTRAMUSCULAR; INTRAVENOUS EVERY 6 HOURS PRN
Status: DISCONTINUED | OUTPATIENT
Start: 2020-06-10 | End: 2020-06-18 | Stop reason: HOSPADM

## 2020-06-10 RX ORDER — MORPHINE SULFATE 4 MG/ML
4 INJECTION, SOLUTION INTRAMUSCULAR; INTRAVENOUS ONCE
Status: COMPLETED | OUTPATIENT
Start: 2020-06-10 | End: 2020-06-10

## 2020-06-10 RX ORDER — FAMOTIDINE 10 MG
10 TABLET ORAL 2 TIMES DAILY
Status: DISCONTINUED | OUTPATIENT
Start: 2020-06-10 | End: 2020-06-18 | Stop reason: HOSPADM

## 2020-06-10 RX ORDER — FENTANYL CITRATE 50 UG/ML
50 INJECTION, SOLUTION INTRAMUSCULAR; INTRAVENOUS EVERY 30 MIN PRN
Status: COMPLETED | OUTPATIENT
Start: 2020-06-10 | End: 2020-06-10

## 2020-06-10 RX ORDER — TIZANIDINE 4 MG/1
2 TABLET ORAL 2 TIMES DAILY PRN
Status: DISCONTINUED | OUTPATIENT
Start: 2020-06-10 | End: 2020-06-18 | Stop reason: HOSPADM

## 2020-06-10 RX ORDER — TRAZODONE HYDROCHLORIDE 50 MG/1
50 TABLET ORAL NIGHTLY
Status: DISCONTINUED | OUTPATIENT
Start: 2020-06-10 | End: 2020-06-15

## 2020-06-10 RX ORDER — FAMOTIDINE 20 MG/1
20 TABLET, FILM COATED ORAL 2 TIMES DAILY
Status: DISCONTINUED | OUTPATIENT
Start: 2020-06-10 | End: 2020-06-10 | Stop reason: DRUGHIGH

## 2020-06-10 RX ADMIN — FAMOTIDINE 10 MG: 10 TABLET ORAL at 23:06

## 2020-06-10 RX ADMIN — MORPHINE SULFATE 4 MG: 4 INJECTION, SOLUTION INTRAMUSCULAR; INTRAVENOUS at 17:46

## 2020-06-10 RX ADMIN — TRAZODONE HYDROCHLORIDE 50 MG: 50 TABLET ORAL at 23:06

## 2020-06-10 RX ADMIN — SODIUM CHLORIDE 1000 ML: 9 INJECTION, SOLUTION INTRAVENOUS at 22:45

## 2020-06-10 RX ADMIN — SODIUM CHLORIDE: 9 INJECTION, SOLUTION INTRAVENOUS at 23:55

## 2020-06-10 RX ADMIN — FENTANYL CITRATE 75 MCG: 50 INJECTION, SOLUTION INTRAMUSCULAR; INTRAVENOUS at 19:21

## 2020-06-10 RX ADMIN — FENTANYL CITRATE 50 MCG: 50 INJECTION, SOLUTION INTRAMUSCULAR; INTRAVENOUS at 02:15

## 2020-06-10 RX ADMIN — FENTANYL CITRATE 50 MCG: 50 INJECTION, SOLUTION INTRAMUSCULAR; INTRAVENOUS at 01:22

## 2020-06-10 RX ADMIN — FAMOTIDINE 20 MG: 10 INJECTION, SOLUTION INTRAVENOUS at 01:52

## 2020-06-10 RX ADMIN — ONDANSETRON 4 MG: 2 INJECTION INTRAMUSCULAR; INTRAVENOUS at 17:46

## 2020-06-10 RX ADMIN — PIPERACILLIN AND TAZOBACTAM 3.38 G: 3; .375 INJECTION, POWDER, FOR SOLUTION INTRAVENOUS at 19:10

## 2020-06-10 RX ADMIN — IOPAMIDOL 75 ML: 755 INJECTION, SOLUTION INTRAVENOUS at 18:03

## 2020-06-10 RX ADMIN — ONDANSETRON 4 MG: 2 INJECTION INTRAMUSCULAR; INTRAVENOUS at 01:52

## 2020-06-10 RX ADMIN — HYDROMORPHONE HYDROCHLORIDE 1 MG: 1 INJECTION, SOLUTION INTRAMUSCULAR; INTRAVENOUS; SUBCUTANEOUS at 22:45

## 2020-06-10 RX ADMIN — Medication 10 ML: at 22:45

## 2020-06-10 RX ADMIN — SODIUM CHLORIDE 1000 ML: 9 INJECTION, SOLUTION INTRAVENOUS at 17:44

## 2020-06-10 RX ADMIN — GABAPENTIN 300 MG: 300 CAPSULE ORAL at 23:16

## 2020-06-10 ASSESSMENT — PAIN DESCRIPTION - ORIENTATION
ORIENTATION: RIGHT;LOWER;UPPER

## 2020-06-10 ASSESSMENT — PAIN DESCRIPTION - LOCATION
LOCATION: ABDOMEN

## 2020-06-10 ASSESSMENT — PAIN DESCRIPTION - PAIN TYPE
TYPE: ACUTE PAIN

## 2020-06-10 ASSESSMENT — ENCOUNTER SYMPTOMS
SORE THROAT: 0
ABDOMINAL PAIN: 1
BACK PAIN: 0
SHORTNESS OF BREATH: 0
COUGH: 0
NAUSEA: 1
VOMITING: 1
DIARRHEA: 1

## 2020-06-10 ASSESSMENT — PAIN SCALES - GENERAL
PAINLEVEL_OUTOF10: 10
PAINLEVEL_OUTOF10: 8
PAINLEVEL_OUTOF10: 10
PAINLEVEL_OUTOF10: 8
PAINLEVEL_OUTOF10: 10
PAINLEVEL_OUTOF10: 9
PAINLEVEL_OUTOF10: 10
PAINLEVEL_OUTOF10: 10
PAINLEVEL_OUTOF10: 7

## 2020-06-10 ASSESSMENT — PAIN DESCRIPTION - FREQUENCY: FREQUENCY: CONTINUOUS

## 2020-06-10 ASSESSMENT — PAIN DESCRIPTION - DESCRIPTORS
DESCRIPTORS: SHARP

## 2020-06-10 ASSESSMENT — PAIN - FUNCTIONAL ASSESSMENT: PAIN_FUNCTIONAL_ASSESSMENT: 0-10

## 2020-06-10 NOTE — ED PROVIDER NOTES
Years: 20.00     Pack years: 10.00     Types: Cigarettes     Last attempt to quit: 3/1/2020     Years since quittin.2    Smokeless tobacco: Never Used   Substance and Sexual Activity    Alcohol use: No    Drug use: Yes     Types: Marijuana, Opiates      Comment: pt states she took two hits for new years so she could sleep. Urine drug screen from hospitalization on 20 was positive for amphetamines and cocaine.  Sexual activity: Not Currently   Lifestyle    Physical activity     Days per week: None     Minutes per session: None    Stress: None   Relationships    Social connections     Talks on phone: None     Gets together: None     Attends Congregation service: None     Active member of club or organization: None     Attends meetings of clubs or organizations: None     Relationship status: None    Intimate partner violence     Fear of current or ex partner: None     Emotionally abused: None     Physically abused: None     Forced sexual activity: None   Other Topics Concern    None   Social History Narrative    None       SCREENINGS               PHYSICAL EXAM    (up to 7 for level 4, 8 or more for level 5)     ED Triage Vitals [06/10/20 0113]   BP Temp Temp Source Pulse Resp SpO2 Height Weight   115/63 98.4 °F (36.9 °C) Oral 93 24 97 % 5' 1\" (1.549 m) 130 lb (59 kg)       Physical Exam  Vitals signs and nursing note reviewed. Constitutional:       General: She is in acute distress. Appearance: Normal appearance. She is not ill-appearing. HENT:      Head: Normocephalic and atraumatic. Nose: Nose normal. No congestion. Mouth/Throat:      Mouth: Mucous membranes are moist.   Eyes:      Conjunctiva/sclera: Conjunctivae normal.   Neck:      Musculoskeletal: Normal range of motion and neck supple. Cardiovascular:      Rate and Rhythm: Normal rate and regular rhythm. Pulses: Normal pulses. Heart sounds: Normal heart sounds. No murmur.    Pulmonary:      Effort: Pulmonary ALT 6 (*)     AST 10 (*)     All other components within normal limits    Narrative:     Performed at:  [address] [place]   Phone [phone]   URINE RT REFLEX TO CULTURE - Abnormal; Notable for the following components:    Leukocyte Esterase, Urine TRACE (*)     All other components within normal limits    Narrative:     Performed at:  [address],  [place], [place]   Phone [phone]   MICROSCOPIC URINALYSIS - Abnormal; Notable for the following components:    Mucus, UA Rare (*)     WBC, UA 10-20 (*)     Epithelial Cells, UA 11-20 (*)     Bacteria, UA 1+ (*)     All other components within normal limits    Narrative:     Performed at:  [address],  [place], [place]   Phone [phone]   CULTURE, URINE   LIPASE    Narrative:     Performed at:  [address], [place]   Phone [phone]   TROPONIN    Narrative:     Performed at:  [address], [place] [place]   Phone [phone]       All other labs were within normal range or not returned as of this dictation. EMERGENCY DEPARTMENT COURSE and DIFFERENTIAL DIAGNOSIS/MDM:   Vitals:    Vitals:    06/10/20 0113 06/10/20 0314 06/10/20 0427   BP: 115/63 96/62 100/65   Pulse: 93 88 88   Resp: 24 18 18   Temp: 98.4 °F (36.9 °C)     TempSrc: Oral     SpO2: 97% 100% 95%   Weight: 130 lb (59 kg)     Height: 5' 1\" (1.549 m)         Patient evaluated and previous record reviewed. Patient presents with abdominal pain. Vital signs stable and within normal limits. Physical exam as documented above. Differential includes gallstones, cholecystitis, GERD, pancreatitis, ACS, angina.   Lab work-up notable for leukocytosis, liver enzymes slightly low, bilirubin within normal limits, lipase within normal limits, troponin negative, UA contaminated. Ultrasound of the gallbladder shows biliary sludge without secondary signs of infection or obstruction. Patient was given pain medication, nausea medication, and Pepcid. On reevaluation she is resting comfortably and has been sleeping. Patient already has follow-up scheduled with GI later this week. We will also give her information to follow-up with surgery to discuss removal of her gallbladder. Patient discharged home with return precautions. Patient voices understanding and is amenable with plan. CONSULTS:  None    PROCEDURES:  Unless otherwise noted below, none     Procedures      FINAL IMPRESSION      1. Abdominal pain, right upper quadrant    2. Biliary sludge          DISPOSITION/PLAN   DISPOSITION        PATIENT REFERRED TO:  Basilia Joiner MD  [address] [place] [address]  Joe: [address] [place] [address]  [phone]    Schedule an appointment as soon as possible for a visit         DISCHARGE MEDICATIONS:  New Prescriptions    No medications on file     Controlled Substances Monitoring:     No flowsheet data found.     (Please note that portions of this note were completed with a voice recognition program.  Efforts were made to edit the dictations but occasionally words are mis-transcribed.)    Diogo Wilkerson MD (electronically signed)  Attending Emergency Physician            Maximo Askew MD  06/10/20 6050

## 2020-06-10 NOTE — ED NOTES
1916- perfect serve to Dr. Lata Kwok for consult per Dr. Kareen Hussein. Jimbo Pod  06/10/20 Veena Kwok returned page spoke with Dr. Kareen Hussein.      Jimbo Pod  06/10/20 1949

## 2020-06-10 NOTE — ED NOTES
General surgery notified per Dr. Lashell Matias for cholysisitis     2842- Call was returned by Dr. Carl Lovell.    425 Kasi Clements  06/10/20 225 [facility]  06/10/20 Steven Samson  06/10/20 5849

## 2020-06-10 NOTE — ED NOTES
1859- Perfect serve sent to Dr. Chadwick Police for admission      Robbietess Thompson  06/10/20 1900

## 2020-06-11 ENCOUNTER — APPOINTMENT (OUTPATIENT)
Dept: INTERVENTIONAL RADIOLOGY/VASCULAR | Age: 65
DRG: 720 | End: 2020-06-11
Payer: COMMERCIAL

## 2020-06-11 ENCOUNTER — CARE COORDINATION (OUTPATIENT)
Dept: CARE COORDINATION | Age: 65
End: 2020-06-11

## 2020-06-11 ENCOUNTER — APPOINTMENT (OUTPATIENT)
Dept: CT IMAGING | Age: 65
DRG: 720 | End: 2020-06-11
Payer: COMMERCIAL

## 2020-06-11 LAB
A/G RATIO: 1 (ref 1.1–2.2)
ALBUMIN SERPL-MCNC: 3.2 G/DL (ref 3.4–5)
ALP BLD-CCNC: 85 U/L (ref 40–129)
ALPHA-1 ANTITRYPSIN: 167 MG/DL (ref 90–200)
ALT SERPL-CCNC: <5 U/L (ref 10–40)
ANION GAP SERPL CALCULATED.3IONS-SCNC: 9 MMOL/L (ref 3–16)
AST SERPL-CCNC: 10 U/L (ref 15–37)
BASOPHILS ABSOLUTE: 0 K/UL (ref 0–0.2)
BASOPHILS RELATIVE PERCENT: 0.2 %
BILIRUB SERPL-MCNC: 0.4 MG/DL (ref 0–1)
BUN BLDV-MCNC: 10 MG/DL (ref 7–20)
CALCIUM SERPL-MCNC: 8.2 MG/DL (ref 8.3–10.6)
CHLORIDE BLD-SCNC: 105 MMOL/L (ref 99–110)
CO2: 19 MMOL/L (ref 21–32)
CREAT SERPL-MCNC: 0.7 MG/DL (ref 0.6–1.2)
EKG ATRIAL RATE: 86 BPM
EKG DIAGNOSIS: NORMAL
EKG P AXIS: 70 DEGREES
EKG P-R INTERVAL: 134 MS
EKG Q-T INTERVAL: 362 MS
EKG QRS DURATION: 88 MS
EKG QTC CALCULATION (BAZETT): 433 MS
EKG R AXIS: 50 DEGREES
EKG T AXIS: 85 DEGREES
EKG VENTRICULAR RATE: 86 BPM
EOSINOPHILS ABSOLUTE: 0.1 K/UL (ref 0–0.6)
EOSINOPHILS RELATIVE PERCENT: 0.7 %
GFR AFRICAN AMERICAN: >60
GFR NON-AFRICAN AMERICAN: >60
GLOBULIN: 3.3 G/DL
GLUCOSE BLD-MCNC: 122 MG/DL (ref 70–99)
HCT VFR BLD CALC: 34.7 % (ref 36–48)
HEMOGLOBIN: 11.1 G/DL (ref 12–16)
INR BLD: 1.19 (ref 0.86–1.14)
LACTIC ACID, SEPSIS: 1.1 MMOL/L (ref 0.4–1.9)
LYMPHOCYTES ABSOLUTE: 2.4 K/UL (ref 1–5.1)
LYMPHOCYTES RELATIVE PERCENT: 18.1 %
MCH RBC QN AUTO: 29.6 PG (ref 26–34)
MCHC RBC AUTO-ENTMCNC: 31.9 G/DL (ref 31–36)
MCV RBC AUTO: 92.8 FL (ref 80–100)
MITOCHONDRIAL M2 AB, IGG: 9 UNITS (ref 0–20)
MONOCYTES ABSOLUTE: 0.8 K/UL (ref 0–1.3)
MONOCYTES RELATIVE PERCENT: 6 %
NEUTROPHILS ABSOLUTE: 9.9 K/UL (ref 1.7–7.7)
NEUTROPHILS RELATIVE PERCENT: 75 %
PDW BLD-RTO: 16.7 % (ref 12.4–15.4)
PLATELET # BLD: 274 K/UL (ref 135–450)
PMV BLD AUTO: 7.8 FL (ref 5–10.5)
POTASSIUM REFLEX MAGNESIUM: 3.8 MMOL/L (ref 3.5–5.1)
PROTHROMBIN TIME: 13.8 SEC (ref 10–13.2)
RBC # BLD: 3.74 M/UL (ref 4–5.2)
SODIUM BLD-SCNC: 133 MMOL/L (ref 136–145)
TOTAL PROTEIN: 6.5 G/DL (ref 6.4–8.2)
URINE CULTURE, ROUTINE: NORMAL
WBC # BLD: 13.1 K/UL (ref 4–11)

## 2020-06-11 PROCEDURE — 2580000003 HC RX 258: Performed by: INTERNAL MEDICINE

## 2020-06-11 PROCEDURE — 99232 SBSQ HOSP IP/OBS MODERATE 35: CPT | Performed by: INTERNAL MEDICINE

## 2020-06-11 PROCEDURE — 87040 BLOOD CULTURE FOR BACTERIA: CPT

## 2020-06-11 PROCEDURE — 2500000003 HC RX 250 WO HCPCS: Performed by: INTERNAL MEDICINE

## 2020-06-11 PROCEDURE — 6370000000 HC RX 637 (ALT 250 FOR IP): Performed by: INTERNAL MEDICINE

## 2020-06-11 PROCEDURE — 87075 CULTR BACTERIA EXCEPT BLOOD: CPT

## 2020-06-11 PROCEDURE — 36573 INSJ PICC RS&I 5 YR+: CPT

## 2020-06-11 PROCEDURE — 36415 COLL VENOUS BLD VENIPUNCTURE: CPT

## 2020-06-11 PROCEDURE — 94640 AIRWAY INHALATION TREATMENT: CPT

## 2020-06-11 PROCEDURE — 0F9430Z DRAINAGE OF GALLBLADDER WITH DRAINAGE DEVICE, PERCUTANEOUS APPROACH: ICD-10-PCS | Performed by: RADIOLOGY

## 2020-06-11 PROCEDURE — 02H633Z INSERTION OF INFUSION DEVICE INTO RIGHT ATRIUM, PERCUTANEOUS APPROACH: ICD-10-PCS | Performed by: RADIOLOGY

## 2020-06-11 PROCEDURE — 6360000002 HC RX W HCPCS: Performed by: INTERNAL MEDICINE

## 2020-06-11 PROCEDURE — 85025 COMPLETE CBC W/AUTO DIFF WBC: CPT

## 2020-06-11 PROCEDURE — 87205 SMEAR GRAM STAIN: CPT

## 2020-06-11 PROCEDURE — 2580000003 HC RX 258: Performed by: NURSE PRACTITIONER

## 2020-06-11 PROCEDURE — 87070 CULTURE OTHR SPECIMN AEROBIC: CPT

## 2020-06-11 PROCEDURE — 93010 ELECTROCARDIOGRAM REPORT: CPT | Performed by: INTERNAL MEDICINE

## 2020-06-11 PROCEDURE — 80053 COMPREHEN METABOLIC PANEL: CPT

## 2020-06-11 PROCEDURE — 47490 INCISION OF GALLBLADDER: CPT

## 2020-06-11 PROCEDURE — 2060000000 HC ICU INTERMEDIATE R&B

## 2020-06-11 PROCEDURE — 83605 ASSAY OF LACTIC ACID: CPT

## 2020-06-11 PROCEDURE — C1751 CATH, INF, PER/CENT/MIDLINE: HCPCS

## 2020-06-11 PROCEDURE — 99254 IP/OBS CNSLTJ NEW/EST MOD 60: CPT | Performed by: SURGERY

## 2020-06-11 RX ORDER — SODIUM CHLORIDE 0.9 % (FLUSH) 0.9 %
10 SYRINGE (ML) INJECTION EVERY 12 HOURS SCHEDULED
Status: DISCONTINUED | OUTPATIENT
Start: 2020-06-11 | End: 2020-06-18 | Stop reason: HOSPADM

## 2020-06-11 RX ORDER — LORAZEPAM 2 MG/ML
0.5 INJECTION INTRAMUSCULAR ONCE
Status: COMPLETED | OUTPATIENT
Start: 2020-06-11 | End: 2020-06-11

## 2020-06-11 RX ORDER — LIDOCAINE HYDROCHLORIDE 10 MG/ML
5 INJECTION, SOLUTION INFILTRATION; PERINEURAL ONCE
Status: DISCONTINUED | OUTPATIENT
Start: 2020-06-11 | End: 2020-06-13 | Stop reason: SDUPTHER

## 2020-06-11 RX ORDER — SODIUM CHLORIDE 0.9 % (FLUSH) 0.9 %
10 SYRINGE (ML) INJECTION PRN
Status: DISCONTINUED | OUTPATIENT
Start: 2020-06-11 | End: 2020-06-18 | Stop reason: HOSPADM

## 2020-06-11 RX ADMIN — HYDROMORPHONE HYDROCHLORIDE 1 MG: 1 INJECTION, SOLUTION INTRAMUSCULAR; INTRAVENOUS; SUBCUTANEOUS at 17:33

## 2020-06-11 RX ADMIN — SODIUM CHLORIDE: 9 INJECTION, SOLUTION INTRAVENOUS at 21:09

## 2020-06-11 RX ADMIN — SUCRALFATE 1 G: 1 TABLET ORAL at 06:12

## 2020-06-11 RX ADMIN — HYDROMORPHONE HYDROCHLORIDE 1 MG: 1 INJECTION, SOLUTION INTRAMUSCULAR; INTRAVENOUS; SUBCUTANEOUS at 06:17

## 2020-06-11 RX ADMIN — SUCRALFATE 1 G: 1 TABLET ORAL at 16:30

## 2020-06-11 RX ADMIN — SUCRALFATE 1 G: 1 TABLET ORAL at 11:29

## 2020-06-11 RX ADMIN — SODIUM CHLORIDE: 9 INJECTION, SOLUTION INTRAVENOUS at 21:07

## 2020-06-11 RX ADMIN — MEROPENEM 1 G: 1 INJECTION, POWDER, FOR SOLUTION INTRAVENOUS at 00:56

## 2020-06-11 RX ADMIN — SODIUM CHLORIDE: 9 INJECTION, SOLUTION INTRAVENOUS at 17:35

## 2020-06-11 RX ADMIN — LORAZEPAM 0.5 MG: 2 INJECTION INTRAMUSCULAR; INTRAVENOUS at 12:08

## 2020-06-11 RX ADMIN — DULOXETINE HYDROCHLORIDE 60 MG: 60 CAPSULE, DELAYED RELEASE ORAL at 08:45

## 2020-06-11 RX ADMIN — HYDROMORPHONE HYDROCHLORIDE 1 MG: 1 INJECTION, SOLUTION INTRAMUSCULAR; INTRAVENOUS; SUBCUTANEOUS at 21:08

## 2020-06-11 RX ADMIN — PANTOPRAZOLE SODIUM 40 MG: 40 TABLET, DELAYED RELEASE ORAL at 06:12

## 2020-06-11 RX ADMIN — Medication 10 ML: at 21:08

## 2020-06-11 RX ADMIN — GABAPENTIN 300 MG: 300 CAPSULE ORAL at 21:07

## 2020-06-11 RX ADMIN — HYDROMORPHONE HYDROCHLORIDE 1 MG: 1 INJECTION, SOLUTION INTRAMUSCULAR; INTRAVENOUS; SUBCUTANEOUS at 08:47

## 2020-06-11 RX ADMIN — TIOTROPIUM BROMIDE INHALATION SPRAY 2 PUFF: 3.12 SPRAY, METERED RESPIRATORY (INHALATION) at 06:35

## 2020-06-11 RX ADMIN — TRAZODONE HYDROCHLORIDE 50 MG: 50 TABLET ORAL at 21:07

## 2020-06-11 RX ADMIN — FAMOTIDINE 10 MG: 10 TABLET ORAL at 08:45

## 2020-06-11 RX ADMIN — ALBUTEROL SULFATE 2.5 MG: 2.5 SOLUTION RESPIRATORY (INHALATION) at 11:27

## 2020-06-11 RX ADMIN — GABAPENTIN 300 MG: 300 CAPSULE ORAL at 08:45

## 2020-06-11 RX ADMIN — FAMOTIDINE 10 MG: 10 TABLET ORAL at 21:07

## 2020-06-11 RX ADMIN — HYDROMORPHONE HYDROCHLORIDE 1 MG: 1 INJECTION, SOLUTION INTRAMUSCULAR; INTRAVENOUS; SUBCUTANEOUS at 11:29

## 2020-06-11 RX ADMIN — MEROPENEM 1 G: 1 INJECTION, POWDER, FOR SOLUTION INTRAVENOUS at 12:08

## 2020-06-11 RX ADMIN — ASPIRIN 81 MG: 81 TABLET, COATED ORAL at 08:45

## 2020-06-11 RX ADMIN — SODIUM CHLORIDE: 9 INJECTION, SOLUTION INTRAVENOUS at 07:53

## 2020-06-11 RX ADMIN — HYDROMORPHONE HYDROCHLORIDE 1 MG: 1 INJECTION, SOLUTION INTRAMUSCULAR; INTRAVENOUS; SUBCUTANEOUS at 02:56

## 2020-06-11 ASSESSMENT — PAIN DESCRIPTION - ONSET
ONSET: ON-GOING

## 2020-06-11 ASSESSMENT — PAIN DESCRIPTION - DESCRIPTORS
DESCRIPTORS: ACHING;SHARP
DESCRIPTORS: SHARP
DESCRIPTORS: ACHING;SHARP
DESCRIPTORS: PRESSURE;SHARP
DESCRIPTORS: SHARP

## 2020-06-11 ASSESSMENT — PAIN - FUNCTIONAL ASSESSMENT
PAIN_FUNCTIONAL_ASSESSMENT: PREVENTS OR INTERFERES SOME ACTIVE ACTIVITIES AND ADLS
PAIN_FUNCTIONAL_ASSESSMENT: PREVENTS OR INTERFERES WITH ALL ACTIVE AND SOME PASSIVE ACTIVITIES
PAIN_FUNCTIONAL_ASSESSMENT: PREVENTS OR INTERFERES SOME ACTIVE ACTIVITIES AND ADLS

## 2020-06-11 ASSESSMENT — PAIN DESCRIPTION - ORIENTATION
ORIENTATION: RIGHT;LOWER;UPPER
ORIENTATION: MID
ORIENTATION: MID
ORIENTATION: RIGHT;LOWER;UPPER
ORIENTATION: MID

## 2020-06-11 ASSESSMENT — PAIN DESCRIPTION - FREQUENCY
FREQUENCY: CONTINUOUS

## 2020-06-11 ASSESSMENT — PAIN DESCRIPTION - LOCATION
LOCATION: ABDOMEN

## 2020-06-11 ASSESSMENT — PAIN DESCRIPTION - PAIN TYPE
TYPE: ACUTE PAIN

## 2020-06-11 ASSESSMENT — PAIN SCALES - GENERAL
PAINLEVEL_OUTOF10: 10
PAINLEVEL_OUTOF10: 0
PAINLEVEL_OUTOF10: 10
PAINLEVEL_OUTOF10: 8
PAINLEVEL_OUTOF10: 10
PAINLEVEL_OUTOF10: 0
PAINLEVEL_OUTOF10: 10
PAINLEVEL_OUTOF10: 7

## 2020-06-11 ASSESSMENT — PAIN DESCRIPTION - PROGRESSION
CLINICAL_PROGRESSION: NOT CHANGED
CLINICAL_PROGRESSION: GRADUALLY WORSENING
CLINICAL_PROGRESSION: GRADUALLY WORSENING

## 2020-06-11 NOTE — CONSULTS
Labs     06/10/20  0120 06/10/20  1723 06/11/20  0401   WBC 12.9* 16.7* 13.1*   HGB 11.9* 11.5* 11.1*   HCT 36.2 35.5* 34.7*    331 274     BMP:    Recent Labs     06/10/20  0120 06/10/20  1723 06/11/20  0401   * 132* 133*   K 3.8 3.6 3.8    99 105   CO2 24 22 19*   BUN 20 18 10   CREATININE 1.2 1.1 0.7   GLUCOSE 115* 123* 122*     Hepatic:   Recent Labs     06/10/20  0120 06/10/20  1723 06/11/20  0401   AST 10* 10* 10*   ALT 6* 6* <5*   BILITOT 0.4 0.5 0.4   ALKPHOS 91 95 85     Mag:    No results for input(s): MG in the last 72 hours. Phos:   No results for input(s): PHOS in the last 72 hours. INR: No results for input(s): INR in the last 72 hours. Radiology Review: Images personally reviewed by me. Ultrasound shows contracted gallbladder. CT shows findings concerning for perforated gallbladder      IMPRESSION/RECOMMENDATIONS:    Likely acute cholecystitis with perforation. Plan for percutaneous drainage today. Continue IV fluid hydration, IV antibiotics, and parenteral narcotics as needed. She will need interval cholecystectomy once the acute episode has resolved.     Electronically signed by Jenifer Martins MD     [address] [place] [address]

## 2020-06-11 NOTE — PROGRESS NOTES
Attempted x 4 times to re-start IV without success. Clinical called several times to stick patient, no answer. Will continue to keep calling.

## 2020-06-11 NOTE — ED PROVIDER NOTES
([email or web address]     Arterial occlusion     Centrilobular emphysema ([facility] [address] 2019    Chronic bilateral low back pain with bilateral sciatica 8/3/2018    Chronic bilateral low back pain with bilateral sciatica     Hyperlipidemia     Hypertension     MDRO (multiple drug resistant organisms) resistance 2020    Escherichia coli-URINE    Metabolic encephalopathy     Moderate single current episode of major depressive disorder ([facility] [address] 8/3/2018    Ruptured disk          SURGICAL HISTORY       Past Surgical History:   Procedure Laterality Date     SECTION  1981    LEG SURGERY  2017    fasciotomy due to DVT, LLE         CURRENT MEDICATIONS       Previous Medications    ALBUTEROL SULFATE  (90 BASE) MCG/ACT INHALER    INHALE 2 PUFFS INTO THE LUNGS EVERY 6 HOURS AS NEEDED FOR WHEEZING    ASPIRIN LOW DOSE 81 MG EC TABLET    TAKE 1 TABLET BY MOUTH DAILY    ATORVASTATIN (LIPITOR) 80 MG TABLET    TAKE 1 TABLET BY MOUTH NIGHTLY    CETIRIZINE (ZYRTEC) 10 MG TABLET    TAKE 1 TABLET BY MOUTH DAILY    DULOXETINE (CYMBALTA) 60 MG EXTENDED RELEASE CAPSULE    TAKE 1 CAPSULE BY MOUTH DAILY    FAMOTIDINE (PEPCID) 20 MG TABLET    TAKE 1 TABLET BY MOUTH TWICE DAILY    FLUTICASONE (FLONASE) 50 MCG/ACT NASAL SPRAY    USE 2 SPRAYS IN EACH NOSTRIL DAILY    GABAPENTIN (NEURONTIN) 400 MG CAPSULE    Take 1 capsule by mouth 3 times daily for 30 days. LIDOCAINE (LIDODERM) 5 %    PLACE 1 PATCH ONTO THE SKIN DAILY 12 HOURS ON, 12 HOURS OFF.    LISINOPRIL (PRINIVIL;ZESTRIL) 40 MG TABLET    TAKE 1 TABLET BY MOUTH DAILY    NICOTINE (NICODERM CQ) 7 MG/24HR    Place 1 patch onto the skin daily for 14 days    NICOTINE (NICODERM CQ) 7 MG/24HR    PLACE 1 PATCH ONTO THE SKIN DAILY (ROTATE SITES)    NITROGLYCERIN (NITROSTAT) 0.4 MG SL TABLET    up to max of 3 total doses. If no relief after 1 dose, call 911.     OMEPRAZOLE (PRILOSEC) 20 MG DELAYED RELEASE CAPSULE    Take 1 capsule by mouth every morning (before breakfast)    SPIRIVA HANDIHALER 18 MCG INHALATION CAPSULE    INHALE 1 CAPSULE INTO THE LUNGS DAILY    SUCRALFATE (CARAFATE) 1 GM TABLET    Take 1 tablet by mouth 3 times daily    TIZANIDINE (ZANAFLEX) 2 MG TABLET    TAKE 1 TABLET BY MOUTH 2 TIMES DAILY AS NEEDED (MUSCLE SPASM AND PAIN)    TRAZODONE (DESYREL) 50 MG TABLET    TAKE 1 TABLET BY MOUTH NIGHTLY    VENTOLIN  (90 BASE) MCG/ACT INHALER    Inhale 2 puffs into the lungs every 6 hours as needed for Wheezing    VITAMIN D (ERGOCALCIFEROL) 1.25 MG (37233 UT) CAPS CAPSULE    TAKE 1 CAPSULE BY MOUTH ONCE A WEEK FOR 12 DOSES       ALLERGIES     Codeine    FAMILY HISTORY       Family History   Problem Relation Age of Onset    High Blood Pressure Mother     High Cholesterol Mother     Heart Disease Mother     Coronary Art Dis Mother     Heart Attack Father         59    Cirrhosis Sister     Alcohol Abuse Sister     Other Sister         \"colostomy bag due to hole in her colon\"   Jose Alberto Abdi Sudden Death Brother         \"suicide\"    Colon Cancer Other         maternal uncle    Breast Cancer Other         multiple maternal aunts along with uterine cancer          SOCIAL HISTORY       Social History     Socioeconomic History    Marital status:      Spouse name: None    Number of children: 2    Years of education: None    Highest education level: Some college, no degree   Occupational History    None   Social Needs    Financial resource strain: Not very hard    Food insecurity     Worry: Never true     Inability: Never true    Transportation needs     Medical: No     Non-medical: No   Tobacco Use    Smoking status: Current Every Day Smoker     Packs/day: 0.50     Years: 20.00     Pack years: 10.00     Types: Cigarettes     Last attempt to quit: 3/1/2020     Years since quittin.2    Smokeless tobacco: Never Used   Substance and Sexual Activity    Alcohol use: No    Drug use: Yes     Types: Marijuana, Opiates      Comment: pt states she took two hits for new years so

## 2020-06-11 NOTE — ED NOTES
RN requested MD for order for Blood cultures before zosyn. MD states that they are not needed.       Libra Larsen RN  06/10/20 [id number]

## 2020-06-11 NOTE — PROGRESS NOTES
Patient pulled tele monitor off again, pulled on RUQ drain that placed in radiology also, about half way out of skin. Call placed to Dr. Analy Contreras at this time.

## 2020-06-11 NOTE — PROGRESS NOTES
Progress Note    Admit Date:  6/10/2020    Presented with abdominal pain she has ruptured gallbladder      Subjective:  Ms. Valdivia Raw C/O  of increasing abdominal pain today. She is anxious. Dilaudid Dose increased . Seen by general surgeon    Objective:     /72   Pulse 97   Temp 98.9 °F (37.2 °C) (Oral)   Resp 16   Ht 5' 1\" (1.549 m)   Wt 140 lb (63.5 kg)   SpO2 90%   BMI 26.45 kg/m²       Intake/Output Summary (Last 24 hours) at 6/11/2020 1153  Last data filed at 6/11/2020 0753  Gross per 24 hour   Intake 2080 ml   Output --   Net 2080 ml       Physical Exam:  Gen: moderate distress. Alert. Awake, oriented   Eyes:  No sclera icterus. No conjunctival injection. ENT: No discharge. Pharynx clear. Neck: No JVD. Trachea midline. Resp: No accessory muscle use. No crackles. No wheezes. No rhonchi. CV: Regular rate. Regular rhythm. No murmur. No rub. No edema. Capillary Refill: Brisk,< 3 seconds   Peripheral Pulses: +2 palpable, equal bilaterally   GI: Soft, Diffuse tenderness to palpation. Markedly tender in RUQ and to a lesser degree epigastric and RLQ. Significant guarding. Rebound tenderness in RUQ. Non-distended. BS + x4. Skin: Warm and dry. No nodule on exposed extremities. No rash on exposed extremities. M/S: No cyanosis. No joint deformity. No clubbing. Neuro: Awake. Grossly nonfocal    Psych: Oriented x 3. + anxiety.       Scheduled Meds:   aspirin  81 mg Oral Daily    DULoxetine  60 mg Oral Daily    fluticasone  1 spray Each Nostril Daily    lidocaine  1 patch Transdermal Daily    nicotine  1 patch Transdermal Daily    pantoprazole  40 mg Oral QAM AC    tiotropium  2 puff Inhalation Daily    sucralfate  1 g Oral TID AC    traZODone  50 mg Oral Nightly    sodium chloride flush  10 mL Intravenous 2 times per day    enoxaparin  40 mg Subcutaneous Daily    famotidine  10 mg Oral BID    meropenem  1 g Intravenous Q12H    gabapentin  300 mg Oral TID       Continuous Infusions:   sodium chloride 150 mL/hr at 06/11/20 0753       PRN Meds:  HYDROmorphone, albuterol, nitroGLYCERIN, tiZANidine, sodium chloride flush, acetaminophen **OR** acetaminophen, prochlorperazine      Data:  CBC:   Recent Labs     06/10/20  0120 06/10/20  1723 06/11/20  0401   WBC 12.9* 16.7* 13.1*   HGB 11.9* 11.5* 11.1*   HCT 36.2 35.5* 34.7*   MCV 90.3 91.7 92.8    331 274     BMP:   Recent Labs     06/10/20  0120 06/10/20  1723 06/11/20  0401   * 132* 133*   K 3.8 3.6 3.8    99 105   CO2 24 22 19*   BUN 20 18 10   CREATININE 1.2 1.1 0.7     LIVER PROFILE:   Recent Labs     06/09/20  1419 06/10/20  0120 06/10/20  1723 06/11/20  0401   AST 13* 10* 10* 10*   ALT 7* 6* 6* <5*   LIPASE  --  25.0 16.0  --    BILIDIR <0.2  --   --   --    BILITOT 0.4 0.4 0.5 0.4   ALKPHOS 64 91 95 85     PT/INR:   Recent Labs     06/10/20  1723   PROTIME 13.8*   INR 1.19*       CULTURES    Blood cx x2: pending     RADIOLOGY    CT ABDOMEN PELVIS W IV CONTRAST Additional Contrast? None   Final Result   1. Loculated right perihepatic fluid collection which appears contiguous with   the gallbladder fundus, possibly indicating contained perforation with   biloma. Consider imaging follow-up with hepatobiliary scan or sampling of   the fluid. Surgical consultation recommended. 2. Mild dependent changes in the right lung base. 3. Colonic diverticulosis with no acute features. Findings were discussed with Boston Marie at 6:35 pm on 6/10/2020.          CT PERC CHOLECYSTOSTOMY    (Results Pending)     Assessment/Plan:    Perforated Gallbladder  - loculated, appears contained per Gen surg  - Admit to PCU, tele  - NPO, IVF, Merrem D#2, PRN Dilaudid  - Gen surgery consult, plan for perc cholecystostomy drain today   - will need interval cholecystectomy on acute episode resolves    Sepsis  - w/ leukocytosis, tachycardia and tachypnea  - 2/2 above  - check blood cx and lactic acid; given 1L bolus in ED  - abx as

## 2020-06-11 NOTE — H&P
Kwesi Friday, APRN - CNP   nicotine (NICODERM CQ) 7 MG/24HR Place 1 patch onto the skin daily for 14 days  Patient not taking: Reported on 5/1/2020 5/31/19 2/27/20  Bertin Brewer MD   VENTOLIN  (90 Base) MCG/ACT inhaler Inhale 2 puffs into the lungs every 6 hours as needed for Wheezing 5/31/19   Bertin Brewer MD       Allergies:  Codeine    Social History:    TOBACCO:   reports that she has been smoking cigarettes. She has a 10.00 pack-year smoking history. She has never used smokeless tobacco.  ETOH:   reports no history of alcohol use. Family History:  Reviewed in detail and negative for DM, Early CAD, Cancer (except as below). Positive as follows:        Problem Relation Age of Onset    High Blood Pressure Mother     High Cholesterol Mother     Heart Disease Mother     Coronary Art Dis Mother     Heart Attack Father         59    Cirrhosis Sister     Alcohol Abuse Sister     Other Sister         \"colostomy bag due to hole in her colon\"   Risa Samples Sudden Death Brother         \"suicide\"    Colon Cancer Other         maternal uncle    Breast Cancer Other         multiple maternal aunts along with uterine cancer       REVIEW OF SYSTEMS:   Pertinent positives/negatives as follows: right-sided abdominal pain, nausea vomiting diarrhea, and as discussed in HPI, otherwise a complete ROS performed and all other systems are negative  PHYSICAL EXAM PERFORMED:    BP (!) 99/55   Pulse 86   Temp 99.8 °F (37.7 °C) (Oral)   Resp 22   Ht 5' 1\" (1.549 m)   Wt 130 lb (59 kg)   SpO2 96%   BMI 24.56 kg/m²     GEN:  A&Ox3, appears uncomfortable. HEENT:  NC/AT,EOMI, MMM, no erythema/exudates or visible masses. CVS:  Normal S1,S2. RRR. Without M/G/R.   LUNG:   CTA-B. no wheezes, rales or rhonchi  ABD:  Soft, ND. Diffuse tenderness to palpation. Markedly tender in right upper quadrant and to a lesser degree epigastric and right lower quadrant., BS+ x4. Significant guarding.   Some degree of rebound in the right

## 2020-06-11 NOTE — CARE COORDINATION
Case Management Assessment  Initial Evaluation    Date/Time of Evaluation: 6/11/2020 3:23 PM  Assessment Completed by: Suzan Perera    Patient Name: Prince Marie  YOB: 1955  Diagnosis: Sepsis [facility]) [A41.9]  Date / Time: 6/10/2020  4:13 PM  Admission status/Date:   6/10/20  Inpatient   Chart Reviewed: Yes      Patient Interviewed: Yes   Family Interviewed:  No      Hospitalization in the last 30 days:  No    Contacts  :     Relationship to Patient:   Phone Number:    Alternate Contact:     Relationship to Patient:     Phone Number:    Met with:    Current PCP  Vanda Rinne required for SNF : Y, N        3 night stay required - Y, N    ADLS  Support Systems: Family Members  Transportation: self    Meal Preparation: self    Housing  Home Environment: alone 3rd fl apt  Steps: 20   Plans to Return to Present Housing: Yes  Other Identified Issues:     Johnny Romo 80  Currently active with [address] : No  Type of Home Care Services: None  Passport/Waiver : No  :                      Phone Number:    Passport/Waiver Services:           Durable Medical Equipment   DME Provider:   Equipment:   Walker___Cane___RTS___ BSC___Shower Chair___  02__ at ____Liter(s)---Uses________HHN___ CPAP___ BiPap___ Hospital Bed___W/C____Other________      Has Home O2 in place on admit:  No    If above answer is No ---is pt presently on O2 during hospitalization:  No  if yes CM to follow for potential DC O2 need  Informed of need to bring portable home O2 tank on day of discharge for nursing to connect prior to leaving:   No  Verbalized agreement/Understanding:   No    Community Service Affiliation  Dialysis:  No    · Name:  · Location  · Dialysis Schedule:  · Phone:   · Fax:     Outpatient PT/OT: No    Cancer Center: No     CHF Clinic: No     Pulmonary Rehab: No  Pain Clinic: No  Community Mental Health: No    Wound Clinic: No     COVID

## 2020-06-11 NOTE — PROGRESS NOTES
treated with HHN at Home        c. Unable to demonstrate proper use of MDI with spacer     d. RR > 30 bpm   5. Bronchodilators will be delivered via Metered Dose Inhaler (MDI), HHN, Aerogen to intubated patients on mechanical ventilation. 6. Inhalation medication orders will be delivered and/or substituted as outlined below. Aerosolized Medications Ordering and Administration Guidelines:    1. All Medications will be ordered by a physician, and their frequency and/or modality will be adjusted as defined by the patients Respiratory Severity Index (RSI) score. 2. If the patient does not have documented COPD, consider discontinuing anticholinergics when RSI is less than 9.  3. If the bronchospasm worsens (increased RSI), then the bronchodilator frequency can be increased to a maximum of every 4 hours. If greater than every 4 hours is required, the physician will be contacted. 4. If the bronchospasm improves, the frequency of the bronchodilator can be decreased, based on the patient's RSI, but not less than home treatment regimen frequency. 5. Bronchodilator(s) will be discontinued if patient has a RSI less than 9 and has received no scheduled or as needed treatment for 72  Hrs. Patients Ordered on a Mucolytic Agent:    1. Must always be administered with a bronchodilator. 2. Discontinue if patient experiences worsened bronchospasm, or secretions have lessened to the point that the patient is able to clear them with a cough. Anti-inflammatory and Combination Medications:    1. If the patient lacks prior history of lung disease, is not using inhaled anti-inflammatory medication at home, and lacks wheezing by examination or by history for at least 24 hours, contact physician for possible discontinuation.

## 2020-06-12 ENCOUNTER — CARE COORDINATION (OUTPATIENT)
Dept: CARE COORDINATION | Age: 65
End: 2020-06-12

## 2020-06-12 LAB
ANION GAP SERPL CALCULATED.3IONS-SCNC: 11 MMOL/L (ref 3–16)
BUN BLDV-MCNC: 3 MG/DL (ref 7–20)
CALCIUM SERPL-MCNC: 8.1 MG/DL (ref 8.3–10.6)
CHLORIDE BLD-SCNC: 103 MMOL/L (ref 99–110)
CO2: 21 MMOL/L (ref 21–32)
CREAT SERPL-MCNC: <0.5 MG/DL (ref 0.6–1.2)
GFR AFRICAN AMERICAN: >60
GFR NON-AFRICAN AMERICAN: >60
GLUCOSE BLD-MCNC: 113 MG/DL (ref 70–99)
HCT VFR BLD CALC: 32.4 % (ref 36–48)
HEMOGLOBIN: 10.6 G/DL (ref 12–16)
MAGNESIUM: 1.5 MG/DL (ref 1.8–2.4)
MCH RBC QN AUTO: 29.7 PG (ref 26–34)
MCHC RBC AUTO-ENTMCNC: 32.7 G/DL (ref 31–36)
MCV RBC AUTO: 91 FL (ref 80–100)
PDW BLD-RTO: 15.8 % (ref 12.4–15.4)
PHOSPHORUS: 2 MG/DL (ref 2.5–4.9)
PLATELET # BLD: 294 K/UL (ref 135–450)
PMV BLD AUTO: 7.6 FL (ref 5–10.5)
POTASSIUM SERPL-SCNC: 3.6 MMOL/L (ref 3.5–5.1)
RBC # BLD: 3.56 M/UL (ref 4–5.2)
SODIUM BLD-SCNC: 135 MMOL/L (ref 136–145)
WBC # BLD: 11.4 K/UL (ref 4–11)

## 2020-06-12 PROCEDURE — 6360000002 HC RX W HCPCS: Performed by: INTERNAL MEDICINE

## 2020-06-12 PROCEDURE — 2500000003 HC RX 250 WO HCPCS: Performed by: INTERNAL MEDICINE

## 2020-06-12 PROCEDURE — 83735 ASSAY OF MAGNESIUM: CPT

## 2020-06-12 PROCEDURE — 80048 BASIC METABOLIC PNL TOTAL CA: CPT

## 2020-06-12 PROCEDURE — 99232 SBSQ HOSP IP/OBS MODERATE 35: CPT | Performed by: INTERNAL MEDICINE

## 2020-06-12 PROCEDURE — 85027 COMPLETE CBC AUTOMATED: CPT

## 2020-06-12 PROCEDURE — 2580000003 HC RX 258: Performed by: NURSE PRACTITIONER

## 2020-06-12 PROCEDURE — 2580000003 HC RX 258: Performed by: INTERNAL MEDICINE

## 2020-06-12 PROCEDURE — 6370000000 HC RX 637 (ALT 250 FOR IP): Performed by: INTERNAL MEDICINE

## 2020-06-12 PROCEDURE — 94640 AIRWAY INHALATION TREATMENT: CPT

## 2020-06-12 PROCEDURE — 99232 SBSQ HOSP IP/OBS MODERATE 35: CPT | Performed by: SURGERY

## 2020-06-12 PROCEDURE — 84100 ASSAY OF PHOSPHORUS: CPT

## 2020-06-12 PROCEDURE — 36415 COLL VENOUS BLD VENIPUNCTURE: CPT

## 2020-06-12 PROCEDURE — 2060000000 HC ICU INTERMEDIATE R&B

## 2020-06-12 RX ORDER — SODIUM CHLORIDE 9 MG/ML
INJECTION, SOLUTION INTRAVENOUS
Status: DISPENSED
Start: 2020-06-12 | End: 2020-06-13

## 2020-06-12 RX ORDER — GABAPENTIN 400 MG/1
400 CAPSULE ORAL 3 TIMES DAILY
Status: DISCONTINUED | OUTPATIENT
Start: 2020-06-12 | End: 2020-06-18 | Stop reason: HOSPADM

## 2020-06-12 RX ORDER — MAGNESIUM SULFATE IN WATER 40 MG/ML
2 INJECTION, SOLUTION INTRAVENOUS ONCE
Status: COMPLETED | OUTPATIENT
Start: 2020-06-12 | End: 2020-06-12

## 2020-06-12 RX ADMIN — ENOXAPARIN SODIUM 40 MG: 40 INJECTION SUBCUTANEOUS at 10:50

## 2020-06-12 RX ADMIN — Medication 10 ML: at 10:51

## 2020-06-12 RX ADMIN — POTASSIUM PHOSPHATE, MONOBASIC AND POTASSIUM PHOSPHATE, DIBASIC 15 MMOL: 224; 236 INJECTION, SOLUTION, CONCENTRATE INTRAVENOUS at 15:17

## 2020-06-12 RX ADMIN — MEROPENEM 1 G: 1 INJECTION, POWDER, FOR SOLUTION INTRAVENOUS at 14:00

## 2020-06-12 RX ADMIN — MEROPENEM 1 G: 1 INJECTION, POWDER, FOR SOLUTION INTRAVENOUS at 01:48

## 2020-06-12 RX ADMIN — DULOXETINE HYDROCHLORIDE 60 MG: 60 CAPSULE, DELAYED RELEASE ORAL at 10:49

## 2020-06-12 RX ADMIN — SUCRALFATE 1 G: 1 TABLET ORAL at 10:49

## 2020-06-12 RX ADMIN — MAGNESIUM SULFATE HEPTAHYDRATE 2 G: 40 INJECTION, SOLUTION INTRAVENOUS at 15:17

## 2020-06-12 RX ADMIN — HYDROMORPHONE HYDROCHLORIDE 1 MG: 1 INJECTION, SOLUTION INTRAMUSCULAR; INTRAVENOUS; SUBCUTANEOUS at 10:50

## 2020-06-12 RX ADMIN — FAMOTIDINE 10 MG: 10 TABLET ORAL at 20:31

## 2020-06-12 RX ADMIN — FAMOTIDINE 10 MG: 10 TABLET ORAL at 10:49

## 2020-06-12 RX ADMIN — Medication 10 ML: at 20:34

## 2020-06-12 RX ADMIN — TRAZODONE HYDROCHLORIDE 50 MG: 50 TABLET ORAL at 20:31

## 2020-06-12 RX ADMIN — SODIUM CHLORIDE: 9 INJECTION, SOLUTION INTRAVENOUS at 14:01

## 2020-06-12 RX ADMIN — GABAPENTIN 400 MG: 400 CAPSULE ORAL at 20:31

## 2020-06-12 RX ADMIN — GABAPENTIN 400 MG: 400 CAPSULE ORAL at 10:49

## 2020-06-12 RX ADMIN — SUCRALFATE 1 G: 1 TABLET ORAL at 05:40

## 2020-06-12 RX ADMIN — SUCRALFATE 1 G: 1 TABLET ORAL at 15:17

## 2020-06-12 RX ADMIN — HYDROMORPHONE HYDROCHLORIDE 1 MG: 1 INJECTION, SOLUTION INTRAMUSCULAR; INTRAVENOUS; SUBCUTANEOUS at 05:57

## 2020-06-12 RX ADMIN — ASPIRIN 81 MG: 81 TABLET, COATED ORAL at 10:49

## 2020-06-12 RX ADMIN — SODIUM CHLORIDE: 9 INJECTION, SOLUTION INTRAVENOUS at 05:41

## 2020-06-12 RX ADMIN — TIOTROPIUM BROMIDE INHALATION SPRAY 2 PUFF: 3.12 SPRAY, METERED RESPIRATORY (INHALATION) at 07:02

## 2020-06-12 RX ADMIN — GABAPENTIN 400 MG: 400 CAPSULE ORAL at 14:00

## 2020-06-12 RX ADMIN — HYDROMORPHONE HYDROCHLORIDE 1 MG: 1 INJECTION, SOLUTION INTRAMUSCULAR; INTRAVENOUS; SUBCUTANEOUS at 14:00

## 2020-06-12 RX ADMIN — ALBUTEROL SULFATE 2.5 MG: 2.5 SOLUTION RESPIRATORY (INHALATION) at 07:02

## 2020-06-12 RX ADMIN — PANTOPRAZOLE SODIUM 40 MG: 40 TABLET, DELAYED RELEASE ORAL at 05:41

## 2020-06-12 ASSESSMENT — PAIN DESCRIPTION - DESCRIPTORS
DESCRIPTORS: ACHING;DULL
DESCRIPTORS: ACHING;DULL

## 2020-06-12 ASSESSMENT — PAIN DESCRIPTION - ONSET: ONSET: ON-GOING

## 2020-06-12 ASSESSMENT — PAIN DESCRIPTION - ORIENTATION
ORIENTATION: RIGHT
ORIENTATION: RIGHT

## 2020-06-12 ASSESSMENT — PAIN DESCRIPTION - PROGRESSION: CLINICAL_PROGRESSION: GRADUALLY WORSENING

## 2020-06-12 ASSESSMENT — PAIN DESCRIPTION - PAIN TYPE
TYPE: ACUTE PAIN

## 2020-06-12 ASSESSMENT — PAIN SCALES - GENERAL
PAINLEVEL_OUTOF10: 7
PAINLEVEL_OUTOF10: 8
PAINLEVEL_OUTOF10: 7
PAINLEVEL_OUTOF10: 8
PAINLEVEL_OUTOF10: 7

## 2020-06-12 ASSESSMENT — PAIN DESCRIPTION - FREQUENCY
FREQUENCY: CONTINUOUS
FREQUENCY: CONTINUOUS

## 2020-06-12 ASSESSMENT — PAIN DESCRIPTION - LOCATION
LOCATION: ABDOMEN
LOCATION: ABDOMEN

## 2020-06-12 NOTE — PROGRESS NOTES
[facility] SURGERY    PATIENT NAME: uLi Began     TODAY'S DATE: 6/12/2020    CHIEF COMPLAINT: Abdominal pain    INTERVAL HISTORY/HPI:    Pt feels better today with less pain. Pulled her drain out a bit, but still functional.     REVIEW OF SYSTEMS:  Pertinent positives and negatives as per interval history section    OBJECTIVE:  VITALS:  /80   Pulse 110   Temp 97 °F (36.1 °C) (Oral)   Resp 18   Ht 5' 1\" (1.549 m)   Wt 144 lb 8 oz (65.5 kg)   SpO2 92%   BMI 27.30 kg/m²     INTAKE/OUTPUT:    I/O last 3 completed shifts: In: 3488 [I.V.:3488]  Out: 90 [Drains:90]  No intake/output data recorded. CONSTITUTIONAL:  awake and alert  LUNGS:  Respirations easy and unlabored, clear to auscultation  CARD:  tachycardic with regular rhythm  ABDOMEN:  normal bowel sounds, soft, non-distended, less tender, drain with bilious output     Data:  CBC:   Recent Labs     06/10/20  0120 06/10/20  1723 06/11/20  0401   WBC 12.9* 16.7* 13.1*   HGB 11.9* 11.5* 11.1*   HCT 36.2 35.5* 34.7*    331 274     BMP:    Recent Labs     06/10/20  0120 06/10/20  1723 06/11/20  0401   * 132* 133*   K 3.8 3.6 3.8    99 105   CO2 24 22 19*   BUN 20 18 10   CREATININE 1.2 1.1 0.7   GLUCOSE 115* 123* 122*     Hepatic:   Recent Labs     06/10/20  0120 06/10/20  1723 06/11/20  0401   AST 10* 10* 10*   ALT 6* 6* <5*   BILITOT 0.4 0.5 0.4   ALKPHOS 91 95 85     Mag:    No results for input(s): MG in the last 72 hours. Phos:   No results for input(s): PHOS in the last 72 hours. INR:   Recent Labs     06/10/20  1723   INR 1.19*       Radiology Review:  *Imaging personally reviewed by me. CT perc drain images reviewed      ASSESSMENT AND PLAN:  Likely acute cholecystitis with perforation. Percutaneous drainage was successful. Continue IV fluid hydration, IV antibiotics, and parenteral narcotics as needed. She will need interval cholecystectomy once the acute episode has resolved.  OK to start a diet Electronically signed by Dioni Hidalgo MD     [id number]

## 2020-06-13 LAB
ANION GAP SERPL CALCULATED.3IONS-SCNC: 9 MMOL/L (ref 3–16)
BUN BLDV-MCNC: <2 MG/DL (ref 7–20)
CALCIUM SERPL-MCNC: 7.8 MG/DL (ref 8.3–10.6)
CHLORIDE BLD-SCNC: 104 MMOL/L (ref 99–110)
CO2: 25 MMOL/L (ref 21–32)
CREAT SERPL-MCNC: <0.5 MG/DL (ref 0.6–1.2)
GFR AFRICAN AMERICAN: >60
GFR NON-AFRICAN AMERICAN: >60
GLUCOSE BLD-MCNC: 105 MG/DL (ref 70–99)
HCT VFR BLD CALC: 31.3 % (ref 36–48)
HEMOGLOBIN: 10.4 G/DL (ref 12–16)
MAGNESIUM: 1.7 MG/DL (ref 1.8–2.4)
MCH RBC QN AUTO: 30.1 PG (ref 26–34)
MCHC RBC AUTO-ENTMCNC: 33.3 G/DL (ref 31–36)
MCV RBC AUTO: 90.2 FL (ref 80–100)
PDW BLD-RTO: 15.8 % (ref 12.4–15.4)
PHOSPHORUS: 1.8 MG/DL (ref 2.5–4.9)
PLATELET # BLD: 307 K/UL (ref 135–450)
PMV BLD AUTO: 7.4 FL (ref 5–10.5)
POTASSIUM SERPL-SCNC: 2.8 MMOL/L (ref 3.5–5.1)
RBC # BLD: 3.47 M/UL (ref 4–5.2)
SODIUM BLD-SCNC: 138 MMOL/L (ref 136–145)
WBC # BLD: 8.4 K/UL (ref 4–11)

## 2020-06-13 PROCEDURE — 83735 ASSAY OF MAGNESIUM: CPT

## 2020-06-13 PROCEDURE — 99232 SBSQ HOSP IP/OBS MODERATE 35: CPT | Performed by: SURGERY

## 2020-06-13 PROCEDURE — 94640 AIRWAY INHALATION TREATMENT: CPT

## 2020-06-13 PROCEDURE — 2580000003 HC RX 258: Performed by: INTERNAL MEDICINE

## 2020-06-13 PROCEDURE — 80048 BASIC METABOLIC PNL TOTAL CA: CPT

## 2020-06-13 PROCEDURE — 84100 ASSAY OF PHOSPHORUS: CPT

## 2020-06-13 PROCEDURE — 2060000000 HC ICU INTERMEDIATE R&B

## 2020-06-13 PROCEDURE — 6370000000 HC RX 637 (ALT 250 FOR IP): Performed by: HOSPITALIST

## 2020-06-13 PROCEDURE — 85027 COMPLETE CBC AUTOMATED: CPT

## 2020-06-13 PROCEDURE — 2500000003 HC RX 250 WO HCPCS: Performed by: INTERNAL MEDICINE

## 2020-06-13 PROCEDURE — 36415 COLL VENOUS BLD VENIPUNCTURE: CPT

## 2020-06-13 PROCEDURE — 6360000002 HC RX W HCPCS: Performed by: INTERNAL MEDICINE

## 2020-06-13 PROCEDURE — 6370000000 HC RX 637 (ALT 250 FOR IP): Performed by: INTERNAL MEDICINE

## 2020-06-13 RX ORDER — MAGNESIUM SULFATE 1 G/100ML
1 INJECTION INTRAVENOUS PRN
Status: DISCONTINUED | OUTPATIENT
Start: 2020-06-13 | End: 2020-06-18 | Stop reason: HOSPADM

## 2020-06-13 RX ORDER — POTASSIUM CHLORIDE 20 MEQ/1
40 TABLET, EXTENDED RELEASE ORAL PRN
Status: DISCONTINUED | OUTPATIENT
Start: 2020-06-13 | End: 2020-06-18 | Stop reason: HOSPADM

## 2020-06-13 RX ORDER — POTASSIUM CHLORIDE 7.45 MG/ML
10 INJECTION INTRAVENOUS PRN
Status: DISCONTINUED | OUTPATIENT
Start: 2020-06-13 | End: 2020-06-18 | Stop reason: HOSPADM

## 2020-06-13 RX ORDER — LISINOPRIL 20 MG/1
20 TABLET ORAL DAILY
Status: DISCONTINUED | OUTPATIENT
Start: 2020-06-13 | End: 2020-06-18 | Stop reason: HOSPADM

## 2020-06-13 RX ADMIN — SUCRALFATE 1 G: 1 TABLET ORAL at 06:14

## 2020-06-13 RX ADMIN — TIOTROPIUM BROMIDE INHALATION SPRAY 2 PUFF: 3.12 SPRAY, METERED RESPIRATORY (INHALATION) at 07:20

## 2020-06-13 RX ADMIN — HYDROMORPHONE HYDROCHLORIDE 1 MG: 1 INJECTION, SOLUTION INTRAMUSCULAR; INTRAVENOUS; SUBCUTANEOUS at 09:20

## 2020-06-13 RX ADMIN — TRAZODONE HYDROCHLORIDE 50 MG: 50 TABLET ORAL at 20:40

## 2020-06-13 RX ADMIN — HYDROMORPHONE HYDROCHLORIDE 1 MG: 1 INJECTION, SOLUTION INTRAMUSCULAR; INTRAVENOUS; SUBCUTANEOUS at 14:10

## 2020-06-13 RX ADMIN — GABAPENTIN 400 MG: 400 CAPSULE ORAL at 08:12

## 2020-06-13 RX ADMIN — POTASSIUM CHLORIDE 10 MEQ: 7.46 INJECTION, SOLUTION INTRAVENOUS at 09:17

## 2020-06-13 RX ADMIN — HYDROMORPHONE HYDROCHLORIDE 1 MG: 1 INJECTION, SOLUTION INTRAMUSCULAR; INTRAVENOUS; SUBCUTANEOUS at 19:03

## 2020-06-13 RX ADMIN — SUCRALFATE 1 G: 1 TABLET ORAL at 16:37

## 2020-06-13 RX ADMIN — PANTOPRAZOLE SODIUM 40 MG: 40 TABLET, DELAYED RELEASE ORAL at 06:15

## 2020-06-13 RX ADMIN — HYDROMORPHONE HYDROCHLORIDE 1 MG: 1 INJECTION, SOLUTION INTRAMUSCULAR; INTRAVENOUS; SUBCUTANEOUS at 00:29

## 2020-06-13 RX ADMIN — ASPIRIN 81 MG: 81 TABLET, COATED ORAL at 08:12

## 2020-06-13 RX ADMIN — FAMOTIDINE 10 MG: 10 TABLET ORAL at 20:40

## 2020-06-13 RX ADMIN — POTASSIUM CHLORIDE 10 MEQ: 7.46 INJECTION, SOLUTION INTRAVENOUS at 10:42

## 2020-06-13 RX ADMIN — SODIUM PHOSPHATE, MONOBASIC, MONOHYDRATE 10.44 MMOL: 276; 142 INJECTION, SOLUTION INTRAVENOUS at 08:07

## 2020-06-13 RX ADMIN — POTASSIUM CHLORIDE 10 MEQ: 7.46 INJECTION, SOLUTION INTRAVENOUS at 06:15

## 2020-06-13 RX ADMIN — HYDROMORPHONE HYDROCHLORIDE 1 MG: 1 INJECTION, SOLUTION INTRAMUSCULAR; INTRAVENOUS; SUBCUTANEOUS at 11:47

## 2020-06-13 RX ADMIN — SODIUM CHLORIDE: 9 INJECTION, SOLUTION INTRAVENOUS at 02:32

## 2020-06-13 RX ADMIN — HYDROMORPHONE HYDROCHLORIDE 1 MG: 1 INJECTION, SOLUTION INTRAMUSCULAR; INTRAVENOUS; SUBCUTANEOUS at 21:32

## 2020-06-13 RX ADMIN — HYDROMORPHONE HYDROCHLORIDE 1 MG: 1 INJECTION, SOLUTION INTRAMUSCULAR; INTRAVENOUS; SUBCUTANEOUS at 16:37

## 2020-06-13 RX ADMIN — SUCRALFATE 1 G: 1 TABLET ORAL at 11:41

## 2020-06-13 RX ADMIN — MEROPENEM 1 G: 1 INJECTION, POWDER, FOR SOLUTION INTRAVENOUS at 13:32

## 2020-06-13 RX ADMIN — POTASSIUM CHLORIDE 10 MEQ: 7.46 INJECTION, SOLUTION INTRAVENOUS at 11:41

## 2020-06-13 RX ADMIN — FAMOTIDINE 10 MG: 10 TABLET ORAL at 08:12

## 2020-06-13 RX ADMIN — MEROPENEM 1 G: 1 INJECTION, POWDER, FOR SOLUTION INTRAVENOUS at 01:45

## 2020-06-13 RX ADMIN — POTASSIUM CHLORIDE 10 MEQ: 7.46 INJECTION, SOLUTION INTRAVENOUS at 13:25

## 2020-06-13 RX ADMIN — HYDROMORPHONE HYDROCHLORIDE 1 MG: 1 INJECTION, SOLUTION INTRAMUSCULAR; INTRAVENOUS; SUBCUTANEOUS at 06:48

## 2020-06-13 RX ADMIN — LISINOPRIL 20 MG: 20 TABLET ORAL at 14:26

## 2020-06-13 RX ADMIN — GABAPENTIN 400 MG: 400 CAPSULE ORAL at 13:32

## 2020-06-13 RX ADMIN — HYDROMORPHONE HYDROCHLORIDE 1 MG: 1 INJECTION, SOLUTION INTRAMUSCULAR; INTRAVENOUS; SUBCUTANEOUS at 03:53

## 2020-06-13 RX ADMIN — DULOXETINE HYDROCHLORIDE 60 MG: 60 CAPSULE, DELAYED RELEASE ORAL at 08:12

## 2020-06-13 RX ADMIN — ENOXAPARIN SODIUM 40 MG: 40 INJECTION SUBCUTANEOUS at 08:12

## 2020-06-13 RX ADMIN — GABAPENTIN 400 MG: 400 CAPSULE ORAL at 20:40

## 2020-06-13 RX ADMIN — POTASSIUM CHLORIDE 10 MEQ: 7.46 INJECTION, SOLUTION INTRAVENOUS at 08:08

## 2020-06-13 ASSESSMENT — PAIN DESCRIPTION - ORIENTATION
ORIENTATION: RIGHT

## 2020-06-13 ASSESSMENT — PAIN DESCRIPTION - PROGRESSION
CLINICAL_PROGRESSION: GRADUALLY WORSENING

## 2020-06-13 ASSESSMENT — PAIN DESCRIPTION - LOCATION
LOCATION: ABDOMEN

## 2020-06-13 ASSESSMENT — PAIN SCALES - GENERAL
PAINLEVEL_OUTOF10: 7
PAINLEVEL_OUTOF10: 7
PAINLEVEL_OUTOF10: 2
PAINLEVEL_OUTOF10: 8
PAINLEVEL_OUTOF10: 7
PAINLEVEL_OUTOF10: 3
PAINLEVEL_OUTOF10: 8
PAINLEVEL_OUTOF10: 3
PAINLEVEL_OUTOF10: 7
PAINLEVEL_OUTOF10: 8
PAINLEVEL_OUTOF10: 7
PAINLEVEL_OUTOF10: 4
PAINLEVEL_OUTOF10: 3
PAINLEVEL_OUTOF10: 8

## 2020-06-13 ASSESSMENT — PAIN DESCRIPTION - ONSET
ONSET: ON-GOING

## 2020-06-13 ASSESSMENT — PAIN DESCRIPTION - PAIN TYPE
TYPE: ACUTE PAIN

## 2020-06-13 ASSESSMENT — PAIN - FUNCTIONAL ASSESSMENT
PAIN_FUNCTIONAL_ASSESSMENT: ACTIVITIES ARE NOT PREVENTED

## 2020-06-13 ASSESSMENT — PAIN DESCRIPTION - DESCRIPTORS
DESCRIPTORS: DISCOMFORT;SHARP
DESCRIPTORS: ACHING
DESCRIPTORS: DULL
DESCRIPTORS: DISCOMFORT;SHARP
DESCRIPTORS: ACHING;DISCOMFORT

## 2020-06-13 ASSESSMENT — PAIN DESCRIPTION - FREQUENCY
FREQUENCY: INTERMITTENT

## 2020-06-13 NOTE — PROGRESS NOTES
Pt IV came out on its own. Had been infiltrated and leaking. Called Sherry from clinical to US new IV.

## 2020-06-13 NOTE — PROGRESS NOTES
Dr. Mati Love in to see pt. States to d/c pts. Telemetry monitor. Removed at this time.  Mina Mai RN

## 2020-06-13 NOTE — PROGRESS NOTES
diet and activity. Await final culture results.   She will need interval cholecystectomy once this acute episode has resolved     Electronically signed by Matthew Mendoza MD     [id number]

## 2020-06-14 LAB
ANION GAP SERPL CALCULATED.3IONS-SCNC: 10 MMOL/L (ref 3–16)
BUN BLDV-MCNC: <2 MG/DL (ref 7–20)
CALCIUM SERPL-MCNC: 8.4 MG/DL (ref 8.3–10.6)
CHLORIDE BLD-SCNC: 99 MMOL/L (ref 99–110)
CO2: 26 MMOL/L (ref 21–32)
CREAT SERPL-MCNC: <0.5 MG/DL (ref 0.6–1.2)
GFR AFRICAN AMERICAN: >60
GFR NON-AFRICAN AMERICAN: >60
GLUCOSE BLD-MCNC: 116 MG/DL (ref 70–99)
HCT VFR BLD CALC: 32 % (ref 36–48)
HEMOGLOBIN: 10.7 G/DL (ref 12–16)
MAGNESIUM: 1.6 MG/DL (ref 1.8–2.4)
MCH RBC QN AUTO: 30 PG (ref 26–34)
MCHC RBC AUTO-ENTMCNC: 33.3 G/DL (ref 31–36)
MCV RBC AUTO: 90 FL (ref 80–100)
PDW BLD-RTO: 15.9 % (ref 12.4–15.4)
PHOSPHORUS: 3 MG/DL (ref 2.5–4.9)
PLATELET # BLD: 339 K/UL (ref 135–450)
PMV BLD AUTO: 7.3 FL (ref 5–10.5)
POTASSIUM SERPL-SCNC: 3.4 MMOL/L (ref 3.5–5.1)
RBC # BLD: 3.56 M/UL (ref 4–5.2)
SODIUM BLD-SCNC: 135 MMOL/L (ref 136–145)
WBC # BLD: 7.2 K/UL (ref 4–11)

## 2020-06-14 PROCEDURE — 80048 BASIC METABOLIC PNL TOTAL CA: CPT

## 2020-06-14 PROCEDURE — 2060000000 HC ICU INTERMEDIATE R&B

## 2020-06-14 PROCEDURE — 84100 ASSAY OF PHOSPHORUS: CPT

## 2020-06-14 PROCEDURE — 6370000000 HC RX 637 (ALT 250 FOR IP): Performed by: INTERNAL MEDICINE

## 2020-06-14 PROCEDURE — 94640 AIRWAY INHALATION TREATMENT: CPT

## 2020-06-14 PROCEDURE — 99232 SBSQ HOSP IP/OBS MODERATE 35: CPT | Performed by: SURGERY

## 2020-06-14 PROCEDURE — 94761 N-INVAS EAR/PLS OXIMETRY MLT: CPT

## 2020-06-14 PROCEDURE — 85027 COMPLETE CBC AUTOMATED: CPT

## 2020-06-14 PROCEDURE — 2580000003 HC RX 258: Performed by: NURSE PRACTITIONER

## 2020-06-14 PROCEDURE — 6360000002 HC RX W HCPCS: Performed by: INTERNAL MEDICINE

## 2020-06-14 PROCEDURE — 36415 COLL VENOUS BLD VENIPUNCTURE: CPT

## 2020-06-14 PROCEDURE — 2580000003 HC RX 258: Performed by: INTERNAL MEDICINE

## 2020-06-14 PROCEDURE — 83735 ASSAY OF MAGNESIUM: CPT

## 2020-06-14 PROCEDURE — 6370000000 HC RX 637 (ALT 250 FOR IP): Performed by: HOSPITALIST

## 2020-06-14 RX ORDER — SENNA AND DOCUSATE SODIUM 50; 8.6 MG/1; MG/1
2 TABLET, FILM COATED ORAL DAILY
Status: DISCONTINUED | OUTPATIENT
Start: 2020-06-14 | End: 2020-06-18 | Stop reason: HOSPADM

## 2020-06-14 RX ORDER — SENNA AND DOCUSATE SODIUM 50; 8.6 MG/1; MG/1
2 TABLET, FILM COATED ORAL DAILY PRN
Status: DISCONTINUED | OUTPATIENT
Start: 2020-06-14 | End: 2020-06-14

## 2020-06-14 RX ADMIN — MAGNESIUM SULFATE IN DEXTROSE 1 G: 10 INJECTION, SOLUTION INTRAVENOUS at 05:53

## 2020-06-14 RX ADMIN — LISINOPRIL 20 MG: 20 TABLET ORAL at 08:10

## 2020-06-14 RX ADMIN — FAMOTIDINE 10 MG: 10 TABLET ORAL at 08:10

## 2020-06-14 RX ADMIN — HYDROMORPHONE HYDROCHLORIDE 1 MG: 1 INJECTION, SOLUTION INTRAMUSCULAR; INTRAVENOUS; SUBCUTANEOUS at 23:40

## 2020-06-14 RX ADMIN — ENOXAPARIN SODIUM 40 MG: 40 INJECTION SUBCUTANEOUS at 08:10

## 2020-06-14 RX ADMIN — ASPIRIN 81 MG: 81 TABLET, COATED ORAL at 08:10

## 2020-06-14 RX ADMIN — HYDROMORPHONE HYDROCHLORIDE 1 MG: 1 INJECTION, SOLUTION INTRAMUSCULAR; INTRAVENOUS; SUBCUTANEOUS at 10:15

## 2020-06-14 RX ADMIN — SENNOSIDES AND DOCUSATE SODIUM 2 TABLET: 8.6; 5 TABLET ORAL at 18:52

## 2020-06-14 RX ADMIN — HYDROMORPHONE HYDROCHLORIDE 1 MG: 1 INJECTION, SOLUTION INTRAMUSCULAR; INTRAVENOUS; SUBCUTANEOUS at 08:08

## 2020-06-14 RX ADMIN — MAGNESIUM SULFATE IN DEXTROSE 1 G: 10 INJECTION, SOLUTION INTRAVENOUS at 08:17

## 2020-06-14 RX ADMIN — Medication 10 ML: at 21:34

## 2020-06-14 RX ADMIN — POTASSIUM BICARBONATE 40 MEQ: 782 TABLET, EFFERVESCENT ORAL at 05:48

## 2020-06-14 RX ADMIN — PANTOPRAZOLE SODIUM 40 MG: 40 TABLET, DELAYED RELEASE ORAL at 05:48

## 2020-06-14 RX ADMIN — HYDROMORPHONE HYDROCHLORIDE 1 MG: 1 INJECTION, SOLUTION INTRAMUSCULAR; INTRAVENOUS; SUBCUTANEOUS at 00:02

## 2020-06-14 RX ADMIN — TRAZODONE HYDROCHLORIDE 50 MG: 50 TABLET ORAL at 21:31

## 2020-06-14 RX ADMIN — TIOTROPIUM BROMIDE INHALATION SPRAY 2 PUFF: 3.12 SPRAY, METERED RESPIRATORY (INHALATION) at 07:37

## 2020-06-14 RX ADMIN — HYDROMORPHONE HYDROCHLORIDE 1 MG: 1 INJECTION, SOLUTION INTRAMUSCULAR; INTRAVENOUS; SUBCUTANEOUS at 21:31

## 2020-06-14 RX ADMIN — HYDROMORPHONE HYDROCHLORIDE 1 MG: 1 INJECTION, SOLUTION INTRAMUSCULAR; INTRAVENOUS; SUBCUTANEOUS at 02:40

## 2020-06-14 RX ADMIN — MEROPENEM 1 G: 1 INJECTION, POWDER, FOR SOLUTION INTRAVENOUS at 12:32

## 2020-06-14 RX ADMIN — SUCRALFATE 1 G: 1 TABLET ORAL at 10:16

## 2020-06-14 RX ADMIN — HYDROMORPHONE HYDROCHLORIDE 1 MG: 1 INJECTION, SOLUTION INTRAMUSCULAR; INTRAVENOUS; SUBCUTANEOUS at 05:21

## 2020-06-14 RX ADMIN — SUCRALFATE 1 G: 1 TABLET ORAL at 05:48

## 2020-06-14 RX ADMIN — HYDROMORPHONE HYDROCHLORIDE 1 MG: 1 INJECTION, SOLUTION INTRAMUSCULAR; INTRAVENOUS; SUBCUTANEOUS at 16:53

## 2020-06-14 RX ADMIN — HYDROMORPHONE HYDROCHLORIDE 1 MG: 1 INJECTION, SOLUTION INTRAMUSCULAR; INTRAVENOUS; SUBCUTANEOUS at 12:32

## 2020-06-14 RX ADMIN — GABAPENTIN 400 MG: 400 CAPSULE ORAL at 21:31

## 2020-06-14 RX ADMIN — SUCRALFATE 1 G: 1 TABLET ORAL at 16:55

## 2020-06-14 RX ADMIN — GABAPENTIN 400 MG: 400 CAPSULE ORAL at 08:09

## 2020-06-14 RX ADMIN — GABAPENTIN 400 MG: 400 CAPSULE ORAL at 14:40

## 2020-06-14 RX ADMIN — DULOXETINE HYDROCHLORIDE 60 MG: 60 CAPSULE, DELAYED RELEASE ORAL at 08:10

## 2020-06-14 RX ADMIN — HYDROMORPHONE HYDROCHLORIDE 1 MG: 1 INJECTION, SOLUTION INTRAMUSCULAR; INTRAVENOUS; SUBCUTANEOUS at 18:52

## 2020-06-14 RX ADMIN — FAMOTIDINE 10 MG: 10 TABLET ORAL at 21:32

## 2020-06-14 RX ADMIN — HYDROMORPHONE HYDROCHLORIDE 1 MG: 1 INJECTION, SOLUTION INTRAMUSCULAR; INTRAVENOUS; SUBCUTANEOUS at 14:40

## 2020-06-14 RX ADMIN — MEROPENEM 1 G: 1 INJECTION, POWDER, FOR SOLUTION INTRAVENOUS at 00:52

## 2020-06-14 ASSESSMENT — PAIN DESCRIPTION - PAIN TYPE
TYPE: ACUTE PAIN

## 2020-06-14 ASSESSMENT — PAIN SCALES - GENERAL
PAINLEVEL_OUTOF10: 8
PAINLEVEL_OUTOF10: 8
PAINLEVEL_OUTOF10: 10
PAINLEVEL_OUTOF10: 9
PAINLEVEL_OUTOF10: 9
PAINLEVEL_OUTOF10: 8
PAINLEVEL_OUTOF10: 9
PAINLEVEL_OUTOF10: 8
PAINLEVEL_OUTOF10: 9

## 2020-06-14 ASSESSMENT — PAIN DESCRIPTION - ORIENTATION
ORIENTATION: RIGHT

## 2020-06-14 ASSESSMENT — PAIN DESCRIPTION - DESCRIPTORS
DESCRIPTORS: DULL
DESCRIPTORS: SHARP
DESCRIPTORS: DISCOMFORT

## 2020-06-14 ASSESSMENT — PAIN DESCRIPTION - ONSET
ONSET: ON-GOING

## 2020-06-14 ASSESSMENT — PAIN DESCRIPTION - FREQUENCY
FREQUENCY: INTERMITTENT

## 2020-06-14 ASSESSMENT — PAIN DESCRIPTION - PROGRESSION
CLINICAL_PROGRESSION: GRADUALLY WORSENING
CLINICAL_PROGRESSION: NOT CHANGED
CLINICAL_PROGRESSION: NOT CHANGED

## 2020-06-14 ASSESSMENT — PAIN DESCRIPTION - LOCATION
LOCATION: ABDOMEN

## 2020-06-14 ASSESSMENT — PAIN - FUNCTIONAL ASSESSMENT: PAIN_FUNCTIONAL_ASSESSMENT: ACTIVITIES ARE NOT PREVENTED

## 2020-06-14 NOTE — PROGRESS NOTES
[facility] SURGERY    PATIENT NAME: Val Washington     TODAY'S DATE: 6/14/2020    CHIEF COMPLAINT: Abdominal pain    INTERVAL HISTORY/HPI:    Pt still with pain, but feels a little bit better. Has been tolerating a low-fat full liquid diet. REVIEW OF SYSTEMS:  Pertinent positives and negatives as per interval history section    OBJECTIVE:  VITALS:  BP (!) 153/93   Pulse 83   Temp 97.9 °F (36.6 °C) (Oral)   Resp 16   Ht 5' 1\" (1.549 m)   Wt 141 lb 14.4 oz (64.4 kg)   SpO2 96%   BMI 26.81 kg/m²     INTAKE/OUTPUT:    I/O last 3 completed shifts: In: 1010 [P.O.:1010]  Out: 6159 [Urine:2875; Drains:120]  I/O this shift: In: 26 [P.O.:460]  Out: 50 [Drains:50]    CONSTITUTIONAL:  awake and alert  LUNGS:  Respirations easy and unlabored, clear to auscultation  CARD:  regular rate and rhythm  ABDOMEN:  normal bowel sounds, soft, non-distended, tenderness noted around the drain site    Data:  CBC:   Recent Labs     06/12/20  1235 06/13/20 0445 06/14/20  0439   WBC 11.4* 8.4 7.2   HGB 10.6* 10.4* 10.7*   HCT 32.4* 31.3* 32.0*    307 339     BMP:    Recent Labs     06/12/20 1235 06/13/20 0445 06/14/20  0439   * 138 135*   K 3.6 2.8* 3.4*    104 99   CO2 21 25 26   BUN 3* <2* <2*   CREATININE <0.5* <0.5* <0.5*   GLUCOSE 113* 105* 116*     Hepatic: No results for input(s): AST, ALT, ALB, BILITOT, ALKPHOS in the last 72 hours. Mag:      Recent Labs     06/12/20  1235 06/13/20 0445 06/14/20  0439   MG 1.50* 1.70* 1.60*      Phos:     Recent Labs     06/12/20  1235 06/13/20 0445 06/14/20  0439   PHOS 2.0* 1.8* 3.0      INR: No results for input(s): INR in the last 72 hours. Radiology Review:  *Imaging personally reviewed by me. N/A      ASSESSMENT AND PLAN:  Acute cholecystitis with perforation. Continue IV antibiotics and narcotics as needed. Await final culture results. Advance diet.   She will need interval cholecystectomy once this acute episode is resolved     Electronically

## 2020-06-14 NOTE — PROGRESS NOTES
Dependence  - Recommended cessation  - nicotine patch ordered   Need up /ambualte IS    Add stool sofener  DVT Prophylaxis: Lovenox  Diet: DIET LOW FAT; Low Fat  Code Status: Full Code     Dispo: cont care    Sheryl Reyna MD  6/14/2020

## 2020-06-14 NOTE — PROGRESS NOTES
Pt assessed. Clear and diminished throughout bilateral lobes. Scheduled medications given at this time. Pt states 9/10 pain and pain medication given per request, see MAR. All requests granted. Pt denies any needs. Call light and bedside table with-in easy reach. Nia Red

## 2020-06-14 NOTE — PROGRESS NOTES
Shift assessment complete. Scheduled medications given at this time. Pt states pain of 8. Next PRN dilaudid due 2115. Call light within reach. Will continue to monitor.

## 2020-06-15 ENCOUNTER — APPOINTMENT (OUTPATIENT)
Dept: CT IMAGING | Age: 65
DRG: 720 | End: 2020-06-15
Payer: COMMERCIAL

## 2020-06-15 LAB
ALBUMIN SERPL-MCNC: 2.9 G/DL (ref 3.4–5)
ALP BLD-CCNC: 68 U/L (ref 40–129)
ALT SERPL-CCNC: <5 U/L (ref 10–40)
ANION GAP SERPL CALCULATED.3IONS-SCNC: 9 MMOL/L (ref 3–16)
AST SERPL-CCNC: 9 U/L (ref 15–37)
BILIRUB SERPL-MCNC: <0.2 MG/DL (ref 0–1)
BILIRUBIN DIRECT: <0.2 MG/DL (ref 0–0.3)
BILIRUBIN, INDIRECT: ABNORMAL MG/DL (ref 0–1)
BLOOD CULTURE, ROUTINE: NORMAL
BUN BLDV-MCNC: 3 MG/DL (ref 7–20)
CALCIUM SERPL-MCNC: 8.4 MG/DL (ref 8.3–10.6)
CHLORIDE BLD-SCNC: 98 MMOL/L (ref 99–110)
CO2: 29 MMOL/L (ref 21–32)
CREAT SERPL-MCNC: <0.5 MG/DL (ref 0.6–1.2)
CULTURE, BLOOD 2: NORMAL
GFR AFRICAN AMERICAN: >60
GFR NON-AFRICAN AMERICAN: >60
GLUCOSE BLD-MCNC: 90 MG/DL (ref 70–99)
HCT VFR BLD CALC: 31.7 % (ref 36–48)
HEMOGLOBIN: 10.4 G/DL (ref 12–16)
LIPASE: 13 U/L (ref 13–60)
MAGNESIUM: 1.8 MG/DL (ref 1.8–2.4)
MCH RBC QN AUTO: 29.1 PG (ref 26–34)
MCHC RBC AUTO-ENTMCNC: 32.8 G/DL (ref 31–36)
MCV RBC AUTO: 88.6 FL (ref 80–100)
PDW BLD-RTO: 15.7 % (ref 12.4–15.4)
PHOSPHORUS: 3.8 MG/DL (ref 2.5–4.9)
PLATELET # BLD: 361 K/UL (ref 135–450)
PMV BLD AUTO: 7.3 FL (ref 5–10.5)
POTASSIUM SERPL-SCNC: 3.5 MMOL/L (ref 3.5–5.1)
RBC # BLD: 3.58 M/UL (ref 4–5.2)
SODIUM BLD-SCNC: 136 MMOL/L (ref 136–145)
TOTAL PROTEIN: 6 G/DL (ref 6.4–8.2)
WBC # BLD: 6.6 K/UL (ref 4–11)

## 2020-06-15 PROCEDURE — 2580000003 HC RX 258: Performed by: INTERNAL MEDICINE

## 2020-06-15 PROCEDURE — 6370000000 HC RX 637 (ALT 250 FOR IP): Performed by: INTERNAL MEDICINE

## 2020-06-15 PROCEDURE — 6360000002 HC RX W HCPCS: Performed by: INTERNAL MEDICINE

## 2020-06-15 PROCEDURE — 6370000000 HC RX 637 (ALT 250 FOR IP): Performed by: NURSE PRACTITIONER

## 2020-06-15 PROCEDURE — 80048 BASIC METABOLIC PNL TOTAL CA: CPT

## 2020-06-15 PROCEDURE — 85027 COMPLETE CBC AUTOMATED: CPT

## 2020-06-15 PROCEDURE — 80076 HEPATIC FUNCTION PANEL: CPT

## 2020-06-15 PROCEDURE — 83690 ASSAY OF LIPASE: CPT

## 2020-06-15 PROCEDURE — 6370000000 HC RX 637 (ALT 250 FOR IP): Performed by: HOSPITALIST

## 2020-06-15 PROCEDURE — 36415 COLL VENOUS BLD VENIPUNCTURE: CPT

## 2020-06-15 PROCEDURE — 6360000004 HC RX CONTRAST MEDICATION

## 2020-06-15 PROCEDURE — 2060000000 HC ICU INTERMEDIATE R&B

## 2020-06-15 PROCEDURE — 99232 SBSQ HOSP IP/OBS MODERATE 35: CPT | Performed by: SURGERY

## 2020-06-15 PROCEDURE — 6360000004 HC RX CONTRAST MEDICATION: Performed by: NURSE PRACTITIONER

## 2020-06-15 PROCEDURE — 94640 AIRWAY INHALATION TREATMENT: CPT

## 2020-06-15 PROCEDURE — 94761 N-INVAS EAR/PLS OXIMETRY MLT: CPT

## 2020-06-15 PROCEDURE — 74177 CT ABD & PELVIS W/CONTRAST: CPT

## 2020-06-15 PROCEDURE — 2580000003 HC RX 258: Performed by: NURSE PRACTITIONER

## 2020-06-15 PROCEDURE — 99232 SBSQ HOSP IP/OBS MODERATE 35: CPT | Performed by: INTERNAL MEDICINE

## 2020-06-15 PROCEDURE — 84100 ASSAY OF PHOSPHORUS: CPT

## 2020-06-15 PROCEDURE — 83735 ASSAY OF MAGNESIUM: CPT

## 2020-06-15 RX ORDER — OXYCODONE HYDROCHLORIDE 5 MG/1
10 TABLET ORAL EVERY 4 HOURS PRN
Status: DISCONTINUED | OUTPATIENT
Start: 2020-06-15 | End: 2020-06-18 | Stop reason: HOSPADM

## 2020-06-15 RX ORDER — TRAZODONE HYDROCHLORIDE 50 MG/1
50 TABLET ORAL NIGHTLY PRN
Status: DISCONTINUED | OUTPATIENT
Start: 2020-06-15 | End: 2020-06-18 | Stop reason: HOSPADM

## 2020-06-15 RX ORDER — LIDOCAINE 50 MG/G
1 PATCH TOPICAL DAILY
Qty: 30 PATCH | Refills: 0 | Status: SHIPPED | OUTPATIENT
Start: 2020-06-15 | End: 2020-07-09

## 2020-06-15 RX ORDER — OXYCODONE HYDROCHLORIDE 5 MG/1
5 TABLET ORAL EVERY 4 HOURS PRN
Status: DISCONTINUED | OUTPATIENT
Start: 2020-06-15 | End: 2020-06-18 | Stop reason: HOSPADM

## 2020-06-15 RX ADMIN — GABAPENTIN 400 MG: 400 CAPSULE ORAL at 21:50

## 2020-06-15 RX ADMIN — SUCRALFATE 1 G: 1 TABLET ORAL at 05:41

## 2020-06-15 RX ADMIN — HYDROMORPHONE HYDROCHLORIDE 1 MG: 1 INJECTION, SOLUTION INTRAMUSCULAR; INTRAVENOUS; SUBCUTANEOUS at 04:28

## 2020-06-15 RX ADMIN — Medication 10 ML: at 09:44

## 2020-06-15 RX ADMIN — IOPAMIDOL 75 ML: 755 INJECTION, SOLUTION INTRAVENOUS at 15:43

## 2020-06-15 RX ADMIN — GABAPENTIN 400 MG: 400 CAPSULE ORAL at 14:17

## 2020-06-15 RX ADMIN — HYDROMORPHONE HYDROCHLORIDE 1 MG: 1 INJECTION, SOLUTION INTRAMUSCULAR; INTRAVENOUS; SUBCUTANEOUS at 09:38

## 2020-06-15 RX ADMIN — OXYCODONE HYDROCHLORIDE 10 MG: 5 TABLET ORAL at 20:43

## 2020-06-15 RX ADMIN — OXYCODONE HYDROCHLORIDE 10 MG: 5 TABLET ORAL at 11:41

## 2020-06-15 RX ADMIN — OXYCODONE HYDROCHLORIDE 10 MG: 5 TABLET ORAL at 16:23

## 2020-06-15 RX ADMIN — GABAPENTIN 400 MG: 400 CAPSULE ORAL at 09:44

## 2020-06-15 RX ADMIN — FAMOTIDINE 10 MG: 10 TABLET ORAL at 21:51

## 2020-06-15 RX ADMIN — ENOXAPARIN SODIUM 40 MG: 40 INJECTION SUBCUTANEOUS at 09:41

## 2020-06-15 RX ADMIN — SUCRALFATE 1 G: 1 TABLET ORAL at 16:22

## 2020-06-15 RX ADMIN — Medication 10 ML: at 21:51

## 2020-06-15 RX ADMIN — TRAZODONE HYDROCHLORIDE 50 MG: 50 TABLET ORAL at 22:00

## 2020-06-15 RX ADMIN — IOHEXOL 50 ML: 240 INJECTION, SOLUTION INTRATHECAL; INTRAVASCULAR; INTRAVENOUS; ORAL at 14:19

## 2020-06-15 RX ADMIN — DULOXETINE HYDROCHLORIDE 60 MG: 60 CAPSULE, DELAYED RELEASE ORAL at 09:44

## 2020-06-15 RX ADMIN — HYDROMORPHONE HYDROCHLORIDE 1 MG: 1 INJECTION, SOLUTION INTRAMUSCULAR; INTRAVENOUS; SUBCUTANEOUS at 06:48

## 2020-06-15 RX ADMIN — SENNOSIDES AND DOCUSATE SODIUM 2 TABLET: 8.6; 5 TABLET ORAL at 09:44

## 2020-06-15 RX ADMIN — LISINOPRIL 20 MG: 20 TABLET ORAL at 09:44

## 2020-06-15 RX ADMIN — PANTOPRAZOLE SODIUM 40 MG: 40 TABLET, DELAYED RELEASE ORAL at 05:41

## 2020-06-15 RX ADMIN — MEROPENEM 1 G: 1 INJECTION, POWDER, FOR SOLUTION INTRAVENOUS at 01:48

## 2020-06-15 RX ADMIN — ASPIRIN 81 MG: 81 TABLET, COATED ORAL at 09:44

## 2020-06-15 RX ADMIN — MEROPENEM 1 G: 1 INJECTION, POWDER, FOR SOLUTION INTRAVENOUS at 14:18

## 2020-06-15 RX ADMIN — TIOTROPIUM BROMIDE INHALATION SPRAY 2 PUFF: 3.12 SPRAY, METERED RESPIRATORY (INHALATION) at 07:44

## 2020-06-15 RX ADMIN — FAMOTIDINE 10 MG: 10 TABLET ORAL at 09:43

## 2020-06-15 RX ADMIN — ACETAMINOPHEN 650 MG: 325 TABLET ORAL at 21:59

## 2020-06-15 RX ADMIN — HYDROMORPHONE HYDROCHLORIDE 1 MG: 1 INJECTION, SOLUTION INTRAMUSCULAR; INTRAVENOUS; SUBCUTANEOUS at 01:48

## 2020-06-15 RX ADMIN — TIZANIDINE 2 MG: 4 TABLET ORAL at 21:59

## 2020-06-15 RX ADMIN — SUCRALFATE 1 G: 1 TABLET ORAL at 11:42

## 2020-06-15 ASSESSMENT — PAIN SCALES - GENERAL
PAINLEVEL_OUTOF10: 10
PAINLEVEL_OUTOF10: 5
PAINLEVEL_OUTOF10: 7
PAINLEVEL_OUTOF10: 5
PAINLEVEL_OUTOF10: 9

## 2020-06-15 NOTE — PROGRESS NOTES
06/15/2020    RDW 15.7 06/15/2020    SEGSPCT 51.3 05/27/2012    BANDSPCT 7 04/24/2020    METASPCT 1 04/24/2020    LYMPHOPCT 18.1 06/11/2020    MONOPCT 6.0 06/11/2020    EOSPCT 0.3 01/05/2012    BASOPCT 0.2 06/11/2020    MONOSABS 0.8 06/11/2020    LYMPHSABS 2.4 06/11/2020    EOSABS 0.1 06/11/2020    BASOSABS 0.0 06/11/2020    DIFFTYPE Auto 05/27/2012     CMP:    Lab Results   Component Value Date     06/15/2020    K 3.5 06/15/2020    K 3.8 06/11/2020    CL 98 06/15/2020    CO2 29 06/15/2020    BUN 3 06/15/2020    CREATININE <0.5 06/15/2020    GFRAA >60 06/15/2020    GFRAA >60 05/27/2012    AGRATIO 1.0 06/11/2020    LABGLOM >60 06/15/2020    GLUCOSE 90 06/15/2020    PROT 6.0 06/15/2020    PROT 8.3 05/27/2012    LABALBU 2.9 06/15/2020    CALCIUM 8.4 06/15/2020    BILITOT <0.2 06/15/2020    ALKPHOS 68 06/15/2020    AST 9 06/15/2020    ALT <5 06/15/2020         Kendy Jennifer Collins  Electronically signed 6/15/2020 at 4:14 PM     Patient seen and examined. I agree with the assessment and plan from LEIGHANN Rogers. Patient with increased pain RUQ today. Abdomen soft. Tender RUQ / R flank  Repeat CT today showed GB drain in place but RUQ / perihepatic fluid collection is larger. Will arrange for IR to drain this collection tomorrow.     [address] [facility]

## 2020-06-15 NOTE — PROGRESS NOTES
Shift assessment complete. Scheduled medications given at this time. Pt stated pain of 9. Gave PRN Dilaudid. Call light within reach. Will continue to monitor.

## 2020-06-15 NOTE — PROGRESS NOTES
Patient medicated w/ Oxycodone 10mg for complaints of abdominal pain of 10 after returning from CT scan. Will monitor for effectiveness.

## 2020-06-15 NOTE — PROGRESS NOTES
Progress Note    Admit Date:  6/10/2020    Presented with abdominal pain. she has ruptured gallbladder. Seen by general surgery. S/p placement of percutaneous cholecystostomy tube in the right upper quadrant on 6/11/2020      Subjective:  Ms. Hope Tinoco seen. C/o abdominal and back pain. Pain not controlled. Patient became extremely confused after she got 1 dose of Ativan yesterday. She pulled out her PICC line. she has had significant side effects with Ativan in the past.  Ativan has been discontinued added to allergy list.    She is oriented    Objective:     BP (!) 186/94   Pulse 91   Temp 97.8 °F (36.6 °C) (Oral)   Resp 18   Ht 5' 1\" (1.549 m)   Wt 140 lb 14.4 oz (63.9 kg)   SpO2 93%   BMI 26.62 kg/m²       Intake/Output Summary (Last 24 hours) at 6/15/2020 1111  Last data filed at 6/15/2020 1027  Gross per 24 hour   Intake 715 ml   Output 1530 ml   Net -815 ml       Physical Exam:  Gen:  No distress. Alert. Awake, oriented   Eyes:  No sclera icterus. No conjunctival injection. ENT: No discharge. Pharynx clear. Neck: No JVD. Trachea midline. Resp: No accessory muscle use. No crackles. No wheezes. No rhonchi. CV: Regular rate. Regular rhythm. No murmur. No rub. No edema. Capillary Refill: Brisk,< 3 seconds   Peripheral Pulses: +2 palpable, equal bilaterally   GI:   Right upper quadrant drain is in place. Soft, mild right upper quadrant tenderness. Bowel sounds present. Skin: Warm and dry. No nodule on exposed extremities. No rash on exposed extremities. M/S: No cyanosis. No joint deformity. No clubbing. Neuro: Awake. Grossly nonfocal    Psych: Oriented x 3. I Best Flower have reviewed the chart on [facility] and personally interviewed and examined patient, reviewed the data (labs and imaging) and after discussion with my PA formulated the plan. Agree with note with the following edits.     HPI:     I reviewed the patient's Past Medical History, Past Surgical LIVER PROFILE:   No results for input(s): AST, ALT, LIPASE, BILIDIR, BILITOT, ALKPHOS in the last 72 hours. Invalid input(s): AMYLASE,  ALB  PT/INR:   No results for input(s): PROTIME, INR in the last 72 hours. CULTURES    Blood cx x2: NGTD     RADIOLOGY    IR PICC WO SQ PORT/PUMP > 5 YEARS   Final Result   Successful placement of PICC line. CT [facility] CHOLECYSTOSTOMY   Final Result   Successful CT guided placement of cholecystostomy tube. CT ABDOMEN PELVIS W IV CONTRAST Additional Contrast? None   Final Result   1. Loculated right perihepatic fluid collection which appears contiguous with   the gallbladder fundus, possibly indicating contained perforation with   biloma. Consider imaging follow-up with hepatobiliary scan or sampling of   the fluid. Surgical consultation recommended. 2. Mild dependent changes in the right lung base. 3. Colonic diverticulosis with no acute features. Findings were discussed with Jessica Parents at 6:35 pm on 6/10/2020. Assessment/Plan:    Perforated Gallbladder  - loculated, appears contained per Gen surg  - Admitted to PCU, tele  -Gen surgery consulted, S/P  perc cholecystostomy drain placement on 6/11/2020  - NPO-> low fat diet  - IVF stopped  -Continue IV antibiotics Merrem D#5  -  PRN Dilaudid for pain, Oxy-IR added. - will need interval cholecystectomy on acute episode resolves  Monitor electrolytes, replete potassium and phosphorus. continue IV fluids      Sepsis  - w/ leukocytosis, tachycardia and tachypnea  - 2/2 above  - check blood cx (NGTD). given 1L bolus in ED  - abx as above, IVF, monitor vitals and labs  - WBC trending down.   16--> 13--> 11 K-->6 K    Hypotension - Resolved  - mild, poss related to pain meds  - monitor on tele, hold home antihypertensives    Normocytic Anemia  - Hgb 11.9  - baseline: 11-12  - stable, monitor    HLD  - hold statin for now    GERD  - cont PPI    Tobacco Dependence  - Recommended cessation  -

## 2020-06-15 NOTE — TELEPHONE ENCOUNTER
Refill Request     Last Seen: 5/7/2020    Last Written: 5/11/2020    Next Appointment:   Future Appointments   Date Time Provider Elia Pulliam   7/31/2020 10:00 AM [facility] MAIN [facility] [place] [facility] [place]   8/4/2020 10:00 AM MD ANJUM Umanzor             Requested Prescriptions     Pending Prescriptions Disp Refills    lidocaine (LIDODERM) 5 % [Pharmacy Med Name: LIDOCAINE 5% PATCH] 30 patch      Sig: PLACE 1 PATCH ONTO THE SKIN DAILY 12 HOURS ON, 12 HOURS OFF.

## 2020-06-16 ENCOUNTER — APPOINTMENT (OUTPATIENT)
Dept: INTERVENTIONAL RADIOLOGY/VASCULAR | Age: 65
DRG: 720 | End: 2020-06-16
Payer: COMMERCIAL

## 2020-06-16 ENCOUNTER — APPOINTMENT (OUTPATIENT)
Dept: CT IMAGING | Age: 65
DRG: 720 | End: 2020-06-16
Payer: COMMERCIAL

## 2020-06-16 LAB
ANAEROBIC CULTURE: NORMAL
ANION GAP SERPL CALCULATED.3IONS-SCNC: 12 MMOL/L (ref 3–16)
BUN BLDV-MCNC: 5 MG/DL (ref 7–20)
CALCIUM SERPL-MCNC: 8.8 MG/DL (ref 8.3–10.6)
CHLORIDE BLD-SCNC: 99 MMOL/L (ref 99–110)
CO2: 28 MMOL/L (ref 21–32)
CREAT SERPL-MCNC: <0.5 MG/DL (ref 0.6–1.2)
GFR AFRICAN AMERICAN: >60
GFR NON-AFRICAN AMERICAN: >60
GLUCOSE BLD-MCNC: 105 MG/DL (ref 70–99)
GRAM STAIN RESULT: NORMAL
HCT VFR BLD CALC: 36.7 % (ref 36–48)
HEMOGLOBIN: 11.8 G/DL (ref 12–16)
INR BLD: 1.05 (ref 0.86–1.14)
MAGNESIUM: 2 MG/DL (ref 1.8–2.4)
MCH RBC QN AUTO: 28.9 PG (ref 26–34)
MCHC RBC AUTO-ENTMCNC: 32.2 G/DL (ref 31–36)
MCV RBC AUTO: 89.7 FL (ref 80–100)
PDW BLD-RTO: 15.9 % (ref 12.4–15.4)
PHOSPHORUS: 3.8 MG/DL (ref 2.5–4.9)
PLATELET # BLD: 423 K/UL (ref 135–450)
PMV BLD AUTO: 7.4 FL (ref 5–10.5)
POTASSIUM SERPL-SCNC: 3.4 MMOL/L (ref 3.5–5.1)
PROTHROMBIN TIME: 12.2 SEC (ref 10–13.2)
RBC # BLD: 4.09 M/UL (ref 4–5.2)
SODIUM BLD-SCNC: 139 MMOL/L (ref 136–145)
WBC # BLD: 8.3 K/UL (ref 4–11)
WOUND/ABSCESS: NORMAL

## 2020-06-16 PROCEDURE — 87070 CULTURE OTHR SPECIMN AEROBIC: CPT

## 2020-06-16 PROCEDURE — 2060000000 HC ICU INTERMEDIATE R&B

## 2020-06-16 PROCEDURE — 6360000002 HC RX W HCPCS: Performed by: RADIOLOGY

## 2020-06-16 PROCEDURE — 6370000000 HC RX 637 (ALT 250 FOR IP): Performed by: HOSPITALIST

## 2020-06-16 PROCEDURE — 6370000000 HC RX 637 (ALT 250 FOR IP): Performed by: INTERNAL MEDICINE

## 2020-06-16 PROCEDURE — 80048 BASIC METABOLIC PNL TOTAL CA: CPT

## 2020-06-16 PROCEDURE — 6360000002 HC RX W HCPCS: Performed by: INTERNAL MEDICINE

## 2020-06-16 PROCEDURE — 83735 ASSAY OF MAGNESIUM: CPT

## 2020-06-16 PROCEDURE — 87075 CULTR BACTERIA EXCEPT BLOOD: CPT

## 2020-06-16 PROCEDURE — 85610 PROTHROMBIN TIME: CPT

## 2020-06-16 PROCEDURE — 85027 COMPLETE CBC AUTOMATED: CPT

## 2020-06-16 PROCEDURE — 84100 ASSAY OF PHOSPHORUS: CPT

## 2020-06-16 PROCEDURE — 99232 SBSQ HOSP IP/OBS MODERATE 35: CPT | Performed by: SURGERY

## 2020-06-16 PROCEDURE — 99232 SBSQ HOSP IP/OBS MODERATE 35: CPT | Performed by: INTERNAL MEDICINE

## 2020-06-16 PROCEDURE — 2580000003 HC RX 258: Performed by: NURSE PRACTITIONER

## 2020-06-16 PROCEDURE — 94640 AIRWAY INHALATION TREATMENT: CPT

## 2020-06-16 PROCEDURE — 75989 ABSCESS DRAINAGE UNDER X-RAY: CPT

## 2020-06-16 PROCEDURE — 2580000003 HC RX 258: Performed by: INTERNAL MEDICINE

## 2020-06-16 PROCEDURE — 6370000000 HC RX 637 (ALT 250 FOR IP): Performed by: NURSE PRACTITIONER

## 2020-06-16 PROCEDURE — 87205 SMEAR GRAM STAIN: CPT

## 2020-06-16 PROCEDURE — 36415 COLL VENOUS BLD VENIPUNCTURE: CPT

## 2020-06-16 RX ORDER — MIDAZOLAM HYDROCHLORIDE 5 MG/ML
INJECTION INTRAMUSCULAR; INTRAVENOUS
Status: COMPLETED | OUTPATIENT
Start: 2020-06-16 | End: 2020-06-16

## 2020-06-16 RX ORDER — FENTANYL CITRATE 50 UG/ML
INJECTION, SOLUTION INTRAMUSCULAR; INTRAVENOUS
Status: COMPLETED | OUTPATIENT
Start: 2020-06-16 | End: 2020-06-16

## 2020-06-16 RX ADMIN — MIDAZOLAM HYDROCHLORIDE 0.5 MG: 5 INJECTION INTRAMUSCULAR; INTRAVENOUS at 13:47

## 2020-06-16 RX ADMIN — FENTANYL CITRATE 25 MCG: 50 INJECTION INTRAMUSCULAR; INTRAVENOUS at 13:46

## 2020-06-16 RX ADMIN — OXYCODONE HYDROCHLORIDE 10 MG: 5 TABLET ORAL at 16:35

## 2020-06-16 RX ADMIN — GABAPENTIN 400 MG: 400 CAPSULE ORAL at 09:15

## 2020-06-16 RX ADMIN — TIOTROPIUM BROMIDE INHALATION SPRAY 2 PUFF: 3.12 SPRAY, METERED RESPIRATORY (INHALATION) at 06:41

## 2020-06-16 RX ADMIN — FENTANYL CITRATE 25 MCG: 50 INJECTION INTRAMUSCULAR; INTRAVENOUS at 13:42

## 2020-06-16 RX ADMIN — Medication 10 ML: at 09:17

## 2020-06-16 RX ADMIN — GABAPENTIN 400 MG: 400 CAPSULE ORAL at 20:42

## 2020-06-16 RX ADMIN — SENNOSIDES AND DOCUSATE SODIUM 2 TABLET: 8.6; 5 TABLET ORAL at 16:35

## 2020-06-16 RX ADMIN — SUCRALFATE 1 G: 1 TABLET ORAL at 07:16

## 2020-06-16 RX ADMIN — DULOXETINE HYDROCHLORIDE 60 MG: 60 CAPSULE, DELAYED RELEASE ORAL at 09:15

## 2020-06-16 RX ADMIN — MEROPENEM 1 G: 1 INJECTION, POWDER, FOR SOLUTION INTRAVENOUS at 12:23

## 2020-06-16 RX ADMIN — SUCRALFATE 1 G: 1 TABLET ORAL at 16:34

## 2020-06-16 RX ADMIN — PANTOPRAZOLE SODIUM 40 MG: 40 TABLET, DELAYED RELEASE ORAL at 07:17

## 2020-06-16 RX ADMIN — LISINOPRIL 20 MG: 20 TABLET ORAL at 09:15

## 2020-06-16 RX ADMIN — OXYCODONE HYDROCHLORIDE 10 MG: 5 TABLET ORAL at 12:23

## 2020-06-16 RX ADMIN — OXYCODONE HYDROCHLORIDE 10 MG: 5 TABLET ORAL at 07:17

## 2020-06-16 RX ADMIN — MEROPENEM 1 G: 1 INJECTION, POWDER, FOR SOLUTION INTRAVENOUS at 01:00

## 2020-06-16 RX ADMIN — MIDAZOLAM HYDROCHLORIDE 0.5 MG: 5 INJECTION INTRAMUSCULAR; INTRAVENOUS at 13:42

## 2020-06-16 RX ADMIN — OXYCODONE HYDROCHLORIDE 10 MG: 5 TABLET ORAL at 20:42

## 2020-06-16 RX ADMIN — ASPIRIN 81 MG: 81 TABLET, COATED ORAL at 16:35

## 2020-06-16 RX ADMIN — FAMOTIDINE 10 MG: 10 TABLET ORAL at 20:42

## 2020-06-16 RX ADMIN — TRAZODONE HYDROCHLORIDE 50 MG: 50 TABLET ORAL at 20:42

## 2020-06-16 RX ADMIN — FENTANYL CITRATE 25 MCG: 50 INJECTION INTRAMUSCULAR; INTRAVENOUS at 13:47

## 2020-06-16 RX ADMIN — FAMOTIDINE 10 MG: 10 TABLET ORAL at 09:15

## 2020-06-16 RX ADMIN — OXYCODONE HYDROCHLORIDE 10 MG: 5 TABLET ORAL at 02:53

## 2020-06-16 RX ADMIN — Medication 10 ML: at 20:46

## 2020-06-16 RX ADMIN — TIZANIDINE 2 MG: 4 TABLET ORAL at 20:42

## 2020-06-16 RX ADMIN — MEROPENEM 1 G: 1 INJECTION, POWDER, FOR SOLUTION INTRAVENOUS at 20:43

## 2020-06-16 RX ADMIN — MIDAZOLAM HYDROCHLORIDE 0.5 MG: 5 INJECTION INTRAMUSCULAR; INTRAVENOUS at 13:46

## 2020-06-16 RX ADMIN — GABAPENTIN 400 MG: 400 CAPSULE ORAL at 16:33

## 2020-06-16 ASSESSMENT — PAIN SCALES - GENERAL
PAINLEVEL_OUTOF10: 7
PAINLEVEL_OUTOF10: 10
PAINLEVEL_OUTOF10: 9
PAINLEVEL_OUTOF10: 0
PAINLEVEL_OUTOF10: 5

## 2020-06-16 NOTE — PRE SEDATION
Sedation Pre-Procedure Note    Patient Name: Kim Marquez   YOB: 1955  Room/Bed: /8925-31  Medical Record Number: [id number]  Date: 2020   Time: 1:23 PM       Indication:  64 yo female with an enlarging fluid collection in the right abdomen concerning for biloma versus abscess. Presents for CT guided drainage catheter placement. Consent: I have discussed with the patient and/or the patient representative the indication, alternatives, and the possible risks and/or complications of the planned procedure and the anesthesia methods. The patient and/or patient representative appear to understand and agree to proceed. Vital Signs:   Vitals:    20 0813   BP: (!) 174/89   Pulse: 79   Resp: 16   Temp: 97.5 °F (36.4 °C)   SpO2: 96%       Past Medical History:   has a past medical history of JASPREET (acute kidney injury) ([address] JASPREET (acute kidney injury) ([address] Arterial occlusion, Centrilobular emphysema ([address] Chronic bilateral low back pain with bilateral sciatica, Chronic bilateral low back pain with bilateral sciatica, Hyperlipidemia, Hypertension, MDRO (multiple drug resistant organisms) resistance, Metabolic encephalopathy, Moderate single current episode of major depressive disorder ([address] and Ruptured disk. Past Surgical History:   has a past surgical history that includes  section () and Leg Surgery (2017).     Medications:   Scheduled Meds:    sennosides-docusate sodium  2 tablet Oral Daily    lisinopril  20 mg Oral Daily    gabapentin  400 mg Oral TID    sodium chloride flush  10 mL Intravenous 2 times per day    aspirin  81 mg Oral Daily    DULoxetine  60 mg Oral Daily    fluticasone  1 spray Each Nostril Daily    lidocaine  1 patch Transdermal Daily    nicotine  1 patch Transdermal Daily    pantoprazole  40 mg Oral QAM AC    tiotropium  2 puff Inhalation Daily    sucralfate  1 g Oral TID AC    famotidine  10 mg Oral BID    meropenem  1 g Intravenous capsule Take 1 capsule by mouth 3 times daily for 30 days. 1/13/20 5/1/21  Faiza Disla MD   DULoxetine (CYMBALTA) 60 MG extended release capsule TAKE 1 CAPSULE BY MOUTH DAILY 1/13/20   Faiza Disla MD   lisinopril (PRINIVIL;ZESTRIL) 40 MG tablet TAKE 1 TABLET BY MOUTH DAILY 1/13/20   Faiza Disla MD   traZODone (DESYREL) 50 MG tablet TAKE 1 TABLET BY MOUTH NIGHTLY 1/13/20   Faiza Disla MD   nicotine (NICODERM CQ) 7 MG/24HR PLACE 1 PATCH ONTO THE SKIN DAILY (ROTATE SITES) 12/4/19   Faiza Disla MD   nitroGLYCERIN (NITROSTAT) 0.4 MG SL tablet up to max of 3 total doses. If no relief after 1 dose, call 911. 7/31/19   SHAHAB Murrell - CNP   nicotine (NICODERM CQ) 7 MG/24HR Place 1 patch onto the skin daily for 14 days  Patient not taking: Reported on 5/1/2020 5/31/19 2/27/20  Faiza Disla MD   VENTOLIN  (90 Base) MCG/ACT inhaler Inhale 2 puffs into the lungs every 6 hours as needed for Wheezing 5/31/19   Faiza Disla MD          Pre-Sedation Documentation and Exam:   I have reviewed the patient's history and review of systems.     Mallampati Airway Assessment:  Mallampati Class III - (soft palate & base of uvula are visible)    Prior History of Anesthesia Complications:   none    ASA Classification:  Class 3 - A patient with severe systemic disease that limits activity but is not incapacitating    Sedation/ Anesthesia Plan:   intravenous sedation    Medications Planned:   midazolam (Versed) intravenously and fentanyl intravenously    Patient is an appropriate candidate for plan of sedation: yes    Electronically signed by Archana Brady MD on 6/16/2020 at 1:23 PM

## 2020-06-16 NOTE — PROGRESS NOTES
Patient medicated per request w/ oxy IR 10mg po for complaints of abdominal pain of 9. Will monitor for effectiveness.

## 2020-06-16 NOTE — PROGRESS NOTES
perforation with   biloma. Consider imaging follow-up with hepatobiliary scan or sampling of   the fluid. Surgical consultation recommended. 2. Mild dependent changes in the right lung base. 3. Colonic diverticulosis with no acute features. Findings were discussed with Ayleenmikey Annie at 6:35 pm on 6/10/2020. Assessment/Plan:    Perforated Gallbladder  - loculated abscess   - Admitted to PCU, tele  -Gen surgery consulted, S/P  perc cholecystostomy drain placement on 6/11/2020 and repeat ct worsening  - second drain placed today   - NPO-> low fat diet  - IVF stopped  -Continue IV antibiotics Merrem D#6  -  PRN Dilaudid for pain, Oxy-IR added. - will need interval cholecystectomy after acute episode resolves  Monitor electrolytes, replete potassium and phosphorus. Sepsis  - w/ leukocytosis, tachycardia and tachypnea  - 2/2 above  - check blood cx (NGTD). given 1L bolus in ED  - abx as above, IVF, monitor vitals and labs  - WBC trending down.   16--> 13--> 11 K-->6 K    Hypotension - Resolved  - mild, poss related to pain meds  - monitor on tele, hold home antihypertensives    Normocytic Anemia  - Hgb 11.9  - baseline: 11-12  - stable, monitor    HLD  - hold statin for now    GERD  - cont PPI    Tobacco Dependence  - Recommended cessation  - nicotine patch ordered     DVT Prophylaxis: Lovenox  Diet: DIET LOW FAT; Low Fat  Code Status: Full Code     Dispo: PCU    Rody Murphy MD 6/16/2020 7:25 PM

## 2020-06-16 NOTE — PROGRESS NOTES
3.5 mg of Versed and 25 mcg of Fentanyl wasted and witnessed by William Sheldon, RT.   Summer CASANDRA OH RN

## 2020-06-16 NOTE — PROGRESS NOTES
Pt resting in bed with room quiet. All meds given per [facility]. Shift assessment complete. Pt reported pain 10/10 and a headache this evening: gave PRN pain medication, PRN Zanaflex, and PRN Tylenol. Beverage and snack offered. Bed in lowest position. Call light within reach. Will continue to monitor and assess.

## 2020-06-16 NOTE — PROGRESS NOTES
General Surgery - [facility] [place], [place]  Daily Progress Note    Pt Name: Igor Lacey  Medical Record Number: [id number]  Date of Birth 1955   Today's Date: 6/16/2020    ASSESSMENT  1. S/P perc drainage of intraabdominal fluid collection this afternoon  2. CT guided cholecystostomy placed on 6/11  3. ABD: soft, + peritonitic findings along right abdomen, no N/V, + few flatus, no BM  4. Perc drain: 80 ml out: bilious drainage  5. Pt reporting she \"feels a little better today\"  6. Leuks normal  7. VSS    8. Per jennifer drain culture: 3+ gram + cocci, 2+ gram neg rods    PLAN  1. Merrem  2. Resume lovenox  3. Low fat diet  4. Hopefully patient improves with antibiotics and drainage with plans for outpatient lap jennifer. [place] Mini has slightly improved from yesterday. Pain is better controlled. She has no nausea and no vomiting. She has passed flatus and has not had a bowel movement. She is tolerating some low fat food. Current activity is up with assistance    OBJECTIVE  VITALS:  height is 5' 1\" (1.549 m) and weight is 138 lb 9.6 oz (62.9 kg). Her temperature is 98.2 °F (36.8 °C). Her blood pressure is 157/86 (abnormal) and her pulse is 84. Her respiration is 16 and oxygen saturation is 95%. VITALS:  BP (!) 157/86   Pulse 84   Temp 98.2 °F (36.8 °C)   Resp 16   Ht 5' 1\" (1.549 m)   Wt 138 lb 9.6 oz (62.9 kg)   SpO2 95%   BMI 26.19 kg/m²   GENERAL: alert, cooperative, no distress    I/O last 3 completed shifts: In: 356 [P.O.:240; I.V.:116]  Out: -   No intake/output data recorded.     LABS  Recent Labs     06/15/20  0502 06/16/20  0504   WBC 6.6 8.3   HGB 10.4* 11.8*   HCT 31.7* 36.7    423    139   K 3.5 3.4*   CL 98* 99   CO2 29 28   BUN 3* 5*   CREATININE <0.5* <0.5*   MG 1.80 2.00   PHOS 3.8 3.8   CALCIUM 8.4 8.8   INR  --  1.05   AST 9*  --    ALT <5*  --    BILITOT <0.2  --    BILIDIR <0.2  --      CBC with Differential:    Lab Results   Component Value Date    WBC 8.3 06/16/2020    RBC 4.09 06/16/2020    HGB 11.8 06/16/2020    HCT 36.7 06/16/2020     06/16/2020    MCV 89.7 06/16/2020    MCH 28.9 06/16/2020    MCHC 32.2 06/16/2020    RDW 15.9 06/16/2020    SEGSPCT 51.3 05/27/2012    BANDSPCT 7 04/24/2020    METASPCT 1 04/24/2020    LYMPHOPCT 18.1 06/11/2020    MONOPCT 6.0 06/11/2020    EOSPCT 0.3 01/05/2012    BASOPCT 0.2 06/11/2020    MONOSABS 0.8 06/11/2020    LYMPHSABS 2.4 06/11/2020    EOSABS 0.1 06/11/2020    BASOSABS 0.0 06/11/2020    DIFFTYPE Auto 05/27/2012     CMP:    Lab Results   Component Value Date     06/16/2020    K 3.4 06/16/2020    K 3.8 06/11/2020    CL 99 06/16/2020    CO2 28 06/16/2020    BUN 5 06/16/2020    CREATININE <0.5 06/16/2020    GFRAA >60 06/16/2020    GFRAA >60 05/27/2012    AGRATIO 1.0 06/11/2020    LABGLOM >60 06/16/2020    GLUCOSE 105 06/16/2020    PROT 6.0 06/15/2020    PROT 8.3 05/27/2012    LABALBU 2.9 06/15/2020    CALCIUM 8.8 06/16/2020    BILITOT <0.2 06/15/2020    ALKPHOS 68 06/15/2020    AST 9 06/15/2020    ALT <5 06/15/2020         Kendy Jennifer Frame  Electronically signed 6/16/2020 at 4:17 PM

## 2020-06-17 LAB
ANION GAP SERPL CALCULATED.3IONS-SCNC: 9 MMOL/L (ref 3–16)
BUN BLDV-MCNC: 7 MG/DL (ref 7–20)
CALCIUM SERPL-MCNC: 8.6 MG/DL (ref 8.3–10.6)
CHLORIDE BLD-SCNC: 97 MMOL/L (ref 99–110)
CO2: 29 MMOL/L (ref 21–32)
CREAT SERPL-MCNC: <0.5 MG/DL (ref 0.6–1.2)
GFR AFRICAN AMERICAN: >60
GFR NON-AFRICAN AMERICAN: >60
GLUCOSE BLD-MCNC: 114 MG/DL (ref 70–99)
HCT VFR BLD CALC: 35.9 % (ref 36–48)
HEMOGLOBIN: 11.5 G/DL (ref 12–16)
MAGNESIUM: 1.9 MG/DL (ref 1.8–2.4)
MCH RBC QN AUTO: 29 PG (ref 26–34)
MCHC RBC AUTO-ENTMCNC: 32 G/DL (ref 31–36)
MCV RBC AUTO: 90.6 FL (ref 80–100)
PDW BLD-RTO: 15.7 % (ref 12.4–15.4)
PHOSPHORUS: 3.7 MG/DL (ref 2.5–4.9)
PLATELET # BLD: 406 K/UL (ref 135–450)
PMV BLD AUTO: 7.3 FL (ref 5–10.5)
POTASSIUM SERPL-SCNC: 3.4 MMOL/L (ref 3.5–5.1)
RBC # BLD: 3.96 M/UL (ref 4–5.2)
SODIUM BLD-SCNC: 135 MMOL/L (ref 136–145)
WBC # BLD: 7.2 K/UL (ref 4–11)

## 2020-06-17 PROCEDURE — 85027 COMPLETE CBC AUTOMATED: CPT

## 2020-06-17 PROCEDURE — 6370000000 HC RX 637 (ALT 250 FOR IP): Performed by: HOSPITALIST

## 2020-06-17 PROCEDURE — 83735 ASSAY OF MAGNESIUM: CPT

## 2020-06-17 PROCEDURE — 6360000002 HC RX W HCPCS: Performed by: INTERNAL MEDICINE

## 2020-06-17 PROCEDURE — 2580000003 HC RX 258: Performed by: INTERNAL MEDICINE

## 2020-06-17 PROCEDURE — 6370000000 HC RX 637 (ALT 250 FOR IP): Performed by: INTERNAL MEDICINE

## 2020-06-17 PROCEDURE — 94640 AIRWAY INHALATION TREATMENT: CPT

## 2020-06-17 PROCEDURE — 84100 ASSAY OF PHOSPHORUS: CPT

## 2020-06-17 PROCEDURE — 2060000000 HC ICU INTERMEDIATE R&B

## 2020-06-17 PROCEDURE — 6370000000 HC RX 637 (ALT 250 FOR IP): Performed by: NURSE PRACTITIONER

## 2020-06-17 PROCEDURE — 99232 SBSQ HOSP IP/OBS MODERATE 35: CPT | Performed by: SURGERY

## 2020-06-17 PROCEDURE — 36415 COLL VENOUS BLD VENIPUNCTURE: CPT

## 2020-06-17 PROCEDURE — 80048 BASIC METABOLIC PNL TOTAL CA: CPT

## 2020-06-17 PROCEDURE — 99232 SBSQ HOSP IP/OBS MODERATE 35: CPT | Performed by: INTERNAL MEDICINE

## 2020-06-17 PROCEDURE — 2580000003 HC RX 258: Performed by: NURSE PRACTITIONER

## 2020-06-17 RX ORDER — KETOROLAC TROMETHAMINE 30 MG/ML
15 INJECTION, SOLUTION INTRAMUSCULAR; INTRAVENOUS EVERY 6 HOURS PRN
Status: DISCONTINUED | OUTPATIENT
Start: 2020-06-17 | End: 2020-06-18 | Stop reason: HOSPADM

## 2020-06-17 RX ORDER — FLUTICASONE PROPIONATE 50 MCG
1 SPRAY, SUSPENSION (ML) NASAL DAILY PRN
Status: DISCONTINUED | OUTPATIENT
Start: 2020-06-17 | End: 2020-06-18 | Stop reason: HOSPADM

## 2020-06-17 RX ADMIN — ACETAMINOPHEN 650 MG: 325 TABLET ORAL at 20:25

## 2020-06-17 RX ADMIN — OXYCODONE HYDROCHLORIDE 10 MG: 5 TABLET ORAL at 01:16

## 2020-06-17 RX ADMIN — ASPIRIN 81 MG: 81 TABLET, COATED ORAL at 08:29

## 2020-06-17 RX ADMIN — OXYCODONE HYDROCHLORIDE 10 MG: 5 TABLET ORAL at 05:48

## 2020-06-17 RX ADMIN — KETOROLAC TROMETHAMINE 15 MG: 30 INJECTION, SOLUTION INTRAMUSCULAR at 22:44

## 2020-06-17 RX ADMIN — LISINOPRIL 20 MG: 20 TABLET ORAL at 08:30

## 2020-06-17 RX ADMIN — SUCRALFATE 1 G: 1 TABLET ORAL at 05:49

## 2020-06-17 RX ADMIN — Medication 10 ML: at 08:29

## 2020-06-17 RX ADMIN — DULOXETINE HYDROCHLORIDE 60 MG: 60 CAPSULE, DELAYED RELEASE ORAL at 08:29

## 2020-06-17 RX ADMIN — MEROPENEM 1 G: 1 INJECTION, POWDER, FOR SOLUTION INTRAVENOUS at 20:25

## 2020-06-17 RX ADMIN — SUCRALFATE 1 G: 1 TABLET ORAL at 10:58

## 2020-06-17 RX ADMIN — TIZANIDINE 2 MG: 4 TABLET ORAL at 20:25

## 2020-06-17 RX ADMIN — HYDROMORPHONE HYDROCHLORIDE 1 MG: 1 INJECTION, SOLUTION INTRAMUSCULAR; INTRAVENOUS; SUBCUTANEOUS at 22:38

## 2020-06-17 RX ADMIN — MEROPENEM 1 G: 1 INJECTION, POWDER, FOR SOLUTION INTRAVENOUS at 05:48

## 2020-06-17 RX ADMIN — Medication 10 ML: at 20:25

## 2020-06-17 RX ADMIN — OXYCODONE HYDROCHLORIDE 10 MG: 5 TABLET ORAL at 23:47

## 2020-06-17 RX ADMIN — GABAPENTIN 400 MG: 400 CAPSULE ORAL at 20:25

## 2020-06-17 RX ADMIN — SUCRALFATE 1 G: 1 TABLET ORAL at 15:50

## 2020-06-17 RX ADMIN — GABAPENTIN 400 MG: 400 CAPSULE ORAL at 08:29

## 2020-06-17 RX ADMIN — FAMOTIDINE 10 MG: 10 TABLET ORAL at 20:25

## 2020-06-17 RX ADMIN — OXYCODONE HYDROCHLORIDE 10 MG: 5 TABLET ORAL at 14:44

## 2020-06-17 RX ADMIN — HYDROMORPHONE HYDROCHLORIDE 1 MG: 1 INJECTION, SOLUTION INTRAMUSCULAR; INTRAVENOUS; SUBCUTANEOUS at 11:01

## 2020-06-17 RX ADMIN — TIOTROPIUM BROMIDE INHALATION SPRAY 2 PUFF: 3.12 SPRAY, METERED RESPIRATORY (INHALATION) at 07:36

## 2020-06-17 RX ADMIN — OXYCODONE HYDROCHLORIDE 10 MG: 5 TABLET ORAL at 19:13

## 2020-06-17 RX ADMIN — OXYCODONE HYDROCHLORIDE 10 MG: 5 TABLET ORAL at 09:50

## 2020-06-17 RX ADMIN — KETOROLAC TROMETHAMINE 15 MG: 30 INJECTION, SOLUTION INTRAMUSCULAR at 15:50

## 2020-06-17 RX ADMIN — MEROPENEM 1 G: 1 INJECTION, POWDER, FOR SOLUTION INTRAVENOUS at 12:07

## 2020-06-17 RX ADMIN — HYDROMORPHONE HYDROCHLORIDE 1 MG: 1 INJECTION, SOLUTION INTRAMUSCULAR; INTRAVENOUS; SUBCUTANEOUS at 13:20

## 2020-06-17 RX ADMIN — TRAZODONE HYDROCHLORIDE 50 MG: 50 TABLET ORAL at 20:25

## 2020-06-17 RX ADMIN — PANTOPRAZOLE SODIUM 40 MG: 40 TABLET, DELAYED RELEASE ORAL at 05:49

## 2020-06-17 RX ADMIN — HYDROMORPHONE HYDROCHLORIDE 1 MG: 1 INJECTION, SOLUTION INTRAMUSCULAR; INTRAVENOUS; SUBCUTANEOUS at 20:39

## 2020-06-17 RX ADMIN — GABAPENTIN 400 MG: 400 CAPSULE ORAL at 13:19

## 2020-06-17 RX ADMIN — HYDROMORPHONE HYDROCHLORIDE 1 MG: 1 INJECTION, SOLUTION INTRAMUSCULAR; INTRAVENOUS; SUBCUTANEOUS at 17:59

## 2020-06-17 RX ADMIN — FAMOTIDINE 10 MG: 10 TABLET ORAL at 08:30

## 2020-06-17 ASSESSMENT — PAIN DESCRIPTION - LOCATION
LOCATION: ABDOMEN

## 2020-06-17 ASSESSMENT — PAIN DESCRIPTION - PAIN TYPE
TYPE: ACUTE PAIN

## 2020-06-17 ASSESSMENT — PAIN - FUNCTIONAL ASSESSMENT
PAIN_FUNCTIONAL_ASSESSMENT: PREVENTS OR INTERFERES SOME ACTIVE ACTIVITIES AND ADLS

## 2020-06-17 ASSESSMENT — PAIN DESCRIPTION - ORIENTATION
ORIENTATION: MID

## 2020-06-17 ASSESSMENT — PAIN SCALES - GENERAL
PAINLEVEL_OUTOF10: 8
PAINLEVEL_OUTOF10: 0
PAINLEVEL_OUTOF10: 9
PAINLEVEL_OUTOF10: 9
PAINLEVEL_OUTOF10: 8
PAINLEVEL_OUTOF10: 10
PAINLEVEL_OUTOF10: 9
PAINLEVEL_OUTOF10: 8
PAINLEVEL_OUTOF10: 0
PAINLEVEL_OUTOF10: 8
PAINLEVEL_OUTOF10: 9
PAINLEVEL_OUTOF10: 9
PAINLEVEL_OUTOF10: 10
PAINLEVEL_OUTOF10: 9
PAINLEVEL_OUTOF10: 8
PAINLEVEL_OUTOF10: 0
PAINLEVEL_OUTOF10: 9

## 2020-06-17 ASSESSMENT — PAIN DESCRIPTION - DESCRIPTORS
DESCRIPTORS: SHARP
DESCRIPTORS: CONSTANT;ACHING
DESCRIPTORS: SHARP

## 2020-06-17 ASSESSMENT — PAIN DESCRIPTION - PROGRESSION
CLINICAL_PROGRESSION: NOT CHANGED
CLINICAL_PROGRESSION: GRADUALLY WORSENING
CLINICAL_PROGRESSION: GRADUALLY WORSENING
CLINICAL_PROGRESSION: NOT CHANGED

## 2020-06-17 ASSESSMENT — PAIN DESCRIPTION - FREQUENCY
FREQUENCY: CONTINUOUS

## 2020-06-17 ASSESSMENT — PAIN DESCRIPTION - ONSET
ONSET: ON-GOING

## 2020-06-17 NOTE — PROGRESS NOTES
Bedside report given to Izabella Tidwell RN. Patient in stable condition. Care transferred. JOSUE Lambert, RN.

## 2020-06-17 NOTE — PROGRESS NOTES
General Surgery - [place], [place]  Daily Progress Note    Pt Name: David Dumont  Medical Record Number: [id number]  Date of Birth 1955   Today's Date: 6/17/2020    ASSESSMENT  1. S/P perc drainage of intraabdominal fluid collection this afternoon  2. CT guided cholecystostomy placed on 6/11  3. ABD: soft, + tenderness along right side of abdomen which appears improved when palpated, no N/V, + few flatus, + BM  4. Perc drain: 0 ml out: bilious drainage  5. R lower perc drain: 60 ml out serous in color  6. Pt reporting she \"wants to go home but has severe abdominal pain\"  7. Leuks normal  8. VSS    9. Per jennifer drain culture: 3+ gram + cocci, 2+ gram neg rods  10. Perc drain 6/16: no growht    PLAN  1. Merrem  2. Resume lovenox  3. Low fat diet  4. Hopefully patient can be discharged home in the am on PO apin meds and PO antibiotics with drains x2      SUBJECTIVE  Natalie Osei has slightly improved from yesterday. Pain is better controlled. She has no nausea and no vomiting. She has passed flatus and has not had a bowel movement. She is tolerating some low fat food. Current activity is up with assistance    OBJECTIVE  VITALS:  height is 5' 1\" (1.549 m) and weight is 138 lb 8 oz (62.8 kg). Her oral temperature is 97.7 °F (36.5 °C). Her blood pressure is 123/74 and her pulse is 75. Her respiration is 16 and oxygen saturation is 97%. VITALS:  /74   Pulse 75   Temp 97.7 °F (36.5 °C) (Oral)   Resp 16   Ht 5' 1\" (1.549 m)   Wt 138 lb 8 oz (62.8 kg)   SpO2 97%   BMI 26.17 kg/m²   GENERAL: alert, cooperative, no distress    I/O last 3 completed shifts: In: 9938 [P.O.:840;  I.V.:353]  Out: 60 [Drains:60]  I/O this shift:  In: 600 [P.O.:600]  Out: 450 [Urine:450]    LABS  Recent Labs     06/15/20  0502 06/16/20  0504 06/17/20  0445   WBC 6.6 8.3 7.2   HGB 10.4* 11.8* 11.5*   HCT 31.7* 36.7 35.9*    423 406    139 135*   K 3.5 3.4* 3.4*   CL 98* 99 97*   CO2 29 28 29   BUN 3* 5* 7   CREATININE

## 2020-06-17 NOTE — PROGRESS NOTES
Pt resting in bed with room quiet. All meds given per [facility]. Shift assessment complete. Pt reported pain 10/10 this evening: gave PRN pain medication, PRN Zanaflex, and PRN Trazadone. Beverage and snack offered. Bed in lowest position. Call light within reach. Will continue to monitor and assess.

## 2020-06-18 VITALS
OXYGEN SATURATION: 94 % | TEMPERATURE: 97.2 F | RESPIRATION RATE: 18 BRPM | HEIGHT: 61 IN | BODY MASS INDEX: 27.03 KG/M2 | SYSTOLIC BLOOD PRESSURE: 112 MMHG | WEIGHT: 143.2 LBS | DIASTOLIC BLOOD PRESSURE: 71 MMHG | HEART RATE: 68 BPM

## 2020-06-18 PROBLEM — A41.9 SEPSIS (HCC): Status: RESOLVED | Noted: 2020-06-10 | Resolved: 2020-06-18

## 2020-06-18 LAB
ANION GAP SERPL CALCULATED.3IONS-SCNC: 12 MMOL/L (ref 3–16)
BUN BLDV-MCNC: 7 MG/DL (ref 7–20)
CALCIUM SERPL-MCNC: 9.2 MG/DL (ref 8.3–10.6)
CHLORIDE BLD-SCNC: 98 MMOL/L (ref 99–110)
CO2: 22 MMOL/L (ref 21–32)
CREAT SERPL-MCNC: 0.6 MG/DL (ref 0.6–1.2)
GFR AFRICAN AMERICAN: >60
GFR NON-AFRICAN AMERICAN: >60
GLUCOSE BLD-MCNC: 73 MG/DL (ref 70–99)
HCT VFR BLD CALC: 35.8 % (ref 36–48)
HEMOGLOBIN: 11.3 G/DL (ref 12–16)
MAGNESIUM: 2.2 MG/DL (ref 1.8–2.4)
MCH RBC QN AUTO: 28.6 PG (ref 26–34)
MCHC RBC AUTO-ENTMCNC: 31.6 G/DL (ref 31–36)
MCV RBC AUTO: 90.7 FL (ref 80–100)
PDW BLD-RTO: 15.5 % (ref 12.4–15.4)
PHOSPHORUS: 3.9 MG/DL (ref 2.5–4.9)
PLATELET # BLD: 389 K/UL (ref 135–450)
PMV BLD AUTO: 7.1 FL (ref 5–10.5)
POTASSIUM SERPL-SCNC: 3.7 MMOL/L (ref 3.5–5.1)
RBC # BLD: 3.95 M/UL (ref 4–5.2)
SODIUM BLD-SCNC: 132 MMOL/L (ref 136–145)
WBC # BLD: 6.4 K/UL (ref 4–11)

## 2020-06-18 PROCEDURE — 6370000000 HC RX 637 (ALT 250 FOR IP): Performed by: NURSE PRACTITIONER

## 2020-06-18 PROCEDURE — 6370000000 HC RX 637 (ALT 250 FOR IP): Performed by: INTERNAL MEDICINE

## 2020-06-18 PROCEDURE — 83735 ASSAY OF MAGNESIUM: CPT

## 2020-06-18 PROCEDURE — 6360000002 HC RX W HCPCS: Performed by: INTERNAL MEDICINE

## 2020-06-18 PROCEDURE — 85027 COMPLETE CBC AUTOMATED: CPT

## 2020-06-18 PROCEDURE — 2580000003 HC RX 258: Performed by: NURSE PRACTITIONER

## 2020-06-18 PROCEDURE — 6370000000 HC RX 637 (ALT 250 FOR IP): Performed by: HOSPITALIST

## 2020-06-18 PROCEDURE — 84100 ASSAY OF PHOSPHORUS: CPT

## 2020-06-18 PROCEDURE — 99238 HOSP IP/OBS DSCHRG MGMT 30/<: CPT | Performed by: INTERNAL MEDICINE

## 2020-06-18 PROCEDURE — 94640 AIRWAY INHALATION TREATMENT: CPT

## 2020-06-18 PROCEDURE — 36415 COLL VENOUS BLD VENIPUNCTURE: CPT

## 2020-06-18 PROCEDURE — 2580000003 HC RX 258: Performed by: INTERNAL MEDICINE

## 2020-06-18 PROCEDURE — 80048 BASIC METABOLIC PNL TOTAL CA: CPT

## 2020-06-18 RX ORDER — OXYCODONE HYDROCHLORIDE 5 MG/1
5 TABLET ORAL EVERY 4 HOURS PRN
Qty: 18 TABLET | Refills: 0 | Status: SHIPPED | OUTPATIENT
Start: 2020-06-18 | End: 2020-06-21

## 2020-06-18 RX ORDER — LISINOPRIL 20 MG/1
20 TABLET ORAL DAILY
Qty: 30 TABLET | Refills: 3 | Status: SHIPPED | OUTPATIENT
Start: 2020-06-19 | End: 2020-07-13 | Stop reason: ALTCHOICE

## 2020-06-18 RX ORDER — AMOXICILLIN AND CLAVULANATE POTASSIUM 875; 125 MG/1; MG/1
1 TABLET, FILM COATED ORAL 2 TIMES DAILY
Qty: 14 TABLET | Refills: 0 | Status: SHIPPED | OUTPATIENT
Start: 2020-06-18 | End: 2020-06-25

## 2020-06-18 RX ADMIN — LISINOPRIL 20 MG: 20 TABLET ORAL at 08:36

## 2020-06-18 RX ADMIN — SUCRALFATE 1 G: 1 TABLET ORAL at 11:49

## 2020-06-18 RX ADMIN — ACETAMINOPHEN 650 MG: 325 TABLET ORAL at 04:05

## 2020-06-18 RX ADMIN — TIOTROPIUM BROMIDE INHALATION SPRAY 2 PUFF: 3.12 SPRAY, METERED RESPIRATORY (INHALATION) at 07:09

## 2020-06-18 RX ADMIN — GABAPENTIN 400 MG: 400 CAPSULE ORAL at 08:37

## 2020-06-18 RX ADMIN — PANTOPRAZOLE SODIUM 40 MG: 40 TABLET, DELAYED RELEASE ORAL at 06:24

## 2020-06-18 RX ADMIN — MEROPENEM 1 G: 1 INJECTION, POWDER, FOR SOLUTION INTRAVENOUS at 04:05

## 2020-06-18 RX ADMIN — MEROPENEM 1 G: 1 INJECTION, POWDER, FOR SOLUTION INTRAVENOUS at 11:49

## 2020-06-18 RX ADMIN — HYDROMORPHONE HYDROCHLORIDE 1 MG: 1 INJECTION, SOLUTION INTRAMUSCULAR; INTRAVENOUS; SUBCUTANEOUS at 01:21

## 2020-06-18 RX ADMIN — OXYCODONE HYDROCHLORIDE 10 MG: 5 TABLET ORAL at 08:36

## 2020-06-18 RX ADMIN — OXYCODONE HYDROCHLORIDE 10 MG: 5 TABLET ORAL at 04:05

## 2020-06-18 RX ADMIN — HYDROMORPHONE HYDROCHLORIDE 1 MG: 1 INJECTION, SOLUTION INTRAMUSCULAR; INTRAVENOUS; SUBCUTANEOUS at 06:24

## 2020-06-18 RX ADMIN — HYDROMORPHONE HYDROCHLORIDE 1 MG: 1 INJECTION, SOLUTION INTRAMUSCULAR; INTRAVENOUS; SUBCUTANEOUS at 04:36

## 2020-06-18 RX ADMIN — FAMOTIDINE 10 MG: 10 TABLET ORAL at 08:36

## 2020-06-18 RX ADMIN — HYDROMORPHONE HYDROCHLORIDE 1 MG: 1 INJECTION, SOLUTION INTRAMUSCULAR; INTRAVENOUS; SUBCUTANEOUS at 09:58

## 2020-06-18 RX ADMIN — Medication 10 ML: at 08:38

## 2020-06-18 RX ADMIN — SUCRALFATE 1 G: 1 TABLET ORAL at 06:24

## 2020-06-18 RX ADMIN — ASPIRIN 81 MG: 81 TABLET, COATED ORAL at 08:37

## 2020-06-18 RX ADMIN — DULOXETINE HYDROCHLORIDE 60 MG: 60 CAPSULE, DELAYED RELEASE ORAL at 08:36

## 2020-06-18 ASSESSMENT — PAIN DESCRIPTION - FREQUENCY: FREQUENCY: CONTINUOUS

## 2020-06-18 ASSESSMENT — PAIN SCALES - GENERAL
PAINLEVEL_OUTOF10: 8
PAINLEVEL_OUTOF10: 7
PAINLEVEL_OUTOF10: 8
PAINLEVEL_OUTOF10: 9

## 2020-06-18 ASSESSMENT — PAIN DESCRIPTION - PROGRESSION: CLINICAL_PROGRESSION: GRADUALLY WORSENING

## 2020-06-18 ASSESSMENT — PAIN DESCRIPTION - ORIENTATION: ORIENTATION: MID

## 2020-06-18 ASSESSMENT — PAIN - FUNCTIONAL ASSESSMENT: PAIN_FUNCTIONAL_ASSESSMENT: PREVENTS OR INTERFERES SOME ACTIVE ACTIVITIES AND ADLS

## 2020-06-18 ASSESSMENT — PAIN DESCRIPTION - LOCATION: LOCATION: ABDOMEN

## 2020-06-18 ASSESSMENT — PAIN DESCRIPTION - ONSET: ONSET: ON-GOING

## 2020-06-18 ASSESSMENT — PAIN DESCRIPTION - PAIN TYPE: TYPE: ACUTE PAIN

## 2020-06-18 ASSESSMENT — PAIN DESCRIPTION - DESCRIPTORS: DESCRIPTORS: SHARP

## 2020-06-18 NOTE — PROGRESS NOTES
General Surgery - [facility]  Daily Progress Note    Pt Name: Stacey Cervantes  Medical Record Number: [id number]  Date of Birth 1955   Today's Date: 6/18/2020    ASSESSMENT  1. S/P perc drainage of intraabdominal fluid collection 6/17  2. CT guided cholecystostomy placed on 6/11  3. ABD: soft, + tenderness along right side of abdomen which appears much improved when palpated, no N/V, + few flatus, + BM  4. Perc drain: 0 ml out: bilious drainage  5. R lower perc drain: 20 ml out serous in color  6. Pt reporting she \"wants to go home and abdominal pain is better\"  7. Leuks normal  8. VSS    9. Per jennifer drain culture: 3+ gram + cocci, 2+ gram neg rods  10. Perc drain 6/16: no growth    PLAN  1. Merrem  2. Resume lovenox  3. Low fat diet  4. Home this afternoon with 2 drains in place, on PO antibiotics with PO pain medications f/u with Dr. Karyn Lord in 1 week. Hubert Larkin has improved from yesterday. Pain is better controlled. She has no nausea and no vomiting. She has passed flatus and has had a bowel movement. She is tolerating some low fat food. Current activity is up with assistance    OBJECTIVE  VITALS:  height is 5' 1\" (1.549 m) and weight is 143 lb 3.2 oz (65 kg). Her oral temperature is 97.2 °F (36.2 °C). Her blood pressure is 112/71 and her pulse is 68. Her respiration is 18 and oxygen saturation is 94%. VITALS:  /71   Pulse 68   Temp 97.2 °F (36.2 °C) (Oral)   Resp 18   Ht 5' 1\" (1.549 m)   Wt 143 lb 3.2 oz (65 kg)   SpO2 94%   BMI 27.06 kg/m²   GENERAL: alert, cooperative, no distress    I/O last 3 completed shifts:   In: 840 [P.O.:840]  Out: 1820 [Urine:1800; Drains:20]  I/O this shift:  In: 240 [P.O.:240]  Out: -     LABS  Recent Labs     06/16/20  0504  06/18/20  0505   WBC 8.3   < > 6.4   HGB 11.8*   < > 11.3*   HCT 36.7   < > 35.8*      < > 389      < > 132*   K 3.4*   < > 3.7   CL 99   < > 98*   CO2 28   < > 22   BUN 5*   < > 7   CREATININE <0.5*   < > 0.6   MG 2.00   < > 2.20   PHOS 3.8   < > 3.9   CALCIUM 8.8   < > 9.2   INR 1.05  --   --     < > = values in this interval not displayed.      CBC with Differential:    Lab Results   Component Value Date    WBC 6.4 06/18/2020    RBC 3.95 06/18/2020    HGB 11.3 06/18/2020    HCT 35.8 06/18/2020     06/18/2020    MCV 90.7 06/18/2020    MCH 28.6 06/18/2020    MCHC 31.6 06/18/2020    RDW 15.5 06/18/2020    SEGSPCT 51.3 05/27/2012    BANDSPCT 7 04/24/2020    METASPCT 1 04/24/2020    LYMPHOPCT 18.1 06/11/2020    MONOPCT 6.0 06/11/2020    EOSPCT 0.3 01/05/2012    BASOPCT 0.2 06/11/2020    MONOSABS 0.8 06/11/2020    LYMPHSABS 2.4 06/11/2020    EOSABS 0.1 06/11/2020    BASOSABS 0.0 06/11/2020    DIFFTYPE Auto 05/27/2012     CMP:    Lab Results   Component Value Date     06/18/2020    K 3.7 06/18/2020    K 3.8 06/11/2020    CL 98 06/18/2020    CO2 22 06/18/2020    BUN 7 06/18/2020    CREATININE 0.6 06/18/2020    GFRAA >60 06/18/2020    GFRAA >60 05/27/2012    AGRATIO 1.0 06/11/2020    LABGLOM >60 06/18/2020    GLUCOSE 73 06/18/2020    PROT 6.0 06/15/2020    PROT 8.3 05/27/2012    LABALBU 2.9 06/15/2020    CALCIUM 9.2 06/18/2020    BILITOT <0.2 06/15/2020    ALKPHOS 68 06/15/2020    AST 9 06/15/2020    ALT <5 06/15/2020         Kendy Jennifer  Parcel  Electronically signed 6/18/2020 at 11:13 AM

## 2020-06-18 NOTE — ADT AUTH CERT
Utilization Reviews         Gallbladder or Bile Duct Inflammation or Stone - Care Day 8 (6/17/2020) by Candis Gould RN         Review Status Review Entered   Completed 6/17/2020 17:16       Criteria Review      Care Day: 8 Care Date: 6/17/2020 Level of Care:    Guideline Day 3    Level Of Care    (X) Floor to discharge    6/17/2020 5:16 PM EDT by Darlene [email or web address]    Clinical Status    (X) * Hemodynamic stability    6/17/2020 5:16 PM EDT by Darlene Edwards      hr 73-75  bp: 160/80    (X) * Afebrile or temperature acceptable for next level of care    6/17/2020 5:16 PM EDT by Darlene Edwards      97.9    ( ) * Nausea and vomiting absent or controlled and acceptable for next level of care    ( ) * Pain absent or managed    (X) * Laboratory test results normal or acceptable for next level of care    6/17/2020 5:16 PM EDT by Darlene Edwards      na: 135, k+: 3.4, plt: 114    ( ) * Discharge plans and education understood    Activity    ( ) * Ambulatory [G]    Routes    ( ) * Oral hydration, medications, and diet    Medications    (X) * Antibiotics absent or administration arranged for next level of care    6/17/2020 5:16 PM EDT by Darlene Edwards      merrem 1g iv bid    * Milestone   Additional Notes   6/17   Day 8      Pt remains on medsurg unit      Orders:   Bmp, cbc phos, mg daily   Merrem 1g iv bid      Per General surgery PN:  ASSESSMENT   1. S/P perc drainage of intraabdominal fluid collection this afternoon   2. CT guided cholecystostomy placed on 6/11   3. ABD: soft, + tenderness along right side of abdomen which appears improved when palpated, no N/V, + few flatus, + BM   4. Perc drain: 0 ml out: bilious drainage   5. R lower perc drain: 60 ml out serous in color   6. Pt reporting she \"wants to go home but has severe abdominal pain\"   7. Leuks normal   8. VSS        9. Per jennifer drain culture: 3+ gram + cocci, 2+ gram neg rods   10. Perc drain 6/16: no growht       PLAN   1.  Tat[place] Prom 2. Resume lovenox   3. Low fat diet   4. Hopefully patient can be discharged home in the am on PO apin meds and PO antibiotics with drains x2         Gallbladder or Bile Duct Inflammation or Stone - Care Day 7 (6/16/2020) by Kristian Brown RN         Review Status Review Entered   Completed 6/17/2020 17:16       Criteria Review      Care Day: 7 Care Date: 6/16/2020 Level of Care:    Guideline Day 3    Level Of Care    (X) Floor to discharge    Clinical Status    (X) * Hemodynamic stability    6/17/2020 5:16 PM EDT by Ryan Denny      hr 73-75  bp: 122/76    (X) * Afebrile or temperature acceptable for next level of care    6/17/2020 5:16 PM EDT by Ryan Denny      97.9    ( ) * Nausea and vomiting absent or controlled and acceptable for next level of care    ( ) * Pain absent or managed    (X) * Laboratory test results normal or acceptable for next level of care    6/17/2020 5:16 PM EDT by Ryan Denny      k+: 3.4, gluc: 105    ( ) * Discharge plans and education understood    Activity    ( ) * Ambulatory [G]    Routes    ( ) * Oral hydration, medications, and diet    Medications    (X) * Antibiotics absent or administration arranged for next level of care    6/17/2020 5:16 PM EDT by Ryan Denny      merrem 1g iv bid    * Milestone   Additional Notes   6/16  Day 7      Pt remains on medsurg unit      Orders:   Bmp, cbc phos, mg daily   Merrem 1g iv bid      Per General surgery PN:  ASSESSMENT   1. S/P perc drainage of intraabdominal fluid collection this afternoon   2. CT guided cholecystostomy placed on 6/11   3. ABD: soft, + peritonitic findings along right abdomen, no N/V, + few flatus, no BM   4. Perc drain: 80 ml out: bilious drainage   5. Pt reporting she \"feels a little better today\"   6. Leuks normal   7. VSS        8. Per jennifer drain culture: 3+ gram + cocci, 2+ gram neg rods       PLAN   1. Merrem   2. Resume lovenox   3. Low fat diet   4.  Hopefully patient improves with normal or acceptable for next level of care    6/17/2020 5:16 PM EDT by Ryan Denny      k+: 3.4, gluc: 105    ( ) * Discharge plans and education understood    Activity    ( ) * Ambulatory [G]    Routes    ( ) * Oral hydration, medications, and diet    Medications    (X) * Antibiotics absent or administration arranged for next level of care    6/17/2020 5:16 PM EDT by Ryan Denny      merrem 1g iv bid    * Milestone   Additional Notes   6/15  Day 6      Pt remains on medsurg unit      CT abd/pelvis:   Impression:        Slight increase in size of loculated perihepatic fluid collection. .   Sterility of this fluid is indeterminate by CT. Decreased gallbladder distention, status post cholecystostomy tube insertion      Increased pleural-parenchymal disease at the lung bases      CT guided drainage   Impression:        1. Successful CT-guided placement of a 10 [place] drainage catheter into a   right abdominal fluid collection as described above. 2. Approximately 5 mL of serosanguineous slightly yellow tinged fluid was   removed and sent for analysis. 3. Following drainage catheter placement and aspiration, there was near   complete resolution of the right-sided fluid collection. Orders:   Bmp, cbc phos, mg daily   Merrem 1g iv bid   Dilaudid iv prn (5 doses today)      Per general surgery PN:  ASSESSMENT   1. Repeat CT abd and pelvis:   2. CT guided cholecystostomy placed on 6/11   3. ABD: soft, + peritonitic findings along right abdomen, no N/V, + few flatus, no BM   4. Perc drain: 80 ml out: bilious drainage   5. Pt complaining of \"worsening\" but, \"different\" pain. 6. Leuks normal   7. VSS            PLAN   1. Merrem   2. D/C lovenox place SCDs   3. NPO after midnight   4. Add PT/INR to am labs    5.  Plan for IR drainage of loculated perihepatic fluid collection in the am            Gallbladder or Bile Duct Inflammation or Stone - Care Day 5 (6/14/2020) by Kristian Brown RN

## 2020-06-18 NOTE — DISCHARGE SUMMARY
removed and sent for analysis. 3. Following drainage catheter placement and aspiration, there was near   complete resolution of the right-sided fluid collection. CT ABDOMEN PELVIS W IV CONTRAST Additional Contrast? Oral   Final Result   Slight increase in size of loculated perihepatic fluid collection. .   Sterility of this fluid is indeterminate by CT. Decreased gallbladder distention, status post cholecystostomy tube insertion      Increased pleural-parenchymal disease at the lung bases         IR PICC WO SQ PORT/PUMP > 5 YEARS   Final Result   Successful placement of PICC line. CT [facility] CHOLECYSTOSTOMY   Final Result   Successful CT guided placement of cholecystostomy tube. CT ABDOMEN PELVIS W IV CONTRAST Additional Contrast? None   Final Result   1. Loculated right perihepatic fluid collection which appears contiguous with   the gallbladder fundus, possibly indicating contained perforation with   biloma. Consider imaging follow-up with hepatobiliary scan or sampling of   the fluid. Surgical consultation recommended. 2. Mild dependent changes in the right lung base. 3. Colonic diverticulosis with no acute features. Findings were discussed with Tiki Lewis at 6:35 pm on 6/10/2020. Discharge Medications     Medication List      START taking these medications    amoxicillin-clavulanate 875-125 MG per tablet  Commonly known as: Augmentin  Take 1 tablet by mouth 2 times daily for 7 days  Notes to patient:  Augmentin  Use: treat bacterial infections  Side effects:rash; hives; itching; shortness of breath; wheezing; cough     oxyCODONE 5 MG immediate release tablet  Commonly known as:  ROXICODONE  Take 1 tablet by mouth every 4 hours as needed for Pain for up to 3 days. Notes to patient:  ANALGESICS  Use: moderate to severe pain. Side effects: drowsiness, dizziness, or         possibly nausea.         CHANGE how you take these medications    lisinopril 20 MG

## 2020-06-19 ENCOUNTER — TELEPHONE (OUTPATIENT)
Dept: SURGERY | Age: 65
End: 2020-06-19

## 2020-06-19 ENCOUNTER — TELEPHONE (OUTPATIENT)
Dept: FAMILY MEDICINE CLINIC | Age: 65
End: 2020-06-19

## 2020-06-19 NOTE — TELEPHONE ENCOUNTER
I called and spoke to pt, advised to make sure her bulbs are compressed, she stated they are, advised to continue antibiotics, pain meds and Advil as needed, she was having pain when she urinates but no burning, advised to push fluids, advised if she has any worsening symptoms to call me back, she is more than welcome to come back to hospital if anything gets worse. Advised she may have more discomfort because she has 2 drains instead of just one. She states she is eating and drinking, has had normal bm. Call office back with any questions or concerns.

## 2020-06-21 LAB
ANAEROBIC CULTURE: NORMAL
GRAM STAIN RESULT: NORMAL
WOUND/ABSCESS: NORMAL

## 2020-06-25 ENCOUNTER — TELEPHONE (OUTPATIENT)
Dept: SURGERY | Age: 65
End: 2020-06-25

## 2020-06-25 ENCOUNTER — OFFICE VISIT (OUTPATIENT)
Dept: SURGERY | Age: 65
End: 2020-06-25
Payer: COMMERCIAL

## 2020-06-25 ENCOUNTER — TELEPHONE (OUTPATIENT)
Dept: FAMILY MEDICINE CLINIC | Age: 65
End: 2020-06-25

## 2020-06-25 VITALS
WEIGHT: 140 LBS | BODY MASS INDEX: 26.43 KG/M2 | DIASTOLIC BLOOD PRESSURE: 78 MMHG | HEIGHT: 61 IN | SYSTOLIC BLOOD PRESSURE: 130 MMHG | TEMPERATURE: 97.6 F

## 2020-06-25 PROCEDURE — 4004F PT TOBACCO SCREEN RCVD TLK: CPT | Performed by: SURGERY

## 2020-06-25 PROCEDURE — 1111F DSCHRG MED/CURRENT MED MERGE: CPT | Performed by: SURGERY

## 2020-06-25 PROCEDURE — 99213 OFFICE O/P EST LOW 20 MIN: CPT | Performed by: SURGERY

## 2020-06-25 PROCEDURE — G8427 DOCREV CUR MEDS BY ELIG CLIN: HCPCS | Performed by: SURGERY

## 2020-06-25 PROCEDURE — G8419 CALC BMI OUT NRM PARAM NOF/U: HCPCS | Performed by: SURGERY

## 2020-06-25 PROCEDURE — 3017F COLORECTAL CA SCREEN DOC REV: CPT | Performed by: SURGERY

## 2020-06-25 RX ORDER — SODIUM CHLORIDE 0.9 % (FLUSH) 0.9 %
10 SYRINGE (ML) INJECTION EVERY 12 HOURS SCHEDULED
Status: CANCELLED | OUTPATIENT
Start: 2020-06-25

## 2020-06-25 RX ORDER — INDOCYANINE GREEN AND WATER 25 MG
5 KIT INJECTION ONCE
Status: CANCELLED | OUTPATIENT
Start: 2020-06-25 | End: 2020-06-25

## 2020-06-25 RX ORDER — HEPARIN SODIUM 5000 [USP'U]/ML
5000 INJECTION, SOLUTION INTRAVENOUS; SUBCUTANEOUS ONCE
Status: CANCELLED | OUTPATIENT
Start: 2020-06-25

## 2020-06-25 RX ORDER — SODIUM CHLORIDE 0.9 % (FLUSH) 0.9 %
10 SYRINGE (ML) INJECTION PRN
Status: CANCELLED | OUTPATIENT
Start: 2020-06-25

## 2020-06-25 NOTE — PROGRESS NOTES
[facility] SURGERY    CHIEF COMPLAINT: Abdominal pain    SUBJECTIVE:   Patient presents for follow up of her gallbladder perforation. She reports she feels better. She still has pain, but it is improved. She has had almost no drainage out the drains. Allergies   Allergen Reactions    Lorazepam      Confusion, hallucinations    Codeine Hives     Tolerates Norco.     Outpatient Medications Marked as Taking for the 6/25/20 encounter (Office Visit) with Valinda Bamberger, MD   Medication Sig Dispense Refill    lisinopril (PRINIVIL;ZESTRIL) 20 MG tablet Take 1 tablet by mouth daily 30 tablet 3    amoxicillin-clavulanate (AUGMENTIN) 875-125 MG per tablet Take 1 tablet by mouth 2 times daily for 7 days 14 tablet 0    lidocaine (LIDODERM) 5 % PLACE 1 PATCH ONTO THE SKIN DAILY 12 HOURS ON, 12 HOURS OFF. 30 patch 0    sucralfate (CARAFATE) 1 GM tablet Take 1 tablet by mouth 3 times daily 90 tablet 3    omeprazole (PRILOSEC) 20 MG delayed release capsule Take 1 capsule by mouth every morning (before breakfast) 60 capsule 1    famotidine (PEPCID) 20 MG tablet TAKE 1 TABLET BY MOUTH TWICE DAILY 60 tablet 2    tiZANidine (ZANAFLEX) 2 MG tablet TAKE 1 TABLET BY MOUTH 2 TIMES DAILY AS NEEDED (MUSCLE SPASM AND PAIN) 30 tablet 2    albuterol sulfate  (90 Base) MCG/ACT inhaler INHALE 2 PUFFS INTO THE LUNGS EVERY 6 HOURS AS NEEDED FOR WHEEZING 8.5 g 2    ASPIRIN LOW DOSE 81 MG EC tablet TAKE 1 TABLET BY MOUTH DAILY 90 tablet 3    fluticasone (FLONASE) 50 MCG/ACT nasal spray USE 2 SPRAYS IN EACH NOSTRIL DAILY 16 g 2    vitamin D (ERGOCALCIFEROL) 1.25 MG (44065 UT) CAPS capsule TAKE 1 CAPSULE BY MOUTH ONCE A WEEK FOR 12 DOSES 12 capsule 0    cetirizine (ZYRTEC) 10 MG tablet TAKE 1 TABLET BY MOUTH DAILY 90 tablet 1    SPIRIVA HANDIHALER 18 MCG inhalation capsule INHALE 1 CAPSULE INTO THE LUNGS DAILY 90 capsule 1    gabapentin (NEURONTIN) 400 MG capsule Take 1 capsule by mouth 3 times daily for 30 days. college, no degree   Occupational History    Not on file   Social Needs    Financial resource strain: Not very hard    Food insecurity     Worry: Never true     Inability: Never true    Transportation needs     Medical: No     Non-medical: No   Tobacco Use    Smoking status: Current Every Day Smoker     Packs/day: 0.50     Years: 20.00     Pack years: 10.00     Types: Cigarettes     Last attempt to quit: 3/1/2020     Years since quittin.3    Smokeless tobacco: Never Used   Substance and Sexual Activity    Alcohol use: No    Drug use: Not Currently     Types: Opiates     Sexual activity: Not Currently   Lifestyle    Physical activity     Days per week: Not on file     Minutes per session: Not on file    Stress: Not on file   Relationships    Social connections     Talks on phone: Not on file     Gets together: Not on file     Attends [employer] service: Not on file     Active member of club or organization: Not on file     Attends meetings of clubs or organizations: Not on file     Relationship status: Not on file    Intimate partner violence     Fear of current or ex partner: Not on file     Emotionally abused: Not on file     Physically abused: Not on file     Forced sexual activity: Not on file   Other Topics Concern    Not on file   Social History Narrative    Not on file          Vitals:    20 1022   BP: 130/78   Site: Left Upper Arm   Position: Sitting   Cuff Size: Medium Adult   Temp: 97.6 °F (36.4 °C)   TempSrc: Temporal   Weight: 140 lb (63.5 kg)   Height: 5' 0.98\" (1.549 m)     Body mass index is 26.47 kg/m². ROS:  As per HPI, otherwise reviewed and negative    PHYSICAL EXAM:     Constitutional:  Well developed, well nourished, no acute distress, non-toxic appearance   Respiratory:  No respiratory distress, normal breath sounds, no rales, no wheezing   Cardiovascular:  Normal rate, normal rhythm, no murmurs. GI: Bowel sounds positive, soft, tender around the drain sites.   They were removed  Integument: Warm and dry  Neurologic:  Alert & oriented x 3, normal motor function, normal sensory function, no focal deficits noted   Psychiatric:  Speech and behavior appropriate             DATA:  No new data    ASSESSMENT:   1. Perforated gallbladder           PLAN: Robotic cholecystectomy, possible open cholecystectomy. I explained the procedure including risks, benefits, and alternatives. Questions were answered and the patient agrees to proceed. The patient was counseled at length about the risks of hal Covid-19 during their perioperative period and any recovery window from their procedure. The patient was made aware that hal Covid-19  may worsen their prognosis for recovering from their procedure  and lend to a higher morbidity and/or mortality risk. All material risks, benefits, and reasonable alternatives including postponing the procedure were discussed. The patient does wish to proceed with the procedure at this time.

## 2020-06-25 NOTE — TELEPHONE ENCOUNTER
Received call from [facility]. They state patient called them asking for a prescription of pain meds. Pharmacy states that she recently had her gallbladder removed and she still is having pain. States her surgeon would not order pain meds and was instructed to call her FP. I did attempt to reach out to the patient for clarity of request but unable to make contact with her. Her VM was full.

## 2020-06-29 ENCOUNTER — OFFICE VISIT (OUTPATIENT)
Dept: PRIMARY CARE CLINIC | Age: 65
End: 2020-06-29
Payer: COMMERCIAL

## 2020-06-29 PROCEDURE — G8419 CALC BMI OUT NRM PARAM NOF/U: HCPCS | Performed by: PHYSICIAN ASSISTANT

## 2020-06-29 PROCEDURE — 99211 OFF/OP EST MAY X REQ PHY/QHP: CPT | Performed by: PHYSICIAN ASSISTANT

## 2020-06-29 PROCEDURE — G8428 CUR MEDS NOT DOCUMENT: HCPCS | Performed by: PHYSICIAN ASSISTANT

## 2020-07-02 LAB
SARS-COV-2: NOT DETECTED
SOURCE: NORMAL

## 2020-07-02 NOTE — RESULT ENCOUNTER NOTE
Please contact patient with their testing results: Your test for COVID-19, also known as novel coronavirus, came back negative. No virus was detected from the sample collected. Testing is not 100%. Until your symptoms are fully resolved, you may still be contagious. We recommend that you remain isolated for 7 days minimum or 72 hours after your symptoms have completely resolved, whichever is longer. If you were exposed to a known positive COIVID-19 patient, then you must remain isolated for 14 days. If you were tested for a pre-op, then you remain in isolated until your procedure. Continually monitor symptoms. Contact a medical provider if symptoms are worsening. If you have any additional questions, contact your PCP.     For additional information, please visit the Centers for Disease Control and Prevention [email or web address]

## 2020-07-06 ENCOUNTER — TELEPHONE (OUTPATIENT)
Dept: SURGERY | Age: 65
End: 2020-07-06

## 2020-07-06 NOTE — TELEPHONE ENCOUNTER
Pt needs to reschedule GB Surgery. Originally on 07/02 but needed to cancel due to sickness. Said she has not had a fever in 2 days.

## 2020-07-07 NOTE — TELEPHONE ENCOUNTER
Called and spoke to pt, rescheduled surgery for Friday 7/17/2020 advised to call to schedule an appt for her covid testing, advised all instructions are the same. Pt agrees.

## 2020-07-13 ENCOUNTER — OFFICE VISIT (OUTPATIENT)
Dept: PRIMARY CARE CLINIC | Age: 65
End: 2020-07-13
Payer: COMMERCIAL

## 2020-07-13 PROCEDURE — G8428 CUR MEDS NOT DOCUMENT: HCPCS | Performed by: NURSE PRACTITIONER

## 2020-07-13 PROCEDURE — G8420 CALC BMI NORM PARAMETERS: HCPCS | Performed by: NURSE PRACTITIONER

## 2020-07-13 PROCEDURE — 99211 OFF/OP EST MAY X REQ PHY/QHP: CPT | Performed by: NURSE PRACTITIONER

## 2020-07-13 RX ORDER — ERGOCALCIFEROL 1.25 MG/1
50000 CAPSULE ORAL WEEKLY
Qty: 12 CAPSULE | Refills: 1 | OUTPATIENT
Start: 2020-07-13 | End: 2020-09-29

## 2020-07-13 NOTE — TELEPHONE ENCOUNTER
Refill Request     Last Seen: 5/7/2020    Last Written: 4/13/20    Next Appointment:   Future Appointments   Date Time Provider Elia Pulliam   7/31/2020 10:00 AM [facility] [place] [facility] MAIN [facility] [place] CT SC Marisa Braxton   8/4/2020 10:00 AM MD ANJUM Mendez             Requested Prescriptions     Pending Prescriptions Disp Refills    vitamin D (ERGOCALCIFEROL) 1.25 MG (45875 UT) CAPS capsule [Pharmacy Med Name: VIT D (ERGOCALCIFEROL) 50,000 IU CAPS] 12 capsule 0     Sig: TAKE 1 CAPSULE BY MOUTH ONCE A WEEK FOR 12 DOSES

## 2020-07-13 NOTE — TELEPHONE ENCOUNTER
Needs Vitamin D labs checked prior to continuation of high dose supplementation. Ordered. Please let her know.  Thanks

## 2020-07-13 NOTE — PROGRESS NOTES
Che Lawton received a viral test for COVID-19. They were educated on isolation and quarantine as appropriate. For any symptoms, they were directed to seek care from their PCP, given contact information to establish with a doctor, directed to an urgent care or the emergency room.

## 2020-07-13 NOTE — PROGRESS NOTES
PRE OP INSTRUCTION SHEET   1. Do not eat or drink anything after 12 midnight  prior to surgery. This includes no water, chewing gum or mints. 2. Take the following pills will a small sip of water (see MAR)                                        3. Aspirin, Ibuprofen, Advil, Naproxen, Vitamin E, fish oil and other Anti-inflammatory products should be stopped for 5 days before surgery or as directed by your physician. 4. Check with your Doctor regarding stopping Plavix, Coumadin, Lovenox, Fragmin or other blood thinners   5. Do not smoke, and do not drink any alcoholic beverages 24 hours prior to surgery. This includes NA Beer. 6. You may brush your teeth and gargle the morning of surgery. DO NOT SWALLOW WATER   7. You MUST make arrangements for a responsible adult to take you home after your surgery. You will not be allowed to leave alone or drive yourself home. It is strongly suggested someone stay with you the first 24 hrs. Your surgery will be cancelled if you do not have a ride home. 8. A parent/legal guardian must accompany a child scheduled for surgery and plan to stay at the hospital until the child is discharged. Please do not bring other children with you. 9. Please wear simple, loose fitting clothing to the hospital.  [place] not bring valuables (money, credit cards, checkbooks, etc.) Do not wear any makeup (including no eye makeup) or nail polish on your fingers or toes. 10. DO NOT wear any jewelry or piercings on day of surgery. All body piercing jewelry must be removed. 11. If you have dentures,glasses, or contacts they will be removed before going to the OR; we will provide you a container. 12. Please see your family doctor/and cardiologist for a history & physical and/or concerning medications. Bring any test results/reports from your physician's office. Have history and labs faxed to [phone].  Remember to bring Blood Bank bracelet on the day of surgery. 14. If you have a Living Will and Durable Power of  for Healthcare, please bring in a copy. 13. Notify your Surgeon if you develop any illness between now and surgery  time, cough, cold, fever, sore throat, nausea, vomiting, etc.  Please notify your surgeon if you experience dizziness, shortness of breath or blurred vision between now & the time of your surgery   16. DO NOT shave your operative site 96 hours prior to surgery. For face & neck surgery, men may use an electric razor 48 hours prior to surgery. 17. Shower with _x__Antibacterial soap (x_chlorhexidine for total joint  Pt's) shower two times before surgery.(the morning of and the night before. 18. To provide excellent care visitors will be limited to one in the room at any given time.   Please call pre admission testing if you any further questions [phone] or [phone]

## 2020-07-14 ENCOUNTER — HOSPITAL ENCOUNTER (INPATIENT)
Age: 65
LOS: 2 days | Discharge: HOME | DRG: 469 | End: 2020-07-16
Attending: EMERGENCY MEDICINE | Admitting: INTERNAL MEDICINE
Payer: COMMERCIAL

## 2020-07-14 ENCOUNTER — APPOINTMENT (OUTPATIENT)
Dept: CT IMAGING | Age: 65
DRG: 469 | End: 2020-07-14
Payer: COMMERCIAL

## 2020-07-14 ENCOUNTER — TELEPHONE (OUTPATIENT)
Dept: SURGERY | Age: 65
End: 2020-07-14

## 2020-07-14 ENCOUNTER — APPOINTMENT (OUTPATIENT)
Dept: GENERAL RADIOLOGY | Age: 65
DRG: 469 | End: 2020-07-14
Payer: COMMERCIAL

## 2020-07-14 LAB
A/G RATIO: 1.1 (ref 1.1–2.2)
ALBUMIN SERPL-MCNC: 4.4 G/DL (ref 3.4–5)
ALP BLD-CCNC: 113 U/L (ref 40–129)
ALT SERPL-CCNC: 8 U/L (ref 10–40)
AMORPHOUS: ABNORMAL /HPF
ANION GAP SERPL CALCULATED.3IONS-SCNC: 15 MMOL/L (ref 3–16)
AST SERPL-CCNC: 14 U/L (ref 15–37)
BACTERIA: ABNORMAL /HPF
BASOPHILS ABSOLUTE: 0.1 K/UL (ref 0–0.2)
BASOPHILS RELATIVE PERCENT: 0.5 %
BILIRUB SERPL-MCNC: <0.2 MG/DL (ref 0–1)
BILIRUBIN URINE: NEGATIVE
BLOOD, URINE: NEGATIVE
BUN BLDV-MCNC: 16 MG/DL (ref 7–20)
CALCIUM SERPL-MCNC: 9 MG/DL (ref 8.3–10.6)
CHLORIDE BLD-SCNC: 95 MMOL/L (ref 99–110)
CLARITY: CLEAR
CO2: 26 MMOL/L (ref 21–32)
COLOR: YELLOW
CREAT SERPL-MCNC: 3.2 MG/DL (ref 0.6–1.2)
D DIMER: 339 NG/ML DDU (ref 0–229)
EOSINOPHILS ABSOLUTE: 0.1 K/UL (ref 0–0.6)
EOSINOPHILS RELATIVE PERCENT: 0.7 %
EPITHELIAL CELLS, UA: ABNORMAL /HPF (ref 0–5)
GFR AFRICAN AMERICAN: 18
GFR NON-AFRICAN AMERICAN: 15
GLOBULIN: 4.1 G/DL
GLUCOSE BLD-MCNC: 106 MG/DL (ref 70–99)
GLUCOSE URINE: NEGATIVE MG/DL
HCT VFR BLD CALC: 43.3 % (ref 36–48)
HEMOGLOBIN: 14.1 G/DL (ref 12–16)
HYALINE CASTS: ABNORMAL /LPF (ref 0–2)
KETONES, URINE: NEGATIVE MG/DL
LACTIC ACID, SEPSIS: 1.5 MMOL/L (ref 0.4–1.9)
LEUKOCYTE ESTERASE, URINE: ABNORMAL
LYMPHOCYTES ABSOLUTE: 4.1 K/UL (ref 1–5.1)
LYMPHOCYTES RELATIVE PERCENT: 31.5 %
MAGNESIUM: 2 MG/DL (ref 1.8–2.4)
MCH RBC QN AUTO: 29.3 PG (ref 26–34)
MCHC RBC AUTO-ENTMCNC: 32.6 G/DL (ref 31–36)
MCV RBC AUTO: 90 FL (ref 80–100)
MICROSCOPIC EXAMINATION: YES
MONOCYTES ABSOLUTE: 0.7 K/UL (ref 0–1.3)
MONOCYTES RELATIVE PERCENT: 5.6 %
MUCUS: ABNORMAL /LPF
NEUTROPHILS ABSOLUTE: 8 K/UL (ref 1.7–7.7)
NEUTROPHILS RELATIVE PERCENT: 61.7 %
NITRITE, URINE: NEGATIVE
OCCULT BLOOD DIAGNOSTIC: NORMAL
PDW BLD-RTO: 16.4 % (ref 12.4–15.4)
PH UA: 5.5 (ref 5–8)
PLATELET # BLD: 288 K/UL (ref 135–450)
PMV BLD AUTO: 8 FL (ref 5–10.5)
POTASSIUM REFLEX MAGNESIUM: 3.5 MMOL/L (ref 3.5–5.1)
PROCALCITONIN: 0.1 NG/ML (ref 0–0.15)
PROTEIN UA: NEGATIVE MG/DL
RBC # BLD: 4.81 M/UL (ref 4–5.2)
RBC UA: ABNORMAL /HPF (ref 0–4)
SODIUM BLD-SCNC: 136 MMOL/L (ref 136–145)
SPECIFIC GRAVITY UA: 1.01 (ref 1–1.03)
TOTAL PROTEIN: 8.5 G/DL (ref 6.4–8.2)
URINE REFLEX TO CULTURE: YES
URINE TYPE: ABNORMAL
UROBILINOGEN, URINE: 0.2 E.U./DL
WBC # BLD: 12.9 K/UL (ref 4–11)
WBC UA: ABNORMAL /HPF (ref 0–5)

## 2020-07-14 PROCEDURE — 2700000000 HC OXYGEN THERAPY PER DAY

## 2020-07-14 PROCEDURE — 87040 BLOOD CULTURE FOR BACTERIA: CPT

## 2020-07-14 PROCEDURE — 87086 URINE CULTURE/COLONY COUNT: CPT

## 2020-07-14 PROCEDURE — 71045 X-RAY EXAM CHEST 1 VIEW: CPT

## 2020-07-14 PROCEDURE — 2500000003 HC RX 250 WO HCPCS: Performed by: INTERNAL MEDICINE

## 2020-07-14 PROCEDURE — 96374 THER/PROPH/DIAG INJ IV PUSH: CPT

## 2020-07-14 PROCEDURE — 84145 PROCALCITONIN (PCT): CPT

## 2020-07-14 PROCEDURE — 6370000000 HC RX 637 (ALT 250 FOR IP): Performed by: INTERNAL MEDICINE

## 2020-07-14 PROCEDURE — 96375 TX/PRO/DX INJ NEW DRUG ADDON: CPT

## 2020-07-14 PROCEDURE — 2580000003 HC RX 258: Performed by: NURSE PRACTITIONER

## 2020-07-14 PROCEDURE — 85379 FIBRIN DEGRADATION QUANT: CPT

## 2020-07-14 PROCEDURE — G0378 HOSPITAL OBSERVATION PER HR: HCPCS

## 2020-07-14 PROCEDURE — 96365 THER/PROPH/DIAG IV INF INIT: CPT

## 2020-07-14 PROCEDURE — 74176 CT ABD & PELVIS W/O CONTRAST: CPT

## 2020-07-14 PROCEDURE — G0328 FECAL BLOOD SCRN IMMUNOASSAY: HCPCS

## 2020-07-14 PROCEDURE — 80053 COMPREHEN METABOLIC PANEL: CPT

## 2020-07-14 PROCEDURE — 81001 URINALYSIS AUTO W/SCOPE: CPT

## 2020-07-14 PROCEDURE — 2580000003 HC RX 258: Performed by: INTERNAL MEDICINE

## 2020-07-14 PROCEDURE — 6360000002 HC RX W HCPCS: Performed by: INTERNAL MEDICINE

## 2020-07-14 PROCEDURE — 2060000000 HC ICU INTERMEDIATE R&B

## 2020-07-14 PROCEDURE — 94761 N-INVAS EAR/PLS OXIMETRY MLT: CPT

## 2020-07-14 PROCEDURE — 96361 HYDRATE IV INFUSION ADD-ON: CPT

## 2020-07-14 PROCEDURE — 6360000002 HC RX W HCPCS: Performed by: NURSE PRACTITIONER

## 2020-07-14 PROCEDURE — 85025 COMPLETE CBC W/AUTO DIFF WBC: CPT

## 2020-07-14 PROCEDURE — 96372 THER/PROPH/DIAG INJ SC/IM: CPT

## 2020-07-14 PROCEDURE — 83735 ASSAY OF MAGNESIUM: CPT

## 2020-07-14 PROCEDURE — 83605 ASSAY OF LACTIC ACID: CPT

## 2020-07-14 PROCEDURE — 99285 EMERGENCY DEPT VISIT HI MDM: CPT

## 2020-07-14 RX ORDER — ACETAMINOPHEN 650 MG/1
650 SUPPOSITORY RECTAL EVERY 6 HOURS PRN
Status: DISCONTINUED | OUTPATIENT
Start: 2020-07-14 | End: 2020-07-16 | Stop reason: HOSPADM

## 2020-07-14 RX ORDER — HEPARIN SODIUM 5000 [USP'U]/ML
5000 INJECTION, SOLUTION INTRAVENOUS; SUBCUTANEOUS EVERY 8 HOURS SCHEDULED
Status: DISCONTINUED | OUTPATIENT
Start: 2020-07-14 | End: 2020-07-16 | Stop reason: HOSPADM

## 2020-07-14 RX ORDER — SODIUM CHLORIDE 9 MG/ML
INJECTION, SOLUTION INTRAVENOUS CONTINUOUS
Status: DISCONTINUED | OUTPATIENT
Start: 2020-07-14 | End: 2020-07-14

## 2020-07-14 RX ORDER — FENTANYL CITRATE 50 UG/ML
50 INJECTION, SOLUTION INTRAMUSCULAR; INTRAVENOUS ONCE
Status: COMPLETED | OUTPATIENT
Start: 2020-07-14 | End: 2020-07-14

## 2020-07-14 RX ORDER — SODIUM CHLORIDE 0.9 % (FLUSH) 0.9 %
10 SYRINGE (ML) INJECTION PRN
Status: DISCONTINUED | OUTPATIENT
Start: 2020-07-14 | End: 2020-07-16 | Stop reason: HOSPADM

## 2020-07-14 RX ORDER — TIZANIDINE 4 MG/1
2 TABLET ORAL 2 TIMES DAILY PRN
Status: DISCONTINUED | OUTPATIENT
Start: 2020-07-14 | End: 2020-07-16 | Stop reason: HOSPADM

## 2020-07-14 RX ORDER — ALBUTEROL SULFATE 2.5 MG/3ML
2.5 SOLUTION RESPIRATORY (INHALATION) EVERY 4 HOURS PRN
Status: DISCONTINUED | OUTPATIENT
Start: 2020-07-14 | End: 2020-07-14

## 2020-07-14 RX ORDER — SODIUM CHLORIDE 0.9 % (FLUSH) 0.9 %
10 SYRINGE (ML) INJECTION EVERY 12 HOURS SCHEDULED
Status: DISCONTINUED | OUTPATIENT
Start: 2020-07-14 | End: 2020-07-16 | Stop reason: HOSPADM

## 2020-07-14 RX ORDER — ONDANSETRON 2 MG/ML
4 INJECTION INTRAMUSCULAR; INTRAVENOUS ONCE
Status: COMPLETED | OUTPATIENT
Start: 2020-07-14 | End: 2020-07-14

## 2020-07-14 RX ORDER — SODIUM CHLORIDE 0.9 % (FLUSH) 0.9 %
10 SYRINGE (ML) INJECTION PRN
Status: DISCONTINUED | OUTPATIENT
Start: 2020-07-14 | End: 2020-07-14

## 2020-07-14 RX ORDER — SODIUM CHLORIDE 0.9 % (FLUSH) 0.9 %
10 SYRINGE (ML) INJECTION EVERY 12 HOURS SCHEDULED
Status: DISCONTINUED | OUTPATIENT
Start: 2020-07-14 | End: 2020-07-14

## 2020-07-14 RX ORDER — ACETAMINOPHEN 325 MG/1
650 TABLET ORAL EVERY 6 HOURS PRN
Status: DISCONTINUED | OUTPATIENT
Start: 2020-07-14 | End: 2020-07-16 | Stop reason: HOSPADM

## 2020-07-14 RX ORDER — PANTOPRAZOLE SODIUM 40 MG/1
40 TABLET, DELAYED RELEASE ORAL
Status: DISCONTINUED | OUTPATIENT
Start: 2020-07-15 | End: 2020-07-16 | Stop reason: HOSPADM

## 2020-07-14 RX ORDER — FLUTICASONE PROPIONATE 50 MCG
2 SPRAY, SUSPENSION (ML) NASAL DAILY
Status: DISCONTINUED | OUTPATIENT
Start: 2020-07-14 | End: 2020-07-16 | Stop reason: HOSPADM

## 2020-07-14 RX ORDER — ONDANSETRON 2 MG/ML
4 INJECTION INTRAMUSCULAR; INTRAVENOUS EVERY 6 HOURS PRN
Status: DISCONTINUED | OUTPATIENT
Start: 2020-07-14 | End: 2020-07-16 | Stop reason: HOSPADM

## 2020-07-14 RX ORDER — PROMETHAZINE HYDROCHLORIDE 25 MG/1
12.5 TABLET ORAL EVERY 6 HOURS PRN
Status: DISCONTINUED | OUTPATIENT
Start: 2020-07-14 | End: 2020-07-16 | Stop reason: HOSPADM

## 2020-07-14 RX ORDER — TRAZODONE HYDROCHLORIDE 50 MG/1
50 TABLET ORAL NIGHTLY
Status: DISCONTINUED | OUTPATIENT
Start: 2020-07-14 | End: 2020-07-15

## 2020-07-14 RX ORDER — SUCRALFATE 1 G/1
1 TABLET ORAL 3 TIMES DAILY
Status: DISCONTINUED | OUTPATIENT
Start: 2020-07-14 | End: 2020-07-15

## 2020-07-14 RX ORDER — ASPIRIN 81 MG/1
81 TABLET ORAL DAILY
Status: DISCONTINUED | OUTPATIENT
Start: 2020-07-14 | End: 2020-07-15

## 2020-07-14 RX ORDER — 0.9 % SODIUM CHLORIDE 0.9 %
1000 INTRAVENOUS SOLUTION INTRAVENOUS ONCE
Status: COMPLETED | OUTPATIENT
Start: 2020-07-14 | End: 2020-07-14

## 2020-07-14 RX ORDER — DEXTROSE AND SODIUM CHLORIDE 5; .9 G/100ML; G/100ML
INJECTION, SOLUTION INTRAVENOUS CONTINUOUS
Status: DISCONTINUED | OUTPATIENT
Start: 2020-07-14 | End: 2020-07-16

## 2020-07-14 RX ORDER — 0.9 % SODIUM CHLORIDE 0.9 %
30 INTRAVENOUS SOLUTION INTRAVENOUS ONCE
Status: COMPLETED | OUTPATIENT
Start: 2020-07-14 | End: 2020-07-14

## 2020-07-14 RX ORDER — ALBUTEROL SULFATE 2.5 MG/3ML
2.5 SOLUTION RESPIRATORY (INHALATION) EVERY 6 HOURS PRN
Status: DISCONTINUED | OUTPATIENT
Start: 2020-07-14 | End: 2020-07-16 | Stop reason: HOSPADM

## 2020-07-14 RX ADMIN — SODIUM CHLORIDE 1743 ML: 9 INJECTION, SOLUTION INTRAVENOUS at 15:41

## 2020-07-14 RX ADMIN — TRAZODONE HYDROCHLORIDE 50 MG: 50 TABLET ORAL at 21:02

## 2020-07-14 RX ADMIN — HEPARIN SODIUM 5000 UNITS: 5000 INJECTION INTRAVENOUS; SUBCUTANEOUS at 21:02

## 2020-07-14 RX ADMIN — SUCRALFATE 1 G: 1 TABLET ORAL at 21:02

## 2020-07-14 RX ADMIN — SODIUM CHLORIDE 1000 ML: 9 INJECTION, SOLUTION INTRAVENOUS at 19:53

## 2020-07-14 RX ADMIN — FENTANYL CITRATE 50 MCG: 50 INJECTION INTRAMUSCULAR; INTRAVENOUS at 15:40

## 2020-07-14 RX ADMIN — METRONIDAZOLE 500 MG: 500 INJECTION, SOLUTION INTRAVENOUS at 21:02

## 2020-07-14 RX ADMIN — ONDANSETRON 4 MG: 2 INJECTION INTRAMUSCULAR; INTRAVENOUS at 15:41

## 2020-07-14 RX ADMIN — DEXTROSE AND SODIUM CHLORIDE: 5; 900 INJECTION, SOLUTION INTRAVENOUS at 21:01

## 2020-07-14 RX ADMIN — Medication 10 ML: at 21:02

## 2020-07-14 ASSESSMENT — ENCOUNTER SYMPTOMS
VOMITING: 1
DIARRHEA: 1
BLOOD IN STOOL: 1
COLOR CHANGE: 0
SORE THROAT: 0
RHINORRHEA: 0
NAUSEA: 1
SHORTNESS OF BREATH: 0
ABDOMINAL PAIN: 1

## 2020-07-14 ASSESSMENT — PAIN SCALES - GENERAL
PAINLEVEL_OUTOF10: 8
PAINLEVEL_OUTOF10: 0
PAINLEVEL_OUTOF10: 8

## 2020-07-14 ASSESSMENT — PAIN DESCRIPTION - PAIN TYPE: TYPE: ACUTE PAIN

## 2020-07-14 ASSESSMENT — PAIN DESCRIPTION - LOCATION: LOCATION: ABDOMEN

## 2020-07-14 NOTE — ED PROVIDER NOTES
and are negative. Positivesand Pertinent negatives as per HPI. Except as noted above in the ROS, all other systems were reviewed and negative. PAST MEDICAL HISTORY     Past Medical History:   Diagnosis Date    JASPREET (acute kidney injury) ([facility] [address] 2020    JASPREET (acute kidney injury) ([facility] [address]     Arterial occlusion     Centrilobular emphysema ([facility] [address] 2019    Chronic bilateral low back pain with bilateral sciatica 8/3/2018    Chronic bilateral low back pain with bilateral sciatica     Hyperlipidemia     Hypertension     MDRO (multiple drug resistant organisms) resistance 2020    Escherichia coli-URINE    Metabolic encephalopathy     Moderate single current episode of major depressive disorder ([facility] [address] 8/3/2018    Ruptured disk          SURGICAL HISTORY       Past Surgical History:   Procedure Laterality Date     SECTION  1981    LEG SURGERY  2017    fasciotomy due to DVT, LLE         CURRENT MEDICATIONS       Current Discharge Medication List      CONTINUE these medications which have NOT CHANGED    Details   lidocaine (LIDODERM) 5 % PLACE 1 PATCH ONTO THE SKIN DAILY 12 HOURS ON, 12 HOURS OFF. Qty: 30 patch, Refills: 2      fluticasone (FLONASE) 50 MCG/ACT nasal spray USE 2 SPRAYS IN EACH NOSTRIL DAILY  Qty: 16 g, Refills: 2    Associated Diagnoses: Viral URI with cough      atorvastatin (LIPITOR) 80 MG tablet TAKE 1 TABLET BY MOUTH NIGHTLY  Qty: 90 tablet, Refills: 1    Associated Diagnoses: Mixed hyperlipidemia      DULoxetine (CYMBALTA) 60 MG extended release capsule TAKE 1 CAPSULE BY MOUTH DAILY  Qty: 90 capsule, Refills: 1    Associated Diagnoses: Chronic pain syndrome; Moderate single current episode of major depressive disorder (HCC)      gabapentin (NEURONTIN) 400 MG capsule Take 1 capsule by mouth 3 times daily for 30 days. Qty: 90 capsule, Refills: 5    Associated Diagnoses: History of fasciotomy;  Chronic pain of both knees      lisinopril (PRINIVIL;ZESTRIL) 40 MG tablet TAKE 1 TABLET BY MOUTH DAILY  Qty: 90 tablet, Refills: 1    Associated Diagnoses: Essential hypertension      traZODone (DESYREL) 50 MG tablet TAKE 1 TABLET BY MOUTH NIGHTLY  Qty: 90 tablet, Refills: 1    Associated Diagnoses: Primary insomnia      sucralfate (CARAFATE) 1 GM tablet Take 1 tablet by mouth 3 times daily  Qty: 90 tablet, Refills: 3      omeprazole (PRILOSEC) 20 MG delayed release capsule Take 1 capsule by mouth every morning (before breakfast)  Qty: 60 capsule, Refills: 1      famotidine (PEPCID) 20 MG tablet TAKE 1 TABLET BY MOUTH TWICE DAILY  Qty: 60 tablet, Refills: 2      tiZANidine (ZANAFLEX) 2 MG tablet TAKE 1 TABLET BY MOUTH 2 TIMES DAILY AS NEEDED (MUSCLE SPASM AND PAIN)  Qty: 30 tablet, Refills: 2      !! albuterol sulfate  (90 Base) MCG/ACT inhaler INHALE 2 PUFFS INTO THE LUNGS EVERY 6 HOURS AS NEEDED FOR WHEEZING  Qty: 8.5 g, Refills: 2    Associated Diagnoses: Mild intermittent asthma without complication      ASPIRIN LOW DOSE 81 MG EC tablet TAKE 1 TABLET BY MOUTH DAILY  Qty: 90 tablet, Refills: 3      vitamin D (ERGOCALCIFEROL) 1.25 MG (46253 UT) CAPS capsule TAKE 1 CAPSULE BY MOUTH ONCE A WEEK FOR 12 DOSES  Qty: 12 capsule, Refills: 0    Associated Diagnoses: Moderate single current episode of major depressive disorder ([facility] [address] Vitamin D deficiency      cetirizine (ZYRTEC) 10 MG tablet TAKE 1 TABLET BY MOUTH DAILY  Qty: 90 tablet, Refills: 1      SPIRIVA HANDIHALER 18 MCG inhalation capsule INHALE 1 CAPSULE INTO THE LUNGS DAILY  Qty: 90 capsule, Refills: 1    Associated Diagnoses: Centrilobular emphysema (HCC)      nicotine (NICODERM CQ) 7 MG/24HR PLACE 1 PATCH ONTO THE SKIN DAILY (ROTATE SITES)  Qty: 14 patch, Refills: 0      nitroGLYCERIN (NITROSTAT) 0.4 MG SL tablet up to max of 3 total doses. If no relief after 1 dose, call 911.   Qty: 25 tablet, Refills: 3      !! VENTOLIN  (90 Base) MCG/ACT inhaler Inhale 2 puffs into the lungs every 6 hours as needed for Wheezing  Qty: 1 Inhaler, Refills: 3    Associated Diagnoses: Mild intermittent asthma without complication       !! - Potential duplicate medications found. Please discuss with provider.             ALLERGIES     Lorazepam and Codeine    FAMILY HISTORY       Family History   Problem Relation Age of Onset    High Blood Pressure Mother     High Cholesterol Mother     Heart Disease Mother     Coronary Art Dis Mother     Heart Attack Father         59    Cirrhosis Sister     Alcohol Abuse Sister     Other Sister         \"colostomy bag due to hole in her colon\"   [employer] Sudden Death Brother         \"suicide\"    Colon Cancer Other         maternal uncle    Breast Cancer Other         multiple maternal aunts along with uterine cancer         SOCIAL HISTORY       Social History     Socioeconomic History    Marital status:      Spouse name: None    Number of children: 2    Years of education: None    Highest education level: Some college, no degree   Occupational History    None   Social Needs    Financial resource strain: Not very hard    Food insecurity     Worry: Never true     Inability: Never true    Transportation needs     Medical: No     Non-medical: No   Tobacco Use    Smoking status: Current Every Day Smoker     Packs/day: 0.50     Years: 20.00     Pack years: 10.00     Types: Cigarettes     Last attempt to quit: 3/1/2020     Years since quittin.3    Smokeless tobacco: Never Used   Substance and Sexual Activity    Alcohol use: No    Drug use: Not Currently     Types: Opiates     Sexual activity: Not Currently   Lifestyle    Physical activity     Days per week: None     Minutes per session: None    Stress: None   Relationships    Social connections     Talks on phone: None     Gets together: None     Attends [employer] service: None     Active member of club or organization: None     Attends meetings of clubs or organizations: None     Relationship status: None    Intimate partner violence     Fear of current or ex partner: None     Emotionally abused: None     Physically abused: None     Forced sexual activity: None   Other Topics Concern    None   Social History Narrative    None       SCREENINGS             PHYSICAL EXAM    (up to 7 for level 4, 8 ormore for level 5)     ED Triage Vitals   BP Temp Temp Source Pulse Resp SpO2 Height Weight   07/14/20 1454 07/14/20 1453 07/14/20 1453 07/14/20 1454 07/14/20 1454 07/14/20 1454 07/14/20 1453 07/14/20 1453   (!) 75/52 98.1 °F (36.7 °C) Tympanic 84 15 93 % 5' 1\" (1.549 m) 128 lb (58.1 kg)       Physical Exam  Constitutional:       Appearance: She is well-developed. HENT:      Head: Normocephalic and atraumatic. Neck:      Musculoskeletal: Normal range of motion. Cardiovascular:      Rate and Rhythm: Normal rate. Pulmonary:      Effort: Pulmonary effort is normal. No respiratory distress. Abdominal:      General: Bowel sounds are decreased. There is no distension. Palpations: Abdomen is soft. Tenderness: There is abdominal tenderness in the right upper quadrant and right lower quadrant. Genitourinary:     Rectum: External hemorrhoid present. No mass, tenderness, anal fissure or internal hemorrhoid. Normal anal tone. Musculoskeletal: Normal range of motion. General: Tenderness present. Comments: achiness to right inner thigh. Sensation strength and pulse intact to right thigh   Skin:     General: Skin is warm and dry. Neurological:      Mental Status: She is alert and oriented to person, place, and time.          DIAGNOSTIC RESULTS   LABS:    Labs Reviewed   CBC WITH AUTO DIFFERENTIAL - Abnormal; Notable for the following components:       Result Value    WBC 12.9 (*)     RDW 16.4 (*)     Neutrophils Absolute 8.0 (*)     All other components within normal limits    Narrative:     Performed at:  [facility],  [place], [facility]   Phone [phone] COMPREHENSIVE METABOLIC PANEL W/ REFLEX TO MG FOR LOW K - Abnormal; Notable for the following components:    Chloride 95 (*)     Glucose 106 (*)     CREATININE 3.2 (*)     GFR Non- 15 (*)     GFR  18 (*)     Total Protein 8.5 (*)     ALT 8 (*)     AST 14 (*)     All other components within normal limits    Narrative:     Performed at:  [facility],  [employer]   Phone [phone]   D-DIMER, QUANTITATIVE - Abnormal; Notable for the following components:    D-Dimer, Quant 339 (*)     All other components within normal limits    Narrative:     Performed at:  [facility],  [employer]   Phone [phone]   URINE RT REFLEX TO CULTURE - Abnormal; Notable for the following components:    Leukocyte Esterase, Urine MODERATE (*)     All other components within normal limits    Narrative:     Performed at:  [facility],  [employer]   Phone [phone]   MICROSCOPIC URINALYSIS - Abnormal; Notable for the following components:    Hyaline Casts, UA 3-5 (*)     Mucus, UA 4+ (*)     WBC, UA 21-50 (*)     Epithelial Cells, UA 6-10 (*)     Bacteria, UA 2+ (*)     All other components within normal limits    Narrative:     Performed at:  [facility],  [employer]   Phone [phone]   CULTURE, BLOOD 1   CULTURE, BLOOD 2   C DIFF TOXIN/ANTIGEN   CULTURE, URINE   PROCALCITONIN    Narrative:     Performed at:  [facility],  [employer], [employer]   Phone [phone]   LACTATE, SEPSIS    Narrative:     Performed at:  [facility],  [employer]   Phone [phone]   BLOOD OCCULT STOOL DIAGNOSTIC    Narrative:     ORDER#: [id number] ORDERED BY: Isaac Tavares  SOURCE: Stool                              COLLECTED:  07/14/20 15:11  ANTIBIOTICS AT LEXX.:                      RECEIVED :  07/14/20 15:46  Performed at:  [facility] [address],  [place], [facility]   Phone [phone]   MAGNESIUM    Narrative:     Performed at:  [facility],  [place], [facility]   Phone [phone]       All other labs were within normal range or notreturned as of this dictation. EKG: All EKG's are interpreted by the Emergency Department Physician who either signs or Co-signs this chart in the absence of a cardiologist.  Please see their note for interpretation of EKG. RADIOLOGY:   Abdominal CT without IV contrast interpreted by radiologist for  Impression:     1. Interval removal of right-sided pigtail drainage catheter from the right   intra-abdominal fluid collection.  Interval removal of right cholecystostomy   tube.  Only minimal perihepatic fluid remains with essentially complete   resolution of the organized fluid collection.  No significant fluid   collection identified within the gallbladder fossa.  No intra-abdominal fluid   collection identified. 2. No bowel obstruction identified. 3. Severe atherosclerotic disease. Portable chest x-ray interpreted by radiologist for no acute cardiopulmonary disease. Interpretation per the Radiologist below, if available at the time of this note:    CT ABDOMEN PELVIS WO CONTRAST Additional Contrast? None   Final Result   1. Interval removal of right-sided pigtail drainage catheter from the right   intra-abdominal fluid collection. Interval removal of right cholecystostomy   tube. Only minimal perihepatic fluid remains with essentially complete   resolution of the organized fluid collection. No significant fluid   collection identified within the gallbladder fossa.   No intra-abdominal fluid

## 2020-07-14 NOTE — ED NOTES
200 - Perfect served Dr. Eriberto Vargas for Severiano Salvo. CARLOS Daugherty  07/14/20 1731    1733 - Dr. Eriberto Vargas called back.       Dick Swink  07/14/20 1737

## 2020-07-14 NOTE — ED PROVIDER NOTES
I independently evaluated and obtained a history and physical on [facility]. All diagnostic, treatment, and disposition assistants were made to myself in conjunction the advanced practice provider. For further details of this patient's emergency department encounter, please see the advanced practice provider's documentation. History: 63 y.o. F with recent cholecystectomy who presents due to bloody stools, lightheadedness, falls, and abdominal pain. She denies any trauma since her recent surgery. She does report dizziness that has been completed. Physician Exam: Pleasant  female no acute distress. Regular rate and rhythm. Intact distal pulses. No focal neurologic deficits. Mild diffuse abdominal tenderness to palpation with no rebound, guarding, peritoneal signs. MDM: Laboratory evaluation is concerning for acute kidney injury with creatinine of 3.2 as well as mildly elevated d-dimer of 339. CT abdomen pelvis does not show any acute emergent abdominal pathology. The patient was given IV fluids and admitted for further care. The patient is given IV pain medications he is reevaluated multiple times with mild improvement in symptoms. She expresses understanding and agreement with plan for admission.     Critical Care  Performed by: Gena Alvarez MD  Authorized by: Gena Alvarez MD     Critical care provider statement:     Critical care time (minutes):  30    Critical care time was exclusive of:  Separately billable procedures and treating other patients and teaching time    Critical care was necessary to treat or prevent imminent or life-threatening deterioration of the following conditions:  Renal failure    Critical care was time spent personally by me on the following activities:  Ordering and performing treatments and interventions, development of treatment plan with patient or surrogate, ordering and review of laboratory studies, evaluation of patient's response to treatment, re-evaluation of patient's condition, examination of patient, obtaining history from patient or surrogate and review of old charts             FINAL IMPRESSION      1. JASPREET (acute kidney injury) ([facility] [address]    2. Dizziness    3. Generalized abdominal pain    4. Elevated d-dimer    5. History of DVT of lower extremity    6. History of abdominal surgery    7. Hypotension, unspecified hypotension type    8. Fall, initial encounter    9.  Syncope, near               Ruchi Monroy MD  07/14/20 0344

## 2020-07-15 LAB
A/G RATIO: 1.1 (ref 1.1–2.2)
ALBUMIN SERPL-MCNC: 3 G/DL (ref 3.4–5)
ALP BLD-CCNC: 74 U/L (ref 40–129)
ALT SERPL-CCNC: <5 U/L (ref 10–40)
ANION GAP SERPL CALCULATED.3IONS-SCNC: 7 MMOL/L (ref 3–16)
AST SERPL-CCNC: 10 U/L (ref 15–37)
BASOPHILS ABSOLUTE: 0.1 K/UL (ref 0–0.2)
BASOPHILS RELATIVE PERCENT: 0.6 %
BILIRUB SERPL-MCNC: <0.2 MG/DL (ref 0–1)
BILIRUBIN URINE: NEGATIVE
BLOOD, URINE: NEGATIVE
BUN BLDV-MCNC: 10 MG/DL (ref 7–20)
CALCIUM SERPL-MCNC: 7.6 MG/DL (ref 8.3–10.6)
CHLORIDE BLD-SCNC: 105 MMOL/L (ref 99–110)
CHLORIDE URINE RANDOM: <20 MMOL/L
CLARITY: CLEAR
CO2: 24 MMOL/L (ref 21–32)
COLOR: YELLOW
CREAT SERPL-MCNC: 1.1 MG/DL (ref 0.6–1.2)
CREATININE URINE: 100.5 MG/DL (ref 28–259)
EOSINOPHILS ABSOLUTE: 0.2 K/UL (ref 0–0.6)
EOSINOPHILS RELATIVE PERCENT: 1.9 %
EPITHELIAL CELLS, UA: ABNORMAL /HPF (ref 0–5)
GFR AFRICAN AMERICAN: >60
GFR NON-AFRICAN AMERICAN: 50
GLOBULIN: 2.8 G/DL
GLUCOSE BLD-MCNC: 119 MG/DL (ref 70–99)
GLUCOSE URINE: NEGATIVE MG/DL
HCT VFR BLD CALC: 34.1 % (ref 36–48)
HEMOGLOBIN: 11.2 G/DL (ref 12–16)
KETONES, URINE: NEGATIVE MG/DL
LEUKOCYTE ESTERASE, URINE: ABNORMAL
LYMPHOCYTES ABSOLUTE: 2.8 K/UL (ref 1–5.1)
LYMPHOCYTES RELATIVE PERCENT: 31.8 %
MCH RBC QN AUTO: 30 PG (ref 26–34)
MCHC RBC AUTO-ENTMCNC: 32.8 G/DL (ref 31–36)
MCV RBC AUTO: 91.5 FL (ref 80–100)
MICROSCOPIC EXAMINATION: YES
MONOCYTES ABSOLUTE: 0.5 K/UL (ref 0–1.3)
MONOCYTES RELATIVE PERCENT: 5.5 %
NEUTROPHILS ABSOLUTE: 5.4 K/UL (ref 1.7–7.7)
NEUTROPHILS RELATIVE PERCENT: 60.2 %
NITRITE, URINE: NEGATIVE
OSMOLALITY URINE: 224 MOSM/KG (ref 390–1070)
PDW BLD-RTO: 16.1 % (ref 12.4–15.4)
PH UA: 6 (ref 5–8)
PLATELET # BLD: 196 K/UL (ref 135–450)
PMV BLD AUTO: 7.7 FL (ref 5–10.5)
POTASSIUM REFLEX MAGNESIUM: 3.6 MMOL/L (ref 3.5–5.1)
POTASSIUM, UR: 13.4 MMOL/L
PROTEIN UA: NEGATIVE MG/DL
RBC # BLD: 3.72 M/UL (ref 4–5.2)
RBC UA: ABNORMAL /HPF (ref 0–4)
SODIUM BLD-SCNC: 136 MMOL/L (ref 136–145)
SODIUM URINE: 24 MMOL/L
SPECIFIC GRAVITY UA: 1.01 (ref 1–1.03)
TOTAL PROTEIN: 5.8 G/DL (ref 6.4–8.2)
URINE CULTURE, ROUTINE: NORMAL
URINE TYPE: ABNORMAL
UROBILINOGEN, URINE: 0.2 E.U./DL
WBC # BLD: 9 K/UL (ref 4–11)
WBC UA: ABNORMAL /HPF (ref 0–5)

## 2020-07-15 PROCEDURE — 36415 COLL VENOUS BLD VENIPUNCTURE: CPT

## 2020-07-15 PROCEDURE — 85025 COMPLETE CBC W/AUTO DIFF WBC: CPT

## 2020-07-15 PROCEDURE — 83935 ASSAY OF URINE OSMOLALITY: CPT

## 2020-07-15 PROCEDURE — 2500000003 HC RX 250 WO HCPCS: Performed by: INTERNAL MEDICINE

## 2020-07-15 PROCEDURE — 96376 TX/PRO/DX INJ SAME DRUG ADON: CPT

## 2020-07-15 PROCEDURE — 96366 THER/PROPH/DIAG IV INF ADDON: CPT

## 2020-07-15 PROCEDURE — 94640 AIRWAY INHALATION TREATMENT: CPT

## 2020-07-15 PROCEDURE — 2580000003 HC RX 258: Performed by: INTERNAL MEDICINE

## 2020-07-15 PROCEDURE — 84133 ASSAY OF URINE POTASSIUM: CPT

## 2020-07-15 PROCEDURE — 2060000000 HC ICU INTERMEDIATE R&B

## 2020-07-15 PROCEDURE — 82436 ASSAY OF URINE CHLORIDE: CPT

## 2020-07-15 PROCEDURE — 6370000000 HC RX 637 (ALT 250 FOR IP): Performed by: INTERNAL MEDICINE

## 2020-07-15 PROCEDURE — 6360000002 HC RX W HCPCS: Performed by: INTERNAL MEDICINE

## 2020-07-15 PROCEDURE — G0378 HOSPITAL OBSERVATION PER HR: HCPCS

## 2020-07-15 PROCEDURE — 99232 SBSQ HOSP IP/OBS MODERATE 35: CPT | Performed by: INTERNAL MEDICINE

## 2020-07-15 PROCEDURE — 2700000000 HC OXYGEN THERAPY PER DAY

## 2020-07-15 PROCEDURE — 96372 THER/PROPH/DIAG INJ SC/IM: CPT

## 2020-07-15 PROCEDURE — 81001 URINALYSIS AUTO W/SCOPE: CPT

## 2020-07-15 PROCEDURE — 80053 COMPREHEN METABOLIC PANEL: CPT

## 2020-07-15 PROCEDURE — 82570 ASSAY OF URINE CREATININE: CPT

## 2020-07-15 PROCEDURE — 84300 ASSAY OF URINE SODIUM: CPT

## 2020-07-15 RX ORDER — TRAZODONE HYDROCHLORIDE 50 MG/1
50 TABLET ORAL NIGHTLY PRN
Status: DISCONTINUED | OUTPATIENT
Start: 2020-07-15 | End: 2020-07-16 | Stop reason: HOSPADM

## 2020-07-15 RX ADMIN — TIZANIDINE 2 MG: 4 TABLET ORAL at 21:14

## 2020-07-15 RX ADMIN — METRONIDAZOLE 500 MG: 500 INJECTION, SOLUTION INTRAVENOUS at 05:16

## 2020-07-15 RX ADMIN — Medication 10 ML: at 08:40

## 2020-07-15 RX ADMIN — TRAZODONE HYDROCHLORIDE 50 MG: 50 TABLET ORAL at 21:14

## 2020-07-15 RX ADMIN — HEPARIN SODIUM 5000 UNITS: 5000 INJECTION INTRAVENOUS; SUBCUTANEOUS at 05:17

## 2020-07-15 RX ADMIN — SUCRALFATE 1 G: 1 TABLET ORAL at 08:32

## 2020-07-15 RX ADMIN — DEXTROSE AND SODIUM CHLORIDE: 5; 900 INJECTION, SOLUTION INTRAVENOUS at 05:21

## 2020-07-15 RX ADMIN — ASPIRIN 81 MG: 81 TABLET, COATED ORAL at 08:33

## 2020-07-15 RX ADMIN — PANTOPRAZOLE SODIUM 40 MG: 40 TABLET, DELAYED RELEASE ORAL at 05:17

## 2020-07-15 RX ADMIN — TIOTROPIUM BROMIDE INHALATION SPRAY 2 PUFF: 3.12 SPRAY, METERED RESPIRATORY (INHALATION) at 07:11

## 2020-07-15 RX ADMIN — Medication 10 ML: at 21:02

## 2020-07-15 RX ADMIN — HEPARIN SODIUM 5000 UNITS: 5000 INJECTION INTRAVENOUS; SUBCUTANEOUS at 14:27

## 2020-07-15 RX ADMIN — HEPARIN SODIUM 5000 UNITS: 5000 INJECTION INTRAVENOUS; SUBCUTANEOUS at 21:02

## 2020-07-15 ASSESSMENT — PAIN DESCRIPTION - LOCATION: LOCATION: HEAD

## 2020-07-15 ASSESSMENT — PAIN SCALES - GENERAL: PAINLEVEL_OUTOF10: 7

## 2020-07-15 NOTE — H&P
Hospital Medicine History & Physical      PCP: Rojelio Juárez MD    Date of Admission: 2020    Date of Service: Pt seen/examined on 2020    Chief Complaint:       History Of Present Illness:    63 y.o. female who presented to the hospital with a chief complaint of dizziness and diarrhea. The patient had a perforated gallbladder had a cholecystostomy tube placed. This was recently removed by her surgeon office and she has a schedule laparoscopic cholecystectomy next week. She states that over the past week she has been having large-volume diarrhea, she is also been dizzy and has had multiple near syncopal events. She also endorses diffuse abdominal pain with nausea and vomiting. She rates her pain an 8 out of 10 and constant. She describes as diarrhea is watery and sometimes bloody. She denies any fevers or chills, sick contacts or exposure to coronavirus. In emergency department she was found to have JASPREET as she was hypotensive with systolic blood pressure in the 70s. She will be admitted for further medical therapy. Past Medical History:        Diagnosis Date    JASPREET (acute kidney injury) ([address] 2020    JASPREET (acute kidney injury) ([address]     Arterial occlusion     Centrilobular emphysema ([address] 2019    Chronic bilateral low back pain with bilateral sciatica 8/3/2018    Chronic bilateral low back pain with bilateral sciatica     Hyperlipidemia     Hypertension     MDRO (multiple drug resistant organisms) resistance 2020    Escherichia coli-URINE    Metabolic encephalopathy     Moderate single current episode of major depressive disorder ([address] 8/3/2018    Ruptured disk        Past Surgical History:        Procedure Laterality Date     SECTION  1981    LEG SURGERY  2017    fasciotomy due to DVT, LLE       Medications Prior to Admission:   Prior to Admission medications    Medication Sig Start Date End Date Taking?  Authorizing Provider   fluticasone (FLONASE) 50 MCG/ACT nasal spray USE 2 SPRAYS IN EACH NOSTRIL DAILY 7/10/20  Yes Michelle French MD   atorvastatin (LIPITOR) 80 MG tablet TAKE 1 TABLET BY MOUTH NIGHTLY 7/10/20  Yes Michelle French MD   DULoxetine (CYMBALTA) 60 MG extended release capsule TAKE 1 CAPSULE BY MOUTH DAILY 7/10/20  Yes Michelle French MD   gabapentin (NEURONTIN) 400 MG capsule Take 1 capsule by mouth 3 times daily for 30 days. 7/10/20 8/9/20 Yes Michelle French MD   lisinopril (PRINIVIL;ZESTRIL) 40 MG tablet TAKE 1 TABLET BY MOUTH DAILY 7/10/20  Yes Michelle French MD   traZODone (DESYREL) 50 MG tablet TAKE 1 TABLET BY MOUTH NIGHTLY 7/10/20  Yes Michelle French MD   sucralfate (CARAFATE) 1 GM tablet Take 1 tablet by mouth 3 times daily 5/21/20  Yes José Murphy MD   famotidine (PEPCID) 20 MG tablet TAKE 1 TABLET BY MOUTH TWICE DAILY 5/11/20  Yes Michelle French MD   tiZANidine (ZANAFLEX) 2 MG tablet TAKE 1 TABLET BY MOUTH 2 TIMES DAILY AS NEEDED (MUSCLE SPASM AND PAIN) 5/11/20  Yes Michelle French MD   albuterol sulfate  (90 Base) MCG/ACT inhaler INHALE 2 PUFFS INTO THE LUNGS EVERY 6 HOURS AS NEEDED FOR WHEEZING 5/11/20  Yes Michelle French MD   ASPIRIN LOW DOSE 81 MG EC tablet TAKE 1 TABLET BY MOUTH DAILY 5/11/20  Yes Michelle Frecnh MD   vitamin D (ERGOCALCIFEROL) 1.25 MG (83165 UT) CAPS capsule TAKE 1 CAPSULE BY MOUTH ONCE A WEEK FOR 12 DOSES 4/13/20 7/14/20 Yes Michelle French MD   cetirizine (ZYRTEC) 10 MG tablet TAKE 1 TABLET BY MOUTH DAILY 4/13/20  Yes Michelle French MD   SPIRIVA HANDIHALER 18 MCG inhalation capsule INHALE 1 CAPSULE INTO THE LUNGS DAILY 4/13/20  Yes Michelle French MD   nicotine (NICODERM CQ) 7 MG/24HR PLACE 1 PATCH ONTO THE SKIN DAILY (ROTATE SITES) 12/4/19  Yes Michelle French MD   nitroGLYCERIN (NITROSTAT) 0.4 MG SL tablet up to max of 3 total doses.  If no relief after 1 dose, call 911. 7/31/19  Yes SHAHAB Salmon CNP   lidocaine (LIDODERM) 5 % PLACE 1 PATCH ONTO THE SKIN DAILY 12 HOURS ON, 12 HOURS OFF. 7/10/20 8/9/20  Felix Dominique MD   omeprazole (PRILOSEC) 20 MG delayed release capsule Take 1 capsule by mouth every morning (before breakfast) 5/21/20   Belkis Noonan MD       Allergies:  Lorazepam and Codeine    Social History:    TOBACCO:   reports that she has been smoking cigarettes. She has a 10.00 pack-year smoking history. She has never used smokeless tobacco.  ETOH:   reports no history of alcohol use. Family History:        Problem Relation Age of Onset    High Blood Pressure Mother     High Cholesterol Mother     Heart Disease Mother     Coronary Art Dis Mother     Heart Attack Father         59    Cirrhosis Sister     Alcohol Abuse Sister     Other Sister         \"colostomy bag due to hole in her colon\"   [employer] Sudden Death Brother         \"suicide\"    Colon Cancer Other         maternal uncle    Breast Cancer Other         multiple maternal aunts along with uterine cancer       REVIEW OF SYSTEMS:   Constitutional:   Endorses weakness, fatigue and dizziness  ENT:  Negative for rhinorrhea, epistaxis, hoarseness, sore throat. Respiratory: Negative for SOB or wheezing   Cardiovascular:   Negative for  chest pain, palpitations   Gastrointestinal:   Positive for abdominal pain, nausea and vomiting, positive for diarrhea  Genitourinary:  Negative for polyuria, dysuria   Hematologic/Lymphatic:  Negative for  bleeding tendency, easy bruising  Musculoskeletal:  Negative for myalgias and arthralgias  Neurologic: Positive for dizziness  Skin:  Negative for itching,rash  Psychiatric:  Negative for depression,anxiety, agitation. Endocrine:  Negative for polydipsia,polyuria,heat /cold intolerance. PHYSICAL EXAM:  BP (!) 94/58   Pulse 88   Temp 97 °F (36.1 °C) (Oral)   Resp 14   Ht 5' 1\" (1.549 m)   Wt 143 lb 8 oz (65.1 kg)   SpO2 91%   BMI 27.11 kg/m²   General appearance: Lethargic but awake and alert.   HEENT:  Normal cephalic, atraumatic without obvious deformity. Pupils equal, round, and reactive to light. Extra ocular muscles intact. Conjunctivae/corneas clear. Neck: Supple, with full range of motion. No jugular venous distention. Trachea midline. Respiratory:  Normal respiratory effort. Clear to auscultation, bilaterally without Rales/Wheezes/Rhonchi. Cardiovascular:  Regular rate and rhythm with normal S1/S2 without murmurs, rubs or gallops. Abdomen: Diffuse tenderness with mild palpation  Musculoskeletal:  No clubbing, cyanosis or edema bilaterally. Full range of motion without deformity. Skin: Skin color, texture, turgor normal.  No rashes or lesions. Neurologic:  Neurovascularly intact without any focal sensory/motor deficits. Cranial nerves: II-XII intact, grossly non-focal.  Psychiatric:  Alert and oriented, thought content appropriate, normal insight  Capillary Refill: Brisk,< 3 seconds   Peripheral Pulses: +2 palpable, equal bilaterally       Labs:   Recent Labs     07/14/20  1510   WBC 12.9*   HGB 14.1   HCT 43.3        Recent Labs     07/14/20  1510      K 3.5   CL 95*   CO2 26   BUN 16   CREATININE 3.2*   CALCIUM 9.0     Recent Labs     07/14/20  1510   AST 14*   ALT 8*   BILITOT <0.2   ALKPHOS 113     No results for input(s): INR in the last 72 hours. No results for input(s): Bakari Sangita in the last 72 hours. Urinalysis:      Lab Results   Component Value Date    NITRU Negative 07/14/2020    WBCUA 21-50 07/14/2020    BACTERIA 2+ 07/14/2020    RBCUA None seen 07/14/2020    BLOODU Negative 07/14/2020    SPECGRAV 1.015 07/14/2020    GLUCOSEU Negative 07/14/2020    GLUCOSEU NEGATIVE 05/27/2012       Radiology:   CT ABDOMEN PELVIS WO CONTRAST Additional Contrast? None   Final Result   1. Interval removal of right-sided pigtail drainage catheter from the right   intra-abdominal fluid collection. Interval removal of right cholecystostomy   tube.   Only minimal perihepatic fluid remains with essentially complete   resolution of the organized fluid collection. No significant fluid   collection identified within the gallbladder fossa. No intra-abdominal fluid   collection identified. 2. No bowel obstruction identified. 3. Severe atherosclerotic disease. XR CHEST PORTABLE   Final Result   No active cardiopulmonary disease             ASSESSMENT:  JASPREET, likely prerenal in the setting of hypotension and diarrhea  Hypotension   Syncope   Diarrhea   Abdominal pain  Cholecystitis/perforated gallbladder, s/p cholecystostomy tube removal     PLAN:  Aggressive volume resuscitation, hold nephrotoxic medications. IV fluids, rule out C. difficile, stool testing ordered  Empiric treatment with Flagyl till C. difficile test returns, high suspicion as patient complains of bloody diarrhea.  -Supportive care, reevaluate in the a.m. Replete electrolytes. DVT Prophylaxis: Heparin  Diet: DIET RENAL;  Code Status: Full Code    Dispo -admit to [facility] on telemetry      Thank you for the opportunity to be involved in this patient's care.       (Please note that portions of this note were completed with a voice recognition program. Efforts were made to edit the dictations but occasionally words are mis-transcribed.)

## 2020-07-15 NOTE — PROGRESS NOTES
RESPIRATORY THERAPY ASSESSMENT    Name:  Keysha Barnes  Medical Record Number:  [id number]  Age: 63 y.o. Gender: female  : 1955  Today's Date:  2020  Room:  /0313-01    Assessment     Is the patient being admitted for a COPD or Asthma exacerbation? No   (If yes the patient will be seen every 4 hours for the first 24 hours and then reassessed)    Patient Admission Diagnosis      Allergies  Allergies   Allergen Reactions    Lorazepam      Confusion, hallucinations    Codeine Hives     Tolerates Norco.       Minimum Predicted Vital Capacity:               Actual Vital Capacity:                    Pulmonary History:  Lung Nodule. Home Oxygen Therapy:  room air  Home Respiratory Therapy:Albuterol and Tiotropium Bromide   Current Respiratory Therapy:  Albuterol Q4PRN,  Spiriva Daily          Respiratory Severity Index(RSI)   Patients with orders for inhalation medications, oxygen, or any therapeutic treatment modality will be placed on Respiratory Protocol. They will be assessed with the first treatment and at least every 72 hours thereafter. The following severity scale will be used to determine frequency of treatment intervention.     Smoking History: Smoking History Less than 1ppd or less than 15 pack year = 1    Social History  Social History     Tobacco Use    Smoking status: Current Every Day Smoker     Packs/day: 0.50     Years: 20.00     Pack years: 10.00     Types: Cigarettes     Last attempt to quit: 3/1/2020     Years since quittin.3    Smokeless tobacco: Never Used   Substance Use Topics    Alcohol use: No    Drug use: Not Currently     Types: Opiates        Recent Surgical History: None = 0  Past Surgical History  Past Surgical History:   Procedure Laterality Date     SECTION  0    LEG SURGERY  2017    fasciotomy due to DVT, LLE       Level of Consciousness: Alert, Oriented, and Cooperative = 0    Level of Activity: Walking unassisted = 0    Respiratory Pattern: Regular Pattern; RR 8-20 = 0    Breath Sounds: Diminshed bilaterally and/or crackles = 2    Sputum   ,  ,    Cough: Strong, spontaneous, non-productive = 0    Vital Signs   BP (!) 94/58   Pulse 88   Temp 97 °F (36.1 °C) (Oral)   Resp 14   Ht 5' 1\" (1.549 m)   Wt 143 lb 8 oz (65.1 kg)   SpO2 91%   BMI 27.11 kg/m²   SPO2 (COPD values may differ): 90-91% on room air or greater than 92% on FiO2 24- 28% = 1    Peak Flow (asthma only): not applicable = 0    RSI: 0-4 = See once and convert to home regimen or discontinue        Plan       Goals: medication delivery    Patient/caregiver was educated on the proper method of use for Respiratory Care Devices:        Level of patient/caregiver understanding able to:   ? Verbalize understanding   ? Demonstrate understanding       ? Teach back        ? Needs reinforcement       ? No available caregiver               ? Other:     Response to education:       Is patient being placed on Home Treatment Regimen? Yes     Does the patient have everything they need prior to discharge? NA     Comments: Chart reviewed and patient assessed. Plan of Care: Spiriva Daily,  Albuterol Q6PRN. Electronically signed by Jg Salas RCP on 7/14/2020 at 11:52 PM    Respiratory Protocol Guidelines     1. Assessment and treatment by Respiratory Therapy will be initiated for medication and therapeutic interventions upon initiation of aerosolized medication. 2. Physician will be contacted for respiratory rate (RR) greater than 35 breaths per minute. Therapy will be held for heart rate (HR) greater than 140 beats per minute, pending direction from physician. 3. Bronchodilators will be administered via Metered Dose Inhaler (MDI) with spacer when the following criteria are met:  a. Alert and cooperative     b. HR < 140 bpm  c. RR < 30 bpm                d. Can demonstrate a 2-3 second inspiratory hold  4.  Bronchodilators will be administered via Hand Held Nebulizer [facility]) to patients when ANY of the following criteria are met  a. Incognizant or uncooperative          b. Patients treated with HHN at Home        c. Unable to demonstrate proper use of MDI with spacer     d. RR > 30 bpm   5. Bronchodilators will be delivered via Metered Dose Inhaler (MDI), HHN, Aerogen to intubated patients on mechanical ventilation. 6. Inhalation medication orders will be delivered and/or substituted as outlined below. Aerosolized Medications Ordering and Administration Guidelines:    1. All Medications will be ordered by a physician, and their frequency and/or modality will be adjusted as defined by the patients Respiratory Severity Index (RSI) score. 2. If the patient does not have documented COPD, consider discontinuing anticholinergics when RSI is less than 9.  3. If the bronchospasm worsens (increased RSI), then the bronchodilator frequency can be increased to a maximum of every 4 hours. If greater than every 4 hours is required, the physician will be contacted. 4. If the bronchospasm improves, the frequency of the bronchodilator can be decreased, based on the patient's RSI, but not less than home treatment regimen frequency. 5. Bronchodilator(s) will be discontinued if patient has a RSI less than 9 and has received no scheduled or as needed treatment for 72  Hrs. Patients Ordered on a Mucolytic Agent:    1. Must always be administered with a bronchodilator. 2. Discontinue if patient experiences worsened bronchospasm, or secretions have lessened to the point that the patient is able to clear them with a cough. Anti-inflammatory and Combination Medications:    1. If the patient lacks prior history of lung disease, is not using inhaled anti-inflammatory medication at home, and lacks wheezing by examination or by history for at least 24 hours, contact physician for possible discontinuation.

## 2020-07-15 NOTE — PROGRESS NOTES
IM Progress Note    Admit Date:  7/14/2020  1    Interval history:  Admitted for ARF, diarrhea  Recent perforated gallbladder s.p drain placement and removal       Subjective:  Ms. Mahajan Child feels ok today   Diarrhea improving  No nausea, no abd pain     Objective:   BP (!) 91/55   Pulse 82   Temp 96.7 °F (35.9 °C) (Oral)   Resp 16   Ht 5' 1\" (1.549 m)   Wt 143 lb 8 oz (65.1 kg)   SpO2 100%   BMI 27.11 kg/m²       Intake/Output Summary (Last 24 hours) at 7/15/2020 0747  Last data filed at 7/15/2020 0439  Gross per 24 hour   Intake 2436 ml   Output 450 ml   Net 1986 ml       Physical Exam:        General:  Elderly female, Awake, alert and oriented. Appears to be not in any distress  Mucous Membranes:  Pink , anicteric  Neck: No JVD, no carotid bruit, no thyromegaly  Chest:  Clear to auscultation bilaterally, no added sounds  Cardiovascular:  RRR S1S2 heard, no murmurs or gallops  Abdomen:  Soft, right UQ abd drains removed  undistended, non tender, no organomegaly, BS present  Extremities: No edema or cyanosis.  Distal pulses well felt  Neurological : grossly normal        Medications:   Scheduled Medications:    tiotropium  2 puff Inhalation Daily    sucralfate  1 g Oral TID    pantoprazole  40 mg Oral QAM AC    aspirin  81 mg Oral Daily    fluticasone  2 spray Each Nostril Daily    nicotine  1 patch Transdermal Daily    traZODone  50 mg Oral Nightly    sodium chloride flush  10 mL Intravenous 2 times per day    heparin (porcine)  5,000 Units Subcutaneous 3 times per day    metroNIDAZOLE  500 mg Intravenous Q8H     I   dextrose 5 % and 0.9 % NaCl 125 mL/hr at 07/15/20 0521     tiZANidine, sodium chloride flush, acetaminophen **OR** acetaminophen, promethazine **OR** ondansetron, albuterol    Lab Data:  Recent Labs     07/14/20  1510 07/15/20  0428   WBC 12.9* 9.0   HGB 14.1 11.2*   HCT 43.3 34.1*   MCV 90.0 91.5    196     Recent Labs     07/14/20  1510 07/15/20  0428    136   K 3.5 3.6 CL 95* 105   CO2 26 24   BUN 16 10   CREATININE 3.2* 1.1     No results for input(s): CKTOTAL, CKMB, CKMBINDEX, TROPONINI in the last 72 hours. Coagulation:   Lab Results   Component Value Date    INR 1.05 06/16/2020    APTT 30.4 04/24/2020     Cardiac markers:   Lab Results   Component Value Date    CKTOTAL 492 04/24/2020    TROPONINI <0.01 06/10/2020         Lab Results   Component Value Date    ALT <5 (L) 07/15/2020    AST 10 (L) 07/15/2020    GGT 29 06/09/2020    ALKPHOS 74 07/15/2020    BILITOT <0.2 07/15/2020       Lab Results   Component Value Date    INR 1.05 06/16/2020    INR 1.19 (H) 06/10/2020    INR 1.20 (H) 04/24/2020    PROTIME 12.2 06/16/2020    PROTIME 13.8 (H) 06/10/2020    PROTIME 14.0 (H) 04/24/2020       Radiology    CT ABDOMEN PELVIS WO CONTRAST Additional Contrast? None   Final Result   1. Interval removal of right-sided pigtail drainage catheter from the right   intra-abdominal fluid collection. Interval removal of right cholecystostomy   tube. Only minimal perihepatic fluid remains with essentially complete   resolution of the organized fluid collection. No significant fluid   collection identified within the gallbladder fossa. No intra-abdominal fluid   collection identified. 2. No bowel obstruction identified.    3. Severe atherosclerotic disease.           XR CHEST PORTABLE   Final Result   No active cardiopulmonary disease         Assessment & Plan:    JASPREET, likely prerenal in the setting of hypotension and diarrhea  - resolved quickly with IVF  - decrease IVF  - hold home meds    Hypotension - sec to volume loss , resolved    Syncope vs near syncope - likely with orthostatic hypotension - check orthostatics     Diarrhea - check c diff for recent abx use    Abdominal pain- likely with diarrhea, resolved    Cholecystitis/perforated gallbladder, s/p cholecystostomy tube removal and scheduled for surgery this Friday  No suspicion for new infection  Pt ok for planned surgery  Ambulatory covid pending    dvt and gi prophylaxis      Noe Bartholomew MD 7/15/2020 7:47 AM

## 2020-07-15 NOTE — PROGRESS NOTES
AM assessment completed. Scheduled medications given per MAR. VSS room air, A/O x4 denies any needs at this time. Call light in reach, will monitor, patient resting in bed.      Electronically signed by Sofia Cotton RN on 7/15/2020 at 10:52 AM

## 2020-07-15 NOTE — PROGRESS NOTES
I have reviewed and agree with all documentation, plan of care and assessments completed by  RN Student Nurse, jonathan.  Alayna Guo 7/15/2020 7:50 PM

## 2020-07-15 NOTE — PROGRESS NOTES
Handoff report given to lisa shelby.     Electronically signed by Elie Odonnell RN on 7/15/2020 at 7:29 PM

## 2020-07-15 NOTE — PROGRESS NOTES
Nephrology consult has been called to Dr. Adrian Boo on 7/15/20 through answering service @ 0630.  [place], [place]

## 2020-07-16 ENCOUNTER — ANESTHESIA EVENT (OUTPATIENT)
Dept: OPERATING ROOM | Age: 65
End: 2020-07-16
Payer: COMMERCIAL

## 2020-07-16 ENCOUNTER — HOSPITAL ENCOUNTER (OUTPATIENT)
Age: 65
Discharge: HOME | DRG: 469 | End: 2020-07-16
Payer: COMMERCIAL

## 2020-07-16 VITALS
WEIGHT: 147.6 LBS | RESPIRATION RATE: 16 BRPM | DIASTOLIC BLOOD PRESSURE: 91 MMHG | TEMPERATURE: 97.8 F | HEART RATE: 75 BPM | SYSTOLIC BLOOD PRESSURE: 181 MMHG | BODY MASS INDEX: 27.87 KG/M2 | HEIGHT: 61 IN | OXYGEN SATURATION: 97 %

## 2020-07-16 LAB
ANION GAP SERPL CALCULATED.3IONS-SCNC: 8 MMOL/L (ref 3–16)
BUN BLDV-MCNC: 6 MG/DL (ref 7–20)
CALCIUM SERPL-MCNC: 8.2 MG/DL (ref 8.3–10.6)
CHLORIDE BLD-SCNC: 110 MMOL/L (ref 99–110)
CO2: 24 MMOL/L (ref 21–32)
CREAT SERPL-MCNC: 0.7 MG/DL (ref 0.6–1.2)
GFR AFRICAN AMERICAN: >60
GFR NON-AFRICAN AMERICAN: >60
GLUCOSE BLD-MCNC: 98 MG/DL (ref 70–99)
POTASSIUM SERPL-SCNC: 4 MMOL/L (ref 3.5–5.1)
SARS-COV-2, NAAT: NOT DETECTED
SODIUM BLD-SCNC: 142 MMOL/L (ref 136–145)

## 2020-07-16 PROCEDURE — U0002 COVID-19 LAB TEST NON-CDC: HCPCS

## 2020-07-16 PROCEDURE — 2580000003 HC RX 258: Performed by: INTERNAL MEDICINE

## 2020-07-16 PROCEDURE — G0378 HOSPITAL OBSERVATION PER HR: HCPCS

## 2020-07-16 PROCEDURE — 96376 TX/PRO/DX INJ SAME DRUG ADON: CPT

## 2020-07-16 PROCEDURE — 94640 AIRWAY INHALATION TREATMENT: CPT

## 2020-07-16 PROCEDURE — 80048 BASIC METABOLIC PNL TOTAL CA: CPT

## 2020-07-16 PROCEDURE — 96372 THER/PROPH/DIAG INJ SC/IM: CPT

## 2020-07-16 PROCEDURE — 99238 HOSP IP/OBS DSCHRG MGMT 30/<: CPT | Performed by: INTERNAL MEDICINE

## 2020-07-16 PROCEDURE — 6360000002 HC RX W HCPCS: Performed by: INTERNAL MEDICINE

## 2020-07-16 PROCEDURE — 36415 COLL VENOUS BLD VENIPUNCTURE: CPT

## 2020-07-16 PROCEDURE — 6370000000 HC RX 637 (ALT 250 FOR IP): Performed by: INTERNAL MEDICINE

## 2020-07-16 RX ADMIN — PANTOPRAZOLE SODIUM 40 MG: 40 TABLET, DELAYED RELEASE ORAL at 05:26

## 2020-07-16 RX ADMIN — TIOTROPIUM BROMIDE INHALATION SPRAY 2 PUFF: 3.12 SPRAY, METERED RESPIRATORY (INHALATION) at 08:11

## 2020-07-16 RX ADMIN — DEXTROSE AND SODIUM CHLORIDE: 5; 900 INJECTION, SOLUTION INTRAVENOUS at 05:26

## 2020-07-16 RX ADMIN — HEPARIN SODIUM 5000 UNITS: 5000 INJECTION INTRAVENOUS; SUBCUTANEOUS at 05:26

## 2020-07-16 RX ADMIN — Medication 10 ML: at 10:18

## 2020-07-16 NOTE — DISCHARGE SUMMARY
Name:  Theresa Mc  Room:  /9497-86  MRN:    [id number]    Discharge Summary      This discharge summary is in conjunction with a complete physical exam done on the day of discharge. Discharging Physician: Dr. Abdiaziz Franco: 7/14/2020  Discharge:  7/16/2020    HPI taken from admission H&P:      63 y.o. female who presented to the hospital with a chief complaint of dizziness and diarrhea. The patient had a perforated gallbladder had a cholecystostomy tube placed. This was recently removed by her surgeon office and she has a schedule laparoscopic cholecystectomy next week. She states that over the past week she has been having large-volume diarrhea, she is also been dizzy and has had multiple near syncopal events. She also endorses diffuse abdominal pain with nausea and vomiting. She rates her pain an 8 out of 10 and constant. She describes as diarrhea is watery and sometimes bloody. She denies any fevers or chills, sick contacts or exposure to coronavirus. In emergency department she was found to have JASPREET as she was hypotensive with systolic blood pressure in the 70s. She will be admitted for further medical therapy.     Diagnoses this Admission and Hospital Course   JASPREET  - likely prerenal in the setting of hypotension and diarrhea  - resolved quickly with IVF  - decrease IVF  - hold home meds-->okay to resume      Hypotension--resolved  - sec to volume loss     Syncope vs near syncope   - likely with orthostatic hypotension   - checked orthostatics after Ivf and remained stable      Diarrhea   - check c diff for recent abx use, no loose stools during admission for testing      Abdominal pain--resolved  - likely with diarrhea     Cholecystitis/perforated gallbladder  - s/p cholecystostomy tube removal and scheduled for surgery this Friday  - No suspicion for new infection  - Pt ok for planned surgery  - Ambulatory covid negative        Procedures (Please Review Full Report for significant fluid   collection identified within the gallbladder fossa. No intra-abdominal fluid   collection identified. 2. No bowel obstruction identified. 3. Severe atherosclerotic disease. XR CHEST PORTABLE   Final Result   No active cardiopulmonary disease             Discharge Medications     Medication List      CONTINUE taking these medications    albuterol sulfate  (90 Base) MCG/ACT inhaler  INHALE 2 PUFFS INTO THE LUNGS EVERY 6 HOURS AS NEEDED FOR WHEEZING     atorvastatin 80 MG tablet  Commonly known as:  LIPITOR  TAKE 1 TABLET BY MOUTH NIGHTLY     cetirizine 10 MG tablet  Commonly known as:  ZYRTEC  TAKE 1 TABLET BY MOUTH DAILY     DULoxetine 60 MG extended release capsule  Commonly known as:  CYMBALTA  TAKE 1 CAPSULE BY MOUTH DAILY     famotidine 20 MG tablet  Commonly known as:  PEPCID  TAKE 1 TABLET BY MOUTH TWICE DAILY     fluticasone 50 MCG/ACT nasal spray  Commonly known as:  FLONASE  USE 2 SPRAYS IN EACH NOSTRIL DAILY     gabapentin 400 MG capsule  Commonly known as:  NEURONTIN  Take 1 capsule by mouth 3 times daily for 30 days. lidocaine 5 %  Commonly known as:  LIDODERM  PLACE 1 PATCH ONTO THE SKIN DAILY 12 HOURS ON, 12 HOURS OFF.     nicotine 7 MG/24HR  Commonly known as:  NICODERM CQ  PLACE 1 PATCH ONTO THE SKIN DAILY (ROTATE SITES)     nitroGLYCERIN 0.4 MG SL tablet  Commonly known as:  NITROSTAT  up to max of 3 total doses. If no relief after 1 dose, call 911.      omeprazole 20 MG delayed release capsule  Commonly known as:  PRILOSEC  Take 1 capsule by mouth every morning (before breakfast)     Spiriva HandiHaler 18 MCG inhalation capsule  Generic drug:  tiotropium  INHALE 1 CAPSULE INTO THE LUNGS DAILY     sucralfate 1 GM tablet  Commonly known as:  Carafate  Take 1 tablet by mouth 3 times daily     tiZANidine 2 MG tablet  Commonly known as:  ZANAFLEX  TAKE 1 TABLET BY MOUTH 2 TIMES DAILY AS NEEDED (MUSCLE SPASM AND PAIN)     traZODone 50 MG tablet  Commonly known as: DESYREL  TAKE 1 TABLET BY MOUTH NIGHTLY     vitamin D 1.25 MG (43862 UT) Caps capsule  Commonly known as:  ERGOCALCIFEROL  TAKE 1 CAPSULE BY MOUTH ONCE A WEEK FOR 12 DOSES        STOP taking these medications    ASPIRIN LOW DOSE 81 MG EC tablet  Generic drug:  aspirin     lisinopril 40 MG tablet  Commonly known as:  PRINIVIL;ZESTRIL          Discharged in stable condition to home    Follow Up:   Follow up with PCP, general surgery OP       Beverly Cortés MD 8/12/2020 9:50 PM

## 2020-07-16 NOTE — PROGRESS NOTES
Patient educated on discharge instructions as well as new medications use, dosage, administration and possible side effects. Patient verified knowledge. IV removed without difficulty and dry dressing in place. Telemetry monitor removed and returned to [facility]. Pt left facility in stable condition to Home with all of their personal belongings. Patient leaving facility ambulatory to private car.

## 2020-07-16 NOTE — DISCHARGE SUMMARY
Name:  Wilmar Johnson  Room:  /7621-96  MRN:    [id number]    IM Discharge Summary    Discharging Physician:  Chetan Lo MD    Admit: 7/14/2020    Discharge:      PCP      Jodee Ya MD    Diagnoses and hospital course  this Admission      JASPREET, likely prerenal in the setting of hypotension and diarrhea  - resolved quickly with IVF  - decrease IVF  - hold home meds     Hypotension - sec to volume loss , resolved     Syncope vs near syncope - likely with orthostatic hypotension - check orthostatics      Diarrhea - check c diff for recent abx use     Abdominal pain- likely with diarrhea, resolved     Cholecystitis/perforated gallbladder, s/p cholecystostomy tube removal and scheduled for surgery this Friday  No suspicion for new infection  Pt ok for planned surgery  Ambulatory covid pending      HPI 63 y.o. female who presented to the hospital with a chief complaint of dizziness and diarrhea. The patient had a perforated gallbladder had a cholecystostomy tube placed. This was recently removed by her surgeon office and she has a schedule laparoscopic cholecystectomy next week. She states that over the past week she has been having large-volume diarrhea, she is also been dizzy and has had multiple near syncopal events. She also endorses diffuse abdominal pain with nausea and vomiting. She rates her pain an 8 out of 10 and constant. She describes as diarrhea is watery and sometimes bloody. She denies any fevers or chills, sick contacts or exposure to coronavirus. In emergency department she was found to have JASPREET as she was hypotensive with systolic blood pressure in the 70s. She will be admitted for further medical therapy. Physical Exam at Discharge:      General:  Elderly female, Awake, alert and oriented.  Appears to be not in any distress  Mucous Membranes:  Pink , anicteric  Neck: No JVD, no carotid bruit, no thyromegaly  Chest:  Clear to auscultation bilaterally, no added sounds  Cardiovascular:  RRR S1S2 heard, no murmurs or gallops  Abdomen:  Soft, right UQ abd drains removed  undistended, non tender, no organomegaly, BS present  Extremities: No edema or cyanosis. Distal pulses well felt  Neurological : grossly normal       Radiology (Please Review Full Report for Details)       CT ABDOMEN PELVIS WO CONTRAST Additional Contrast? None   Final Result   1. Interval removal of right-sided pigtail drainage catheter from the right   intra-abdominal fluid collection.  Interval removal of right cholecystostomy   tube.  Only minimal perihepatic fluid remains with essentially complete   resolution of the organized fluid collection.  No significant fluid   collection identified within the gallbladder fossa.  No intra-abdominal fluid   collection identified. 2. No bowel obstruction identified.    3. Severe atherosclerotic disease.           XR CHEST PORTABLE   Final Result   No active cardiopulmonary disease             Consults:    1.   2.                  Recent Labs     07/14/20  1510 07/15/20  0428   WBC 12.9* 9.0   HGB 14.1 11.2*   HCT 43.3 34.1*   MCV 90.0 91.5    196       Recent Labs     07/14/20  1510 07/15/20  0428 07/16/20  0423    136 142   K 3.5 3.6 4.0   CL 95* 105 110   CO2 26 24 24   BUN 16 10 6*   CREATININE 3.2* 1.1 0.7       CBC:  Lab Results   Component Value Date    WBC 9.0 07/15/2020    HGB 11.2 07/15/2020    HCT 34.1 07/15/2020    MCV 91.5 07/15/2020     07/15/2020    NEUTOPHILPCT 60.2 07/15/2020    LYMPHOPCT 31.8 07/15/2020    MONOPCT 5.5 07/15/2020    EOSPCT 0.3 01/05/2012    BASOPCT 0.6 07/15/2020    NEUTROABS 5.4 07/15/2020    LYMPHSABS 2.8 07/15/2020    MONOSABS 0.5 07/15/2020    EOSABS 0.2 07/15/2020    BASOSABS 0.1 07/15/2020     BNP:   Lab Results   Component Value Date     07/16/2020    K 4.0 07/16/2020    K 3.6 07/15/2020    CO2 24 07/16/2020    BUN 6 07/16/2020    CREATININE 0.7 07/16/2020    CALCIUM 8.2 07/16/2020 Medication List      ASK your doctor about these medications    albuterol sulfate  (90 Base) MCG/ACT inhaler  INHALE 2 PUFFS INTO THE LUNGS EVERY 6 HOURS AS NEEDED FOR WHEEZING  Ask about: Which instructions should I use? ASPIRIN LOW DOSE 81 MG EC tablet  Generic drug:  aspirin  TAKE 1 TABLET BY MOUTH DAILY     atorvastatin 80 MG tablet  Commonly known as:  LIPITOR  TAKE 1 TABLET BY MOUTH NIGHTLY     cetirizine 10 MG tablet  Commonly known as:  ZYRTEC  TAKE 1 TABLET BY MOUTH DAILY     DULoxetine 60 MG extended release capsule  Commonly known as:  CYMBALTA  TAKE 1 CAPSULE BY MOUTH DAILY     famotidine 20 MG tablet  Commonly known as:  PEPCID  TAKE 1 TABLET BY MOUTH TWICE DAILY     fluticasone 50 MCG/ACT nasal spray  Commonly known as:  FLONASE  USE 2 SPRAYS IN EACH NOSTRIL DAILY     gabapentin 400 MG capsule  Commonly known as:  NEURONTIN  Take 1 capsule by mouth 3 times daily for 30 days. lidocaine 5 %  Commonly known as:  LIDODERM  PLACE 1 PATCH ONTO THE SKIN DAILY 12 HOURS ON, 12 HOURS OFF.     lisinopril 40 MG tablet  Commonly known as:  PRINIVIL;ZESTRIL  TAKE 1 TABLET BY MOUTH DAILY     nicotine 7 MG/24HR  Commonly known as:  NICODERM CQ  PLACE 1 PATCH ONTO THE SKIN DAILY (ROTATE SITES)     nitroGLYCERIN 0.4 MG SL tablet  Commonly known as:  NITROSTAT  up to max of 3 total doses. If no relief after 1 dose, call 911.      omeprazole 20 MG delayed release capsule  Commonly known as:  PRILOSEC  Take 1 capsule by mouth every morning (before breakfast)     Spiriva HandiHaler 18 MCG inhalation capsule  Generic drug:  tiotropium  INHALE 1 CAPSULE INTO THE LUNGS DAILY     sucralfate 1 GM tablet  Commonly known as:  Carafate  Take 1 tablet by mouth 3 times daily     tiZANidine 2 MG tablet  Commonly known as:  ZANAFLEX  TAKE 1 TABLET BY MOUTH 2 TIMES DAILY AS NEEDED (MUSCLE SPASM AND PAIN)     traZODone 50 MG tablet  Commonly known as:  DESYREL  TAKE 1 TABLET BY MOUTH NIGHTLY     vitamin D 1.25 MG (85043 UT) Caps capsule  Commonly known as:  ERGOCALCIFEROL  TAKE 1 CAPSULE BY MOUTH ONCE A WEEK FOR 12 DOSES              Discharge Condition/Location: stable/home     Follow Up:    PCP in 1 weeks      Time Spent on discharge is more than 28 minutes in the examination, evaluation, counseling and review of medications and discharge plan.       Santiago Bentley MD 7/16/2020 7:51 AM

## 2020-07-16 NOTE — PROGRESS NOTES
Shift assessment completed, see flowsheets. Patient A/O  4. AM medications given per orders. Patient denies pain or further needs at this time. Patient currently awake in bed, call light and personal belongings within reach. Patient educated on use of call light and to call out with needs, verbalized understanding.

## 2020-07-16 NOTE — FLOWSHEET NOTE
07/15/20 2045   Vital Signs   Temp 98 °F (36.7 °C)   Temp Source Oral   Pulse 87   Heart Rate Source Monitor   Resp 16   BP (!) 145/78   Level of Consciousness 0   MEWS Score 1   Oxygen Therapy   SpO2 93 %   O2 Device None (Room air)   Pt. Resting in bed. Call light in reach. Shift assessment completed see flow sheet. Denies any needs at this time. Will continue to monitor.

## 2020-07-16 NOTE — CARE COORDINATION
DISCHARGE ORDER  Date/Time 2020 9:52 AM  Completed by: Saint Mattock, Case Management    Patient Name: Teri Cali    : 1955  Admitting Diagnosis: JASPREET (acute kidney injury) ([facility] [address] [N17.9]      Admit order Date and Status:inpt  (verify MD's last order for status of admission)      Noted discharge order. Confirmed discharge plan  (pt): Yes    Discharge Plan: Reviewed chart. Met with the pt. Pt declines hhc at this time. Reviewed chart. Role of discharge planner explained and patient verbalized understanding. Discharge order is noted. Has Home O2 in place on admit:  No  Informed of need to bring portable home O2 tank on day of discharge for nursing to connect prior to leaving:   Not Indicated  Verbalized agreement/Understanding:   Not Indicated  Pt is being d/c'd to home today. Pt's O2 sats are 93% on RA. Discharge timeout done with SUSHMA Finley. All discharge needs and concerns addressed.

## 2020-07-17 ENCOUNTER — TELEPHONE (OUTPATIENT)
Dept: FAMILY MEDICINE CLINIC | Age: 65
End: 2020-07-17

## 2020-07-17 ENCOUNTER — HOSPITAL ENCOUNTER (OUTPATIENT)
Age: 65
Setting detail: OUTPATIENT SURGERY
Discharge: HOME | End: 2020-07-17
Attending: SURGERY | Admitting: SURGERY
Payer: COMMERCIAL

## 2020-07-17 ENCOUNTER — ANESTHESIA (OUTPATIENT)
Dept: OPERATING ROOM | Age: 65
End: 2020-07-17
Payer: COMMERCIAL

## 2020-07-17 VITALS
RESPIRATION RATE: 14 BRPM | HEIGHT: 62 IN | TEMPERATURE: 97.4 F | DIASTOLIC BLOOD PRESSURE: 88 MMHG | HEART RATE: 79 BPM | BODY MASS INDEX: 24.66 KG/M2 | OXYGEN SATURATION: 92 % | SYSTOLIC BLOOD PRESSURE: 169 MMHG | WEIGHT: 134 LBS

## 2020-07-17 VITALS
SYSTOLIC BLOOD PRESSURE: 137 MMHG | OXYGEN SATURATION: 100 % | DIASTOLIC BLOOD PRESSURE: 87 MMHG | TEMPERATURE: 97.2 F | RESPIRATION RATE: 3 BRPM

## 2020-07-17 LAB
SARS-COV-2: NOT DETECTED
SOURCE: NORMAL

## 2020-07-17 PROCEDURE — 2580000003 HC RX 258: Performed by: SURGERY

## 2020-07-17 PROCEDURE — 6360000002 HC RX W HCPCS: Performed by: ANESTHESIOLOGY

## 2020-07-17 PROCEDURE — 6360000002 HC RX W HCPCS: Performed by: SURGERY

## 2020-07-17 PROCEDURE — 7100000011 HC PHASE II RECOVERY - ADDTL 15 MIN: Performed by: SURGERY

## 2020-07-17 PROCEDURE — 2500000003 HC RX 250 WO HCPCS: Performed by: SURGERY

## 2020-07-17 PROCEDURE — S2900 ROBOTIC SURGICAL SYSTEM: HCPCS | Performed by: SURGERY

## 2020-07-17 PROCEDURE — 2720000010 HC SURG SUPPLY STERILE: Performed by: SURGERY

## 2020-07-17 PROCEDURE — 2580000003 HC RX 258: Performed by: ANESTHESIOLOGY

## 2020-07-17 PROCEDURE — 2709999900 HC NON-CHARGEABLE SUPPLY: Performed by: SURGERY

## 2020-07-17 PROCEDURE — 2500000003 HC RX 250 WO HCPCS: Performed by: NURSE ANESTHETIST, CERTIFIED REGISTERED

## 2020-07-17 PROCEDURE — 7100000010 HC PHASE II RECOVERY - FIRST 15 MIN: Performed by: SURGERY

## 2020-07-17 PROCEDURE — 3600000009 HC SURGERY ROBOT BASE: Performed by: SURGERY

## 2020-07-17 PROCEDURE — 88304 TISSUE EXAM BY PATHOLOGIST: CPT

## 2020-07-17 PROCEDURE — 6370000000 HC RX 637 (ALT 250 FOR IP): Performed by: ANESTHESIOLOGY

## 2020-07-17 PROCEDURE — 3600000019 HC SURGERY ROBOT ADDTL 15MIN: Performed by: SURGERY

## 2020-07-17 PROCEDURE — 6360000002 HC RX W HCPCS: Performed by: NURSE ANESTHETIST, CERTIFIED REGISTERED

## 2020-07-17 PROCEDURE — 3700000000 HC ANESTHESIA ATTENDED CARE: Performed by: SURGERY

## 2020-07-17 PROCEDURE — 3700000001 HC ADD 15 MINUTES (ANESTHESIA): Performed by: SURGERY

## 2020-07-17 PROCEDURE — 47562 LAPAROSCOPIC CHOLECYSTECTOMY: CPT | Performed by: SURGERY

## 2020-07-17 PROCEDURE — 7100000000 HC PACU RECOVERY - FIRST 15 MIN: Performed by: SURGERY

## 2020-07-17 PROCEDURE — 7100000001 HC PACU RECOVERY - ADDTL 15 MIN: Performed by: SURGERY

## 2020-07-17 RX ORDER — LIDOCAINE HYDROCHLORIDE 20 MG/ML
INJECTION, SOLUTION EPIDURAL; INFILTRATION; INTRACAUDAL; PERINEURAL PRN
Status: DISCONTINUED | OUTPATIENT
Start: 2020-07-17 | End: 2020-07-17 | Stop reason: SDUPTHER

## 2020-07-17 RX ORDER — DEXAMETHASONE SODIUM PHOSPHATE 4 MG/ML
INJECTION, SOLUTION INTRA-ARTICULAR; INTRALESIONAL; INTRAMUSCULAR; INTRAVENOUS; SOFT TISSUE PRN
Status: DISCONTINUED | OUTPATIENT
Start: 2020-07-17 | End: 2020-07-17 | Stop reason: SDUPTHER

## 2020-07-17 RX ORDER — KETOROLAC TROMETHAMINE 30 MG/ML
INJECTION, SOLUTION INTRAMUSCULAR; INTRAVENOUS PRN
Status: DISCONTINUED | OUTPATIENT
Start: 2020-07-17 | End: 2020-07-17 | Stop reason: SDUPTHER

## 2020-07-17 RX ORDER — SODIUM CHLORIDE, SODIUM LACTATE, POTASSIUM CHLORIDE, CALCIUM CHLORIDE 600; 310; 30; 20 MG/100ML; MG/100ML; MG/100ML; MG/100ML
INJECTION, SOLUTION INTRAVENOUS CONTINUOUS
Status: DISCONTINUED | OUTPATIENT
Start: 2020-07-17 | End: 2020-07-17 | Stop reason: HOSPADM

## 2020-07-17 RX ORDER — APREPITANT 40 MG/1
40 CAPSULE ORAL ONCE
Status: COMPLETED | OUTPATIENT
Start: 2020-07-17 | End: 2020-07-17

## 2020-07-17 RX ORDER — ONDANSETRON 2 MG/ML
INJECTION INTRAMUSCULAR; INTRAVENOUS PRN
Status: DISCONTINUED | OUTPATIENT
Start: 2020-07-17 | End: 2020-07-17 | Stop reason: SDUPTHER

## 2020-07-17 RX ORDER — OXYCODONE HYDROCHLORIDE AND ACETAMINOPHEN 5; 325 MG/1; MG/1
1 TABLET ORAL PRN
Status: COMPLETED | OUTPATIENT
Start: 2020-07-17 | End: 2020-07-17

## 2020-07-17 RX ORDER — ONDANSETRON 4 MG/1
4 TABLET, FILM COATED ORAL 3 TIMES DAILY PRN
Qty: 15 TABLET | Refills: 0 | Status: SHIPPED | OUTPATIENT
Start: 2020-07-17 | End: 2020-09-29 | Stop reason: ALTCHOICE

## 2020-07-17 RX ORDER — SODIUM CHLORIDE 0.9 % (FLUSH) 0.9 %
10 SYRINGE (ML) INJECTION PRN
Status: DISCONTINUED | OUTPATIENT
Start: 2020-07-17 | End: 2020-07-17 | Stop reason: HOSPADM

## 2020-07-17 RX ORDER — MEPERIDINE HYDROCHLORIDE 25 MG/ML
12.5 INJECTION INTRAMUSCULAR; INTRAVENOUS; SUBCUTANEOUS EVERY 5 MIN PRN
Status: DISCONTINUED | OUTPATIENT
Start: 2020-07-17 | End: 2020-07-17 | Stop reason: HOSPADM

## 2020-07-17 RX ORDER — ONDANSETRON 2 MG/ML
4 INJECTION INTRAMUSCULAR; INTRAVENOUS EVERY 10 MIN PRN
Status: DISCONTINUED | OUTPATIENT
Start: 2020-07-17 | End: 2020-07-17 | Stop reason: HOSPADM

## 2020-07-17 RX ORDER — LIDOCAINE HYDROCHLORIDE 10 MG/ML
1 INJECTION, SOLUTION EPIDURAL; INFILTRATION; INTRACAUDAL; PERINEURAL
Status: DISCONTINUED | OUTPATIENT
Start: 2020-07-17 | End: 2020-07-17 | Stop reason: HOSPADM

## 2020-07-17 RX ORDER — INDOCYANINE GREEN AND WATER 25 MG
5 KIT INJECTION ONCE
Status: COMPLETED | OUTPATIENT
Start: 2020-07-17 | End: 2020-07-17

## 2020-07-17 RX ORDER — SODIUM CHLORIDE, SODIUM LACTATE, POTASSIUM CHLORIDE, AND CALCIUM CHLORIDE .6; .31; .03; .02 G/100ML; G/100ML; G/100ML; G/100ML
IRRIGANT IRRIGATION PRN
Status: DISCONTINUED | OUTPATIENT
Start: 2020-07-17 | End: 2020-07-17 | Stop reason: ALTCHOICE

## 2020-07-17 RX ORDER — HEPARIN SODIUM 5000 [USP'U]/ML
5000 INJECTION, SOLUTION INTRAVENOUS; SUBCUTANEOUS ONCE
Status: COMPLETED | OUTPATIENT
Start: 2020-07-17 | End: 2020-07-17

## 2020-07-17 RX ORDER — HYDROMORPHONE HCL 110MG/55ML
PATIENT CONTROLLED ANALGESIA SYRINGE INTRAVENOUS PRN
Status: DISCONTINUED | OUTPATIENT
Start: 2020-07-17 | End: 2020-07-17 | Stop reason: SDUPTHER

## 2020-07-17 RX ORDER — PROPOFOL 10 MG/ML
INJECTION, EMULSION INTRAVENOUS PRN
Status: DISCONTINUED | OUTPATIENT
Start: 2020-07-17 | End: 2020-07-17 | Stop reason: SDUPTHER

## 2020-07-17 RX ORDER — OXYCODONE HYDROCHLORIDE AND ACETAMINOPHEN 5; 325 MG/1; MG/1
2 TABLET ORAL PRN
Status: COMPLETED | OUTPATIENT
Start: 2020-07-17 | End: 2020-07-17

## 2020-07-17 RX ORDER — BUPIVACAINE HYDROCHLORIDE 5 MG/ML
INJECTION, SOLUTION EPIDURAL; INTRACAUDAL PRN
Status: DISCONTINUED | OUTPATIENT
Start: 2020-07-17 | End: 2020-07-17 | Stop reason: ALTCHOICE

## 2020-07-17 RX ORDER — LABETALOL HYDROCHLORIDE 5 MG/ML
5 INJECTION, SOLUTION INTRAVENOUS EVERY 10 MIN PRN
Status: DISCONTINUED | OUTPATIENT
Start: 2020-07-17 | End: 2020-07-17 | Stop reason: HOSPADM

## 2020-07-17 RX ORDER — FENTANYL CITRATE 50 UG/ML
INJECTION, SOLUTION INTRAMUSCULAR; INTRAVENOUS PRN
Status: DISCONTINUED | OUTPATIENT
Start: 2020-07-17 | End: 2020-07-17 | Stop reason: SDUPTHER

## 2020-07-17 RX ORDER — SODIUM CHLORIDE 0.9 % (FLUSH) 0.9 %
10 SYRINGE (ML) INJECTION EVERY 12 HOURS SCHEDULED
Status: DISCONTINUED | OUTPATIENT
Start: 2020-07-17 | End: 2020-07-17 | Stop reason: HOSPADM

## 2020-07-17 RX ORDER — HYDRALAZINE HYDROCHLORIDE 20 MG/ML
5 INJECTION INTRAMUSCULAR; INTRAVENOUS EVERY 10 MIN PRN
Status: DISCONTINUED | OUTPATIENT
Start: 2020-07-17 | End: 2020-07-17 | Stop reason: HOSPADM

## 2020-07-17 RX ORDER — OXYCODONE HYDROCHLORIDE AND ACETAMINOPHEN 5; 325 MG/1; MG/1
1 TABLET ORAL EVERY 6 HOURS PRN
Qty: 20 TABLET | Refills: 0 | Status: SHIPPED | OUTPATIENT
Start: 2020-07-17 | End: 2020-07-22

## 2020-07-17 RX ORDER — ROCURONIUM BROMIDE 10 MG/ML
INJECTION, SOLUTION INTRAVENOUS PRN
Status: DISCONTINUED | OUTPATIENT
Start: 2020-07-17 | End: 2020-07-17 | Stop reason: SDUPTHER

## 2020-07-17 RX ADMIN — FENTANYL CITRATE 100 MCG: 50 INJECTION INTRAMUSCULAR; INTRAVENOUS at 08:53

## 2020-07-17 RX ADMIN — DEXAMETHASONE SODIUM PHOSPHATE 4 MG: 4 INJECTION, SOLUTION INTRAMUSCULAR; INTRAVENOUS at 09:15

## 2020-07-17 RX ADMIN — SODIUM CHLORIDE, POTASSIUM CHLORIDE, SODIUM LACTATE AND CALCIUM CHLORIDE: 600; 310; 30; 20 INJECTION, SOLUTION INTRAVENOUS at 07:35

## 2020-07-17 RX ADMIN — PROPOFOL 200 MG: 10 INJECTION, EMULSION INTRAVENOUS at 08:56

## 2020-07-17 RX ADMIN — APREPITANT 40 MG: 40 CAPSULE ORAL at 07:36

## 2020-07-17 RX ADMIN — INDOCYANINE GREEN AND WATER 5 MG: KIT at 07:36

## 2020-07-17 RX ADMIN — Medication 2 G: at 08:49

## 2020-07-17 RX ADMIN — HYDROMORPHONE HYDROCHLORIDE 0.5 MG: 2 INJECTION, SOLUTION INTRAMUSCULAR; INTRAVENOUS; SUBCUTANEOUS at 09:17

## 2020-07-17 RX ADMIN — HYDROMORPHONE HYDROCHLORIDE 0.5 MG: 1 INJECTION, SOLUTION INTRAMUSCULAR; INTRAVENOUS; SUBCUTANEOUS at 11:15

## 2020-07-17 RX ADMIN — KETOROLAC TROMETHAMINE 15 MG: 30 INJECTION, SOLUTION INTRAMUSCULAR at 09:31

## 2020-07-17 RX ADMIN — SUGAMMADEX 120 MG: 100 INJECTION, SOLUTION INTRAVENOUS at 09:41

## 2020-07-17 RX ADMIN — HYDROMORPHONE HYDROCHLORIDE 0.5 MG: 1 INJECTION, SOLUTION INTRAMUSCULAR; INTRAVENOUS; SUBCUTANEOUS at 11:10

## 2020-07-17 RX ADMIN — ONDANSETRON 4 MG: 2 INJECTION, SOLUTION INTRAMUSCULAR; INTRAVENOUS at 08:57

## 2020-07-17 RX ADMIN — HEPARIN SODIUM 5000 UNITS: 5000 INJECTION INTRAVENOUS; SUBCUTANEOUS at 07:36

## 2020-07-17 RX ADMIN — LIDOCAINE HYDROCHLORIDE 50 MG: 20 INJECTION, SOLUTION EPIDURAL; INFILTRATION; INTRACAUDAL; PERINEURAL at 08:56

## 2020-07-17 RX ADMIN — ROCURONIUM BROMIDE 50 MG: 10 INJECTION, SOLUTION INTRAVENOUS at 08:57

## 2020-07-17 RX ADMIN — OXYCODONE HYDROCHLORIDE AND ACETAMINOPHEN 2 TABLET: 5; 325 TABLET ORAL at 11:36

## 2020-07-17 ASSESSMENT — PULMONARY FUNCTION TESTS
PIF_VALUE: 19
PIF_VALUE: 28
PIF_VALUE: 17
PIF_VALUE: 28
PIF_VALUE: 28
PIF_VALUE: 6
PIF_VALUE: 18
PIF_VALUE: 24
PIF_VALUE: 20
PIF_VALUE: 28
PIF_VALUE: 21
PIF_VALUE: 21
PIF_VALUE: 28
PIF_VALUE: 28
PIF_VALUE: 22
PIF_VALUE: 18
PIF_VALUE: 21
PIF_VALUE: 25
PIF_VALUE: 21
PIF_VALUE: 24
PIF_VALUE: 2
PIF_VALUE: 19
PIF_VALUE: 28
PIF_VALUE: 0
PIF_VALUE: 0
PIF_VALUE: 28
PIF_VALUE: 28
PIF_VALUE: 18
PIF_VALUE: 19
PIF_VALUE: 28
PIF_VALUE: 21
PIF_VALUE: 8
PIF_VALUE: 3
PIF_VALUE: 0
PIF_VALUE: 28
PIF_VALUE: 18
PIF_VALUE: 7
PIF_VALUE: 19
PIF_VALUE: 19
PIF_VALUE: 0
PIF_VALUE: 20
PIF_VALUE: 19
PIF_VALUE: 21
PIF_VALUE: 28
PIF_VALUE: 6
PIF_VALUE: 29
PIF_VALUE: 1
PIF_VALUE: 19
PIF_VALUE: 19
PIF_VALUE: 28
PIF_VALUE: 27
PIF_VALUE: 0
PIF_VALUE: 29
PIF_VALUE: 27
PIF_VALUE: 22
PIF_VALUE: 28

## 2020-07-17 ASSESSMENT — PAIN DESCRIPTION - PAIN TYPE: TYPE: SURGICAL PAIN

## 2020-07-17 ASSESSMENT — PAIN DESCRIPTION - LOCATION: LOCATION: ABDOMEN

## 2020-07-17 ASSESSMENT — LIFESTYLE VARIABLES: SMOKING_STATUS: 1

## 2020-07-17 ASSESSMENT — PAIN SCALES - GENERAL
PAINLEVEL_OUTOF10: 8
PAINLEVEL_OUTOF10: 8
PAINLEVEL_OUTOF10: 7

## 2020-07-17 ASSESSMENT — PAIN DESCRIPTION - ORIENTATION: ORIENTATION: RIGHT;MID;UPPER

## 2020-07-17 ASSESSMENT — PAIN - FUNCTIONAL ASSESSMENT: PAIN_FUNCTIONAL_ASSESSMENT: 0-10

## 2020-07-17 ASSESSMENT — PAIN DESCRIPTION - DESCRIPTORS: DESCRIPTORS: OTHER (COMMENT)

## 2020-07-17 NOTE — PROGRESS NOTES
PT STABLE FOR TRANSFER TO PHASE II; FRIEND UPDATED AND HAS GONE TO FILL RX; PT STATES PAIN IS 7/10; WILL MEDICATE WITH PERCOCET

## 2020-07-17 NOTE — OP NOTE
was grasped and elevated  cephalad over the liver edge. The infundibular structures were  dissected out and the cystic duct and artery were identified. Biliary  anatomy identification was confirmed with Firefly imaging. The cystic  duct was triply clipped and the cystic artery was likewise clipped and  they are both divided with cautery. The gallbladder was then dissected  free off the liver bed using electrocautery. The gallbladder fossa was  examined. There was no evidence of bleeding. There was no bile  leakage, either by direct visualization or Firefly visualization. The  clips were intact in the proximal structures. The robot was, therefore,  undocked and the trocars were removed. The umbilical fascial defect was  closed with the previously placed figure-of-eight suture of 0 Ethibond. The wounds were injected with Marcaine and the skin incisions closed  with subcuticular sutures of 4-0 Vicryl followed by Benzoin,  Steri-Strips, and dry sterile dressings. All sponge, needle, and  instrument counts were correct at the end of the case. The patient  tolerated the procedure well and was taken to the recovery area in  stable condition.           Hattie Garcia MD    D: 07/17/2020 9:53:55       T: 07/17/2020 10:04:09     [id number]  Job#: [id number]     Doc#: [id number]    CC:  Michelle Diamond

## 2020-07-17 NOTE — ANESTHESIA PRE PROCEDURE
Department of Anesthesiology  Preprocedure Note       Name:  Larisa Cleveland   Age:  63 y.o.  :  1955                                          MRN:  [id number]         Date:  2020      Surgeon: Sarahi Eric):  Bernadette Townsend MD    Procedure: Procedure(s):  ROBOTIC CHOLECYSTECTOMY, POSSIBLE OPEN PROCEDURE    Medications prior to admission:   Prior to Admission medications    Medication Sig Start Date End Date Taking? Authorizing Provider   lidocaine (LIDODERM) 5 % PLACE 1 PATCH ONTO THE SKIN DAILY 12 HOURS ON, 12 HOURS OFF. 7/10/20 8/9/20 Yes Jayna Hay MD   fluticasone (FLONASE) 50 MCG/ACT nasal spray USE 2 SPRAYS IN EACH NOSTRIL DAILY 7/10/20  Yes Jayna Hay MD   atorvastatin (LIPITOR) 80 MG tablet TAKE 1 TABLET BY MOUTH NIGHTLY 7/10/20  Yes Jayna Hay MD   DULoxetine (CYMBALTA) 60 MG extended release capsule TAKE 1 CAPSULE BY MOUTH DAILY 7/10/20  Yes Jayna Hay MD   gabapentin (NEURONTIN) 400 MG capsule Take 1 capsule by mouth 3 times daily for 30 days.  7/10/20 8/9/20 Yes Jayna Hay MD   traZODone (DESYREL) 50 MG tablet TAKE 1 TABLET BY MOUTH NIGHTLY 7/10/20  Yes Jayna Hay MD   sucralfate (CARAFATE) 1 GM tablet Take 1 tablet by mouth 3 times daily 20  Yes Jose Yan MD   omeprazole (PRILOSEC) 20 MG delayed release capsule Take 1 capsule by mouth every morning (before breakfast) 20  Yes Jose Yan MD   famotidine (PEPCID) 20 MG tablet TAKE 1 TABLET BY MOUTH TWICE DAILY 20  Yes Jayna Hay MD   tiZANidine (ZANAFLEX) 2 MG tablet TAKE 1 TABLET BY MOUTH 2 TIMES DAILY AS NEEDED (MUSCLE SPASM AND PAIN) 20  Yes Jayna Hay MD   albuterol sulfate  (90 Base) MCG/ACT inhaler INHALE 2 PUFFS INTO THE LUNGS EVERY 6 HOURS AS NEEDED FOR WHEEZING 20  Yes Jayna Hay MD   cetirizine (ZYRTEC) 10 MG tablet TAKE 1 TABLET BY MOUTH DAILY 20  Yes Jayna Hay MD   SPIRIVA HANDIHALER 18 MCG inhalation capsule INHALE 1 CAPSULE INTO THE LUNGS DAILY 4/13/20  Yes Navya Huertas MD   nicotine (NICODERM CQ) 7 MG/24HR PLACE 1 PATCH ONTO THE SKIN DAILY (ROTATE SITES) 12/4/19  Yes Navya Huertas MD   nitroGLYCERIN (NITROSTAT) 0.4 MG SL tablet up to max of 3 total doses. If no relief after 1 dose, call 911. 7/31/19  Yes SHAHAB Fields - CNP   vitamin D (ERGOCALCIFEROL) 1.25 MG (95908 UT) CAPS capsule TAKE 1 CAPSULE BY MOUTH ONCE A WEEK FOR 12 DOSES 4/13/20 7/14/20  Navya Huertas MD       Current medications:    No current facility-administered medications for this encounter. Current Outpatient Medications   Medication Sig Dispense Refill    lidocaine (LIDODERM) 5 % PLACE 1 PATCH ONTO THE SKIN DAILY 12 HOURS ON, 12 HOURS OFF. 30 patch 2    fluticasone (FLONASE) 50 MCG/ACT nasal spray USE 2 SPRAYS IN EACH NOSTRIL DAILY 16 g 2    atorvastatin (LIPITOR) 80 MG tablet TAKE 1 TABLET BY MOUTH NIGHTLY 90 tablet 1    DULoxetine (CYMBALTA) 60 MG extended release capsule TAKE 1 CAPSULE BY MOUTH DAILY 90 capsule 1    gabapentin (NEURONTIN) 400 MG capsule Take 1 capsule by mouth 3 times daily for 30 days.  90 capsule 5    traZODone (DESYREL) 50 MG tablet TAKE 1 TABLET BY MOUTH NIGHTLY 90 tablet 1    sucralfate (CARAFATE) 1 GM tablet Take 1 tablet by mouth 3 times daily 90 tablet 3    omeprazole (PRILOSEC) 20 MG delayed release capsule Take 1 capsule by mouth every morning (before breakfast) 60 capsule 1    famotidine (PEPCID) 20 MG tablet TAKE 1 TABLET BY MOUTH TWICE DAILY 60 tablet 2    tiZANidine (ZANAFLEX) 2 MG tablet TAKE 1 TABLET BY MOUTH 2 TIMES DAILY AS NEEDED (MUSCLE SPASM AND PAIN) 30 tablet 2    albuterol sulfate  (90 Base) MCG/ACT inhaler INHALE 2 PUFFS INTO THE LUNGS EVERY 6 HOURS AS NEEDED FOR WHEEZING 8.5 g 2    cetirizine (ZYRTEC) 10 MG tablet TAKE 1 TABLET BY MOUTH DAILY 90 tablet 1    SPIRIVA HANDIHALER 18 MCG inhalation capsule INHALE 1 CAPSULE INTO THE LUNGS DAILY 90 capsule 1    nicotine (NICODERM CQ) 7 MG/24HR PLACE 1 PATCH ONTO THE SKIN DAILY (ROTATE SITES) 14 patch 0    nitroGLYCERIN (NITROSTAT) 0.4 MG SL tablet up to max of 3 total doses. If no relief after 1 dose, call 911. 25 tablet 3    vitamin D (ERGOCALCIFEROL) 1.25 MG (23375 UT) CAPS capsule TAKE 1 CAPSULE BY MOUTH ONCE A WEEK FOR 12 DOSES 12 capsule 0       Allergies:     Allergies   Allergen Reactions    Lorazepam      Confusion, hallucinations    Codeine Hives     Tolerates Norco.       Problem List:    Patient Active Problem List   Diagnosis Code    Essential hypertension I10    Chest pain R07.9    Multiple falls R29.6    Recurrent pain of right knee M25.561    Sternal mass M89.8X8    Current smoker F17.200    LAD (lymphadenopathy), inguinal R59.0    Chronic pain syndrome G89.4    Moderate single current episode of major depressive disorder (HCC) F32.1    Chronic bilateral low back pain without sciatica M54.5, G89.29    Prediabetes R73.03    Vitamin D deficiency E55.9    History of fasciotomy Z98.890    History of DVT in adulthood Z80.65    Personal history of TIA (transient ischemic attack) Z86.73    Deep vein thrombosis (DVT) of left lower extremity (HCC) I82.402    Neuropathy G62.9    Lung nodule R91.1    Bone marrow edema D75.89    Centrilobular emphysema (HCC) J43.2    HLD (hyperlipidemia) E78.5    Hypertensive urgency I16.0    Oropharyngeal dysphagia R13.12    Muscle cramps R25.2    Pulmonary lesion of left side of chest J98.4    JASPREET (acute kidney injury) (HCC) N17.9    Abnormal liver enzymes R74.8    Epigastric pain R10.13    Right upper quadrant abdominal pain R10.11    Perforated gallbladder K82.2    Anemia D64.9    Cholecystitis K81.9    Dizziness R42    Diarrhea of presumed infectious origin R19.7       Past Medical History:        Diagnosis Date    JASPREET [facility]acute kidney [facility]) ([facility] 4/24/2020    [facility]     Arterial occlusion     [facility]rilobular emphysema ([facility] 2019    Chronic bilateral low back pain with bilateral sciatica 8/3/2018    Chronic bilateral low back pain with bilateral sciatica     Hyperlipidemia     Hypertension     MDRO (multiple drug resistant organisms) resistance 2020    Escherichia coli-URINE    Metabolic encephalopathy     Moderate single current episode of major depressive disorder ([facility] 8/3/2018    Ruptured disk        Past Surgical History:        Procedure Laterality Date     SECTION  1981    LEG SURGERY  2017    fasciotomy due to DVT, LLE       Social History:    Social History     Tobacco Use    Smoking status: Current Every Day Smoker     Packs/day: 0.50     Years: 20.00     Pack years: 10.00     Types: Cigarettes     Last attempt to quit: 3/1/2020     Years since quittin.3    Smokeless tobacco: Never Used   Substance Use Topics    Alcohol use: No                                Ready to quit: Not Answered  Counseling given: Not Answered      Vital Signs (Current):   Vitals:    20 1509   Weight: 130 lb (59 kg)   Height: 5' 2\" (1.575 m)                                              BP Readings from Last 3 Encounters:   20 (!) 181/91   20 130/78   20 112/71       NPO Status:                                                                                 BMI:   Wt Readings from Last 3 Encounters:   20 147 lb 9.6 oz (67 kg)   20 140 lb (63.5 kg)   20 143 lb 3.2 oz (65 kg)     Body mass index is 23.78 kg/m².     CBC:   Lab Results   Component Value Date    WBC 9.0 07/15/2020    RBC 3.72 07/15/2020    HGB 11.2 07/15/2020    HCT 34.1 07/15/2020    MCV 91.5 07/15/2020    RDW 16.1 07/15/2020     07/15/2020       CMP:   Lab Results   Component Value Date     2020    K 4.0 2020    K 3.6 07/15/2020     2020    CO2 24 2020    BUN 6 2020    CREATININE 0.7 2020    GFRAA >60 2020    GFRAA >60 2012    AGRATIO 1.1 07/15/2020    LABGLOM >60 07/16/2020    GLUCOSE 98 07/16/2020    PROT 5.8 07/15/2020    PROT 8.3 05/27/2012    CALCIUM 8.2 07/16/2020    BILITOT <0.2 07/15/2020    ALKPHOS 74 07/15/2020    AST 10 07/15/2020    ALT <5 07/15/2020       POC Tests: No results for input(s): POCGLU, POCNA, POCK, POCCL, POCBUN, POCHEMO, POCHCT in the last 72 hours. Coags:   Lab Results   Component Value Date    PROTIME 12.2 06/16/2020    INR 1.05 06/16/2020    APTT 30.4 04/24/2020       HCG (If Applicable): No results found for: PREGTESTUR, PREGSERUM, HCG, HCGQUANT     ABGs: No results found for: PHART, PO2ART, XCS4JWX, TUE5OBK, BEART, M0EQLVRA     Type & Screen (If Applicable):  No results found for: LABABO, LABRH    Drug/Infectious Status (If Applicable):  No results found for: HIV, HEPCAB    COVID-19 Screening (If Applicable):   Lab Results   Component Value Date    COVID19 Not Detected 07/16/2020    COVID19 Not Detected 06/29/2020         Anesthesia Evaluation  Patient summary reviewed and Nursing notes reviewed no history of anesthetic complications:   Airway: Mallampati: II  TM distance: >3 FB   Neck ROM: full  Mouth opening: > = 3 FB Dental: normal exam         Pulmonary:   (+) COPD:  current smoker                           Cardiovascular:    (+) hypertension:,                   Neuro/Psych:   (+) neuromuscular disease:, TIA, psychiatric history:            GI/Hepatic/Renal: Neg GI/Hepatic/Renal ROS       (-) GERD, liver disease and no renal disease       Endo/Other: Negative Endo/Other ROS       (-) diabetes mellitus               Abdominal:           Vascular:   + DVT, . Anesthesia Plan      general     ASA 3     (I discussed with the patient the risks and benefits of PIV, general anesthesia, IV Narcotics, PACU. All questions were answered the patient agrees with the plan)  Induction: intravenous. MIPS: Prophylactic antiemetics administered.   Anesthetic plan and risks discussed with patient. Plan discussed with CRNA.                   Kamari Brock MD   7/17/2020

## 2020-07-17 NOTE — TELEPHONE ENCOUNTER
[facility] Transitions Initial Follow Up Call    Outreach made within 2 business days of discharge: Yes    Patient: Rupinder Richards Patient : 1955   MRN: [id number]  Reason for Admission: There are no discharge diagnoses documented for the most recent discharge. Discharge Date: 20       Spoke with: Patient is having surgery on today 2020.  Will call when healed if appt is still needed

## 2020-07-17 NOTE — PROGRESS NOTES
PT INSTRUCTED IN USE OF INCENTIVE SPIROMETER AND DEMONSTRATED PROPER USE; STATES SHE HAS USED ONE BEFORE;  NO CHANGE IN PHYSICAL ASSESSMENT; PT AND FRIEND VERBALIZE UNDERSTANDING OF INSTRUCTIONS; PT DISCHARGED VIA W/C BY TRANSPORT WITH FRIEND IN STABLE CONDITION; STARES PAIN IS 5/10 AND TOLERABLE

## 2020-07-17 NOTE — ANESTHESIA POSTPROCEDURE EVALUATION
Department of Anesthesiology  Postprocedure Note    Patient: Jocelyn Contreras  MRN: [id number]  YOB: 1955  Date of evaluation: 7/17/2020  Time:  12:58 PM     Procedure Summary     Date:  07/17/20 Room / Location:  [address] [place] [address] / [facility]    Anesthesia Start:  6848 Anesthesia Stop:  0905    Procedure:  ROBOTIC CHOLECYSTECTOMY (N/A Abdomen) Diagnosis:  (PERFORATED GALLBLADDER)    Surgeon:  Reuben Osei MD Responsible Provider:  Charisse Juarez MD    Anesthesia Type:  general ASA Status:  3          Anesthesia Type: general    Rosa Maria Phase I: Rosa Maria Score: 10    Rosa Maria Phase II: Rosa Maria Score: 10    Last vitals: Reviewed and per EMR flowsheets.        Anesthesia Post Evaluation    Patient location during evaluation: PACU  Level of consciousness: awake  Airway patency: patent  Nausea & Vomiting: no nausea  Complications: no  Cardiovascular status: blood pressure returned to baseline  Respiratory status: acceptable  Hydration status: euvolemic

## 2020-07-18 LAB
BLOOD CULTURE, ROUTINE: NORMAL
CULTURE, BLOOD 2: NORMAL

## 2020-07-22 ENCOUNTER — TELEPHONE (OUTPATIENT)
Dept: PULMONOLOGY | Age: 65
End: 2020-07-22

## 2020-07-22 NOTE — TELEPHONE ENCOUNTER
Within this Telehealth Consent, the terms you and yours refer to the person using the Telehealth Service (Service), or in the case of a use of the Service by or on behalf of a minor, you and yours refer to and include (i) the parent or legal guardian who provides consent to the use of the Service by such minor or uses the Service on behalf of such minor, and (ii) the minor for whom consent is being provided or on whose behalf the Service is being utilized. When using Service, you will be consulting with your health care providers via the use of Telehealth.   Telehealth involves the delivery of healthcare services using electronic communications, information technology or other means between a healthcare provider and a patient who are not in the same physical location. Telehealth may be used for diagnosis, treatment, follow-up and/or patient education, and may include, but is not limited to, one or more of the following:    Electronic transmission of medical records, photo images, personal health information or other data between a patient and a healthcare provider    Interactions between a patient and healthcare provider via audio, video and/or data communications    Use of output data from medical devices, sound and video files    Anticipated Benefits   The use of Telehealth by your Provider(s) through the Service may have the following possible benefits:    Making it easier and more efficient for you to access medical care and treatment for the conditions treated by such Provider(s) utilizing the Service    Allowing you to obtain medical care and treatment by Provider(s) at times that are convenient for you    Enabling you to interact with Provider(s) without the necessity of an in-office appointment     Possible Risks   While the use of Telehealth can provide potential benefits for you, there are also potential risks associated with the use of Telehealth.  These risks include, but may not be limited to the following:    Your Provider(s) may not able to provide medical treatment for your particular condition and you may be required to seek alternative healthcare or emergency care services.  The electronic systems or other security protocols or safeguards used in the Service could fail, causing a breach of privacy of your medical or other information.  Given regulatory requirements in certain jurisdictions, your Provider(s) diagnosis and/or treatment options, especially pertaining to certain prescriptions, may be limited. Acceptance   1. You understand that Services will be provided via Telehealth. This process involves the use of HIPAA compliant and secure, real-time audio-visual interfacing with a qualified and appropriately trained provider located at [facility]. 2. You understand that, under no circumstances, will this session be recorded. 3. You understand that the Provider(s) at [facility] and other clinical participants will be party to the information obtained during the Telehealth session in accordance with best medical practices. 4. You understand that the information obtained during the Telehealth session will be used to help determine the most appropriate treatment options. 5. You understand that You have the right to revoke this consent at any point in time. 6. You understand that Telehealth is voluntary, and that continued treatment is not dependent upon consent. 7. You understand that, in the event of non-consent to Telehealth services and/or technical difficulties, you will obtain services as typically provided in the absence of Telehealth technology. 8. You understand that this consent will be kept in Your medical record. 9. No potential benefits from the use of Telehealth or specific results can be guaranteed. Your condition may not be cured or improved and, in some cases, may get worse.    10. There are limitations in the provision of medical care and treatment via Telehealth and the Service and you may not be able to receive diagnosis and/or treatment through the Service for every condition for which you seek diagnosis and/or treatment. 11. There are potential risks to the use of Telehealth, including but not limited to the risks described in this Telehealth Consent. 12. Your Provider(s) have discussed the use of Telehealth and the Service with you, including the benefits and risks of such and you have provided oral consent to your Provider(s) for the use of Telehealth and the Service. 15. You understand that it is your duty to provide your Provider(s) truthful, accurate and complete information, including all relevant information regarding care that you may have received or may be receiving from other healthcare providers outside of the Service. 14. You understand that each of your Provider(s) may determine in his or sole discretion that your condition is not suitable for diagnosis and/or treatment using the Service, and that you may need to seek medical care and treatment a specialist or other healthcare provider, outside of the Service. 15. You understand that you are fully responsible for payment for all services provided by Provider(s) or through use of the Service and that you may not be able to use third-party insurance. 16. You represent that (a) you have read this Telehealth Consent carefully, (b) you understand the risks and benefits of the Service and the use of Telehealth in the medical care and treatment provided to you by Provider(s) using the Service, and (c) you have the legal capacity and authority to provide this consent for yourself and/or the minor for which you are consenting under applicable federal and state laws, including laws relating to the age of [de-identified] and/or parental/guardian consent.    17. You give your informed consent to the use of Telehealth by Provider(s) using the Service under the terms described in the Terms of Service and this Telehealth Consent. The patient was read the following statement and has consented to the visit as of 7/22/20. The patient has been scheduled for their first telehealth visit on 8/12/20 with Dr Latisha Gandhi.

## 2020-07-28 ENCOUNTER — HOSPITAL ENCOUNTER (OUTPATIENT)
Dept: GENERAL RADIOLOGY | Age: 65
Discharge: HOME | End: 2020-07-28
Payer: COMMERCIAL

## 2020-07-28 ENCOUNTER — HOSPITAL ENCOUNTER (OUTPATIENT)
Age: 65
Discharge: HOME | End: 2020-07-28
Payer: COMMERCIAL

## 2020-07-28 PROCEDURE — 73630 X-RAY EXAM OF FOOT: CPT

## 2020-07-29 ENCOUNTER — TELEPHONE (OUTPATIENT)
Dept: FAMILY MEDICINE CLINIC | Age: 65
End: 2020-07-29

## 2020-07-29 RX ORDER — TRAMADOL HYDROCHLORIDE 50 MG/1
50 TABLET ORAL EVERY 6 HOURS PRN
Qty: 20 TABLET | Refills: 0 | Status: SHIPPED | OUTPATIENT
Start: 2020-07-29 | End: 2020-08-03

## 2020-07-29 NOTE — TELEPHONE ENCOUNTER
There are multiple fractures in her foot (base of 2nd toe and 5th toe) and dislocations of the 3rd and 4th toes. She will need to see ortho asap for this. Referral placed for zak. Please let her know.  Thanks

## 2020-07-29 NOTE — TELEPHONE ENCOUNTER
Pt. Is asking for pain medication for her broken toes. States she is absolutely \"miserable\"   OTC products not helping.

## 2020-08-10 RX ORDER — FAMOTIDINE 20 MG/1
TABLET, FILM COATED ORAL
Qty: 60 TABLET | Refills: 2 | Status: SHIPPED | OUTPATIENT
Start: 2020-08-10 | End: 2020-11-02

## 2020-08-10 RX ORDER — TIZANIDINE 2 MG/1
2 TABLET ORAL 2 TIMES DAILY PRN
Qty: 30 TABLET | Refills: 2 | Status: SHIPPED | OUTPATIENT
Start: 2020-08-10 | End: 2020-11-02

## 2020-08-10 RX ORDER — ALBUTEROL SULFATE 90 UG/1
AEROSOL, METERED RESPIRATORY (INHALATION)
Qty: 8.5 G | Refills: 2 | Status: SHIPPED | OUTPATIENT
Start: 2020-08-10 | End: 2020-11-04

## 2020-08-10 RX ORDER — HYDROXYZINE HYDROCHLORIDE 25 MG/1
25 TABLET, FILM COATED ORAL EVERY 8 HOURS PRN
Qty: 90 TABLET | Refills: 3 | Status: SHIPPED | OUTPATIENT
Start: 2020-08-10 | End: 2020-12-03

## 2020-08-10 NOTE — TELEPHONE ENCOUNTER
Refill Request     Last Seen: 5/7/2020    Last Written: 5/11/20    Next Appointment:   Future Appointments   Date Time Provider Elia Pulliam   8/12/2020  9:15 AM MD ANJUM Elizondo             Requested Prescriptions     Pending Prescriptions Disp Refills    famotidine (PEPCID) 20 MG tablet [Pharmacy Med Name: FAMOTIDINE 20 MG TABS] 60 tablet 2     Sig: TAKE 1 TABLET BY MOUTH TWICE DAILY    tiZANidine (Bonnee Helton) 2 MG tablet [Pharmacy Med Name: TIZANIDINE HCL 2 MG TABS] 30 tablet 2     Sig: TAKE 1 TABLET BY MOUTH 2 TIMES DAILY AS NEEDED (MUSCLE SPASM AND PAIN)    albuterol sulfate  (90 Base) MCG/ACT inhaler [Pharmacy Med Name: ALBUTEROL HFA 90MCG INH (PROAIR)] 8.5 g 2     Sig: INHALE 2 PUFFS INTO THE LUNGS EVERY 6 HOURS AS NEEDED FOR WHEEZING    hydrOXYzine (ATARAX) 25 MG tablet [Pharmacy Med Name: HYDROXYZINE HCL 25 MG TABS] 90 tablet 3     Sig: TAKE 1 TABLET BY MOUTH EVERY 8 HOURS AS NEEDED FOR ANXIETY

## 2020-08-11 ENCOUNTER — TELEPHONE (OUTPATIENT)
Dept: PULMONOLOGY | Age: 65
End: 2020-08-11

## 2020-08-11 NOTE — TELEPHONE ENCOUNTER
Patient cancelled appointment on 8/12/20 with Dr. Moriah Wick for ct chest fu.    Reason: Pt's mother in law passed away    Patient did reschedule appointment. Appointment rescheduled for 8/20/20. Last OV 12/10/19    ASSESSMENT:  · LLL pulmonary nodule -stable. Indeterminate etiology.  No hypermetabolism on PET/CT. Malignancy is not completely excluded although is less likely now given the PET/CT results. · Emphysema - needs PFts  · LENTZ  · Tobacco abuse  · HTN - uncontrolled  · Supraclavicular palpable lymph nodes     PLAN:   · Schedule PFTs  · She was started on Spiriva  · We discussed smoking cessation for >3 minutes. We discussed the risks of continued use and the options for achieving long-standing cessation. She currently is in the contemplative phase of quitting and will continue to consider specific actions. · Pt instructed to f/u with PCP in the supraclavicular lymph nodes are palpable. May need surgery referral for biopsy. · 1 year Chest CT due 7/2020  · Pt instructed to f/u with PCP in the supraclavicular lymph nodes are palpable. May need surgery referral for biopsy.

## 2020-08-12 ENCOUNTER — TELEPHONE (OUTPATIENT)
Dept: FAMILY MEDICINE CLINIC | Age: 65
End: 2020-08-12

## 2020-08-12 NOTE — TELEPHONE ENCOUNTER
----- Message from Zhanna Julien sent at 8/12/2020  3:59 PM EDT -----  Subject: Results Request    QUESTIONS  Which lab or imaging result is the patient calling about? blood work  Which provider ordered the test? Krystal Bryan   At what location was the test performed? Date the test was performed? 2020-08-10  Additional Information for Provider?   ---------------------------------------------------------------------------  --------------  CALL BACK INFO  What is the best way for the office to contact you? OK to leave message on   voicemail  Preferred Call Back Phone Number?  [phone]

## 2020-08-13 NOTE — TELEPHONE ENCOUNTER
I called [facility] and finally was able to leave message to send results. I called patient and she states that she got a hold of them. They informed her that she was positive for [facility]. She does not have a fever or sore throat, she wants to know if she is contagious.

## 2020-08-13 NOTE — TELEPHONE ENCOUNTER
I have called the urgent care 3 times. All three times it told me to call back during business hours. There business hour our 8 am to 8 pm. I will attempt to call back.

## 2020-08-14 NOTE — TELEPHONE ENCOUNTER
Pt aware. She states she gets depressed and would like to have a cat for a  because she says she wont be as lonely and will feel better. In order to have a cat she has to have a letter for a support animal with depression listed. Please advise if appropriate.  She knows someone who will give her 2 kittens for free but she needs the letter first.

## 2020-08-19 ENCOUNTER — OFFICE VISIT (OUTPATIENT)
Dept: ORTHOPEDIC SURGERY | Age: 65
End: 2020-08-19
Payer: COMMERCIAL

## 2020-08-19 VITALS — HEIGHT: 62 IN | WEIGHT: 134 LBS | BODY MASS INDEX: 24.66 KG/M2

## 2020-08-19 PROBLEM — S93.105A: Status: ACTIVE | Noted: 2020-08-19

## 2020-08-19 PROBLEM — S92.323A FRACTURE OF 2ND METATARSAL: Status: ACTIVE | Noted: 2020-08-19

## 2020-08-19 PROCEDURE — 99243 OFF/OP CNSLTJ NEW/EST LOW 30: CPT | Performed by: ORTHOPAEDIC SURGERY

## 2020-08-19 PROCEDURE — G8420 CALC BMI NORM PARAMETERS: HCPCS | Performed by: ORTHOPAEDIC SURGERY

## 2020-08-19 PROCEDURE — G8427 DOCREV CUR MEDS BY ELIG CLIN: HCPCS | Performed by: ORTHOPAEDIC SURGERY

## 2020-08-19 PROCEDURE — L3260 AMBULATORY SURGICAL BOOT EAC: HCPCS | Performed by: ORTHOPAEDIC SURGERY

## 2020-08-19 NOTE — PROGRESS NOTES
Chief Complaint    New Patient (Left Foot Pain)      History of Present Illness:  Darcie Larry is a 63 y.o. female who I was asked to see in consultation by Dr. Phan Jason for evaluation of left foot pain. History of exactly when this started is a bit unclear but I think it is most likely from back in April when she had a seizure and had her forefoot caught under her  and fell. She had a long and prolonged hospital stay which included fasciotomies of her left lower extremity. She has had some foot pain since but this worsened after she stubbed her foot on a coffee table approximately 3 weeks ago. Symptoms are worse with weightbearing and better with rest.  She is tried various shoes and has been unable to improve this but no hard soled shoes or postop shoes. She does have some altered sensation to this left foot. Her left foot also swells intermittently after the fasciotomies. Medical History:  Patient's medications, allergies, past medical, surgical, social and family histories were reviewed and updated as appropriate. Review of Systems:  Relevant review of systems reviewed and available in the patient's chart. They are negative or noncontributory except as per HPI. Vital Signs: There were no vitals filed for this visit. General: well-developed, well-nourished  CV: RR  RESP: Respirations unlabored on RA  Skin: no rashes or ulcerations appreciated  Psych: appropriate mood and affect  Musculoskeletal:    Extremity: Left lower extremity    Inspection: Healed fasciotomy scars, swelling and deformity about the second third and fourth MTP joints.     Palpation: Tender to palpation about second third and fourth MTP joints    Range of Motion: Full ankle range of motion, unable to range second through fifth toes because of pain    Stability: Ankle stable    Strength: Not assessed secondary to toe discomfort    Special Tests: None    Gait: Antalgic    Pulse/perfusion: Foot warm and

## 2020-09-01 ENCOUNTER — TELEPHONE (OUTPATIENT)
Dept: PULMONOLOGY | Age: 65
End: 2020-09-01

## 2020-09-01 NOTE — TELEPHONE ENCOUNTER
Patient did not show for CT follow-up appointment  with  on 9/1/20    Called patient to check in for virtual visit and her phone # on file is not taking calls. Patient was also no show on: 11/14/19,12/12/18,9/18/18 and 9/6/18    LOV   ASSESSMENT:12/10/19  · LLL pulmonary nodule -stable. Indeterminate etiology.  No hypermetabolism on PET/CT. Malignancy is not completely excluded although is less likely now given the PET/CT results. · Emphysema - needs PFts  · LENTZ  · Tobacco abuse  · HTN - uncontrolled  · Supraclavicular palpable lymph nodes     PLAN:   · Schedule PFTs  · She was started on Spiriva  · We discussed smoking cessation for >3 minutes. We discussed the risks of continued use and the options for achieving long-standing cessation. She currently is in the contemplative phase of quitting and will continue to consider specific actions. · Pt instructed to f/u with PCP in the supraclavicular lymph nodes are palpable. May need surgery referral for biopsy. · 1 year Chest CT due 7/2020  · Pt instructed to f/u with PCP in the supraclavicular lymph nodes are palpable.   May need surgery referral for biopsy.

## 2020-09-10 ENCOUNTER — HOSPITAL ENCOUNTER (OUTPATIENT)
Dept: VASCULAR LAB | Age: 65
Discharge: HOME | End: 2020-09-10
Payer: COMMERCIAL

## 2020-09-10 PROCEDURE — 93926 LOWER EXTREMITY STUDY: CPT

## 2020-09-11 ENCOUNTER — HOSPITAL ENCOUNTER (OUTPATIENT)
Dept: CT IMAGING | Age: 65
Discharge: HOME | End: 2020-09-11
Payer: COMMERCIAL

## 2020-09-11 PROCEDURE — 71250 CT THORAX DX C-: CPT

## 2020-09-16 ENCOUNTER — TELEPHONE (OUTPATIENT)
Dept: PULMONOLOGY | Age: 65
End: 2020-09-16

## 2020-09-16 ENCOUNTER — VIRTUAL VISIT (OUTPATIENT)
Dept: PULMONOLOGY | Age: 65
End: 2020-09-16
Payer: COMMERCIAL

## 2020-09-16 PROCEDURE — 99443 PR PHYS/QHP TELEPHONE EVALUATION 21-30 MIN: CPT | Performed by: INTERNAL MEDICINE

## 2020-09-16 NOTE — PROGRESS NOTES
Ulysses Jacobsen is a 63 y.o. female evaluated via telephone on 9/16/2020. Consent:  She and/or health care decision maker is aware that that she may receive a bill for this telephone service, depending on her insurance coverage, and has provided verbal consent to proceed: Yes      Documentation:  I communicated with the patient and/or health care decision maker about a lung nodule. Details of this discussion including any medical advice provided:     8/30/18 Chest CT:  Spiculated 1.3 x 0.8 cm nodule within the left lower lobe.  Percutaneous   biopsy is recommended.       Additional less than 6 mm nodules are noted.  Management will depend upon   management of larger lesion.      9/11/18 PET CT:   Left lower lobe pulmonary nodule seen on recent CT scan is not significantly   hypermetabolic.  Recommend continued CT follow-up given that it is a new   finding and there is underlying emphysema.       7/30/19 Chest CT  Small supraclavicular nodes are seen bilaterally.  Index node on the right   measures 1.1 cm.  Appearance is similar. No embolus is seen in the central, right, or left main pulmonary artery.  No   embolus is seen on the right or left, at least to the subsegmental level       There is underlying emphysema, mild in degree.  De pendant opacity is seen at   the lung bases, likely atelectasis.       Irregular nodule is seen in the left lower lobe, measuring 1.5 cm x 9 mm,   similar prior study when remeasured.        9/11/20 Chest CT  Mediastinum: There has been interval increase in size of mediastinal lymph    nodes particularly in the pretracheal/AP window region.  Possible developing    lymphadenopathy in the subcarinal region.  There are calcified left hilar    lymph nodes consistent with changes of old granulomatous disease. Riagh Lars is    increasing bilateral axillary lymphadenopathy.  There are atherosclerotic    changes of the aorta with no evidence of aneurysm.  No evidence of    significant pericardial effusion.  Faint coronary artery calcification is    identified.  No focal abnormality of the thyroid gland is identified. Esophagus is unremarkable in appearance.         Lungs/pleura: There has been interval increase in size of the pulmonary    nodule with ill-defined/spiculated margin in the superior segment of the left    lower lobe (image 38).  Finding measures 2.0 x 1.3 cm in transverse diameter    on the current study.  Finding measured 1.6 x 1.2 cm on the study of    02/27/2020. Constancia Border is a new 4 mm nodule within the anterior aspect of the    left lower lobe adjacent to the major fissure (image 49).  Subtle    ground-glass density within upper lobes adjacent to the fissures may    represent minimal dependent atelectasis.  Other linear/bandlike densities    within the lungs may represent minimal subsegmental atelectasis as well. There is minimal biapical fibrotic change.  There are mild emphysematous    changes of the lungs. Constancia Border is suggestion of presence of bronchomalacia with    narrowing of portions of the trachea and mainstem bronchi as compared to the    study of 02/27/2020.  There is peribronchial cuffing/thickening with    suggestion of focal areas of mucous plugging. ASSESSMENT:  · LLL pulmonary nodule: had been stable with a low risk PET CT in the past, but now a repeat CT shows interval enlargement. · Emphysema - has not kept appts for PFts  · Tobacco abuse  · HTN - uncontrolled  · Supraclavicular palpable lymph nodes - not examined today     PLAN:   · PET CT  · Smoking cessation  · Pt instructed to f/u with PCP in the supraclavicular lymph nodes are palpable. May need surgery referral for biopsy.   · 1 year Chest CT due 7/2020  · Pt instructed to f/u with PCP in the past regarding palpable supraclavicular lymph nodes  ·   I affirm this is a Patient Initiated Episode with a Patient who has not had a related appointment within my department in the past 7 days or scheduled within the next 24 hours.     Patient identification was verified at the start of the visit: Yes    Total Time: minutes: 21-30 minutes    Note: not billable if this call serves to triage the patient into an appointment for the relevant concern      SUE AGUILERA

## 2020-09-16 NOTE — TELEPHONE ENCOUNTER
Pt is scheduled for a PET CT at [facility] location on September 25 2020 with a 6:30 AM arrival. Patient also has a follow up televisit with Dr. Tony Timmons 9/29/20 at 1:30 on Pt needs to be made aware of scheduled appts.

## 2020-09-16 NOTE — TELEPHONE ENCOUNTER
Pt was informed of scheduled PET CT and follow up with Dr. Dave Bowman. Went over PET prep with patient.

## 2020-09-17 ENCOUNTER — TELEPHONE (OUTPATIENT)
Dept: FAMILY MEDICINE CLINIC | Age: 65
End: 2020-09-17

## 2020-09-17 NOTE — TELEPHONE ENCOUNTER
We've discussed this in the past that our office does not prescribe pain medications long term. If the pain is from surgery in the future, the pain is usually managed by the surgeon following surgery. If she wants a referral to pain management, let me know and I will place one to Dr. Aleksandr Parks. Thank you.

## 2020-09-17 NOTE — TELEPHONE ENCOUNTER
----- Message from [id number] [employer] sent at 9/17/2020 10:11 AM EDT -----  Subject: Message to Provider    QUESTIONS  Information for Provider? Pt called stating she is scheduled for foot   surgery in the upcoming weeks and wants to know if doc can prescribe some   pain meds. 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 Please contact Pt.   ---------------------------------------------------------------------------  --------------  CALL BACK INFO  What is the best way for the office to contact you? OK to leave message on   voicemail  Preferred Call Back Phone Number? [phone]  ---------------------------------------------------------------------------  --------------  SCRIPT ANSWERS  Relationship to Patient?  Self

## 2020-09-18 RX ORDER — ERGOCALCIFEROL 1.25 MG/1
50000 CAPSULE ORAL WEEKLY
Qty: 12 CAPSULE | Refills: 0 | Status: ON HOLD | OUTPATIENT
Start: 2020-09-18 | End: 2020-10-01 | Stop reason: HOSPADM

## 2020-09-18 NOTE — TELEPHONE ENCOUNTER
Refill Request     Last Seen: 5/7/2020    Last Written: 4/13/2020    Next Appointment:   Future Appointments   Date Time Provider Elia Lennoni   9/23/2020  1:30 PM MD KATELIN Hansen   9/29/2020  1:30 PM MD ANJUM Rios           Requested Prescriptions     Pending Prescriptions Disp Refills    vitamin D (ERGOCALCIFEROL) 1.25 MG (43608 UT) CAPS capsule [Pharmacy Med Name: VIT D (ERGOCALCIFEROL) 50,000 IU CAPS] 12 capsule 0     Sig: TAKE 1 CAPSULE BY MOUTH ONCE A WEEK FOR 12 DOSES

## 2020-09-23 ENCOUNTER — OFFICE VISIT (OUTPATIENT)
Dept: ORTHOPEDIC SURGERY | Age: 65
End: 2020-09-23
Payer: COMMERCIAL

## 2020-09-23 ENCOUNTER — TELEPHONE (OUTPATIENT)
Dept: FAMILY MEDICINE CLINIC | Age: 65
End: 2020-09-23

## 2020-09-23 VITALS — HEIGHT: 62 IN | WEIGHT: 134 LBS | BODY MASS INDEX: 24.66 KG/M2

## 2020-09-23 PROCEDURE — 4004F PT TOBACCO SCREEN RCVD TLK: CPT | Performed by: ORTHOPAEDIC SURGERY

## 2020-09-23 PROCEDURE — 3017F COLORECTAL CA SCREEN DOC REV: CPT | Performed by: ORTHOPAEDIC SURGERY

## 2020-09-23 PROCEDURE — G8420 CALC BMI NORM PARAMETERS: HCPCS | Performed by: ORTHOPAEDIC SURGERY

## 2020-09-23 PROCEDURE — 99214 OFFICE O/P EST MOD 30 MIN: CPT | Performed by: ORTHOPAEDIC SURGERY

## 2020-09-23 PROCEDURE — G8428 CUR MEDS NOT DOCUMENT: HCPCS | Performed by: ORTHOPAEDIC SURGERY

## 2020-09-23 RX ORDER — TRAMADOL HYDROCHLORIDE 50 MG/1
50 TABLET ORAL EVERY 6 HOURS PRN
Qty: 28 TABLET | Refills: 0 | Status: SHIPPED | OUTPATIENT
Start: 2020-09-23 | End: 2020-09-30

## 2020-09-23 NOTE — PROGRESS NOTES
Positive Silverskiold test    Skin: Intact    Gait: Antalgic    Neuro    Globally altered consistent with her prior examination likely due to complications from her significant lower extremity trauma and fasciotomies. Radiology:     X-rays obtained and reviewed in office:  No new radiographs obtained today          Assessment : 29-year-old female with gastrocnemius contracture and chronic dislocations that are very painful the second third and fourth metatarsals. Also on today's exam she relates to me and I examined and found that there was significant and according the patient rather acute onset painless lymphadenopathy in conjunction with a lung nodule that is apparently enlarging. Impression:  Encounter Diagnoses   Name Primary?  Dislocation of fourth toe, left, closed, initial encounter Yes    Closed nondisplaced fracture of second metatarsal bone of left foot with routine healing, subsequent encounter        Office Procedures:  No orders of the defined types were placed in this encounter. Treatment Plan: I had a nice discussion with the patient today. I also actually called her primary care physician to discuss this case with her personally. I am quite concerned given the growing lung nodule as well as the new at least as far as I can tell lymphadenopathy that the patient is relying to me. To that end her primary care doctor has graciously agreed to see her tomorrow on an urgent basis. I emphasized the patient that it is extremely important that she go to that visit. Of note she is also scheduled for a PET scan on Friday. All the findings of the lymphadenopathy in the lung nodule are certainly very concerning it does not really change the fact that she is totally miserable from her left foot.   Despite what is going on she would still like to try to get the left foot fix I am agreed to at least putting her on my surgery schedule assuming that from a medical standpoint everything is evaluated and she is an appropriate candidate for. From my standpoint the ABIs being normal mean that I do not think that she is at an unreasonably increased risk of wound healing complication. Risk-benefit and alternatives to gastrocnemius recession on this left side as well as metatarsal head resections of second third fourth and fifth metatarsals were discussed with the patient and she would like to proceed with this if possible. Risks of surgery discussed the patient delineated below. I will continue collaborate with Dr. Brie Mcintosh to make sure that we are able to do this safely in the near future given the severity the patient symptoms. I have evaluated the patient myself and completed the examination of the patient on date of visit. Have discussed the case and reviewed all pertinent data with the patient. Risks of surgery include but are not limited to the possibility of; Infection  Wound healing problems  Nerve Tendon or Vessel Damage  Incomplete pain relief  Need for further surgery in the future  Reflex Sympathetic Dystrophy  DVT or PE (blood clots)  Amputation   Death  No guarantees were given or implied     Greater than 25 minutes was spent in the coordination of care and direct face-to-face treatment of this complex patient.

## 2020-09-23 NOTE — TELEPHONE ENCOUNTER
I called [facility] to inform them of the urgency of the appointment. I spoke with Rai at [place] and she states that they need a 2 day notice for urgent appointments and there is no way they can provider her transportation. Tired to call Zahida Eric, no answer and no voicemail to leave message. Can fit her in on Monday at 12 pm, if she cannot get transportation tomorrow per Dr. Kelsey Mckeon.

## 2020-09-23 NOTE — TELEPHONE ENCOUNTER
----- Message from Timo Pierce sent at 9/23/2020  4:26 PM EDT -----  Subject: Message to Provider    QUESTIONS  Information for Provider? Please contact this patient's insurance and let   them know that she will need urgent transportation for an appt that was   scheduled by her PCP   Dr. Kelsey Mckeon for tomorrow at 12 pm. The insurance's number is   [id number] and the patient's ID is [id number]   ---------------------------------------------------------------------------  --------------  CALL BACK INFO  What is the best way for the office to contact you? OK to leave message on   voicemail  Preferred Call Back Phone Number? [phone]  ---------------------------------------------------------------------------  --------------  SCRIPT ANSWERS  Relationship to Patient?  Self

## 2020-09-23 NOTE — PROGRESS NOTES
Palmadi Santhosh    Age 63 y.o.    female    1955    N [id number]    10/1/2020  Arrival Time_____________  OR Time____________[address]     Procedure(s):  LEFT FOOT SECOND, THIRD, FOURTH AND FIFTH  METATARSAL HEAD RESECTION -BLOCK-  LEFT GASTROCNEMIUS RECESSION                      Monitor Anesthesia Care    Surgeon(s):  Tessa Garcia MD       Phone [phone] ([place])     [place] [address]  Cell Work  _________________________________________________________________  _________________________________________________________________  _________________________________________________________________  _________________________________________________________________  _________________________________________________________________      PCP _____________________________ Phone_________________       H&P__________________Bringing    Chart            Epic  DOS     Called_______  EKG__________________Bringing    Chart            Epic  DOS     Called_______  LAB__________________ Bringing    Chart            Epic  DOS     Called_______  Cardiac Clearance_______Bringing    Chart            Epic      DOS       Called_______    Cardiologist________________________ Phone___________________________      ? Protestant concerns / Waiver on Chart            PAT Communications_____________  ? Pre-op Instructions Given [place]          ______________________________  ? Directions to Surgery Center                          ______________________________  ? Transportation Home_______________      _______________________________  ?  Crutches/Walker__________________        _______________________________      ________Pre-op Orders   _______Transcribed    _______Wt.  ________Pharmacy          _______SCD  ______VTE     ______[employer]ocker  ________Consent             ________TED Illa Pontiff

## 2020-09-25 ENCOUNTER — HOSPITAL ENCOUNTER (OUTPATIENT)
Dept: PET IMAGING | Age: 65
Discharge: HOME | End: 2020-09-25
Payer: COMMERCIAL

## 2020-09-25 VITALS — HEIGHT: 63 IN | BODY MASS INDEX: 23.92 KG/M2 | WEIGHT: 135 LBS

## 2020-09-25 PROCEDURE — 3430000000 HC RX DIAGNOSTIC RADIOPHARMACEUTICAL: Performed by: INTERNAL MEDICINE

## 2020-09-25 PROCEDURE — A9552 F18 FDG: HCPCS | Performed by: INTERNAL MEDICINE

## 2020-09-25 PROCEDURE — 78815 PET IMAGE W/CT SKULL-THIGH: CPT

## 2020-09-25 RX ORDER — LISINOPRIL 40 MG/1
40 TABLET ORAL DAILY
COMMUNITY
End: 2020-12-21

## 2020-09-25 RX ORDER — FLUDEOXYGLUCOSE F 18 200 MCI/ML
13.33 INJECTION, SOLUTION INTRAVENOUS
Status: COMPLETED | OUTPATIENT
Start: 2020-09-25 | End: 2020-09-25

## 2020-09-25 RX ORDER — GABAPENTIN 400 MG/1
400 CAPSULE ORAL 3 TIMES DAILY
COMMUNITY
End: 2020-12-21 | Stop reason: SDUPTHER

## 2020-09-25 RX ADMIN — FLUDEOXYGLUCOSE F 18 13.33 MILLICURIE: 200 INJECTION, SOLUTION INTRAVENOUS at 07:00

## 2020-09-25 NOTE — PROGRESS NOTES
Obstructive Sleep Apnea (BAYLEE) Screening     Patient:  Tarik Darnell    YOB: 1955      Medical Record #:  [id number]                     Date:  9/25/2020     1. Are you a loud and/or regular snorer? [x]  Yes       [] No    2. Have you been observed to gasp or stop breathing during sleep? [x]  Yes       [] No    3. Do you feel tired or groggy upon awakening or do you awaken with a headache? [x]  Yes       [] No    4. Are you often tired or fatigued during the wake time hours? [x]  Yes       [] No    5. Do you fall asleep sitting, reading, watching TV or driving? [x]  Yes       [] No    6. Do you often have problems with memory or concentration? []  Yes       [x] No    **If patient's score is ? 3 they are considered high risk for BAYLEE. An Anesthesia provider will evaluate the patient and develop a plan of care the day of surgery. Note:  If the patient's BMI is more than 35 kg m¯² , has neck circumference > 40 cm, and/or high blood pressure the risk is greater (© American Sleep Apnea Association, 2006).

## 2020-09-29 ENCOUNTER — VIRTUAL VISIT (OUTPATIENT)
Dept: PULMONOLOGY | Age: 65
End: 2020-09-29
Payer: COMMERCIAL

## 2020-09-29 PROCEDURE — 99442 PR PHYS/QHP TELEPHONE EVALUATION 11-20 MIN: CPT | Performed by: INTERNAL MEDICINE

## 2020-09-29 NOTE — PROGRESS NOTES
Sumit Suazo is a 63 y.o. female evaluated via telephone on 9/29/2020. Consent:  She and/or health care decision maker is aware that that she may receive a bill for this telephone service, depending on her insurance coverage, and has provided verbal consent to proceed: Yes      Documentation:  I communicated with the patient and/or health care decision maker about a lung nodule. Details of this discussion including any medical advice provided:     8/30/18 Chest CT:  Spiculated 1.3 x 0.8 cm nodule within the left lower lobe.  Percutaneous   biopsy is recommended.       Additional less than 6 mm nodules are noted.  Management will depend upon   management of larger lesion.      9/11/18 PET CT:   Left lower lobe pulmonary nodule seen on recent CT scan is not significantly   hypermetabolic.  Recommend continued CT follow-up given that it is a new   finding and there is underlying emphysema.       7/30/19 Chest CT  Small supraclavicular nodes are seen bilaterally.  Index node on the right   measures 1.1 cm.  Appearance is similar. No embolus is seen in the central, right, or left main pulmonary artery.  No   embolus is seen on the right or left, at least to the subsegmental level       There is underlying emphysema, mild in degree.  De pendant opacity is seen at   the lung bases, likely atelectasis.       Irregular nodule is seen in the left lower lobe, measuring 1.5 cm x 9 mm,   similar prior study when remeasured.        9/11/20 Chest CT  Mediastinum: There has been interval increase in size of mediastinal lymph    nodes particularly in the pretracheal/AP window region.  Possible developing    lymphadenopathy in the subcarinal region.  There are calcified left hilar    lymph nodes consistent with changes of old granulomatous disease. Charlcie [place] is    increasing bilateral axillary lymphadenopathy.  There are atherosclerotic    changes of the aorta with no evidence of aneurysm.  No evidence of    significant pericardial effusion.  Faint coronary artery calcification is    identified.  No focal abnormality of the thyroid gland is identified. Esophagus is unremarkable in appearance.         Lungs/pleura: There has been interval increase in size of the pulmonary    nodule with ill-defined/spiculated margin in the superior segment of the left    lower lobe (image 38).  Finding measures 2.0 x 1.3 cm in transverse diameter    on the current study.  Finding measured 1.6 x 1.2 cm on the study of    02/27/2020. Brena Sovereign is a new 4 mm nodule within the anterior aspect of the    left lower lobe adjacent to the major fissure (image 49).  Subtle    ground-glass density within upper lobes adjacent to the fissures may    represent minimal dependent atelectasis.  Other linear/bandlike densities    within the lungs may represent minimal subsegmental atelectasis as well. There is minimal biapical fibrotic change.  There are mild emphysematous    changes of the lungs. Brena Sovereign is suggestion of presence of bronchomalacia with    narrowing of portions of the trachea and mainstem bronchi as compared to the    study of 02/27/2020.  There is peribronchial cuffing/thickening with    suggestion of focal areas of mucous plugging.       9/25/20 PET CT:   HEAD/NECK: Multiple bilateral metabolically active cervical lymph nodes with    a representative right level 5 lymph node measuring 1.8 cm with SUV max of    2.3.         CHEST: Focal FDG activity localizes to the nodule in the superior segment of    the left lower lobe, SUV max of 2.7.  This has progressed since the PET-CT    scan 09/11/2018.         Increased FDG activity corresponds to bilateral enlarged axillary lymph nodes    with representative lymph nodes as follows:         Left axillary lymph node measures 2.9 x 2.3 cm with SUV max of 2.3.         Right axillary lymph node measures 3.3 x 1.8 cm, SUV max of 2.2.         No metabolically active mediastinal lymphadenopathy.      ABDOMEN/PELVIS: No metabolically active intraperitoneal mass.  Increased    metabolic activity associated with bilateral inguinal lymph nodes. Representative right inguinal lymph node measures 1.7 cm in short axis    dimension with SUV max of 1.1.  Physiologic activity in the gastrointestinal    and genitourinary systems.         BONES/SOFT TISSUE: No abnormal FDG activity localizes to the bones.  No    aggressive osseous lesion.         INCIDENTAL CT FINDINGS: Emphysema.  Mild atherosclerotic calcifications.  The    gallbladder is not visualized.  Colonic diverticulosis without evidence of    acute diverticulitis.              Impression    1.  Multiple bilateral enlarged cervical, axillary, and inguinal lymph nodes    with mild associated metabolic activity.  These have developed since the    prior PET-CT scan and could represent benign and malignant etiologies    including metastatic disease or lymphoma.         2.  Mild activity associated with the enlarging nodule in the superior    segment of the left lower lobe.  This could represent primary lung cancer or    be related to the lymphadenopathy. ASSESSMENT:  · LLL pulmonary nodule: had been stable with a low risk PET CT in the past, but now a repeat CT shows interval enlargement and it is mildly hypermetabolic and suspicous for malignancy  · No mediastinal lymphadenopathy  · Emphysema - has not kept appts for PFts  · Tobacco abuse  · Supraclavicular palpable lymph nodes and enlarged axillary and inguinal LN on PET CT: possibly a separate process unrelated the LLL lung nodule     PLAN:   · Refer to oncology for diffuse LAD with mild hypermetabolism on PET CT  · Smoking cessation  · IR consult for LLL nodule biopsy    I affirm this is a Patient Initiated Episode with a Patient who has not had a related appointment within my department in the past 7 days or scheduled within the next 24 hours.     Patient identification was verified at the start of the visit: Yes    Total Time: 15 min    Note: not billable if this call serves to triage the patient into an appointment for the relevant concern      SUE AGUILERA

## 2020-09-29 NOTE — TELEPHONE ENCOUNTER
Appt with Dr Gorman Ganser at [facility] 10/9/20 @ 10am arrival 9:45am.  Waiting for Ramya Louis to call back for ok for Ct bio.

## 2020-09-29 NOTE — TELEPHONE ENCOUNTER
Per Wilber Anthony approved for Ct guided lung bio. Ct bio vazquez 10/5/20 @ 8:30am arrive 7am [facility]. NPO needs  no blood thinners 5 days prior. Covid test.     Patient aware of appts dates and times. To go get covid test tomorrow. Watch for results.

## 2020-09-30 ENCOUNTER — TELEPHONE (OUTPATIENT)
Dept: ORTHOPEDIC SURGERY | Age: 65
End: 2020-09-30

## 2020-09-30 ENCOUNTER — ANESTHESIA EVENT (OUTPATIENT)
Dept: OPERATING ROOM | Age: 65
End: 2020-09-30
Payer: COMMERCIAL

## 2020-09-30 NOTE — ANESTHESIA PRE PROCEDURE
Department of Anesthesiology  Preprocedure Note       Name:  Kylah Pagan   Age:  63 y.o.  :  1955                                          MRN:  [id number]         Date:  2020      Surgeon: Shelly Pardo):  Kelley Humphrey MD    Procedure: Procedure(s):  LEFT FOOT SECOND, THIRD, FOURTH AND FIFTH  METATARSAL HEAD RESECTION -BLOCK- -BAYLEE=5  LEFT GASTROCNEMIUS RECESSION    Medications prior to admission:   Prior to Admission medications    Medication Sig Start Date End Date Taking? Authorizing Provider   gabapentin (NEURONTIN) 400 MG capsule Take 400 mg by mouth 3 times daily. Yes Historical Provider, MD   lisinopril (PRINIVIL;ZESTRIL) 40 MG tablet Take 40 mg by mouth daily   Yes Historical Provider, MD   traMADol (ULTRAM) 50 MG tablet Take 1 tablet by mouth every 6 hours as needed for Pain for up to 7 days. Intended supply: 7 days.  Take lowest dose possible to manage pain 20  Kelley Humphrey MD   vitamin D (ERGOCALCIFEROL) 1.25 MG (59797 UT) CAPS capsule TAKE 1 CAPSULE BY MOUTH ONCE A WEEK FOR 12 DOSES 20  Venancio Bobo MD   famotidine (PEPCID) 20 MG tablet TAKE 1 TABLET BY MOUTH TWICE DAILY  Patient taking differently: as needed  8/10/20   Venancio Bobo MD   tiZANidine (ZANAFLEX) 2 MG tablet TAKE 1 TABLET BY MOUTH 2 TIMES DAILY AS NEEDED (MUSCLE SPASM AND PAIN) 8/10/20   Venancio Bobo MD   albuterol sulfate  (90 Base) MCG/ACT inhaler INHALE 2 PUFFS INTO THE LUNGS EVERY 6 HOURS AS NEEDED FOR WHEEZING 8/10/20   Venancio Bobo MD   fluticasone (FLONASE) 50 MCG/ACT nasal spray USE 2 SPRAYS IN EACH NOSTRIL DAILY  Patient taking differently: as needed  7/10/20   Venancio Bobo MD   atorvastatin (LIPITOR) 80 MG tablet TAKE 1 TABLET BY MOUTH NIGHTLY 7/10/20   Venancio Bobo MD   DULoxetine (CYMBALTA) 60 MG extended release capsule TAKE 1 CAPSULE BY MOUTH DAILY 7/10/20   Venancio Bobo MD   traZODone (DESYREL) 50 MG tablet TAKE 1 TABLET BY MOUTH NIGHTLY 7/10/20 Sanjay Matamoros MD   omeprazole (PRILOSEC) 20 MG delayed release capsule Take 1 capsule by mouth every morning (before breakfast)  Patient taking differently: Take 20 mg by mouth as needed  5/21/20   Cathi Kenny MD   cetirizine (ZYRTEC) 10 MG tablet TAKE 1 TABLET BY MOUTH DAILY 4/13/20   Sanjay Matamoros MD   SPIRIVA HANDIHALER 18 MCG inhalation capsule INHALE 1 CAPSULE INTO THE LUNGS DAILY 4/13/20   Sanjay Matamoros MD   nicotine (NICODERM CQ) 7 MG/24HR PLACE 1 PATCH ONTO THE SKIN DAILY (ROTATE SITES) 12/4/19   Sanjay Matamoros MD       Current medications:    No current facility-administered medications for this encounter. Current Outpatient Medications   Medication Sig Dispense Refill    gabapentin (NEURONTIN) 400 MG capsule Take 400 mg by mouth 3 times daily.  lisinopril (PRINIVIL;ZESTRIL) 40 MG tablet Take 40 mg by mouth daily      traMADol (ULTRAM) 50 MG tablet Take 1 tablet by mouth every 6 hours as needed for Pain for up to 7 days. Intended supply: 7 days.  Take lowest dose possible to manage pain 28 tablet 0    vitamin D (ERGOCALCIFEROL) 1.25 MG (69450 UT) CAPS capsule TAKE 1 CAPSULE BY MOUTH ONCE A WEEK FOR 12 DOSES 12 capsule 0    famotidine (PEPCID) 20 MG tablet TAKE 1 TABLET BY MOUTH TWICE DAILY (Patient taking differently: as needed ) 60 tablet 2    tiZANidine (ZANAFLEX) 2 MG tablet TAKE 1 TABLET BY MOUTH 2 TIMES DAILY AS NEEDED (MUSCLE SPASM AND PAIN) 30 tablet 2    albuterol sulfate  (90 Base) MCG/ACT inhaler INHALE 2 PUFFS INTO THE LUNGS EVERY 6 HOURS AS NEEDED FOR WHEEZING 8.5 g 2    fluticasone (FLONASE) 50 MCG/ACT nasal spray USE 2 SPRAYS IN EACH NOSTRIL DAILY (Patient taking differently: as needed ) 16 g 2    atorvastatin (LIPITOR) 80 MG tablet TAKE 1 TABLET BY MOUTH NIGHTLY 90 tablet 1    DULoxetine (CYMBALTA) 60 MG extended release capsule TAKE 1 CAPSULE BY MOUTH DAILY 90 capsule 1    traZODone (DESYREL) 50 MG tablet TAKE 1 TABLET BY MOUTH NIGHTLY 90 tablet 1    omeprazole (PRILOSEC) 20 MG delayed release capsule Take 1 capsule by mouth every morning (before breakfast) (Patient taking differently: Take 20 mg by mouth as needed ) 60 capsule 1    cetirizine (ZYRTEC) 10 MG tablet TAKE 1 TABLET BY MOUTH DAILY 90 tablet 1    SPIRIVA HANDIHALER 18 MCG inhalation capsule INHALE 1 CAPSULE INTO THE LUNGS DAILY 90 capsule 1    nicotine (NICODERM CQ) 7 MG/24HR PLACE 1 PATCH ONTO THE SKIN DAILY (ROTATE SITES) 14 patch 0       Allergies:     Allergies   Allergen Reactions    Lorazepam      Confusion, hallucinations  Can take lorazepam but not Ativan    Codeine Hives     Tolerates Norco.       Problem List:    Patient Active Problem List   Diagnosis Code    Essential hypertension I10    Chest pain R07.9    Multiple falls R29.6    Recurrent pain of right knee M25.561    Sternal mass M89.8X8    Current smoker F17.200    LAD (lymphadenopathy), inguinal R59.0    Chronic pain syndrome G89.4    Moderate single current episode of major depressive disorder (HCC) F32.1    Chronic bilateral low back pain without sciatica M54.5, G89.29    Prediabetes R73.03    Vitamin D deficiency E55.9    History of fasciotomy Z98.890    History of DVT in adulthood Z80.65    Personal history of TIA (transient ischemic attack) Z86.73    Deep vein thrombosis (DVT) of left lower extremity ([facility]) I82.402    Neuropathy G62.9    Lung nodule R91.1    Bone marrow edema D75.89    Centrilobular emphysema ([facility]) J43.2    HLD (hyperlipidemia) E78.5    Hypertensive urgency I16.0    Oropharyngeal dysphagia R13.12    Muscle cramps R25.2    Pulmonary lesion of left side of chest J98.4    JASPREET (acute kidney injury) ([facility]) N17.9    Abnormal liver enzymes R74.8    Epigastric pain R10.13    Right upper quadrant abdominal pain R10.11    Perforated gallbladder K82.2    Anemia D64.9    Cholecystitis K81.9    Dizziness R42    Diarrhea of presumed infectious origin R19.7    Fracture of 2nd metatarsal S92.323A    Dislocation of fourth toe, left, closed, initial encounter S93.105A       Past Medical History:        Diagnosis Date    JASPREET (acute kidney injury) ([facility] [address] 2020    Arterial occlusion     Asthma     Centrilobular emphysema ([facility] [address] 2019    pt denies    Chronic bilateral low back pain with bilateral sciatica 8/3/2018    Chronic bilateral low back pain with bilateral sciatica     Hx of blood clots     DVT    Hyperlipidemia     Hypertension     Left lower lobe pulmonary nodule     MDRO (multiple drug resistant organisms) resistance 2020    Escherichia coli-URINE    Metabolic encephalopathy     Moderate single current episode of major depressive disorder ([facility] [address] 8/3/2018    Ruptured disk        Past Surgical History:        Procedure Laterality Date     SECTION  1981    CHOLECYSTECTOMY N/A 2020    ROBOTIC     CHOLECYSTECTOMY, LAPAROSCOPIC N/A 2020    ROBOTIC CHOLECYSTECTOMY performed by Medardo Gleason MD at [place]  2017    fasciotomy due to DVT, LLE       Social History:    Social History     Tobacco Use    Smoking status: Current Every Day Smoker     Packs/day: 0.50     Years: 20.00     Pack years: 10.00     Types: Cigarettes    Smokeless tobacco: Never Used    Tobacco comment: 6 cigarettes per day   Substance Use Topics    Alcohol use:  No                                Ready to quit: Not Answered  Counseling given: Not Answered  Comment: 6 cigarettes per day      Vital Signs (Current):   Vitals:    20 1043   Weight: 135 lb (61.2 kg)   Height: 5' 1\" (1.549 m)                                              BP Readings from Last 3 Encounters:   20 137/87   20 (!) 169/88   20 (!) 181/91       NPO Status:                                                                                 BMI:   Wt Readings from Last 3 Encounters:   20 135 lb (61.2 kg)   20 134 lb (60.8 kg)   20 134 lb (60.8 kg)     Body mass index is 25.51 kg/m². CBC:   Lab Results   Component Value Date    WBC 9.0 07/15/2020    RBC 3.72 07/15/2020    HGB 11.2 07/15/2020    HCT 34.1 07/15/2020    MCV 91.5 07/15/2020    RDW 16.1 07/15/2020     07/15/2020       CMP:   Lab Results   Component Value Date     07/16/2020    K 4.0 07/16/2020    K 3.6 07/15/2020     07/16/2020    CO2 24 07/16/2020    BUN 6 07/16/2020    CREATININE 0.7 07/16/2020    GFRAA >60 07/16/2020    GFRAA >60 05/27/2012    AGRATIO 1.1 07/15/2020    LABGLOM >60 07/16/2020    GLUCOSE 98 07/16/2020    PROT 5.8 07/15/2020    PROT 8.3 05/27/2012    CALCIUM 8.2 07/16/2020    BILITOT <0.2 07/15/2020    ALKPHOS 74 07/15/2020    AST 10 07/15/2020    ALT <5 07/15/2020       POC Tests: No results for input(s): POCGLU, POCNA, POCK, POCCL, POCBUN, POCHEMO, POCHCT in the last 72 hours.     Coags:   Lab Results   Component Value Date    PROTIME 12.2 06/16/2020    INR 1.05 06/16/2020    APTT 30.4 04/24/2020       HCG (If Applicable): No results found for: PREGTESTUR, PREGSERUM, HCG, HCGQUANT     ABGs: No results found for: PHART, PO2ART, AGE0FSI, EUP9FGH, BEART, A4UVPVAM     Type & Screen (If Applicable):  No results found for: LABABO, LABRH    Drug/Infectious Status (If Applicable):  No results found for: HIV, HEPCAB    COVID-19 Screening (If Applicable):   Lab Results   Component Value Date    COVID19 Not Detected 07/16/2020    COVID19 Not Detected 07/13/2020         Anesthesia Evaluation  Patient summary reviewed and Nursing notes reviewed no history of anesthetic complications:   Airway: Mallampati: II  TM distance: >3 FB   Neck ROM: full  Mouth opening: > = 3 FB Dental: normal exam         Pulmonary:   (+) COPD:  asthma:                            Cardiovascular:    (+) hypertension:,                   Neuro/Psych:   (+) neuromuscular disease:, psychiatric history:            GI/Hepatic/Renal: Neg GI/Hepatic/Renal ROS       (-) GERD, liver disease and no renal disease       Endo/Other: Negative Endo/Other ROS       (-) diabetes mellitus               Abdominal:           Vascular:   + DVT, . Anesthesia Plan      general and regional     ASA 3     (I discussed with the patient the risks and benefits of PIV, general anesthesia, IV Narcotics, PACU. All questions were answered the patient agrees with the plan. Sciatic nerve block for post op pain.)  Induction: intravenous. MIPS: Prophylactic antiemetics administered. Anesthetic plan and risks discussed with patient. Plan discussed with CRNA.                   Ifeanyi Chaudhry MD   9/30/2020

## 2020-10-01 ENCOUNTER — ANESTHESIA (OUTPATIENT)
Dept: OPERATING ROOM | Age: 65
End: 2020-10-01
Payer: COMMERCIAL

## 2020-10-01 ENCOUNTER — HOSPITAL ENCOUNTER (OUTPATIENT)
Age: 65
Setting detail: OUTPATIENT SURGERY
Discharge: HOME | End: 2020-10-01
Attending: ORTHOPAEDIC SURGERY | Admitting: ORTHOPAEDIC SURGERY
Payer: COMMERCIAL

## 2020-10-01 VITALS
HEIGHT: 61 IN | DIASTOLIC BLOOD PRESSURE: 81 MMHG | BODY MASS INDEX: 25.49 KG/M2 | HEART RATE: 83 BPM | WEIGHT: 135 LBS | RESPIRATION RATE: 17 BRPM | OXYGEN SATURATION: 97 % | SYSTOLIC BLOOD PRESSURE: 161 MMHG | TEMPERATURE: 96.8 F

## 2020-10-01 VITALS
RESPIRATION RATE: 16 BRPM | SYSTOLIC BLOOD PRESSURE: 107 MMHG | OXYGEN SATURATION: 100 % | DIASTOLIC BLOOD PRESSURE: 70 MMHG

## 2020-10-01 DIAGNOSIS — R91.1 LUNG NODULE: Primary | ICD-10-CM

## 2020-10-01 PROCEDURE — 64445 NJX AA&/STRD SCIATIC NRV IMG: CPT | Performed by: ANESTHESIOLOGY

## 2020-10-01 PROCEDURE — 3700000000 HC ANESTHESIA ATTENDED CARE: Performed by: ORTHOPAEDIC SURGERY

## 2020-10-01 PROCEDURE — 3700000001 HC ADD 15 MINUTES (ANESTHESIA): Performed by: ORTHOPAEDIC SURGERY

## 2020-10-01 PROCEDURE — 7100000011 HC PHASE II RECOVERY - ADDTL 15 MIN: Performed by: ORTHOPAEDIC SURGERY

## 2020-10-01 PROCEDURE — 2580000003 HC RX 258: Performed by: ANESTHESIOLOGY

## 2020-10-01 PROCEDURE — 2500000003 HC RX 250 WO HCPCS: Performed by: NURSE ANESTHETIST, CERTIFIED REGISTERED

## 2020-10-01 PROCEDURE — 6360000002 HC RX W HCPCS: Performed by: ORTHOPAEDIC SURGERY

## 2020-10-01 PROCEDURE — C1713 ANCHOR/SCREW BN/BN,TIS/BN: HCPCS | Performed by: ORTHOPAEDIC SURGERY

## 2020-10-01 PROCEDURE — 7100000000 HC PACU RECOVERY - FIRST 15 MIN: Performed by: ORTHOPAEDIC SURGERY

## 2020-10-01 PROCEDURE — 7100000010 HC PHASE II RECOVERY - FIRST 15 MIN: Performed by: ORTHOPAEDIC SURGERY

## 2020-10-01 PROCEDURE — 7100000001 HC PACU RECOVERY - ADDTL 15 MIN: Performed by: ORTHOPAEDIC SURGERY

## 2020-10-01 PROCEDURE — 3600000014 HC SURGERY LEVEL 4 ADDTL 15MIN: Performed by: ORTHOPAEDIC SURGERY

## 2020-10-01 PROCEDURE — 2709999900 HC NON-CHARGEABLE SUPPLY: Performed by: ORTHOPAEDIC SURGERY

## 2020-10-01 PROCEDURE — 6360000002 HC RX W HCPCS: Performed by: NURSE ANESTHETIST, CERTIFIED REGISTERED

## 2020-10-01 PROCEDURE — 3600000004 HC SURGERY LEVEL 4 BASE: Performed by: ORTHOPAEDIC SURGERY

## 2020-10-01 DEVICE — K WIRE FIX L152MM DIA1.6MM S STL 2 TRCR PNT: Type: IMPLANTABLE DEVICE | Site: FOOT | Status: FUNCTIONAL

## 2020-10-01 RX ORDER — ROCURONIUM BROMIDE 10 MG/ML
INJECTION, SOLUTION INTRAVENOUS PRN
Status: DISCONTINUED | OUTPATIENT
Start: 2020-10-01 | End: 2020-10-01 | Stop reason: SDUPTHER

## 2020-10-01 RX ORDER — ONDANSETRON 2 MG/ML
INJECTION INTRAMUSCULAR; INTRAVENOUS PRN
Status: DISCONTINUED | OUTPATIENT
Start: 2020-10-01 | End: 2020-10-01 | Stop reason: SDUPTHER

## 2020-10-01 RX ORDER — PROPOFOL 10 MG/ML
INJECTION, EMULSION INTRAVENOUS PRN
Status: DISCONTINUED | OUTPATIENT
Start: 2020-10-01 | End: 2020-10-01 | Stop reason: SDUPTHER

## 2020-10-01 RX ORDER — LIDOCAINE HYDROCHLORIDE 10 MG/ML
0.3 INJECTION, SOLUTION EPIDURAL; INFILTRATION; INTRACAUDAL; PERINEURAL
Status: DISCONTINUED | OUTPATIENT
Start: 2020-10-01 | End: 2020-10-01 | Stop reason: HOSPADM

## 2020-10-01 RX ORDER — HYDRALAZINE HYDROCHLORIDE 20 MG/ML
5 INJECTION INTRAMUSCULAR; INTRAVENOUS EVERY 10 MIN PRN
Status: DISCONTINUED | OUTPATIENT
Start: 2020-10-01 | End: 2020-10-01 | Stop reason: HOSPADM

## 2020-10-01 RX ORDER — OXYCODONE HYDROCHLORIDE AND ACETAMINOPHEN 5; 325 MG/1; MG/1
2 TABLET ORAL PRN
Status: DISCONTINUED | OUTPATIENT
Start: 2020-10-01 | End: 2020-10-01 | Stop reason: HOSPADM

## 2020-10-01 RX ORDER — ONDANSETRON 2 MG/ML
4 INJECTION INTRAMUSCULAR; INTRAVENOUS EVERY 10 MIN PRN
Status: DISCONTINUED | OUTPATIENT
Start: 2020-10-01 | End: 2020-10-01 | Stop reason: HOSPADM

## 2020-10-01 RX ORDER — SODIUM CHLORIDE 0.9 % (FLUSH) 0.9 %
10 SYRINGE (ML) INJECTION EVERY 12 HOURS SCHEDULED
Status: DISCONTINUED | OUTPATIENT
Start: 2020-10-01 | End: 2020-10-01 | Stop reason: HOSPADM

## 2020-10-01 RX ORDER — FENTANYL CITRATE 50 UG/ML
INJECTION, SOLUTION INTRAMUSCULAR; INTRAVENOUS PRN
Status: DISCONTINUED | OUTPATIENT
Start: 2020-10-01 | End: 2020-10-01 | Stop reason: SDUPTHER

## 2020-10-01 RX ORDER — ASPIRIN 325 MG
325 TABLET, DELAYED RELEASE (ENTERIC COATED) ORAL DAILY
Qty: 30 TABLET | Refills: 0 | Status: SHIPPED | OUTPATIENT
Start: 2020-10-01 | End: 2020-11-05

## 2020-10-01 RX ORDER — DEXAMETHASONE SODIUM PHOSPHATE 10 MG/ML
INJECTION INTRAMUSCULAR; INTRAVENOUS PRN
Status: DISCONTINUED | OUTPATIENT
Start: 2020-10-01 | End: 2020-10-01 | Stop reason: SDUPTHER

## 2020-10-01 RX ORDER — OXYCODONE HYDROCHLORIDE AND ACETAMINOPHEN 5; 325 MG/1; MG/1
1 TABLET ORAL PRN
Status: DISCONTINUED | OUTPATIENT
Start: 2020-10-01 | End: 2020-10-01 | Stop reason: HOSPADM

## 2020-10-01 RX ORDER — SODIUM CHLORIDE, SODIUM LACTATE, POTASSIUM CHLORIDE, CALCIUM CHLORIDE 600; 310; 30; 20 MG/100ML; MG/100ML; MG/100ML; MG/100ML
INJECTION, SOLUTION INTRAVENOUS CONTINUOUS
Status: DISCONTINUED | OUTPATIENT
Start: 2020-10-01 | End: 2020-10-01 | Stop reason: HOSPADM

## 2020-10-01 RX ORDER — MEPERIDINE HYDROCHLORIDE 50 MG/ML
12.5 INJECTION INTRAMUSCULAR; INTRAVENOUS; SUBCUTANEOUS EVERY 5 MIN PRN
Status: DISCONTINUED | OUTPATIENT
Start: 2020-10-01 | End: 2020-10-01 | Stop reason: HOSPADM

## 2020-10-01 RX ORDER — SODIUM CHLORIDE 0.9 % (FLUSH) 0.9 %
10 SYRINGE (ML) INJECTION PRN
Status: DISCONTINUED | OUTPATIENT
Start: 2020-10-01 | End: 2020-10-01 | Stop reason: HOSPADM

## 2020-10-01 RX ORDER — MIDAZOLAM HYDROCHLORIDE 1 MG/ML
INJECTION INTRAMUSCULAR; INTRAVENOUS
Status: DISCONTINUED
Start: 2020-10-01 | End: 2020-10-01 | Stop reason: HOSPADM

## 2020-10-01 RX ORDER — LABETALOL HYDROCHLORIDE 5 MG/ML
5 INJECTION, SOLUTION INTRAVENOUS EVERY 10 MIN PRN
Status: DISCONTINUED | OUTPATIENT
Start: 2020-10-01 | End: 2020-10-01 | Stop reason: HOSPADM

## 2020-10-01 RX ORDER — DEXAMETHASONE SODIUM PHOSPHATE 10 MG/ML
INJECTION INTRAMUSCULAR; INTRAVENOUS
Status: DISCONTINUED
Start: 2020-10-01 | End: 2020-10-01 | Stop reason: HOSPADM

## 2020-10-01 RX ORDER — MIDAZOLAM HYDROCHLORIDE 1 MG/ML
INJECTION INTRAMUSCULAR; INTRAVENOUS PRN
Status: DISCONTINUED | OUTPATIENT
Start: 2020-10-01 | End: 2020-10-01 | Stop reason: SDUPTHER

## 2020-10-01 RX ORDER — LIDOCAINE HYDROCHLORIDE 20 MG/ML
INJECTION, SOLUTION INFILTRATION; PERINEURAL PRN
Status: DISCONTINUED | OUTPATIENT
Start: 2020-10-01 | End: 2020-10-01 | Stop reason: SDUPTHER

## 2020-10-01 RX ORDER — ERGOCALCIFEROL 1.25 MG/1
50000 CAPSULE ORAL WEEKLY
Qty: 12 CAPSULE | Refills: 0 | Status: SHIPPED | OUTPATIENT
Start: 2020-10-01

## 2020-10-01 RX ORDER — OXYCODONE HYDROCHLORIDE AND ACETAMINOPHEN 5; 325 MG/1; MG/1
1 TABLET ORAL EVERY 6 HOURS PRN
Qty: 28 TABLET | Refills: 0 | Status: SHIPPED | OUTPATIENT
Start: 2020-10-01 | End: 2020-10-08

## 2020-10-01 RX ADMIN — MIDAZOLAM HYDROCHLORIDE 2 MG: 2 INJECTION, SOLUTION INTRAMUSCULAR; INTRAVENOUS at 10:08

## 2020-10-01 RX ADMIN — SODIUM CHLORIDE, POTASSIUM CHLORIDE, SODIUM LACTATE AND CALCIUM CHLORIDE: 600; 310; 30; 20 INJECTION, SOLUTION INTRAVENOUS at 11:35

## 2020-10-01 RX ADMIN — DEXAMETHASONE SODIUM PHOSPHATE 6 MG: 10 INJECTION INTRAMUSCULAR; INTRAVENOUS at 10:21

## 2020-10-01 RX ADMIN — FENTANYL CITRATE 50 MCG: 50 INJECTION INTRAMUSCULAR; INTRAVENOUS at 10:13

## 2020-10-01 RX ADMIN — CEFAZOLIN SODIUM 2 G: 10 INJECTION, POWDER, FOR SOLUTION INTRAVENOUS at 10:08

## 2020-10-01 RX ADMIN — ROCURONIUM BROMIDE 50 MG: 10 SOLUTION INTRAVENOUS at 10:13

## 2020-10-01 RX ADMIN — ONDANSETRON 4 MG: 2 INJECTION INTRAMUSCULAR; INTRAVENOUS at 10:20

## 2020-10-01 RX ADMIN — SODIUM CHLORIDE, POTASSIUM CHLORIDE, SODIUM LACTATE AND CALCIUM CHLORIDE: 600; 310; 30; 20 INJECTION, SOLUTION INTRAVENOUS at 08:01

## 2020-10-01 RX ADMIN — PROPOFOL 200 MG: 10 INJECTION, EMULSION INTRAVENOUS at 10:13

## 2020-10-01 RX ADMIN — LIDOCAINE HYDROCHLORIDE 60 MG: 20 INJECTION, SOLUTION INFILTRATION; PERINEURAL at 10:13

## 2020-10-01 ASSESSMENT — PULMONARY FUNCTION TESTS
PIF_VALUE: 22
PIF_VALUE: 18
PIF_VALUE: 22
PIF_VALUE: 2
PIF_VALUE: 21
PIF_VALUE: 22
PIF_VALUE: 22
PIF_VALUE: 23
PIF_VALUE: 22
PIF_VALUE: 21
PIF_VALUE: 22
PIF_VALUE: 23
PIF_VALUE: 20
PIF_VALUE: 23
PIF_VALUE: 5
PIF_VALUE: 9
PIF_VALUE: 22
PIF_VALUE: 22
PIF_VALUE: 21
PIF_VALUE: 22
PIF_VALUE: 0
PIF_VALUE: 22
PIF_VALUE: 1
PIF_VALUE: 6
PIF_VALUE: 22
PIF_VALUE: 22
PIF_VALUE: 21
PIF_VALUE: 22
PIF_VALUE: 20
PIF_VALUE: 6
PIF_VALUE: 22
PIF_VALUE: 21
PIF_VALUE: 22
PIF_VALUE: 0
PIF_VALUE: 22
PIF_VALUE: 24
PIF_VALUE: 22
PIF_VALUE: 21
PIF_VALUE: 20
PIF_VALUE: 41
PIF_VALUE: 22
PIF_VALUE: 21
PIF_VALUE: 1
PIF_VALUE: 23
PIF_VALUE: 1
PIF_VALUE: 21
PIF_VALUE: 21
PIF_VALUE: 22
PIF_VALUE: 20
PIF_VALUE: 22
PIF_VALUE: 21
PIF_VALUE: 21
PIF_VALUE: 22
PIF_VALUE: 1
PIF_VALUE: 22
PIF_VALUE: 23
PIF_VALUE: 22
PIF_VALUE: 21
PIF_VALUE: 22
PIF_VALUE: 1
PIF_VALUE: 22
PIF_VALUE: 21
PIF_VALUE: 22
PIF_VALUE: 23
PIF_VALUE: 21
PIF_VALUE: 21
PIF_VALUE: 22

## 2020-10-01 NOTE — BRIEF OP NOTE
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 Item Serial No.  Lot No. LRB No. Used Action   IMPL KWIRE [id number] DBL TROC TIP NON TRD Screw/Plate/Nail/Tristan IMPL KWIRE [id number] DBL TROC TIP NON TRD  [employer] [id number] Left 3 Implanted   IMPL KWIRE [id number] DBL TROC TIP NON TRD Screw/Plate/Nail/Tristan IMPL KWIRE [id number] DBL TROC TIP NON TRD  [employer] [id number] Left 1 Implanted         Drains:   Closed/Suction Drain Right RLQ Bulb 12 [place] (Active)       Closed/Suction Drain Right RLQ Bulb 10 [place] (Active)       Findings: left successful correction of hammer toes and resection of metatarsal heads with gastrocnemius recession.     Electronically signed by Connor Ramirez MD on 10/1/2020 at 11:55 AM

## 2020-10-01 NOTE — ANESTHESIA POSTPROCEDURE EVALUATION
Department of Anesthesiology  Postprocedure Note    Patient: Sarah Rodriguez  MRN: [id number]  YOB: 1955  Date of evaluation: 10/1/2020  Time:  1:26 PM     Procedure Summary     Date:  10/01/20 Room / Location:  [address]    Anesthesia Start:  1008 Anesthesia Stop:  7930    Procedures:       LEFT FOOT SECOND, THIRD, FOURTH AND FIFTH  METATARSAL HEAD RESECTION -BLOCK- -BAYLEE=5 (Left Foot)      LEFT GASTROCNEMIUS RECESSION (Left ) Diagnosis:       Closed dislocation of fifth toe of left foot, initial encounter      Closed nondisplaced fracture of second metatarsal bone of left foot, initial encounter      (LEFT FOOT FOURTH TOE DISLOCATION, SECOND METATARSAL BONE FRACTURE)    Surgeon:  Olena Marie MD Responsible Provider:  Adolfo Tomlinson MD    Anesthesia Type:  general, regional ASA Status:  3          Anesthesia Type: general, regional    Rosa Maria Phase I: Rosa Maria Score: 10    Rosa Maria Phase II: Rosa Maria Score: 10    Last vitals: Reviewed and per EMR flowsheets.        Anesthesia Post Evaluation    Patient location during evaluation: PACU  Level of consciousness: awake  Airway patency: patent  Nausea & Vomiting: no nausea  Complications: no  Cardiovascular status: blood pressure returned to baseline  Respiratory status: acceptable  Hydration status: euvolemic

## 2020-10-01 NOTE — PROGRESS NOTES
Discharge instructions reviewed with patient/responsible adult and understanding verbalized. Discharge instructions signed and copies given. Patient discharged home with belongings. Prescription x 3 sent with pt and/or family. Crutches given - patient states she knows how to use.

## 2020-10-01 NOTE — ANESTHESIA PROCEDURE NOTES
Peripheral Block    Patient location during procedure: pre-op  End time: 10/1/2020 8:30 AM  Staffing  Anesthesiologist: Chandler Suh MD  Performed: anesthesiologist   Preanesthetic Checklist  Completed: patient identified, site marked, surgical consent, pre-op evaluation, timeout performed, IV checked, risks and benefits discussed, monitors and equipment checked, anesthesia consent given, oxygen available and patient being monitored  Peripheral Block  Prep: ChloraPrep  Patient monitoring: cardiac monitor, continuous pulse ox, frequent blood pressure checks and IV access  Block type: Sciatic  Laterality: left  Injection technique: single-shot  Procedures: ultrasound guided and nerve stimulator  Local infiltration: lidocaine  Infiltration strength: 1 %  Dose: 3 mL  Popliteal  Provider prep: mask and sterile gloves  Local infiltration: lidocaine  Needle  Needle gauge: 21 G  Needle length: 10 cm  Needle localization: ultrasound guidance, nerve stimulator and anatomical landmarks  Assessment  Injection assessment: negative aspiration for heme, no paresthesia on injection and local visualized surrounding nerve on ultrasound  Paresthesia pain: none  Slow fractionated injection: yes  Hemodynamics: stable  Additional Notes  Immediately prior to procedure a \"time out\" was called to verify the correct patient, allergies, laterality, procedure and equipment. Time out performed with RN    Local Anesthetic: 0.5 %  Bupivacaine, Decadron 10 mg   Amount: 30 ml  in 5 ml increments after negative aspiration each time. Versed 2 mg IV    Adductor Krunal and Gluteus Nate muscles, Femur and Sciatic Nerve are identified, the tip of the needle and the spread of the local anesthetic around the Sciatic nerve are visualized. The Sciatic nerve appeared to be anatomically normal and there were no abnormal pathologically findings seen.          Reason for block: post-op pain management and at surgeon's request

## 2020-10-01 NOTE — TELEPHONE ENCOUNTER
Called patient states she will get covid test today. Told her to make sure she tells them she is [facility] for [facility] on Monday. Watch for results.

## 2020-10-02 NOTE — OP NOTE
Operative Note      Patient: Meghann Payan  YOB: 1955  MRN: [id number]    Date of Procedure: 10/1/2020    Pre-Op Diagnosis: Chronic dislocations of second third and fourth MTP joints with severe metatarsalgia, gastrocnemius contracture, hammertoes of toes 2 through 4    Post-Op Diagnosis: Same       Procedure(s):  LEFT FOOT SECOND, THIRD, FOURTH AND FIFTH  METATARSAL HEAD RESECTION -BLOCK- -BAYLEE=5  LEFT GASTROCNEMIUS RECESSION   #1 complete resection of metatarsal heads second third and fourth metatarsals, CPT 78159 ×3  #2 complete resection of metatarsal head, fifth metatarsal, CPT 88864  #3 surgical treatment of hammertoes, second third and fourth toes, CPT 28285 x3  #4 gastrocnemius recession, CPT 45371      Surgeon(s):  Kamari Davis MD    Assistant:   Surgical Assistant: Jordana Marina    Anesthesia: General    Estimated Blood Loss (mL): Minimal    Complications: None    Specimens:   * No specimens in log *    Implants:  Implant Name Type Inv. Item Serial No.  Lot No. LRB No. Used Action   IMPL KWIRE [id number] DBL TROC TIP NON TRD Screw/Plate/Nail/Tristan IMPL KWIRE [id number] DBL TROC TIP NON TRD  [employer] [id number] Left 3 Implanted   IMPL KWIRE [id number] DBL TROC TIP NON TRD Screw/Plate/Nail/Tristan IMPL KWIRE [id number] DBL TROC TIP NON TRD  [employer] [id number] Left 1 Implanted         Drains:   Closed/Suction Drain Right RLQ Bulb 12 [place] (Active)       Closed/Suction Drain Right RLQ Bulb 10 [place] (Active)       Findings: Resection of second third fourth and fifth metatarsal heads with correction of hammertoes 2 through 4 as well as excellent resolution of gastrocnemius contracture with gastrocnemius recession. Indications for procedure:  Is a pleasant 62-year-old female with a history of complex left lower extremity trauma resulting in fasciotomies compartment syndrome of the left leg.   She subsequently developed intrinsic contractures in the left foot with chronic dislocations of her second third and fourth metatarsals resulting in severe metatarsalgia. She also has a mixed picture of significant numbness and weakness in his lower extremity and a gastrocnemius contracture. She is accordingly indicated for gastrocnemius recession and metatarsal head resections of the second through fifth metatarsals with hammertoe correction of second third and fourth digits. There is benefits alternatives were discussed the patient elected to proceed. Detailed Description of Procedure: The surgery patient was identified in the preoperative holding area. Operative site was marked the cyst with the patient. Informed consent procedure plan reviewed. History and physical exam form updated. Regional anesthesia block was performed by the anesthesia team.  The patient was then transported back to the operating suite where she was positioned supine on the operating table. A left thigh tourniquet was applied. Left lower extremity was prepped and draped in usual sterile fashion after all bony prominences were  well-padded. Appropriate antibiotics were given at the appropriate time. Left lower extremities prepped and draped in the usual sterile fashion. Formal surgical timeout was performed. Left lower extremity is exsanguinated tourniquet was insufflated to 300 mmHg. A posterior incision was made at the level of the musculotendinous junction on the back of the leg. Careful dissection was taken down to the fascia overlying the superficial posterior compartment. This was opened and the sural nerve identified and protected. The gastrocnemius tendon was released at its musculotendinous junction with improvement of dorsiflexion ankle dorsiflexion. The wound was copiously irrigated and was closed in layers. A transverse incision was made across the forefoot at the level of the MTP joint. Careful dissection was taken down and the second third fourth and fifth MTP joints were identified.   With all neurovascular structures protected I performed rest metatarsal head resections using a microsagittal saw. These were done with a slight obliquity with the plantar aspect of the cut more proximal.  They were also done such that there was a cascade with the second being the longest shortening down to the fifth. These cuts were verified on fluoroscopy. There was still significant  hammertoe deformity of the second third and fourth toes. Accordingly I corrected these hammertoes doing open FDL tenotomies of the second third and fourth toes in combination with osteoclasis. 0.062 mm K wires were then placed from proximal to distal across the second third fourth and fifth toes. This positioning was verified fluoroscopically. These were advanced at the distal end of the toe until such time that I was able to realign the digit with its associated metatarsal and then advanced the wire in a retrograde fashion up the metatarsal shafts. There is excellent alignment and correction of these toes. The K wires were then bent and cut at the tips of the toes. Fluoroscopy was once again used to verify the positioning of the wires in satisfactory alignment reduction of these. The tourniquet was let down and the surgical incision was copiously irrigated meticulous hemostasis obtained. The skin was closed using interrupted horizontal mattress 3-0 nylon sutures. Sterile dressings applied consisting of Xeroform 4 x 4's ABDs and cast padding. A postoperative splint was then applied and the patient was transported back to the operating suite in stable condition. Postoperative plan:  Patient will be nonweightbearing in the splint for 2 weeks. I will then transition her to a boot and allow her limited weightbearing through the heel for the following 4 weeks. We will plan on keeping the wires in for a total of 6 weeks. She will keep the operative lower extremity clean and dry.   She will be given aspirin for DVT

## 2020-10-05 ENCOUNTER — HOSPITAL ENCOUNTER (OUTPATIENT)
Dept: CT IMAGING | Age: 65
Discharge: HOME | End: 2020-10-05
Payer: COMMERCIAL

## 2020-10-05 ENCOUNTER — HOSPITAL ENCOUNTER (OUTPATIENT)
Age: 65
Discharge: HOME | End: 2020-10-05
Payer: COMMERCIAL

## 2020-10-05 VITALS
SYSTOLIC BLOOD PRESSURE: 145 MMHG | RESPIRATION RATE: 15 BRPM | HEART RATE: 75 BPM | OXYGEN SATURATION: 98 % | DIASTOLIC BLOOD PRESSURE: 76 MMHG

## 2020-10-05 LAB
INR BLD: 0.98 (ref 0.86–1.14)
PLATELET # BLD: 282 K/UL (ref 135–450)
PROTHROMBIN TIME: 11.4 SEC (ref 10–13.2)
SARS-COV-2, NAAT: NOT DETECTED
VITAMIN D 25-HYDROXY: 24.5 NG/ML

## 2020-10-05 PROCEDURE — 36415 COLL VENOUS BLD VENIPUNCTURE: CPT

## 2020-10-05 PROCEDURE — U0002 COVID-19 LAB TEST NON-CDC: HCPCS

## 2020-10-05 PROCEDURE — 85610 PROTHROMBIN TIME: CPT

## 2020-10-05 PROCEDURE — 82306 VITAMIN D 25 HYDROXY: CPT

## 2020-10-05 PROCEDURE — 85049 AUTOMATED PLATELET COUNT: CPT

## 2020-10-05 RX ORDER — TIOTROPIUM BROMIDE 18 UG/1
CAPSULE ORAL; RESPIRATORY (INHALATION)
Qty: 90 CAPSULE | Refills: 1 | Status: SHIPPED | OUTPATIENT
Start: 2020-10-05

## 2020-10-05 RX ORDER — CETIRIZINE HYDROCHLORIDE 10 MG/1
TABLET ORAL
Qty: 90 TABLET | Refills: 1 | Status: SHIPPED | OUTPATIENT
Start: 2020-10-05 | End: 2021-01-03

## 2020-10-05 RX ORDER — FLUTICASONE PROPIONATE 50 MCG
2 SPRAY, SUSPENSION (ML) NASAL PRN
Qty: 1 BOTTLE | Refills: 2 | Status: SHIPPED | OUTPATIENT
Start: 2020-10-05 | End: 2020-12-21

## 2020-10-05 RX ORDER — LIDOCAINE 50 MG/G
1 PATCH TOPICAL DAILY
Qty: 30 PATCH | Refills: 2 | Status: SHIPPED | OUTPATIENT
Start: 2020-10-05 | End: 2020-12-21

## 2020-10-05 NOTE — TELEPHONE ENCOUNTER
Refill Request     Last Seen: 5/7/2020    Flonase Last Written: 7/10/2020    Lidocaine Patch Last Written: 7/10/2020    Cetirizine Last Written: 4/13/2020    Spiriva Last Written: 4/13/2020    Next Appointment:   Future Appointments   Date Time Provider Elia Lennoni   10/8/2020  8:30 AM [facility] Marisa Rad   10/9/2020 10:30 AM MD KATELIN Lazo [facility]   10/13/2020 11:45 AM MD Adri Betancourt [facility]   10/16/2020 10:30 AM MD KATELIN Lazo [facility]             Requested Prescriptions     Pending Prescriptions Disp Refills    fluticasone (FLONASE) 50 MCG/ACT nasal spray [Pharmacy Med Name: FLUTICASONE 0.05% NASAL SPRAY] 16 g 2     Sig: USE 2 SPRAYS IN EACH NOSTRIL DAILY    lidocaine (LIDODERM) 5 % [Pharmacy Med Name: LIDOCAINE 5% PATCH] 30 patch      Sig: PLACE 1 PATCH ONTO THE SKIN DAILY 12 HOURS ON, 12 HOURS OFF.     cetirizine (ZYRTEC) 10 MG tablet [Pharmacy Med Name: CETIRIZINE HCL 10 MG TABS] 90 tablet 1     Sig: TAKE 1 TABLET BY MOUTH DAILY    Karene Killer 18 MCG inhalation capsule [Pharmacy Med Name: Karene Killer 90 capsule 1     Sig: INHALE 1 CAPSULE INTO THE LUNGS DAILY

## 2020-10-05 NOTE — PROGRESS NOTES
Patient arrived for CT biopsy. Patient took aspirin last night. Unable to reach patient by phone prior to biopsy for pre procedure instructions. Dr. Charmaine Yi at bedside to explain safety concern for blood thinner. Biopsy rescheduled for this Thursday @ 0830. Patient given all pre procedure information at this time, aware to stop taking aspirin.   Summer CASANDRA OH RN

## 2020-10-05 NOTE — TELEPHONE ENCOUNTER
Per chart patient took asa day of procedure; [facility] 10/8/20. Watch for results. Appt with Dr Layo Pena 10/13/20.

## 2020-10-08 ENCOUNTER — TELEPHONE (OUTPATIENT)
Dept: ORTHOPEDIC SURGERY | Age: 65
End: 2020-10-08

## 2020-10-08 ENCOUNTER — TELEPHONE (OUTPATIENT)
Dept: PULMONOLOGY | Age: 65
End: 2020-10-08

## 2020-10-08 NOTE — TELEPHONE ENCOUNTER
Patient cancelled appointment on 10/13/20 with Dr. Cornelio Flowers for bx follow up. Reason: pt did not complete bio due to her being on a blood thinner she states she was not informed to stop it. She states radiology will be calling her back to r/s. Patient did not reschedule appointment. Appointment rescheduled for N/a.  She's waiting for a CB     Last OV 9/29/20      ASSESSMENT:  · LLL pulmonary nodule: had been stable with a low risk PET CT in the past, but now a repeat CT shows interval enlargement and it is mildly hypermetabolic and suspicous for malignancy  · No mediastinal lymphadenopathy  · Emphysema - has not kept appts for PFts  · Tobacco abuse  · Supraclavicular palpable lymph nodes and enlarged axillary and inguinal LN on PET CT: possibly a separate process unrelated the LLL lung nodule     PLAN:   · Refer to oncology for diffuse LAD with mild hypermetabolism on PET CT  · Smoking cessation  · IR consult for LLL nodule biopsy     I affirm this is a Patient Initiated Episode with a Patient who has not had a related appointment within my department in the past 7 days or scheduled within the next 24 hours.     Patient identification was verified at the start of the visit: Yes     Total Time: 15 min     Note: not billable if this call serves to triage the patient into an appointment for the relevant concern        SUE AGUILERA

## 2020-10-08 NOTE — TELEPHONE ENCOUNTER
Patient called to reschedule her appointment because she is having chemo on Friday. I have rescheduled her for Monday. She then proceeded to inform me that they were spraying her bldg for bed begs.

## 2020-10-12 ENCOUNTER — OFFICE VISIT (OUTPATIENT)
Dept: ORTHOPEDIC SURGERY | Age: 65
End: 2020-10-12
Payer: COMMERCIAL

## 2020-10-12 VITALS — HEIGHT: 61 IN | BODY MASS INDEX: 25.49 KG/M2 | WEIGHT: 135 LBS

## 2020-10-12 PROCEDURE — L3260 AMBULATORY SURGICAL BOOT EAC: HCPCS | Performed by: ORTHOPAEDIC SURGERY

## 2020-10-12 PROCEDURE — 99024 POSTOP FOLLOW-UP VISIT: CPT | Performed by: ORTHOPAEDIC SURGERY

## 2020-10-13 ENCOUNTER — HOSPITAL ENCOUNTER (OUTPATIENT)
Age: 65
Discharge: HOME | End: 2020-10-13
Payer: COMMERCIAL

## 2020-10-13 ENCOUNTER — HOSPITAL ENCOUNTER (OUTPATIENT)
Dept: CT IMAGING | Age: 65
Discharge: HOME | End: 2020-10-13
Payer: COMMERCIAL

## 2020-10-13 NOTE — PROGRESS NOTES
Procedure cancelled at this time. Dr. Emperatriz Mejia contacted Dr. Meena Anderson to discuss plan. Pt's IV removed. Upon leaving pt now states she did take her BP medication. Pt educated on the fact that proceeding with procedure is not dependant on whether the medication was taken. Advised that the procedure must be performed when BP is in a safe range and that if she did take BP medication and her BP is 171/93 mm/Hg that is more worrisome. Pt verbalized understanding. Pt discharged in stable condition with all belongings in care of brother.

## 2020-10-13 NOTE — PROGRESS NOTES
Image guided right mediastinum mass biopsy completed. 4 core samples obtained and sent to lab for pathology. Pt tolerated procedure without any signs or symptoms of distress vital signs stable see flow sheets. Pt taken to recovery bay for post operative monitoring.

## 2020-10-13 NOTE — PROGRESS NOTES
Previous note filed accidentally and related to CT's cancellation of order and alteration. Pt arrived for image guided lung nodule biopsy. Procedure explained including the risk and benefits of the procedure. All questions answered. Pt verbalizes understanding of the procedure and states no more questions. Consent signed. Vital signs stable see flow sheets. Hypertension noted see flow sheets. Pt states did not and has not been taking BP medications. Dr. Tori Robertson notified. Labs, allergies, medications, and code status reviewed. Pre Rosa Maria score 10.

## 2020-10-13 NOTE — PROGRESS NOTES
Pt arrived for image guided lung nodule biopsy. Procedure explained including the risk and benefits of the procedure. All questions answered. Pt verbalizes understanding of the procedure and states no more questions. Consent signed. Vital signs stable see flow sheets. Labs, allergies, medications, and code status reviewed. No Contraindications noted. Pre Rosa Maria score 10.

## 2020-10-13 NOTE — PRE SEDATION
Sedation Pre-Procedure Note    Patient Name: Damien Dominguez   YOB: 1955  Room/Bed: Room/bed info not found  Medical Record Number: [id number]  Date: 10/13/2020   Time: 8:52 AM       Indication:  PET positive left lower lobe lung nodule    Consent: I have discussed with the patient and/or the patient representative the indication, alternatives, and the possible risks and/or complications of the planned procedure and the anesthesia methods. The patient and/or patient representative appear to understand and agree to proceed. Vital Signs: There were no vitals filed for this visit. Past Medical History:   has a past medical history of JASPREET (acute kidney injury) ([facility] [address] Arterial occlusion, Asthma, Centrilobular emphysema (HCC), Chronic bilateral low back pain with bilateral sciatica, Chronic bilateral low back pain with bilateral sciatica, Hx of blood clots, Hyperlipidemia, Hypertension, Left lower lobe pulmonary nodule, MDRO (multiple drug resistant organisms) resistance, Metabolic encephalopathy, Moderate single current episode of major depressive disorder ([facility] [address] and Ruptured disk. Past Surgical History:   has a past surgical history that includes  section (); Leg Surgery (2017); Cholecystectomy (N/A, 2020); Cholecystectomy, laparoscopic (N/A, 2020); osteotomy (Left, 10/1/2020); and Gastrocnemius Recession (Left, 10/1/2020). Medications:   Scheduled Meds:   Continuous Infusions:   PRN Meds:   Home Meds:   Prior to Admission medications    Medication Sig Start Date End Date Taking?  Authorizing Provider   fluticasone (FLONASE) 50 MCG/ACT nasal spray 2 sprays by Nasal route as needed for Rhinitis or Allergies 10/5/20   August Blakely MD   lidocaine (LIDODERM) 5 % PLACE 1 PATCH ONTO THE SKIN DAILY 12 HOURS ON, 12 HOURS OFF. 10/5/20 11/4/20  August Blakely MD   cetirizine (ZYRTEC) 10 MG tablet TAKE 1 TABLET BY MOUTH DAILY 10/5/20   August Blakely MD   [facility] HANDIHALER 18 MCG inhalation capsule INHALE 1 CAPSULE INTO THE LUNGS DAILY 10/5/20   Zuri Spaulding MD   vitamin D (ERGOCALCIFEROL) 1.25 MG (77731 UT) CAPS capsule Take 1 capsule by mouth once a week 10/1/20   Estela Higgins MD   aspirin 325 MG EC tablet Take 1 tablet by mouth daily 10/1/20 10/1/21  Estela Higgins MD   gabapentin (NEURONTIN) 400 MG capsule Take 400 mg by mouth 3 times daily. Historical Provider, MD   lisinopril (PRINIVIL;ZESTRIL) 40 MG tablet Take 40 mg by mouth daily    Historical Provider, MD   famotidine (PEPCID) 20 MG tablet TAKE 1 TABLET BY MOUTH TWICE DAILY  Patient taking differently: as needed  8/10/20   Zuri Spaulding MD   tiZANidine (ZANAFLEX) 2 MG tablet TAKE 1 TABLET BY MOUTH 2 TIMES DAILY AS NEEDED (MUSCLE SPASM AND PAIN) 8/10/20   Zuri Spaulding MD   albuterol sulfate  (90 Base) MCG/ACT inhaler INHALE 2 PUFFS INTO THE LUNGS EVERY 6 HOURS AS NEEDED FOR WHEEZING 8/10/20   Zuri Spauldign MD   atorvastatin (LIPITOR) 80 MG tablet TAKE 1 TABLET BY MOUTH NIGHTLY 7/10/20   Zuri Spaulding MD   DULoxetine (CYMBALTA) 60 MG extended release capsule TAKE 1 CAPSULE BY MOUTH DAILY 7/10/20   Zuri Spaulding MD   traZODone (DESYREL) 50 MG tablet TAKE 1 TABLET BY MOUTH NIGHTLY 7/10/20   Zuri Spaulding MD   omeprazole (PRILOSEC) 20 MG delayed release capsule Take 1 capsule by mouth every morning (before breakfast)  Patient taking differently: Take 20 mg by mouth as needed  5/21/20   Boston Shannon MD   nicotine (NICODERM CQ) 7 MG/24HR PLACE 1 PATCH ONTO THE SKIN DAILY (ROTATE SITES) 12/4/19   Zuri Spaulding MD     Coumadin Use Last 7 Days:  no  Antiplatelet drug therapy use last 7 days: no  Other anticoagulant use last 7 days: no  Additional Medication Information:        Pre-Sedation Documentation and Exam:   I have reviewed the patient's history and review of systems.     Mallampati Airway Assessment:  normal neck range of motion    Prior History of Anesthesia Complications:   none    ASA Classification:  Class 3 - A patient with severe systemic disease that limits activity but is not incapacitating    Sedation/ Anesthesia Plan:   Procedure to be cancelled    Medications Planned:   Procedure to be cancelled    Patient is an appropriate candidate for plan of sedation: no  Unfortunately the patient had coffee this morning and did not take her blood pressure medications. The exact amount is unclear as different volumes were reported to different staff. We had elected to proceed given the likely small volume and low risk of aspiration with patient in the prone position. However, when we put the patient on the monitor her BP was in the upper 170's. When we inquired we discovered that she had not taken her antihypertensive mediations. It is important that the blood pressure be controlled before, during and after the procedure. She stated that her blood pressure was in the 230's last week and is uncontrolled on her home medications. Given the issues with blood pressure medication and coffee consumption, it was not deemed safe to proceed with the procedure and the procedure was cancelled. Recommend the patient see her PCP and get blood pressure under better control. When the procedure is rescheduled, she should stop her aspirin 5d prior. She should take her blood pressure medications that morning with a sip of water but otherwise strict NPO.     Electronically signed by Sherley Whiting MD on 10/13/2020 at 8:52 AM

## 2020-10-13 NOTE — PROGRESS NOTES
Pt cleared for discharge. Pt alert and oriented x 4. Denies any pain or SOB. Surgical site dressing clean, dry, and intact. Vital signs stable see flow sheets. Post Rosa Maria score 10. Discharge instructions provided and explained. All questions answered. Pt verbalizes understanding of the discharge instructions and states no more questions.  Pt discharged in stable condition with all belongings in care wife

## 2020-10-13 NOTE — PROGRESS NOTES
During pre-procedure pt verbalized having coffee this morning. Also, blood pressure 171/93 on recheck. Dr. Kelton Ritchie bedside to discuss options with pt.

## 2020-10-16 ENCOUNTER — OFFICE VISIT (OUTPATIENT)
Dept: ORTHOPEDIC SURGERY | Age: 65
End: 2020-10-16

## 2020-10-16 VITALS — WEIGHT: 137 LBS | HEIGHT: 61 IN | BODY MASS INDEX: 25.86 KG/M2

## 2020-10-16 PROCEDURE — 99024 POSTOP FOLLOW-UP VISIT: CPT | Performed by: ORTHOPAEDIC SURGERY

## 2020-10-16 RX ORDER — OXYCODONE HYDROCHLORIDE AND ACETAMINOPHEN 5; 325 MG/1; MG/1
1 TABLET ORAL EVERY 6 HOURS PRN
Qty: 12 TABLET | Refills: 0 | Status: SHIPPED | OUTPATIENT
Start: 2020-10-16 | End: 2020-10-19

## 2020-10-16 NOTE — PROGRESS NOTES
I am seeing the patient for 2-week wound check and follow-up after metatarsal head resections and hammertoe corrections of the second through fourth toes. Her block wore off and she has had some increased pain but overall is doing pretty well. She is certainly having some issues with crowding of her great toe but she had no pain over that we did discuss the reasons for not addressing the big toe given her current chemotherapy. I think once you are able to pull the pins at relieve a lot of the issue and if not then we can certainly address that down the line with a further surgery. This was the preoperative plan and while a bit atypical she understood the indications and was in agreement with it. Overall though she has no signs of infection and her pin sites are clean dry and intact her incisions are well-healed. Her sensation is back to baseline but not normal given her history of fasciotomies. We removed her sutures today. We will get radiographs in 2 weeks. She will continue to be nonweightbearing although I am okay if she puts a little bit of weight through her heel. She should wear postop shoe at all times. She is okay to shower but should not soak her incisions.

## 2020-10-19 ENCOUNTER — TELEPHONE (OUTPATIENT)
Dept: ORTHOPEDIC SURGERY | Age: 65
End: 2020-10-19

## 2020-10-20 ENCOUNTER — OFFICE VISIT (OUTPATIENT)
Dept: SURGERY | Age: 65
End: 2020-10-20
Payer: COMMERCIAL

## 2020-10-20 VITALS
TEMPERATURE: 98.2 F | BODY MASS INDEX: 27.38 KG/M2 | HEIGHT: 61 IN | SYSTOLIC BLOOD PRESSURE: 124 MMHG | WEIGHT: 145 LBS | DIASTOLIC BLOOD PRESSURE: 80 MMHG

## 2020-10-20 PROCEDURE — G8484 FLU IMMUNIZE NO ADMIN: HCPCS | Performed by: SURGERY

## 2020-10-20 PROCEDURE — 3017F COLORECTAL CA SCREEN DOC REV: CPT | Performed by: SURGERY

## 2020-10-20 PROCEDURE — G8427 DOCREV CUR MEDS BY ELIG CLIN: HCPCS | Performed by: SURGERY

## 2020-10-20 PROCEDURE — 4004F PT TOBACCO SCREEN RCVD TLK: CPT | Performed by: SURGERY

## 2020-10-20 PROCEDURE — 99214 OFFICE O/P EST MOD 30 MIN: CPT | Performed by: SURGERY

## 2020-10-20 PROCEDURE — G8419 CALC BMI OUT NRM PARAM NOF/U: HCPCS | Performed by: SURGERY

## 2020-10-20 RX ORDER — SODIUM CHLORIDE 0.9 % (FLUSH) 0.9 %
10 SYRINGE (ML) INJECTION PRN
Status: CANCELLED | OUTPATIENT
Start: 2020-10-20

## 2020-10-20 RX ORDER — SODIUM CHLORIDE 0.9 % (FLUSH) 0.9 %
10 SYRINGE (ML) INJECTION EVERY 12 HOURS SCHEDULED
Status: CANCELLED | OUTPATIENT
Start: 2020-10-20

## 2020-10-20 NOTE — PROGRESS NOTES
[facility]    CHIEF COMPLAINT: Lymphadenopathy    SUBJECTIVE:   Patient presents for evaluation of lymphadenopathy. She reports having painful lymph nodes in her neck and groin. This has been going on for couple months. The lymph nodes seem to come and go but are always uncomfortable. She reports occasionally feeling feverish but denies any night sweats or change in her weight. Allergies   Allergen Reactions    Lorazepam      Confusion, hallucinations  Can take lorazepam but not Ativan    Codeine Hives     Tolerates Norco.     Outpatient Medications Marked as Taking for the 10/20/20 encounter (Office Visit) with Araseli Velasquez MD   Medication Sig Dispense Refill    fluticasone (FLONASE) 50 MCG/ACT nasal spray 2 sprays by Nasal route as needed for Rhinitis or Allergies 1 Bottle 2    lidocaine (LIDODERM) 5 % PLACE 1 PATCH ONTO THE SKIN DAILY 12 HOURS ON, 12 HOURS OFF. 30 patch 2    cetirizine (ZYRTEC) 10 MG tablet TAKE 1 TABLET BY MOUTH DAILY 90 tablet 1    SPIRIVA HANDIHALER 18 MCG inhalation capsule INHALE 1 CAPSULE INTO THE LUNGS DAILY 90 capsule 1    vitamin D (ERGOCALCIFEROL) 1.25 MG (37303 UT) CAPS capsule Take 1 capsule by mouth once a week 12 capsule 0    aspirin 325 MG EC tablet Take 1 tablet by mouth daily 30 tablet 0    gabapentin (NEURONTIN) 400 MG capsule Take 400 mg by mouth 3 times daily.       lisinopril (PRINIVIL;ZESTRIL) 40 MG tablet Take 40 mg by mouth daily      famotidine (PEPCID) 20 MG tablet TAKE 1 TABLET BY MOUTH TWICE DAILY (Patient taking differently: as needed ) 60 tablet 2    tiZANidine (ZANAFLEX) 2 MG tablet TAKE 1 TABLET BY MOUTH 2 TIMES DAILY AS NEEDED (MUSCLE SPASM AND PAIN) 30 tablet 2    albuterol sulfate  (90 Base) MCG/ACT inhaler INHALE 2 PUFFS INTO THE LUNGS EVERY 6 HOURS AS NEEDED FOR WHEEZING 8.5 g 2    atorvastatin (LIPITOR) 80 MG tablet TAKE 1 TABLET BY MOUTH NIGHTLY 90 tablet 1    DULoxetine (CYMBALTA) 60 MG extended release hole in her colon\"    Sudden Death Brother         \"suicide\"    Colon Cancer Other         maternal uncle    Breast Cancer Other         multiple maternal aunts along with uterine cancer     Social History     Socioeconomic History    Marital status:      Spouse name: Not on file    Number of children: 2    Years of education: Not on file    Highest education level: Some college, no degree   Occupational History    Not on file   Social Needs    Financial resource strain: Not very hard    Food insecurity     Worry: Never true     Inability: Never true    Transportation needs     Medical: No     Non-medical: No   Tobacco Use    Smoking status: Current Every Day Smoker     Packs/day: 0.50     Years: 20.00     Pack years: 10.00     Types: Cigarettes    Smokeless tobacco: Never Used    Tobacco comment: 6 cigarettes per day   Substance and Sexual Activity    Alcohol use: No    Drug use: Not Currently     Types: Opiates     Sexual activity: Not Currently     Partners: Male   Lifestyle    Physical activity     Days per week: Not on file     Minutes per session: Not on file    Stress: Not on file   Relationships    Social connections     Talks on phone: Not on file     Gets together: Not on file     Attends Confucianist service: Not on file     Active member of club or organization: Not on file     Attends meetings of clubs or organizations: Not on file     Relationship status: Not on file    Intimate partner violence     Fear of current or ex partner: Not on file     Emotionally abused: Not on file     Physically abused: Not on file     Forced sexual activity: Not on file   Other Topics Concern    Not on file   Social History Narrative    Not on file          Vitals:    10/20/20 1000   BP: 124/80   Site: Left Upper Arm   Position: Sitting   Cuff Size: Large Adult   Temp: 98.2 °F (36.8 °C)   TempSrc: Temporal   Weight: 145 lb (65.8 kg)   Height: 5' 1\" (1.549 m)     Body mass index is 27.4 kg/m². ROS:  As per HPI, otherwise reviewed and negative    PHYSICAL EXAM:     Constitutional:  Well developed, well nourished, no acute distress, non-toxic appearance   Respiratory:  No respiratory distress, normal breath sounds, no rales, no wheezing   Cardiovascular:  Normal rate, normal rhythm, no murmurs. GI: Bowel sounds positive, soft, nontender  Lymphatic: She has prominent tender lymph nodes in the cervical axillary and inguinal regions  Neurologic:  Alert & oriented x 3, normal motor function, normal sensory function, no focal deficits noted   Psychiatric:  Speech and behavior appropriate             DATA:  Radiology Review: PET CT images were reviewed and show diffuse hypermetabolic lymphadenopathy as well as a lung nodule    ASSESSMENT:   1. Lymphadenopathy           PLAN: The etiology of her lymphadenopathy is unclear. It could be related to the lung nodule however, the diffuse nature is also suspicious for lymphoma. I did discuss this with Dr. Richard Torres. We will plan for excisional biopsy of one of the right cervical lymph nodes, as these are more strongly hypermetabolic than the inguinal nodes and are very superficial. I explained the procedure including risks, benefits, and alternatives. Questions were answered and the patient agrees to proceed.

## 2020-10-21 ENCOUNTER — TELEPHONE (OUTPATIENT)
Dept: FAMILY MEDICINE CLINIC | Age: 65
End: 2020-10-21

## 2020-10-21 NOTE — TELEPHONE ENCOUNTER
----- Message from Mario Ly sent at 10/21/2020 11:17 AM EDT -----  Subject: Message to Provider    QUESTIONS  Information for Provider? Pt needs a lung biopsy done   but her BP runs high when they have tried doing it. Pt wants to know if   there is anything she can be given to help.   ---------------------------------------------------------------------------  --------------  CALL BACK INFO  What is the best way for the office to contact you? OK to leave message on   voicemail  Preferred Call Back Phone Number? [phone]  ---------------------------------------------------------------------------  --------------  SCRIPT ANSWERS  Relationship to Patient?  Self

## 2020-10-21 NOTE — TELEPHONE ENCOUNTER
Called [facility] with [facility] for 11/2/20 @ 7am arrival.  Needs to repeat covid test.  Reminded to stop ASA 5 days prior to procedure. Did tell patient she needs to f/u with PCP regarding BP to make sure controlled for procedure. Patient states she is vazquez for Lymph node biopsy with Dr Connor Wells on 10/29/20.

## 2020-10-23 ENCOUNTER — OFFICE VISIT (OUTPATIENT)
Dept: ORTHOPEDIC SURGERY | Age: 65
End: 2020-10-23

## 2020-10-23 PROCEDURE — 99024 POSTOP FOLLOW-UP VISIT: CPT | Performed by: ORTHOPAEDIC SURGERY

## 2020-10-23 NOTE — PROGRESS NOTES
1.  Do not eat or drink anything after 12 midnight prior to surgery. This includes no water, chewing gum or mints. 2.  Take the following pills with a small sip of water on the morning of surgery . 3. Aspirin, Ibuprofen, Advil, Naproxen, Vitamin E and other Anti-inflammatory products should be stopped for 5 days before surgery or as directed by your physician. 4.  Check with your doctor regarding stopping Plavix, Coumadin, Lovenox, Fragmin or other blood thinners. 5.  Do not smoke and do not drink alcoholic beverages 24 hours prior to surgery. This includes NA Beer. 6.  You may brush your teeth and gargle the morning of surgery. DO NOT SWALLOW WATER.  7.  You MUST make arrangements for a responsible adult to take you home after your surgery. You will not be allowed to leave alone or drive yourself home. It is strongly suggested someone stay with you the first 24 hours. Your surgery will be cancelled if you do not have a ride home. 8.  A parent/legal guardian must accompany a child scheduled for surgery and plan to stay at the hospital until the child is discharged. Please do not bring other children with you. 9.  Please wear simple, loose fitting clothing to the hospital.  Vance Lieberman not bring valuables ( money, credit cards, checkbooks, etc.)  Do not wear any makeup (including no eye makeup) or nail polish on your fingers or toes. 10.  Do not wear any jewelry or piercing on the day of surgery. All body piercing jewelry must be removed. 11.  If you have dentures, they will be removed before going to the OR; we will provide you a container. If you wear contact lenses or glasses, they will be removed; please bring a case for them. 12.  Please see your family doctor/pediatrician for a history & physical and/or concerning medications. Bring any test results/reports from your physician's office the day of surgery. 15.  Remember to bring Blood Bank Bracelet to the hospital on the day of surgery.   14.  If you have a Living Will and Durable Power of  for Healthcare, please bring in a copy. 13.  Notify your Surgeon if you develop any illness between now and surgery time; cough, cold, fever, sore throat, nausea, vomiting, etc.  Please notify your surgeon if you experience dizziness, shortness of breath or blurred vision between now and the time of your surgery. 16.  DO NOT shave your operative site 96 hours (4 days) prior to surgery. For face and neck surgery, men may use an electric razor 48 hours (2 days) prior to surgery. 17. Shower the night before surgery and the morning of surgery with  an antibacterial soap   or  Chlorhexidine gluconate (for total joint replacement). To provide excellent care, visitors will be limited to two in a room at any given time. Please no children under the age of 15 in the surgical department.

## 2020-10-27 ENCOUNTER — ANESTHESIA EVENT (OUTPATIENT)
Dept: OPERATING ROOM | Age: 65
End: 2020-10-27
Payer: COMMERCIAL

## 2020-10-27 ENCOUNTER — HOSPITAL ENCOUNTER (OUTPATIENT)
Age: 65
Discharge: HOME | End: 2020-10-27
Payer: COMMERCIAL

## 2020-10-27 LAB — SARS-COV-2, NAAT: NOT DETECTED

## 2020-10-27 PROCEDURE — U0002 COVID-19 LAB TEST NON-CDC: HCPCS

## 2020-10-28 NOTE — TELEPHONE ENCOUNTER
Usually this procedure is done with anesthesia on board and they can give her things if needed to help with high blood pressure. I do not want to change any chronic medications for a situational event, and I do not give anti-anxiety medication for procedure involving anesthesia as this can be very dangerous. Please let her know. Thank you.

## 2020-10-29 ENCOUNTER — HOSPITAL ENCOUNTER (OUTPATIENT)
Age: 65
Setting detail: OUTPATIENT SURGERY
Discharge: HOME | End: 2020-10-29
Attending: SURGERY | Admitting: SURGERY
Payer: COMMERCIAL

## 2020-10-29 ENCOUNTER — ANESTHESIA (OUTPATIENT)
Dept: OPERATING ROOM | Age: 65
End: 2020-10-29
Payer: COMMERCIAL

## 2020-10-29 VITALS
WEIGHT: 135 LBS | BODY MASS INDEX: 25.49 KG/M2 | HEIGHT: 61 IN | DIASTOLIC BLOOD PRESSURE: 72 MMHG | OXYGEN SATURATION: 97 % | HEART RATE: 73 BPM | RESPIRATION RATE: 18 BRPM | TEMPERATURE: 97.1 F | SYSTOLIC BLOOD PRESSURE: 147 MMHG

## 2020-10-29 VITALS — SYSTOLIC BLOOD PRESSURE: 108 MMHG | DIASTOLIC BLOOD PRESSURE: 58 MMHG | OXYGEN SATURATION: 100 %

## 2020-10-29 LAB
ANION GAP SERPL CALCULATED.3IONS-SCNC: 11 MMOL/L (ref 3–16)
BUN BLDV-MCNC: 6 MG/DL (ref 7–20)
CALCIUM SERPL-MCNC: 9.3 MG/DL (ref 8.3–10.6)
CHLORIDE BLD-SCNC: 104 MMOL/L (ref 99–110)
CO2: 22 MMOL/L (ref 21–32)
CREAT SERPL-MCNC: 0.7 MG/DL (ref 0.6–1.2)
GFR AFRICAN AMERICAN: >60
GFR NON-AFRICAN AMERICAN: >60
GLUCOSE BLD-MCNC: 117 MG/DL (ref 70–99)
HCT VFR BLD CALC: 41.1 % (ref 36–48)
HEMOGLOBIN: 13.9 G/DL (ref 12–16)
MAGNESIUM: 1.5 MG/DL (ref 1.8–2.4)
MCH RBC QN AUTO: 30.3 PG (ref 26–34)
MCHC RBC AUTO-ENTMCNC: 33.9 G/DL (ref 31–36)
MCV RBC AUTO: 89.3 FL (ref 80–100)
PDW BLD-RTO: 15.3 % (ref 12.4–15.4)
PLATELET # BLD: 321 K/UL (ref 135–450)
PMV BLD AUTO: 8 FL (ref 5–10.5)
POTASSIUM REFLEX MAGNESIUM: 3.5 MMOL/L (ref 3.5–5.1)
RBC # BLD: 4.6 M/UL (ref 4–5.2)
SODIUM BLD-SCNC: 137 MMOL/L (ref 136–145)
WBC # BLD: 8.5 K/UL (ref 4–11)

## 2020-10-29 PROCEDURE — 88307 TISSUE EXAM BY PATHOLOGIST: CPT

## 2020-10-29 PROCEDURE — 83735 ASSAY OF MAGNESIUM: CPT

## 2020-10-29 PROCEDURE — 85027 COMPLETE CBC AUTOMATED: CPT

## 2020-10-29 PROCEDURE — 2500000003 HC RX 250 WO HCPCS: Performed by: NURSE ANESTHETIST, CERTIFIED REGISTERED

## 2020-10-29 PROCEDURE — 3700000001 HC ADD 15 MINUTES (ANESTHESIA): Performed by: SURGERY

## 2020-10-29 PROCEDURE — 88184 FLOWCYTOMETRY/ TC 1 MARKER: CPT

## 2020-10-29 PROCEDURE — 88360 TUMOR IMMUNOHISTOCHEM/MANUAL: CPT

## 2020-10-29 PROCEDURE — 2709999900 HC NON-CHARGEABLE SUPPLY: Performed by: SURGERY

## 2020-10-29 PROCEDURE — 80048 BASIC METABOLIC PNL TOTAL CA: CPT

## 2020-10-29 PROCEDURE — 36415 COLL VENOUS BLD VENIPUNCTURE: CPT

## 2020-10-29 PROCEDURE — 88342 IMHCHEM/IMCYTCHM 1ST ANTB: CPT

## 2020-10-29 PROCEDURE — 3700000000 HC ANESTHESIA ATTENDED CARE: Performed by: SURGERY

## 2020-10-29 PROCEDURE — 88185 FLOWCYTOMETRY/TC ADD-ON: CPT

## 2020-10-29 PROCEDURE — 3600000012 HC SURGERY LEVEL 2 ADDTL 15MIN: Performed by: SURGERY

## 2020-10-29 PROCEDURE — 2500000003 HC RX 250 WO HCPCS

## 2020-10-29 PROCEDURE — 2500000003 HC RX 250 WO HCPCS: Performed by: SURGERY

## 2020-10-29 PROCEDURE — 2580000003 HC RX 258

## 2020-10-29 PROCEDURE — 88341 IMHCHEM/IMCYTCHM EA ADD ANTB: CPT

## 2020-10-29 PROCEDURE — 6360000002 HC RX W HCPCS: Performed by: NURSE ANESTHETIST, CERTIFIED REGISTERED

## 2020-10-29 PROCEDURE — 3600000002 HC SURGERY LEVEL 2 BASE: Performed by: SURGERY

## 2020-10-29 PROCEDURE — 7100000010 HC PHASE II RECOVERY - FIRST 15 MIN: Performed by: SURGERY

## 2020-10-29 PROCEDURE — 2580000003 HC RX 258: Performed by: SURGERY

## 2020-10-29 PROCEDURE — 38510 BIOPSY/REMOVAL LYMPH NODES: CPT | Performed by: SURGERY

## 2020-10-29 RX ORDER — LIDOCAINE HYDROCHLORIDE 10 MG/ML
INJECTION, SOLUTION EPIDURAL; INFILTRATION; INTRACAUDAL; PERINEURAL
Status: DISCONTINUED
Start: 2020-10-29 | End: 2020-10-29 | Stop reason: HOSPADM

## 2020-10-29 RX ORDER — HYDROCODONE BITARTRATE AND ACETAMINOPHEN 5; 325 MG/1; MG/1
1 TABLET ORAL EVERY 6 HOURS PRN
Qty: 12 TABLET | Refills: 0 | Status: SHIPPED | OUTPATIENT
Start: 2020-10-29 | End: 2020-11-01

## 2020-10-29 RX ORDER — SODIUM CHLORIDE 0.9 % (FLUSH) 0.9 %
10 SYRINGE (ML) INJECTION EVERY 12 HOURS SCHEDULED
Status: DISCONTINUED | OUTPATIENT
Start: 2020-10-29 | End: 2020-10-29 | Stop reason: HOSPADM

## 2020-10-29 RX ORDER — SODIUM CHLORIDE 0.9 % (FLUSH) 0.9 %
10 SYRINGE (ML) INJECTION PRN
Status: DISCONTINUED | OUTPATIENT
Start: 2020-10-29 | End: 2020-10-29 | Stop reason: HOSPADM

## 2020-10-29 RX ORDER — OXYCODONE HYDROCHLORIDE AND ACETAMINOPHEN 5; 325 MG/1; MG/1
1 TABLET ORAL PRN
Status: DISCONTINUED | OUTPATIENT
Start: 2020-10-29 | End: 2020-10-29 | Stop reason: HOSPADM

## 2020-10-29 RX ORDER — SODIUM CHLORIDE, SODIUM LACTATE, POTASSIUM CHLORIDE, CALCIUM CHLORIDE 600; 310; 30; 20 MG/100ML; MG/100ML; MG/100ML; MG/100ML
INJECTION, SOLUTION INTRAVENOUS CONTINUOUS
Status: DISCONTINUED | OUTPATIENT
Start: 2020-10-29 | End: 2020-10-29 | Stop reason: HOSPADM

## 2020-10-29 RX ORDER — OXYCODONE HYDROCHLORIDE AND ACETAMINOPHEN 5; 325 MG/1; MG/1
2 TABLET ORAL PRN
Status: DISCONTINUED | OUTPATIENT
Start: 2020-10-29 | End: 2020-10-29 | Stop reason: HOSPADM

## 2020-10-29 RX ORDER — LIDOCAINE HYDROCHLORIDE 20 MG/ML
INJECTION, SOLUTION EPIDURAL; INFILTRATION; INTRACAUDAL; PERINEURAL PRN
Status: DISCONTINUED | OUTPATIENT
Start: 2020-10-29 | End: 2020-10-29 | Stop reason: SDUPTHER

## 2020-10-29 RX ORDER — MORPHINE SULFATE 10 MG/ML
1 INJECTION, SOLUTION INTRAMUSCULAR; INTRAVENOUS EVERY 5 MIN PRN
Status: DISCONTINUED | OUTPATIENT
Start: 2020-10-29 | End: 2020-10-29 | Stop reason: HOSPADM

## 2020-10-29 RX ORDER — SODIUM CHLORIDE, SODIUM LACTATE, POTASSIUM CHLORIDE, CALCIUM CHLORIDE 600; 310; 30; 20 MG/100ML; MG/100ML; MG/100ML; MG/100ML
INJECTION, SOLUTION INTRAVENOUS
Status: COMPLETED
Start: 2020-10-29 | End: 2020-10-29

## 2020-10-29 RX ORDER — FENTANYL CITRATE 50 UG/ML
INJECTION, SOLUTION INTRAMUSCULAR; INTRAVENOUS PRN
Status: DISCONTINUED | OUTPATIENT
Start: 2020-10-29 | End: 2020-10-29 | Stop reason: SDUPTHER

## 2020-10-29 RX ORDER — PROPOFOL 10 MG/ML
INJECTION, EMULSION INTRAVENOUS PRN
Status: DISCONTINUED | OUTPATIENT
Start: 2020-10-29 | End: 2020-10-29 | Stop reason: SDUPTHER

## 2020-10-29 RX ORDER — ONDANSETRON 2 MG/ML
4 INJECTION INTRAMUSCULAR; INTRAVENOUS
Status: DISCONTINUED | OUTPATIENT
Start: 2020-10-29 | End: 2020-10-29 | Stop reason: HOSPADM

## 2020-10-29 RX ORDER — MEPERIDINE HYDROCHLORIDE 25 MG/ML
12.5 INJECTION INTRAMUSCULAR; INTRAVENOUS; SUBCUTANEOUS EVERY 5 MIN PRN
Status: DISCONTINUED | OUTPATIENT
Start: 2020-10-29 | End: 2020-10-29 | Stop reason: HOSPADM

## 2020-10-29 RX ORDER — MORPHINE SULFATE 10 MG/ML
2 INJECTION, SOLUTION INTRAMUSCULAR; INTRAVENOUS EVERY 5 MIN PRN
Status: DISCONTINUED | OUTPATIENT
Start: 2020-10-29 | End: 2020-10-29 | Stop reason: HOSPADM

## 2020-10-29 RX ORDER — MIDAZOLAM HYDROCHLORIDE 1 MG/ML
INJECTION INTRAMUSCULAR; INTRAVENOUS PRN
Status: DISCONTINUED | OUTPATIENT
Start: 2020-10-29 | End: 2020-10-29 | Stop reason: SDUPTHER

## 2020-10-29 RX ORDER — MAGNESIUM HYDROXIDE 1200 MG/15ML
LIQUID ORAL CONTINUOUS PRN
Status: COMPLETED | OUTPATIENT
Start: 2020-10-29 | End: 2020-10-29

## 2020-10-29 RX ADMIN — SODIUM CHLORIDE, SODIUM LACTATE, POTASSIUM CHLORIDE, CALCIUM CHLORIDE 1000 ML: 600; 310; 30; 20 INJECTION, SOLUTION INTRAVENOUS at 10:34

## 2020-10-29 RX ADMIN — MIDAZOLAM 2 MG: 1 INJECTION INTRAMUSCULAR; INTRAVENOUS at 12:26

## 2020-10-29 RX ADMIN — FAMOTIDINE 20 MG: 10 INJECTION INTRAVENOUS at 10:35

## 2020-10-29 RX ADMIN — FENTANYL CITRATE 50 MCG: 50 INJECTION INTRAMUSCULAR; INTRAVENOUS at 12:30

## 2020-10-29 RX ADMIN — LIDOCAINE HYDROCHLORIDE 50 MG: 20 INJECTION, SOLUTION EPIDURAL; INFILTRATION; INTRACAUDAL; PERINEURAL at 12:30

## 2020-10-29 RX ADMIN — PROPOFOL 70 MG: 10 INJECTION, EMULSION INTRAVENOUS at 12:30

## 2020-10-29 RX ADMIN — FENTANYL CITRATE 50 MCG: 50 INJECTION INTRAMUSCULAR; INTRAVENOUS at 12:26

## 2020-10-29 RX ADMIN — SODIUM CHLORIDE, POTASSIUM CHLORIDE, SODIUM LACTATE AND CALCIUM CHLORIDE 1000 ML: 600; 310; 30; 20 INJECTION, SOLUTION INTRAVENOUS at 10:34

## 2020-10-29 ASSESSMENT — PULMONARY FUNCTION TESTS
PIF_VALUE: 0

## 2020-10-29 ASSESSMENT — ENCOUNTER SYMPTOMS: SHORTNESS OF BREATH: 0

## 2020-10-29 ASSESSMENT — PAIN SCALES - GENERAL
PAINLEVEL_OUTOF10: 0
PAINLEVEL_OUTOF10: 0

## 2020-10-29 ASSESSMENT — LIFESTYLE VARIABLES: SMOKING_STATUS: 1

## 2020-10-29 NOTE — OP NOTE
[address]                 [address] [address]                                OPERATIVE REPORT    PATIENT NAME: Graciela Moffett                      :        1955  MED REC NO:   [id number]                          ROOM:  ACCOUNT NO:   [de-identified]                           ADMIT DATE: 10/29/2020  PROVIDER:     Alejandra Cruz MD    DATE OF PROCEDURE:  10/29/2020    PREOPERATIVE DIAGNOSIS:  Right cervical lymphadenopathy. POSTOPERATIVE DIAGNOSIS:  Right cervical lymphadenopathy. PROCEDURE:  Excisional biopsy of right cervical lymph node. ANESTHESIA:  Local with MAC. SURGEON:  Lili Cruz MD    ESTIMATED BLOOD LOSS:  Less than 50 mL. INDICATIONS:  The patient is a 71-year-old woman who presented with  diffuse lymphadenopathy. She is brought in for cervical node biopsy. OPERATIVE SUMMARY:  After preoperative evaluation, the patient was  brought in the operating suite and placed in a comfortable supine  position on the operating room table. Monitoring equipment was attached  and she was given intravenous sedation per Anesthesia. Her right  cervical area was sterilely prepped and draped and anesthetized with  local anesthetic. An incision was made over the node and this was  dissected down through the subcutaneous tissues and platysma. The  muscle fibers were split bluntly and the lymph node was identified. It  was dissected free of the surrounding structures and the lymphatic and  vascular attachments were clamped, divided, and ligated. The node was  passed off. Bleeding was controlled with electrocautery. The incision  was closed with interrupted subcutaneous sutures of 3-0 Vicryl and a  running subcuticular suture of 4-0 Vicryl. Benzoin, Steri-Strips, and  dry sterile dressings were applied. All sponge, needle, and instrument  counts were correct at the end of the case.   The patient tolerated the  procedure well and was taken to the recovery area in stable condition. Lauren Gr MD    D: 10/29/2020 13:01:44       T: 10/29/2020 13:10:58     [email or web address]  Job#: [id number]     Doc#: [id number]    CC:  Md Evangelina Garcia MD Lena Lemme, MD

## 2020-10-29 NOTE — ANESTHESIA POSTPROCEDURE EVALUATION
Department of Anesthesiology  Postprocedure Note    Patient: Cynthia Lerma  MRN: [id number]  YOB: 1955  Date of evaluation: 10/29/2020  Time:  1:20 PM     Procedure Summary     Date:  10/29/20 Room / Location:  [address] [place] [address] / [facility]    Anesthesia Start:  6343 Anesthesia Stop:  1252    Procedure:  RIGHT CERVICAL LYMPH NODE BIOPSY (Right Neck) Diagnosis:  (LYMPHADENOPATHY)    Surgeon:  Jean Paul Ochoa MD Responsible Provider:  Maya Us MD    Anesthesia Type:  TIVA ASA Status:  2          Anesthesia Type: TIVA    Rosa Maria Phase I: Rosa Maria Score: 10    Rosa Maria Phase II: Rosa Maria Score: 10    Last vitals: Reviewed and per EMR flowsheets.      Vitals:    10/29/20 1019 10/29/20 1254 10/29/20 1302   BP: (!) 158/81 127/76 (!) 147/72   Pulse: 77 67 73   Resp: 16 16 18   Temp: 97.6 °F (36.4 °C) 97.2 °F (36.2 °C) 97.1 °F (36.2 °C)   TempSrc: Temporal Temporal Temporal   SpO2: 97% 97% 97%   Weight: 135 lb (61.2 kg)     Height: 5' 1\" (1.549 m)       Anesthesia Post Evaluation    Patient location during evaluation: bedside  Patient participation: complete - patient participated  Level of consciousness: awake and alert  Nausea & Vomiting: no nausea  Complications: no  Cardiovascular status: hemodynamically stable  Respiratory status: acceptable  Hydration status: euvolemic

## 2020-10-29 NOTE — BRIEF OP NOTE
Brief Postoperative Note      Patient: Lachelle Castellon  YOB: 1955  MRN: [id number]    Date of Procedure: 10/29/2020    Pre-Op Diagnosis: LYMPHADENOPATHY    Post-Op Diagnosis: Same       Procedure(s):  RIGHT CERVICAL LYMPH NODE BIOPSY    Surgeon(s):  Dinora Heard MD    Assistant:  Surgical Assistant: Nicho Fernandez    Anesthesia: Monitor Anesthesia Care    Estimated Blood Loss (mL): less than 50     Complications: None    Specimens:   ID Type Source Tests Collected by Time Destination   A : right cervical lymph node Tissue Tissue SURGICAL PATHOLOGY Dinora eHard MD 10/29/2020 1242        Implants:  * No implants in log *      Drains:   Closed/Suction Drain Right RLQ Bulb 12 Austrian (Active)       Closed/Suction Drain Right RLQ Bulb 10 Austrian (Active)       Findings: as above    Electronically signed by Taj Connell MD on 10/29/2020 at 12:43 PM

## 2020-10-29 NOTE — ANESTHESIA PRE PROCEDURE
Department of Anesthesiology  Preprocedure Note       Name:  Jass Simpson   Age:  63 y.o.  :  1955                                          MRN:  [id number]         Date:  10/29/2020      Surgeon: Navdeep Sun):  Selene Vargas MD    Procedure: Procedure(s):  RIGHT CERVICAL LYMPH NODE BIOPSY    Medications prior to admission:   Prior to Admission medications    Medication Sig Start Date End Date Taking? Authorizing Provider   fluticasone (FLONASE) 50 MCG/ACT nasal spray 2 sprays by Nasal route as needed for Rhinitis or Allergies 10/5/20  Yes Dwayne Middleton MD   cetirizine (ZYRTEC) 10 MG tablet TAKE 1 TABLET BY MOUTH DAILY 10/5/20  Yes Dwayne Middleton MD   SPIRIVA HANDIHALER 18 MCG inhalation capsule INHALE 1 CAPSULE INTO THE LUNGS DAILY 10/5/20  Yes Dwayne Middleton MD   vitamin D (ERGOCALCIFEROL) 1.25 MG (82317 UT) CAPS capsule Take 1 capsule by mouth once a week 10/1/20  Yes Agnieszka Watt MD   aspirin 325 MG EC tablet Take 1 tablet by mouth daily 10/1/20 10/1/21 Yes Agnieszka Watt MD   gabapentin (NEURONTIN) 400 MG capsule Take 400 mg by mouth 3 times daily.    Yes Historical Provider, MD   lisinopril (PRINIVIL;ZESTRIL) 40 MG tablet Take 40 mg by mouth daily   Yes Historical Provider, MD   tiZANidine (ZANAFLEX) 2 MG tablet TAKE 1 TABLET BY MOUTH 2 TIMES DAILY AS NEEDED (MUSCLE SPASM AND PAIN) 8/10/20  Yes Dwayne Middleton MD   albuterol sulfate  (90 Base) MCG/ACT inhaler INHALE 2 PUFFS INTO THE LUNGS EVERY 6 HOURS AS NEEDED FOR WHEEZING 8/10/20  Yes Dwayne Middleton MD   atorvastatin (LIPITOR) 80 MG tablet TAKE 1 TABLET BY MOUTH NIGHTLY 7/10/20  Yes Dwayne Middleton MD   DULoxetine (CYMBALTA) 60 MG extended release capsule TAKE 1 CAPSULE BY MOUTH DAILY 7/10/20  Yes Dwayne Middleton MD   traZODone (DESYREL) 50 MG tablet TAKE 1 TABLET BY MOUTH NIGHTLY 7/10/20  Yes Dwayne Middleton MD   nicotine (NICODERM CQ) 7 MG/24HR PLACE 1 PATCH ONTO THE SKIN DAILY (ROTATE SITES) 19  Yes Jcarlos Mesa Nilam Moreno MD   lidocaine (LIDODERM) 5 % PLACE 1 PATCH ONTO THE SKIN DAILY 12 HOURS ON, 12 HOURS OFF. 10/5/20 11/4/20  Bernice Frederick MD   famotidine (PEPCID) 20 MG tablet TAKE 1 TABLET BY MOUTH TWICE DAILY  Patient taking differently: as needed  8/10/20   Bernice Frederick MD       Current medications:    Current Facility-Administered Medications   Medication Dose Route Frequency Provider Last Rate Last Dose    lactated ringers infusion   Intravenous Continuous Darlene Colón  mL/hr at 10/29/20 1034 1,000 mL at 10/29/20 1034    sodium chloride flush 0.9 % injection 10 mL  10 mL Intravenous 2 times per day Darlene Colón MD        sodium chloride flush 0.9 % injection 10 mL  10 mL Intravenous PRN Darlene Colón MD        lidocaine PF 1 % injection                Allergies:     Allergies   Allergen Reactions    Lorazepam      Confusion, hallucinations  Can take lorazepam but not Ativan    Codeine Hives     Tolerates Norco.       Problem List:    Patient Active Problem List   Diagnosis Code    Essential hypertension I10    Chest pain R07.9    Multiple falls R29.6    Recurrent pain of right knee M25.561    Sternal mass M89.8X8    Current smoker F17.200    LAD (lymphadenopathy), inguinal R59.0    Chronic pain syndrome G89.4    Moderate single current episode of major depressive disorder (HCC) F32.1    Chronic bilateral low back pain without sciatica M54.5, G89.29    Prediabetes R73.03    Vitamin D deficiency E55.9    History of fasciotomy Z98.890    History of DVT in adulthood Z80.65    Personal history of TIA (transient ischemic attack) Z86.73    Deep vein thrombosis (DVT) of left lower extremity (HCC) I82.402    Neuropathy G62.9    Lung nodule R91.1    Bone marrow edema R93.7    Centrilobular emphysema (HCC) J43.2    HLD (hyperlipidemia) E78.5    Hypertensive urgency I16.0    Oropharyngeal dysphagia R13.12    Muscle cramps R25.2    Pulmonary lesion of left side of chest J98.4    JASPREET (acute kidney injury) ([facility] [address] N17.9    Abnormal liver enzymes R74.8    Epigastric pain R10.13    Right upper quadrant abdominal pain R10.11    Perforated gallbladder K82.2    Anemia D64.9    Cholecystitis K81.9    Dizziness R42    Diarrhea of presumed infectious origin R19.7    Fracture of 2nd metatarsal S92.323A    Dislocation of fourth toe, left, closed, initial encounter S93.105A       Past Medical History:        Diagnosis Date    JASPREET (acute kidney injury) ([facility] [address] 2020    Arterial occlusion     Asthma     Centrilobular emphysema ([facility] [address] 2019    pt denies    Chronic bilateral low back pain with bilateral sciatica 8/3/2018    Chronic bilateral low back pain with bilateral sciatica     Hx of blood clots     DVT    Hyperlipidemia     Hypertension     Left lower lobe pulmonary nodule     MDRO (multiple drug resistant organisms) resistance 2020    Escherichia coli-URINE    Metabolic encephalopathy     Moderate single current episode of major depressive disorder ([facility] [address] 8/3/2018    Ruptured disk        Past Surgical History:        Procedure Laterality Date     SECTION  1981    CHOLECYSTECTOMY N/A 2020    ROBOTIC     CHOLECYSTECTOMY, LAPAROSCOPIC N/A 2020    ROBOTIC CHOLECYSTECTOMY performed by Angela Mixon MD at [facility] 15 Left 10/1/2020    LEFT GASTROCNEMIUS RECESSION performed by Terry [facility]ardenas MD at [facility] 15  2017    fasciotomy due to DVT, LLE    OSTEOTOMY Left 10/1/2020    LEFT FOOT SECOND, THIRD, FOURTH AND FIFTH  METATARSAL HEAD RESECTION -BLOCK- -BAYLEE=5 performed by Terry Cardenas MD at [place] [address] History:    Social History     Tobacco Use    Smoking status: Current Every Day Smoker     Packs/day: 0.50     Years: 20.00     Pack years: 10.00     Types: Cigarettes    Smokeless tobacco: Never Used    Tobacco comment: 6 cigarettes per day   Substance Use Topics    Alcohol use: No                                Ready to quit: Not Answered  Counseling given: Not Answered  Comment: 6 cigarettes per day      Vital Signs (Current):   Vitals:    10/29/20 1019   BP: (!) 158/81   Pulse: 77   Resp: 16   Temp: 97.6 °F (36.4 °C)   TempSrc: Temporal   SpO2: 97%   Weight: 135 lb (61.2 kg)   Height: 5' 1\" (1.549 m)                                              BP Readings from Last 3 Encounters:   10/29/20 (!) 158/81   10/20/20 124/80   10/13/20 (!) 176/101       NPO Status: Time of last liquid consumption: 1600                        Time of last solid consumption: 1600                        Date of last liquid consumption: 10/28/20                        Date of last solid food consumption: 10/28/20    BMI:   Wt Readings from Last 3 Encounters:   10/29/20 135 lb (61.2 kg)   10/20/20 145 lb (65.8 kg)   10/16/20 137 lb (62.1 kg)     Body mass index is 25.51 kg/m². CBC:   Lab Results   Component Value Date    WBC 8.5 10/29/2020    RBC 4.60 10/29/2020    HGB 13.9 10/29/2020    HCT 41.1 10/29/2020    MCV 89.3 10/29/2020    RDW 15.3 10/29/2020     10/29/2020       CMP:   Lab Results   Component Value Date     07/16/2020    K 4.0 07/16/2020    K 3.6 07/15/2020     07/16/2020    CO2 24 07/16/2020    BUN 6 07/16/2020    CREATININE 0.7 07/16/2020    GFRAA >60 07/16/2020    GFRAA >60 05/27/2012    AGRATIO 1.1 07/15/2020    LABGLOM >60 07/16/2020    GLUCOSE 98 07/16/2020    PROT 5.8 07/15/2020    PROT 8.3 05/27/2012    CALCIUM 8.2 07/16/2020    BILITOT <0.2 07/15/2020    ALKPHOS 74 07/15/2020    AST 10 07/15/2020    ALT <5 07/15/2020       POC Tests: No results for input(s): POCGLU, POCNA, POCK, POCCL, POCBUN, POCHEMO, POCHCT in the last 72 hours.     Coags:   Lab Results   Component Value Date    PROTIME 11.4 10/05/2020    INR 0.98 10/05/2020    APTT 30.4 04/24/2020       HCG (If Applicable): No results found for: PREGTESTUR, PREGSERUM, HCG, HCGQUANT     ABGs: No results found for: PHART, PO2ART, PSF8CEL, MAZ9LPI, BEART, N7DWHWAU     Type & Screen (If Applicable):  No results found for: LABABO, LABRH    Drug/Infectious Status (If Applicable):  No results found for: HIV, HEPCAB    COVID-19 Screening (If Applicable):   Lab Results   Component Value Date    COVID19 Not Detected 10/27/2020    COVID19 Not Detected 07/13/2020         Anesthesia Evaluation  Patient summary reviewed no history of anesthetic complications:   Airway: Mallampati: II  TM distance: >3 FB   Neck ROM: full  Mouth opening: > = 3 FB Dental:    (+) upper dentures      Pulmonary: breath sounds clear to auscultation  (+) COPD (NO O2 REQ., PRN INHALER AND DAILY SINGULAIR, STABLE):  asthma: current smoker (1 CIG/D)    (-) shortness of breath and sleep apnea          Patient did not smoke on day of surgery. Cardiovascular:    (+) hypertension:, hyperlipidemia    (-) pacemaker, past MI, CAD, CABG/stent, dysrhythmias,  angina and  CHF    ECG reviewed  Rhythm: regular  Rate: normal           Beta Blocker:  Not on Beta Blocker         Neuro/Psych:   (+) neuromuscular disease (CHRONIC PAIN):, depression/anxiety  (HX DEP.)   (-) seizures, TIA and CVA           GI/Hepatic/Renal:   (+) renal disease (( HX JASPREET W/ RHABDO, RESOLVED)):,      (-) GERD, liver disease and no morbid obesity       Endo/Other:    (+) : arthritis:., malignancy/cancer (? LYMPHOMA , R LUNG ?). (-) diabetes mellitus, hypothyroidism, hyperthyroidism, blood dyscrasia               Abdominal:           Vascular:   + DVT (3 YRS AGO, NO THINNERS), . Anesthesia Plan      TIVA     ASA 2       Induction: intravenous. MIPS: Postoperative opioids intended and Prophylactic antiemetics administered. Anesthetic plan and risks discussed with patient. Plan discussed with CRNA.             This pre-anesthesia assessment may be used as a history and physical.    DOS STAFF ADDENDUM:    Pt seen and examined, chart reviewed (including anesthesia, drug and allergy history). No interval changes to history and physical examination. Anesthetic plan, risks, benefits, alternatives, and personnel involved discussed with patient. Patient verbalized an understanding and agrees to proceed.       Mimi East MD  October 29, 2020  10:58 AM      Mimi East MD   10/29/2020

## 2020-10-29 NOTE — PROGRESS NOTES
Patient given oral fluids, patient tolerating well. Patient denies any pain. Will continue to monitor.

## 2020-11-02 ENCOUNTER — HOSPITAL ENCOUNTER (OUTPATIENT)
Dept: CT IMAGING | Age: 65
Discharge: HOME | End: 2020-11-02
Payer: COMMERCIAL

## 2020-11-02 ENCOUNTER — HOSPITAL ENCOUNTER (OUTPATIENT)
Dept: GENERAL RADIOLOGY | Age: 65
Discharge: HOME | End: 2020-11-02
Payer: COMMERCIAL

## 2020-11-02 VITALS
OXYGEN SATURATION: 96 % | RESPIRATION RATE: 15 BRPM | WEIGHT: 135 LBS | SYSTOLIC BLOOD PRESSURE: 148 MMHG | DIASTOLIC BLOOD PRESSURE: 92 MMHG | BODY MASS INDEX: 25.49 KG/M2 | HEIGHT: 61 IN | HEART RATE: 75 BPM

## 2020-11-02 PROCEDURE — 6360000002 HC RX W HCPCS: Performed by: RADIOLOGY

## 2020-11-02 PROCEDURE — 77012 CT SCAN FOR NEEDLE BIOPSY: CPT

## 2020-11-02 PROCEDURE — 71045 X-RAY EXAM CHEST 1 VIEW: CPT

## 2020-11-02 PROCEDURE — 88342 IMHCHEM/IMCYTCHM 1ST ANTB: CPT

## 2020-11-02 PROCEDURE — 88341 IMHCHEM/IMCYTCHM EA ADD ANTB: CPT

## 2020-11-02 PROCEDURE — 2709999900 CT NEEDLE BIOPSY LUNG PERCUTANEOUS

## 2020-11-02 PROCEDURE — 88305 TISSUE EXAM BY PATHOLOGIST: CPT

## 2020-11-02 PROCEDURE — 99152 MOD SED SAME PHYS/QHP 5/>YRS: CPT

## 2020-11-02 RX ORDER — FENTANYL CITRATE 50 UG/ML
INJECTION, SOLUTION INTRAMUSCULAR; INTRAVENOUS
Status: COMPLETED | OUTPATIENT
Start: 2020-11-02 | End: 2020-11-02

## 2020-11-02 RX ORDER — MIDAZOLAM HYDROCHLORIDE 5 MG/ML
INJECTION INTRAMUSCULAR; INTRAVENOUS
Status: COMPLETED | OUTPATIENT
Start: 2020-11-02 | End: 2020-11-02

## 2020-11-02 RX ORDER — ACETAMINOPHEN 325 MG/1
650 TABLET ORAL EVERY 4 HOURS PRN
Status: DISCONTINUED | OUTPATIENT
Start: 2020-11-02 | End: 2020-11-03 | Stop reason: HOSPADM

## 2020-11-02 RX ADMIN — FENTANYL CITRATE 50 MCG: 50 INJECTION INTRAMUSCULAR; INTRAVENOUS at 09:15

## 2020-11-02 RX ADMIN — MIDAZOLAM HYDROCHLORIDE 1 MG: 5 INJECTION, SOLUTION INTRAMUSCULAR; INTRAVENOUS at 09:15

## 2020-11-02 RX ADMIN — FENTANYL CITRATE 25 MCG: 50 INJECTION INTRAMUSCULAR; INTRAVENOUS at 09:13

## 2020-11-02 RX ADMIN — FENTANYL CITRATE 25 MCG: 50 INJECTION INTRAMUSCULAR; INTRAVENOUS at 09:11

## 2020-11-02 RX ADMIN — MIDAZOLAM HYDROCHLORIDE 1 MG: 5 INJECTION, SOLUTION INTRAMUSCULAR; INTRAVENOUS at 09:13

## 2020-11-02 ASSESSMENT — PAIN SCALES - GENERAL
PAINLEVEL_OUTOF10: 0

## 2020-11-02 ASSESSMENT — PAIN - FUNCTIONAL ASSESSMENT: PAIN_FUNCTIONAL_ASSESSMENT: 0-10

## 2020-11-02 NOTE — PROGRESS NOTES
Patient discharged home in stable condition. INT removed intact. Discharge instructions given to patient and patient's brother. VSS at time of discharge, biopsy site WNL. Patient left hospital in stable condition.   Carmita OH RN

## 2020-11-02 NOTE — PRE SEDATION
Sedation Pre-Procedure Note    Patient Name: Jass Simpson   YOB: 1955  Room/Bed: Room/bed info not found  Medical Record Number: [id number]  Date: 2020   Time: 8:30 AM       Indication:  Left lung nodule for core biopsy    Consent: I have discussed with the patient and/or the patient representative the indication, alternatives, and the possible risks and/or complications of the planned procedure and the anesthesia methods. The patient and/or patient representative appear to understand and agree to proceed. Vital Signs:   Vitals:    20 0804   BP: (!) 173/97   Pulse: 68   Resp: 15   SpO2: 98%       Past Medical History:   has a past medical history of JASPREET (acute kidney injury) ([facility] [address] Arterial occlusion, Asthma, Centrilobular emphysema (HCC), Chronic bilateral low back pain with bilateral sciatica, Chronic bilateral low back pain with bilateral sciatica, Hx of blood clots, Hyperlipidemia, Hypertension, Left lower lobe pulmonary nodule, MDRO (multiple drug resistant organisms) resistance, Metabolic encephalopathy, Moderate single current episode of major depressive disorder ([facility] [address] and Ruptured disk. Past Surgical History:   has a past surgical history that includes  section (); Leg Surgery (2017); Cholecystectomy (N/A, 2020); Cholecystectomy, laparoscopic (N/A, 2020); osteotomy (Left, 10/1/2020); Gastrocnemius Recession (Left, 10/1/2020); and lymph node biopsy (Right, 10/29/2020). Medications:   Scheduled Meds:   Continuous Infusions:   PRN Meds:   Home Meds:   Prior to Admission medications    Medication Sig Start Date End Date Taking?  Authorizing Provider   aspirin 325 MG EC tablet Take 1 tablet by mouth daily 10/1/20 10/1/21 Yes Agnieszka Watt MD   fluticasone [facility]) 50 MCG/ACT nasal spray 2 sprays by Nasal route as needed for Rhinitis or Allergies 10/5/20   Dwayne Middleton MD   lidocaine (LIDODERM) 5 % PLACE 1 PATCH ONTO THE SKIN DAILY 12 HOURS ON, disease    Sedation/ Anesthesia Plan:   intravenous sedation    Medications Planned:   midazolam (Versed) intravenously and fentanyl intravenously    Patient is an appropriate candidate for plan of sedation: yes    Electronically signed by Bryn Warren MD on 11/2/2020 at 8:30 AM

## 2020-11-02 NOTE — PROGRESS NOTES
Patient moved to recovery area lying left side down, VSS on RA. Biopsy site WNL. Call light within reach. No needs at this time. Monitoring.   Summer CASANDRA OH RN

## 2020-11-02 NOTE — BRIEF OP NOTE
Brief Operative Note      Patient: Lachelle Castellon MRN: [id number]     YOB: 1955  Age: 63 y.o. Sex: female        DATE OF PROCEDURE: 11/2/2020     PROCEDURE PERFORMED: LLL nodule core biopsies    SURGEON: Raciel Mix MD    ANESTHESIA: Fentanyl, Versed    Estimated Blood Loss:none    Complications: None. Device implanted: None. Specimen: 2 solid cores, 18 gauge 1.0 and 1.5 cm.     Electronically signed by Raciel Mix MD on 11/2/2020 at 9:22 AM

## 2020-11-03 ENCOUNTER — TELEPHONE (OUTPATIENT)
Dept: FAMILY MEDICINE CLINIC | Age: 65
End: 2020-11-03

## 2020-11-03 NOTE — TELEPHONE ENCOUNTER
Pt. Requesting pain medication. She states she had a lung biopsy yesterday and she has a bruise on her rib area where they did the biopsy and that area is very sore.

## 2020-11-04 ENCOUNTER — VIRTUAL VISIT (OUTPATIENT)
Dept: PULMONOLOGY | Age: 65
End: 2020-11-04
Payer: COMMERCIAL

## 2020-11-04 PROCEDURE — 99443 PR PHYS/QHP TELEPHONE EVALUATION 21-30 MIN: CPT | Performed by: INTERNAL MEDICINE

## 2020-11-04 PROCEDURE — 99406 BEHAV CHNG SMOKING 3-10 MIN: CPT | Performed by: INTERNAL MEDICINE

## 2020-11-04 NOTE — TELEPHONE ENCOUNTER
Refill Request     Last Seen: 5/7/2020    Last Written: #30  0rf  10/1/2020    Next Appointment:   Future Appointments   Date Time Provider Elia Pulliam   11/13/2020  3:00 PM [facility] PULMONARY FUNCTION TESTING [facility] PFT Peter Damon   2/19/2021 10:45 AM MD Erik Hemphill [facility]       Appointment scheduled      Requested Prescriptions     Pending Prescriptions Disp Refills    aspirin 325 MG EC tablet [Pharmacy Med Name: ASPIRIN  MG TABS] 30 tablet 0     Sig: TAKE 1 TABLET BY MOUTH DAILY [facility] Geryl Lot [id number])

## 2020-11-04 NOTE — PROGRESS NOTES
Luz Barton is a 63 y.o. female evaluated via telephone on 11/4/2020. Consent:  She and/or health care decision maker is aware that that she may receive a bill for this telephone service, depending on her insurance coverage, and has provided verbal consent to proceed: Yes      Documentation:  I communicated with the patient and/or health care decision maker about a lung nodule. Details of this discussion including any medical advice provided:   C/o residual pain from her lung biopsy    11/2/20 Percutaneous lung biopsy: Left lower lobe superior segment lung nodule, biopsies:  - Adenocarcinoma. 8/30/18 Chest CT:  Spiculated 1.3 x 0.8 cm nodule within the left lower lobe.  Percutaneous   biopsy is recommended.       Additional less than 6 mm nodules are noted.  Management will depend upon   management of larger lesion.      9/11/18 PET CT:   Left lower lobe pulmonary nodule seen on recent CT scan is not significantly   hypermetabolic.  Recommend continued CT follow-up given that it is a new   finding and there is underlying emphysema.       7/30/19 Chest CT  Small supraclavicular nodes are seen bilaterally.  Index node on the right   measures 1.1 cm.  Appearance is similar. No embolus is seen in the central, right, or left main pulmonary artery.  No   embolus is seen on the right or left, at least to the subsegmental level       There is underlying emphysema, mild in degree.  De pendant opacity is seen at   the lung bases, likely atelectasis.       Irregular nodule is seen in the left lower lobe, measuring 1.5 cm x 9 mm,   similar prior study when remeasured.        9/11/20 Chest CT  Mediastinum: There has been interval increase in size of mediastinal lymph    nodes particularly in the pretracheal/AP window region.  Possible developing    lymphadenopathy in the subcarinal region.  There are calcified left hilar    lymph nodes consistent with changes of old granulomatous disease. Mare Mitchell is    increasing bilateral axillary lymphadenopathy.  There are atherosclerotic    changes of the aorta with no evidence of aneurysm.  No evidence of    significant pericardial effusion.  Faint coronary artery calcification is    identified.  No focal abnormality of the thyroid gland is identified. Esophagus is unremarkable in appearance.         Lungs/pleura: There has been interval increase in size of the pulmonary    nodule with ill-defined/spiculated margin in the superior segment of the left    lower lobe (image 38).  Finding measures 2.0 x 1.3 cm in transverse diameter    on the current study.  Finding measured 1.6 x 1.2 cm on the study of    02/27/2020. Aleshia An is a new 4 mm nodule within the anterior aspect of the    left lower lobe adjacent to the major fissure (image 49).  Subtle    ground-glass density within upper lobes adjacent to the fissures may    represent minimal dependent atelectasis.  Other linear/bandlike densities    within the lungs may represent minimal subsegmental atelectasis as well. There is minimal biapical fibrotic change.  There are mild emphysematous    changes of the lungs. Ralston An is suggestion of presence of bronchomalacia with    narrowing of portions of the trachea and mainstem bronchi as compared to the    study of 02/27/2020.  There is peribronchial cuffing/thickening with    suggestion of focal areas of mucous plugging. 9/25/20 PET CT:   HEAD/NECK: Multiple bilateral metabolically active cervical lymph nodes with    a representative right level 5 lymph node measuring 1.8 cm with SUV max of    2.3.         CHEST: Focal FDG activity localizes to the nodule in the superior segment of    the left lower lobe, SUV max of 2.7.  This has progressed since the PET-CT    scan 09/11/2018.         Increased FDG activity corresponds to bilateral enlarged axillary lymph nodes    with representative lymph nodes as follows:         Left axillary lymph node measures 2.9 x 2.3 cm with SUV max of 2.3.      Right axillary lymph node measures 3.3 x 1.8 cm, SUV max of 2.2.         No metabolically active mediastinal lymphadenopathy.         ABDOMEN/PELVIS: No metabolically active intraperitoneal mass.  Increased    metabolic activity associated with bilateral inguinal lymph nodes. Representative right inguinal lymph node measures 1.7 cm in short axis    dimension with SUV max of 1.1.  Physiologic activity in the gastrointestinal    and genitourinary systems.         BONES/SOFT TISSUE: No abnormal FDG activity localizes to the bones.  No    aggressive osseous lesion.         INCIDENTAL CT FINDINGS: Emphysema.  Mild atherosclerotic calcifications.  The    gallbladder is not visualized.  Colonic diverticulosis without evidence of    acute diverticulitis.         Impression    1.  Multiple bilateral enlarged cervical, axillary, and inguinal lymph nodes    with mild associated metabolic activity.  These have developed since the    prior PET-CT scan and could represent benign and malignant etiologies    including metastatic disease or lymphoma.         2.  Mild activity associated with the enlarging nodule in the superior    segment of the left lower lobe.  This could represent primary lung cancer or    be related to the lymphadenopathy. ASSESSMENT:  · 2cm LLL Adenocarcinoma: Appears to be stage 1; had been stabe in size for 2 years until the recent repeat zct  · No mediastinal lymphadenopathy or hypermetabolism  · Lymphoma with Supraclavicular palpable lymph nodes and enlarged axillary and inguinal LN on PET CT  · Cigarette smoker     PLAN:   · Follow up with Dr Cecile Ghosh scheduled this week  · May be a candidate for resection of her lung cancer. She needs PFTs. If she turns out not to be a surgical candidate then I think she should be considered for SBRT. · We discussed smoking cessation for >3 minutes.  We discussed the association of smoking with the patient's current symptoms and the risks of continued use and the options for achieving cessation. The patient set a quit date this week andusing patches   · Full PFTs  · Will discuss POC with Dr. Lattie Olszewski    I affirm this is a Patient Initiated Episode with a Patient who has not had a related appointment within my department in the past 7 days or scheduled within the next 24 hours.     Patient identification was verified at the start of the visit: Yes    Total Time: 15 min    Note: not billable if this call serves to triage the patient into an appointment for the relevant concern      SUE AGUILERA

## 2020-11-04 NOTE — TELEPHONE ENCOUNTER
We've reviewed this many times, our office does not prescribe pain medication, especially via phone. She can try ice to help with inflammation, topical medications OTC like voltaren, tylenol, etc. She can also contact the person who did the procedure for advice as well.  Thank you

## 2020-11-05 RX ORDER — ASPIRIN 325 MG
TABLET, DELAYED RELEASE (ENTERIC COATED) ORAL
Qty: 30 TABLET | Refills: 0 | Status: SHIPPED | OUTPATIENT
Start: 2020-11-05 | End: 2020-11-11

## 2020-11-09 ENCOUNTER — TELEPHONE (OUTPATIENT)
Dept: ORTHOPEDIC SURGERY | Age: 65
End: 2020-11-09

## 2020-11-09 ENCOUNTER — OFFICE VISIT (OUTPATIENT)
Dept: CARDIOTHORACIC SURGERY | Age: 65
End: 2020-11-09
Payer: COMMERCIAL

## 2020-11-09 ENCOUNTER — HOSPITAL ENCOUNTER (OUTPATIENT)
Age: 65
Discharge: HOME | End: 2020-11-09
Payer: COMMERCIAL

## 2020-11-09 VITALS
HEIGHT: 61 IN | DIASTOLIC BLOOD PRESSURE: 92 MMHG | TEMPERATURE: 97.8 F | SYSTOLIC BLOOD PRESSURE: 160 MMHG | BODY MASS INDEX: 27.94 KG/M2 | HEART RATE: 70 BPM | WEIGHT: 148 LBS | OXYGEN SATURATION: 94 %

## 2020-11-09 PROCEDURE — G8484 FLU IMMUNIZE NO ADMIN: HCPCS | Performed by: THORACIC SURGERY (CARDIOTHORACIC VASCULAR SURGERY)

## 2020-11-09 PROCEDURE — 3017F COLORECTAL CA SCREEN DOC REV: CPT | Performed by: THORACIC SURGERY (CARDIOTHORACIC VASCULAR SURGERY)

## 2020-11-09 PROCEDURE — 99203 OFFICE O/P NEW LOW 30 MIN: CPT | Performed by: THORACIC SURGERY (CARDIOTHORACIC VASCULAR SURGERY)

## 2020-11-09 PROCEDURE — G8419 CALC BMI OUT NRM PARAM NOF/U: HCPCS | Performed by: THORACIC SURGERY (CARDIOTHORACIC VASCULAR SURGERY)

## 2020-11-09 PROCEDURE — U0003 INFECTIOUS AGENT DETECTION BY NUCLEIC ACID (DNA OR RNA); SEVERE ACUTE RESPIRATORY SYNDROME CORONAVIRUS 2 (SARS-COV-2) (CORONAVIRUS DISEASE [COVID-19]), AMPLIFIED PROBE TECHNIQUE, MAKING USE OF HIGH THROUGHPUT TECHNOLOGIES AS DESCRIBED BY CMS-2020-01-R: HCPCS

## 2020-11-09 PROCEDURE — 1036F TOBACCO NON-USER: CPT | Performed by: THORACIC SURGERY (CARDIOTHORACIC VASCULAR SURGERY)

## 2020-11-09 PROCEDURE — G8427 DOCREV CUR MEDS BY ELIG CLIN: HCPCS | Performed by: THORACIC SURGERY (CARDIOTHORACIC VASCULAR SURGERY)

## 2020-11-09 NOTE — PROGRESS NOTES
Referred by Dr. Jeyson Montalvo  Review of Systems:  Constitutional:  No night sweats, headaches, weight loss. Eyes:  No glaucoma, cataracts. ENMT:  No nosebleeds, deviated septum. Cardiac:  No arrhythmias. Vascular:  No claudication, varicosities. GI:  No PUD, heartburn. :  No kidney stones, frequent UTIs  Musculoskeletal:  No arthritis, gout. Respiratory:  No SOB, emphysema, + asthma. Follows with Major. Integumentary:  No dermatitis, itching, rash. Neurological:  No stroke, TIAs, seizures. Psychiatric:  No depression, anxiety. Endocrine: No diabetes, thyroid issues. Hematologic:  + history of DVT. No bleeding, easy bruising. Immunologic:  + lung cancer & lymphoma, steroid therapies.

## 2020-11-10 LAB — SARS-COV-2: NOT DETECTED

## 2020-11-11 RX ORDER — AMIODARONE HYDROCHLORIDE 200 MG/1
400 TABLET ORAL 2 TIMES DAILY
Qty: 8 TABLET | Refills: 0 | Status: ON HOLD | OUTPATIENT
Start: 2020-11-11 | End: 2020-11-20 | Stop reason: HOSPADM

## 2020-11-11 RX ORDER — ASPIRIN 81 MG/1
81 TABLET ORAL DAILY
COMMUNITY

## 2020-11-11 NOTE — PROGRESS NOTES
Obstructive Sleep Apnea (BAYLEE) Screening     Patient:  Golden Bustillo    YOB: 1955      Medical Record #:  [id number]                     Date:  11/11/2020     1. Are you a loud and/or regular snorer? [x]  Yes       [] No    2. Have you been observed to gasp or stop breathing during sleep? [x]  Yes       [] No    3. Do you feel tired or groggy upon awakening or do you awaken with a headache?           []  Yes       [x] No    4. Are you often tired or fatigued during the wake time hours? []  Yes       [x] No    5. Do you fall asleep sitting, reading, watching TV or driving? []  Yes       [x] No    6. Do you often have problems with memory or concentration? []  Yes       [x] No    **If patient's score is ? 3 they are considered high risk for BAYLEE. An Anesthesia provider will evaluate the patient and develop a plan of care the day of surgery. Note:  If the patient's BMI is more than 35 kg m¯² , has neck circumference > 40 cm, and/or high blood pressure the risk is greater (© American [employer]nea Association, 2006).

## 2020-11-12 ENCOUNTER — OFFICE VISIT (OUTPATIENT)
Dept: PRIMARY CARE CLINIC | Age: 65
End: 2020-11-12

## 2020-11-12 ENCOUNTER — HOSPITAL ENCOUNTER (OUTPATIENT)
Dept: GENERAL RADIOLOGY | Age: 65
Discharge: HOME | End: 2020-11-12
Payer: COMMERCIAL

## 2020-11-12 ENCOUNTER — HOSPITAL ENCOUNTER (OUTPATIENT)
Dept: PREADMISSION TESTING | Age: 65
Discharge: HOME | End: 2020-11-16
Payer: COMMERCIAL

## 2020-11-12 LAB
ABO/RH: NORMAL
ABO/RH: NORMAL
ANION GAP SERPL CALCULATED.3IONS-SCNC: 7 MMOL/L (ref 3–16)
ANTIBODY SCREEN: NORMAL
BASOPHILS ABSOLUTE: 0.1 K/UL (ref 0–0.2)
BASOPHILS RELATIVE PERCENT: 0.7 %
BILIRUBIN URINE: NEGATIVE
BLOOD, URINE: NEGATIVE
BUN BLDV-MCNC: 8 MG/DL (ref 7–20)
CALCIUM SERPL-MCNC: 9.5 MG/DL (ref 8.3–10.6)
CHLORIDE BLD-SCNC: 104 MMOL/L (ref 99–110)
CLARITY: CLEAR
CO2: 27 MMOL/L (ref 21–32)
COLOR: YELLOW
CREAT SERPL-MCNC: 0.6 MG/DL (ref 0.6–1.2)
EKG ATRIAL RATE: 67 BPM
EKG DIAGNOSIS: NORMAL
EKG P AXIS: 56 DEGREES
EKG P-R INTERVAL: 144 MS
EKG Q-T INTERVAL: 396 MS
EKG QRS DURATION: 90 MS
EKG QTC CALCULATION (BAZETT): 418 MS
EKG R AXIS: 35 DEGREES
EKG T AXIS: 50 DEGREES
EKG VENTRICULAR RATE: 67 BPM
EOSINOPHILS ABSOLUTE: 0.3 K/UL (ref 0–0.6)
EOSINOPHILS RELATIVE PERCENT: 3.3 %
GFR AFRICAN AMERICAN: >60
GFR NON-AFRICAN AMERICAN: >60
GLUCOSE BLD-MCNC: 97 MG/DL (ref 70–99)
GLUCOSE URINE: NEGATIVE MG/DL
HCT VFR BLD CALC: 36.9 % (ref 36–48)
HEMOGLOBIN: 12.7 G/DL (ref 12–16)
KETONES, URINE: NEGATIVE MG/DL
LEUKOCYTE ESTERASE, URINE: NEGATIVE
LYMPHOCYTES ABSOLUTE: 2.6 K/UL (ref 1–5.1)
LYMPHOCYTES RELATIVE PERCENT: 31.8 %
MCH RBC QN AUTO: 30.2 PG (ref 26–34)
MCHC RBC AUTO-ENTMCNC: 34.3 G/DL (ref 31–36)
MCV RBC AUTO: 88.1 FL (ref 80–100)
MICROSCOPIC EXAMINATION: NORMAL
MONOCYTES ABSOLUTE: 0.4 K/UL (ref 0–1.3)
MONOCYTES RELATIVE PERCENT: 5.2 %
NEUTROPHILS ABSOLUTE: 4.7 K/UL (ref 1.7–7.7)
NEUTROPHILS RELATIVE PERCENT: 59 %
NITRITE, URINE: NEGATIVE
PDW BLD-RTO: 14.2 % (ref 12.4–15.4)
PH UA: 6 (ref 5–8)
PLATELET # BLD: 270 K/UL (ref 135–450)
PMV BLD AUTO: 8.3 FL (ref 5–10.5)
POTASSIUM SERPL-SCNC: 4.2 MMOL/L (ref 3.5–5.1)
PROTEIN UA: NEGATIVE MG/DL
RBC # BLD: 4.19 M/UL (ref 4–5.2)
SODIUM BLD-SCNC: 138 MMOL/L (ref 136–145)
SPECIFIC GRAVITY UA: 1.02 (ref 1–1.03)
URINE REFLEX TO CULTURE: NORMAL
URINE TYPE: NORMAL
UROBILINOGEN, URINE: 0.2 E.U./DL
WBC # BLD: 8 K/UL (ref 4–11)

## 2020-11-12 PROCEDURE — 86850 RBC ANTIBODY SCREEN: CPT

## 2020-11-12 PROCEDURE — 86901 BLOOD TYPING SEROLOGIC RH(D): CPT

## 2020-11-12 PROCEDURE — 93010 ELECTROCARDIOGRAM REPORT: CPT | Performed by: INTERNAL MEDICINE

## 2020-11-12 PROCEDURE — 93005 ELECTROCARDIOGRAM TRACING: CPT | Performed by: THORACIC SURGERY (CARDIOTHORACIC VASCULAR SURGERY)

## 2020-11-12 PROCEDURE — 71046 X-RAY EXAM CHEST 2 VIEWS: CPT

## 2020-11-12 PROCEDURE — 80048 BASIC METABOLIC PNL TOTAL CA: CPT

## 2020-11-12 PROCEDURE — 81003 URINALYSIS AUTO W/O SCOPE: CPT

## 2020-11-12 PROCEDURE — 99999 PR OFFICE/OUTPT VISIT,PROCEDURE ONLY: CPT | Performed by: NURSE PRACTITIONER

## 2020-11-12 PROCEDURE — 86900 BLOOD TYPING SEROLOGIC ABO: CPT

## 2020-11-12 PROCEDURE — 85025 COMPLETE CBC W/AUTO DIFF WBC: CPT

## 2020-11-12 PROCEDURE — 36415 COLL VENOUS BLD VENIPUNCTURE: CPT

## 2020-11-12 NOTE — PATIENT INSTRUCTIONS

## 2020-11-12 NOTE — PROGRESS NOTES
Mirian Rodriguez received a viral test for COVID-19. They were educated on isolation and quarantine as appropriate. For any symptoms, they were directed to seek care from their PCP, given contact information to establish with a doctor, directed to an urgent care or the emergency room.

## 2020-11-13 ENCOUNTER — HOSPITAL ENCOUNTER (OUTPATIENT)
Dept: PULMONOLOGY | Age: 65
Discharge: HOME | End: 2020-11-13
Payer: COMMERCIAL

## 2020-11-13 VITALS — OXYGEN SATURATION: 95 %

## 2020-11-13 LAB — SARS-COV-2: NOT DETECTED

## 2020-11-13 PROCEDURE — 94760 N-INVAS EAR/PLS OXIMETRY 1: CPT

## 2020-11-13 PROCEDURE — 94729 DIFFUSING CAPACITY: CPT

## 2020-11-13 PROCEDURE — 94726 PLETHYSMOGRAPHY LUNG VOLUMES: CPT

## 2020-11-13 PROCEDURE — 94060 EVALUATION OF WHEEZING: CPT

## 2020-11-13 PROCEDURE — 6370000000 HC RX 637 (ALT 250 FOR IP): Performed by: INTERNAL MEDICINE

## 2020-11-13 PROCEDURE — 94640 AIRWAY INHALATION TREATMENT: CPT

## 2020-11-13 RX ORDER — ALBUTEROL SULFATE 90 UG/1
2 AEROSOL, METERED RESPIRATORY (INHALATION) ONCE
Status: COMPLETED | OUTPATIENT
Start: 2020-11-13 | End: 2020-11-13

## 2020-11-13 RX ADMIN — Medication 2 PUFF: at 13:07

## 2020-11-13 NOTE — PROGRESS NOTES
[address] [facility]   Cardiovascular Evaluation    PATIENT: Akira Links  DATE: 2020  MRN: [id number]  CSN: [id number]  : 1955      Primary Care Doctor: Estefana Goodell, MD  Reason for evaluation:   New Patient and Cardiac Clearance (MIYA Lobectomy procedure on 2020)      Subjective:   History of present illness on initial date of evaluation:  Dheeraj GautamPascual Choudhury is a 68-year-old female with a history of PVD s/p left femoral artery open cut-down with thromboembolectomy of left common iliac artery and left peroneal, anterior tibial, and posterior tibial arteries on 16, TIA, remote DVT, hypertension, hyperlipidemia, and recently diagnosed B cell lymphoma and left upper lobe adenocarcinoma who presents for pre-operative cardiac risk assessment prior to planned left upper lobectomy with Dr. Getachew Lee on 20. Her oncologist is Dr. Estefani Millan. She has not yet started chemotherapy for her lymphoma. In July, she underwent robotic cholecystectomy for a perforated gallbladder. In October, she underwent head resection of her left second, third, fourth, and fifth metatarsals under general anesthesia. She tolerated both procedures well. Prior to her foot surgery, she says that she was able to walk at least 2 blocks and climb the three flights of stairs to her apartment. She climbing the stairs she would stop briefly at each landing to rest.  She does not get chest pain with exertion, but says that she will have some shortness of breath which has been chronic and stable. She recently quit smoking. For the last 5 days, he has had constant dull left-sided chest pain that is reproducible when pressing on her chest.  She has never had pain like this before. She denies any lightheadedness, dizziness, palpitations, lower extremity edema, orthopnea, or paroxysmal nocturnal dyspnea. Her ECG shows normal sinus rhythm with no other significant changes.   She had a pharmacologic nuclear SPECT stress test on 7/31/19 that demonstrated normal myocardial perfusion with preserved LVEF. Prior to this, she had a pharmacologic nuclear SPECT stress test on 9/15/2020 that also showed normal myocardial perfusion with preserved LVEF. Her last echocardiogram was performed on 12/19/2016 and showed a LVEF of 55-60% with normal wall motion, low normal RV systolic function, and no hemodynamically significant valvular abnormalities.         Patient Active Problem List   Diagnosis    Essential hypertension    Chest pain    Multiple falls    Recurrent pain of right knee    Sternal mass    Current smoker    LAD (lymphadenopathy), inguinal    Chronic pain syndrome    Moderate single current episode of major depressive disorder (HCC)    Chronic bilateral low back pain without sciatica    Prediabetes    Vitamin D deficiency    History of fasciotomy    History of DVT in adulthood    Personal history of TIA (transient ischemic attack)    Deep vein thrombosis (DVT) of left lower extremity (HCC)    Neuropathy    Lung nodule    Bone marrow edema    Centrilobular emphysema (HCC)    HLD (hyperlipidemia)    Hypertensive urgency    Oropharyngeal dysphagia    Muscle cramps    Pulmonary lesion of left side of chest    JASPREET (acute kidney injury) (HCC)    Abnormal liver enzymes    Epigastric pain    Right upper quadrant abdominal pain    Perforated gallbladder    Anemia    Cholecystitis    Dizziness    Diarrhea of presumed infectious origin    Fracture of 2nd metatarsal    Dislocation of fourth toe, left, closed, initial encounter    Lymphadenopathy of right cervical region       Past Medical History:   has a past medical history of JASPREET (acute kidney injury) ([facility], [place] [address] Arterial occlusion, Asthma, Centrilobular emphysema (HCC), Chronic bilateral low back pain with bilateral sciatica, Chronic bilateral low back pain with bilateral sciatica, Hx of blood clots, Hyperlipidemia, Hypertension, Left lower lobe 1.25 MG (79650 UT) CAPS capsule Take 1 capsule by mouth once a week 12 capsule 0    gabapentin (NEURONTIN) 400 MG capsule Take 400 mg by mouth 3 times daily.  lisinopril (PRINIVIL;ZESTRIL) 40 MG tablet Take 40 mg by mouth daily      atorvastatin (LIPITOR) 80 MG tablet TAKE 1 TABLET BY MOUTH NIGHTLY 90 tablet 1    DULoxetine (CYMBALTA) 60 MG extended release capsule TAKE 1 CAPSULE BY MOUTH DAILY 90 capsule 1    traZODone (DESYREL) 50 MG tablet TAKE 1 TABLET BY MOUTH NIGHTLY 90 tablet 1    nicotine (NICODERM CQ) 7 MG/24HR PLACE 1 PATCH ONTO THE SKIN DAILY (ROTATE SITES) 14 patch 0     No current facility-administered medications for this visit. Allergies:  Lorazepam and Codeine     Review of Systems:   A 14 point review of symptoms completed. Pertinent positives identified in the HPI, all other review of symptoms negative as below. Review of Systems   Constitutional: Negative for chills and fever. HENT: Negative for congestion, rhinorrhea and sore throat. Eyes: Negative for photophobia, pain and visual disturbance. Respiratory: Positive for cough and shortness of breath. Cardiovascular: Positive for chest pain. Negative for palpitations and leg swelling. Gastrointestinal: Negative for abdominal pain, blood in stool, constipation, diarrhea, nausea and vomiting. Endocrine: Negative for cold intolerance and heat intolerance. Genitourinary: Negative for difficulty urinating, dysuria and hematuria. Musculoskeletal: Positive for arthralgias and myalgias. Negative for joint swelling. Skin: Negative for rash and wound. Allergic/Immunologic: Negative for environmental allergies and food allergies. Neurological: Negative for dizziness, syncope and light-headedness. Hematological: Negative for adenopathy. Does not bruise/bleed easily. Psychiatric/Behavioral: Negative for dysphoric mood. The patient is not nervous/anxious.         Objective:   PHYSICAL EXAM:    Vitals:    11/16/20 1007   BP: 130/60   Pulse: 74   SpO2: 98%    Weight: 151 lb 8 oz (68.7 kg)     Wt Readings from Last 3 Encounters:   11/16/20 151 lb 8 oz (68.7 kg)   11/09/20 148 lb (67.1 kg)   11/02/20 135 lb (61.2 kg)     General: Adult female in no acute distress. Pleasant and interactive on exam.  HEENT: Normocephalic, atraumatic, non-icteric, hearing intact, nares normal, mucous membranes moist.  Neck: Supple, trachea midline. Cervical adenopathy noted. No thyromegaly. No carotid bruits. No JVD. Heart: Regular rate and rhythm. Normal S1 and S2. No murmurs, gallops or rubs. Lungs: Breath sounds mildly diminished bilaterally. No wheezes, rales, or rhonchi. Abdomen: Soft, non-tender. Normoactive bowel sounds. No masses or organomegaly. Skin: No rashes, wounds, or lesions. Pulses: Radial pulses 2+ and symmetric. Extremities: No clubbing, cyanosis, or edema. Musculoskeletal: Spontaneously moves all four extremities. Psych: Normal mood and affect. Neuro: Alert and oriented to person, place, and time. No focal deficits noted.       LABS   CBC:      Lab Results   Component Value Date    WBC 8.0 11/12/2020    RBC 4.19 11/12/2020    HGB 12.7 11/12/2020    HCT 36.9 11/12/2020    MCV 88.1 11/12/2020    RDW 14.2 11/12/2020     11/12/2020     CMP:  Lab Results   Component Value Date     11/12/2020    K 4.2 11/12/2020    K 3.5 10/29/2020     11/12/2020    CO2 27 11/12/2020    BUN 8 11/12/2020    CREATININE 0.6 11/12/2020    GFRAA >60 11/12/2020    GFRAA >60 05/27/2012    AGRATIO 1.1 07/15/2020    LABGLOM >60 11/12/2020    GLUCOSE 97 11/12/2020    PROT 5.8 07/15/2020    PROT 8.3 05/27/2012    CALCIUM 9.5 11/12/2020    BILITOT <0.2 07/15/2020    ALKPHOS 74 07/15/2020    AST 10 07/15/2020    ALT <5 07/15/2020     PT/INR:   No results found for: PTINR  Liver:  No components found for: CHLPL  Lab Results   Component Value Date    ALT <5 (L) 07/15/2020    AST 10 (L) 07/15/2020    GGT 29 06/09/2020    ALKPHOS 74 07/15/2020    BILITOT <0.2 07/15/2020     Lab Results   Component Value Date    LABA1C 5.9 07/27/2018     Lipids:         Lab Results   Component Value Date    TRIG 106 09/18/2019    TRIG 68 02/07/2019    TRIG 360 (H) 07/27/2018            Lab Results   Component Value Date    HDL 85 (H) 09/18/2019    HDL 61 (H) 02/07/2019    HDL 48 07/27/2018            Lab Results   Component Value Date    LDLCALC 88 09/18/2019    LDLCALC 131 (H) 02/07/2019    LDLCALC see below 07/27/2018            Lab Results   Component Value Date    LABVLDL 21 09/18/2019    LABVLDL 14 02/07/2019    LABVLDL see below 07/27/2018         CARDIAC DATA   EKG 11/16/20:  Normal sinus rhythm. ECHO:  TTE 12/19/2016:  - Left ventricle: The cavity size was normal. Wall thickness was    normal. Systolic function was normal. The estimated ejection    fraction was in the range of 55% to 60%. Wall motion was normal;    there were no regional wall motion abnormalities. Left ventricular    diastolic function parameters were normal.  - Right ventricle: Systolic function was low normal by objective    interpretation. TAPSE: 1.9cm. Tricuspid annular systolic velocity:    32SJ/A.  - Pulmonary arteries: Systolic pressure could not be accurately    estimated. Impressions:  No cardiac source of emboli was indentified. STRESS TEST:   Pharmacologic Nuclear SPECT Stress 9/15/16:    Conclusions          Summary     Normal myocardial perfusion study with normal left ventricular function,     size, and wall motion.     The estimated left ventricular function is 69%. Pharmacologic Nuclear SPECT Stress 7/30/2019:  Conclusions          Summary     Normal LV function.    Charlie Alfredo is normal isotope uptake at stress and rest. There is no evidence of     myocardial ischemia or scar.     Normal myocardial perfusion study.           CARDIAC CATH: N/A      VASCULAR/OTHER IMAGING:  ABIs 2/14/17:  11/2/20 Percutaneous lung biopsy: Left lower lobe superior segment lung nodule, biopsies:  - Adenocarcinoma.      Non-contrast chest CT 9/11/20:   1. Findings consistent with interval increase in size of nodule with    spiculated margins in the superior segment of the left lower lobe measuring 2    cm in maximum transverse diameter on the current study.  Finding is    suspicious representing a neoplastic abnormality. 2. New 4 mm left lower lobe pulmonary nodule. 3. Increasing size of mediastinal lymph nodes.  Finding is suspicious    representing manifestation of metastatic disease. 4. Worsening bilateral axillary lymphadenopathy. PET CT 9/25/20:   1.  Multiple bilateral enlarged cervical, axillary, and inguinal lymph nodes    with mild associated metabolic activity.  These have developed since the    prior PET-CT scan and could represent benign and malignant etiologies    including metastatic disease or lymphoma.         2.  Mild activity associated with the enlarging nodule in the superior    segment of the left lower lobe.  This could represent primary lung cancer or    be related to the lymphadenopathy. Assessment:     1. Pre-operative cardiac risk assessment - RCRI is 1 and patient was able to perform at least 4 METs prior to her recent left foot operation. Her recent chest pain is due to chest wall pain and is easily reproducible on palpation. Pharmacologic nuclear SPECT stress test from 7/31/19 demonstrated normal myocardial perfusion with preserved LVEF. ECG today shows normal sinus rhythm with no other significant changes. Within the last 5 months, she has undergone two intermediate risk surgical procedures under general anesthesia and tolerated both without issue. Overall, she is low to intermediate risk for an adverse perioperative cardiovascular event in the setting of an intermediate risk surgical procedure (left upper lobectomy). 2. Non-cardiac chest pain - Presentation is consistent with chest wall pain. 3.  PVD - S/p left femoral artery open cut-down with thromboembolectomy of left common iliac artery and left peroneal, anterior tibial, and posterior tibial arteries on 12/18/16.  4. H/o TIA  5. Essential hypertension  6. Hyperlipidemia  7. B cell lymphoma  8. Left upper lobe adenocarcinoma   9. Remote DVT  10. S/p cholecystectomy       Plan:     1. Overall, patient is low to intermediate risk for an adverse perioperative cardiovascular event in the setting of an intermediate risk surgical procedure (left upper lobectomy). 2. No further cardiac evaluation is indicated prior to planned surgical intervention. 3. It is recommended that patient continue on aspirin 81 mg daily and atorvastatin 80 mg daily throughout the perioperative period. However, her surgery is scheduled in two days and she has apparently already started to hold her aspirin as previously instructed by surgical team.  Aspirin should be restarted as soon as possible after her surgery. 4. Continue other medications as previously prescribed. OK to hold lisinopril prior to surgery if desired by surgical team.  5. OK to take tylenol as needed for musculoskeletal pain. 6. Follow-up prn. This note was scribed in the presence of Eliazar Rizvi DO by Wiliam Chung RN. It is a pleasure to assist in the care of Young Kathy. Please call with any questions. The scribes documentation has been prepared under my direction and personally reviewed by me in its entirety. I confirm that the note above accurately reflects all work, treatment, procedures, and medical decision making performed by me. I, Dr. Max Gottlieb, personally performed the services described in this documentation as scribed by Wiliam Chung RN in my presence, and it is both accurate and complete to the best of our ability.          Max Gottlieb, [address] [facility]  [phone] [facility]  [phone] [facility]

## 2020-11-14 RX ORDER — ASPIRIN 325 MG
325 TABLET, DELAYED RELEASE (ENTERIC COATED) ORAL DAILY
Qty: 30 TABLET | Refills: 0 | OUTPATIENT
Start: 2020-11-14 | End: 2021-11-14

## 2020-11-14 RX ORDER — ERGOCALCIFEROL 1.25 MG/1
CAPSULE ORAL
Qty: 12 CAPSULE | Refills: 0 | OUTPATIENT
Start: 2020-11-14

## 2020-11-16 ENCOUNTER — OFFICE VISIT (OUTPATIENT)
Dept: CARDIOLOGY CLINIC | Age: 65
End: 2020-11-16
Payer: COMMERCIAL

## 2020-11-16 VITALS
DIASTOLIC BLOOD PRESSURE: 60 MMHG | OXYGEN SATURATION: 98 % | SYSTOLIC BLOOD PRESSURE: 130 MMHG | HEIGHT: 61 IN | HEART RATE: 74 BPM | WEIGHT: 151.5 LBS | BODY MASS INDEX: 28.6 KG/M2

## 2020-11-16 PROCEDURE — 93000 ELECTROCARDIOGRAM COMPLETE: CPT | Performed by: INTERNAL MEDICINE

## 2020-11-16 PROCEDURE — 99243 OFF/OP CNSLTJ NEW/EST LOW 30: CPT | Performed by: INTERNAL MEDICINE

## 2020-11-16 ASSESSMENT — ENCOUNTER SYMPTOMS
PHOTOPHOBIA: 0
SORE THROAT: 0
NAUSEA: 0
COUGH: 1
CONSTIPATION: 0
SHORTNESS OF BREATH: 1
ABDOMINAL PAIN: 0
RHINORRHEA: 0
DIARRHEA: 0
VOMITING: 0
EYE PAIN: 0
BLOOD IN STOOL: 0

## 2020-11-16 NOTE — PATIENT INSTRUCTIONS
Patient Plan:  1. Ok to proceed with surgery  2.  EKG today JESSI KING MRN-[id number]    Hospitalist Note  40yo F with Past Medical History Dermatomyositis (dx April 2018), cryptogenic organizing pneumonia/BOOP admitted in December for cough (dc on MMP, AZA and prednisone) readmitted for increasing muscle aches and weakness secondary to dermatomyositis flare.    Overnight events/Updates: The patient reports improvement in her rash and muscle pain. CK levels have decreased from 47038 to 5800.    Vital Signs Last 24 Hrs  T(C): 36.1 (22 Feb 2019 00:04), Max: 36.1 (22 Feb 2019 00:04)  T(F): 97 (22 Feb 2019 00:04), Max: 97 (22 Feb 2019 00:04)  HR: 80 (22 Feb 2019 08:11) (75 - 82)  BP: 118/64 (22 Feb 2019 00:04) (105/56 - 118/64)  BP(mean): --  RR: 18 (22 Feb 2019 00:04) (18 - 18)er   SpO2: 94% (22 Feb 2019 08:11) (94% - 99%)    Physical Examination:  General: AAO x 3  HEENT: PERRLA, EOMI  CV= S1 & S2 appreciated  Lungs=CTA BL  Abdominal Examination= + BS, Soft, NT/ND  Extremity Examination= No C/C/E    ROS: No chest pain, no shortness of breath.  All other systems reviewed and are within normal limits except for the complaints in the HPI.    MEDICATIONS  (STANDING):  azaTHIOprine 100 milliGRAM(s) Oral daily  heparin  Injectable 5000 Unit(s) SubCutaneous every 8 hours  melatonin 5 milliGRAM(s) Oral at bedtime  mycophenolate mofetil 1500 milliGRAM(s) Oral two times a day  predniSONE   Tablet 40 milliGRAM(s) Oral daily  sodium chloride 0.9%. 1000 milliLiter(s) (250 mL/Hr) IV Continuous <Continuous>  triamcinolone 0.1% Cream 1 Application(s) Topical every 12 hours  trimethoprim  160 mG/sulfamethoxazole 800 mG 1 Tablet(s) Oral <User Schedule>    MEDICATIONS  (PRN):  ALBUTerol    90 MICROgram(s) HFA Inhaler 2 Puff(s) Inhalation every 6 hours PRN Shortness of Breath and/or Wheezing  benzonatate 100 milliGRAM(s) Oral three times a day PRN Cough  hydrocortisone 1% Cream 1 Application(s) Topical daily PRN Rash and/or Itching  morphine  - Injectable 2 milliGRAM(s) IV Push every 4 hours PRN Severe Pain (7 - 10)                            10.3   9.57  )-----------( 590      ( 22 Feb 2019 07:59 )             32.9     02-22    139  |  103  |  11  ----------------------------<  91  4.3   |  22  |  <0.5<L>    Ca    8.7      22 Feb 2019 07:59  Phos  3.6     02-21  Mg     1.8     02-22    TPro  8.0  /  Alb  3.3<L>  /  TBili  <0.2  /  DBili  x   /  AST  166<H>  /  ALT  85<H>  /  AlkPhos  63  02-21      Case discussed with housestaff & family  BARRY Antonio 8189

## 2020-11-16 NOTE — LETTER
[address] [facility]   Cardiovascular Evaluation    PATIENT: Ainsley Alicia  DATE: 2020  MRN: [id number]  CSN: [id number]  : 1955      Primary Care Doctor: Elio Staton MD  Reason for evaluation:   New Patient and Cardiac Clearance (MIYA Lobectomy procedure on 2020)      Subjective:   History of present illness on initial date of evaluation:  Anu AhujaPascual Interiano is a 73-year-old female with a history of PVD s/p left femoral artery open cut-down with thromboembolectomy of left common iliac artery and left peroneal, anterior tibial, and posterior tibial arteries on 16, TIA, remote DVT, hypertension, hyperlipidemia, and recently diagnosed B cell lymphoma and left upper lobe adenocarcinoma who presents for pre-operative cardiac risk assessment prior to planned left upper lobectomy with Dr. Akins on 20. Her oncologist is Dr. Sami Encarnacion. She has not yet started chemotherapy for her lymphoma. In July, she underwent robotic cholecystectomy for a perforated gallbladder. In October, she underwent head resection of her left second, third, fourth, and fifth metatarsals under general anesthesia. She tolerated both procedures well. Prior to her foot surgery, she says that she was able to walk at least 2 blocks and climb the three flights of stairs to her apartment. She climbing the stairs she would stop briefly at each landing to rest.  She does not get chest pain with exertion, but says that she will have some shortness of breath which has been chronic and stable. She recently quit smoking. For the last 5 days, he has had constant dull left-sided chest pain that is reproducible when pressing on her chest.  She has never had pain like this before. She denies any lightheadedness, dizziness, palpitations, lower extremity edema, orthopnea, or paroxysmal nocturnal dyspnea.   Her ECG shows normal sinus rhythm with no other significant changes. She had a pharmacologic nuclear SPECT stress test on 7/31/19 that demonstrated normal myocardial perfusion with preserved LVEF. Prior to this, she had a pharmacologic nuclear SPECT stress test on 9/15/2020 that also showed normal myocardial perfusion with preserved LVEF. Her last echocardiogram was performed on 12/19/2016 and showed a LVEF of 55-60% with normal wall motion, low normal RV systolic function, and no hemodynamically significant valvular abnormalities.         Patient Active Problem List   Diagnosis    Essential hypertension    Chest pain    Multiple falls    Recurrent pain of right knee    Sternal mass    Current smoker    LAD (lymphadenopathy), inguinal    Chronic pain syndrome    Moderate single current episode of major depressive disorder (HCC)    Chronic bilateral low back pain without sciatica    Prediabetes    Vitamin D deficiency    History of fasciotomy    History of DVT in adulthood    Personal history of TIA (transient ischemic attack)    Deep vein thrombosis (DVT) of left lower extremity (HCC)    Neuropathy    Lung nodule    Bone marrow edema    Centrilobular emphysema (HCC)    HLD (hyperlipidemia)    Hypertensive urgency    Oropharyngeal dysphagia    Muscle cramps    Pulmonary lesion of left side of chest    JASPREET (acute kidney injury) (HCC)    Abnormal liver enzymes    Epigastric pain    Right upper quadrant abdominal pain    Perforated gallbladder    Anemia    Cholecystitis    Dizziness    Diarrhea of presumed infectious origin    Fracture of 2nd metatarsal    Dislocation of fourth toe, left, closed, initial encounter    Lymphadenopathy of right cervical region       Past Medical History:   has a past medical history of JASPREET (acute kidney injury) ([address] Arterial occlusion, Asthma, Centrilobular emphysema (HCC), Chronic bilateral low back pain with bilateral sciatica, Chronic bilateral low back pain with bilateral sciatica, Hx of blood clots, Hyperlipidemia, Hypertension, Left lower lobe pulmonary nodule, MDRO (multiple drug resistant organisms) resistance, Metabolic encephalopathy, Moderate single current episode of major depressive disorder ([address] and Ruptured disk. Surgical History:   has a past surgical history that includes  section (); Cholecystectomy, laparoscopic (N/A, 2020); osteotomy (Left, 10/1/2020); Gastrocnemius Recession (Left, 10/1/2020); lymph node biopsy (Right, 10/29/2020); CT NEEDLE BIOPSY LUNG PERCUTANEOUS (2020); and fasciotomy (Left, 2017). Social History:   reports that she quit smoking about 2 weeks ago. Her smoking use included cigarettes. She has a 10.00 pack-year smoking history. She has never used smokeless tobacco. She reports previous drug use. Drug: Opiates . She reports that she does not drink alcohol. Family History:  No evidence for sudden cardiac death or premature CAD. Home Medications:  Reviewed and are listed in nursing record. and/or listed below  Current Outpatient Medications   Medication Sig Dispense Refill    amiodarone (CORDARONE) 200 MG tablet Take 2 tablets by mouth 2 times daily Begin in AM on .  Last dose in PM on . 8 tablet 0    aspirin 81 MG EC tablet Take 81 mg by mouth daily      albuterol sulfate  (90 Base) MCG/ACT inhaler INHALE 2 PUFFS INTO THE LUNGS EVERY 6 HOURS AS NEEDED FOR WHEEZING 18 g 2    famotidine (PEPCID) 20 MG tablet Take 1 tablet by mouth 2 times daily as needed (heartburn) 180 tablet 1    tiZANidine (ZANAFLEX) 2 MG tablet TAKE 1 TABLET BY MOUTH 2 TIMES DAILY AS NEEDED (MUSCLE SPASM AND PAIN) 90 tablet 1    fluticasone (FLONASE) 50 MCG/ACT nasal spray 2 sprays by Nasal route as needed for Rhinitis or Allergies 1 Bottle 2    cetirizine (ZYRTEC) 10 MG tablet TAKE 1 TABLET BY MOUTH DAILY (Patient taking differently: daily as needed ) 90 tablet 1 No components found for: CHLPL  Lab Results   Component Value Date    ALT <5 (L) 07/15/2020    AST 10 (L) 07/15/2020    GGT 29 06/09/2020    ALKPHOS 74 07/15/2020    BILITOT <0.2 07/15/2020     Lab Results   Component Value Date    LABA1C 5.9 07/27/2018     Lipids:         Lab Results   Component Value Date    TRIG 106 09/18/2019    TRIG 68 02/07/2019    TRIG 360 (H) 07/27/2018            Lab Results   Component Value Date    HDL 85 (H) 09/18/2019    HDL 61 (H) 02/07/2019    HDL 48 07/27/2018            Lab Results   Component Value Date    LDLCALC 88 09/18/2019    LDLCALC 131 (H) 02/07/2019    LDLCALC see below 07/27/2018            Lab Results   Component Value Date    LABVLDL 21 09/18/2019    LABVLDL 14 02/07/2019    LABVLDL see below 07/27/2018         CARDIAC DATA   EKG 11/16/20:  Normal sinus rhythm. ECHO:  TTE 12/19/2016:  - Left ventricle: The cavity size was normal. Wall thickness was    normal. Systolic function was normal. The estimated ejection    fraction was in the range of 55% to 60%. Wall motion was normal;    there were no regional wall motion abnormalities. Left ventricular    diastolic function parameters were normal.  - Right ventricle: Systolic function was low normal by objective    interpretation. TAPSE: 1.9cm. Tricuspid annular systolic velocity:    29CU/V.  - Pulmonary arteries: Systolic pressure could not be accurately    estimated. Impressions:  No cardiac source of emboli was indentified. STRESS TEST:   Pharmacologic Nuclear SPECT Stress 9/15/16:    Conclusions          Summary     Normal myocardial perfusion study with normal left ventricular function,     size, and wall motion.     The estimated left ventricular function is 69%. Pharmacologic Nuclear SPECT Stress 7/30/2019:  Conclusions          Summary     Normal LV function.    Rashel Spatz is normal isotope uptake at stress and rest. There is no evidence of     myocardial ischemia or scar.  Normal myocardial perfusion study. CARDIAC CATH: N/A      VASCULAR/OTHER IMAGING:  ABIs 2/14/17:  11/2/20 Percutaneous lung biopsy: Left lower lobe superior segment lung nodule, biopsies:  - Adenocarcinoma.      Non-contrast chest CT 9/11/20:   1. Findings consistent with interval increase in size of nodule with    spiculated margins in the superior segment of the left lower lobe measuring 2    cm in maximum transverse diameter on the current study.  Finding is    suspicious representing a neoplastic abnormality. 2. New 4 mm left lower lobe pulmonary nodule. 3. Increasing size of mediastinal lymph nodes.  Finding is suspicious    representing manifestation of metastatic disease. 4. Worsening bilateral axillary lymphadenopathy. PET CT 9/25/20:   1.  Multiple bilateral enlarged cervical, axillary, and inguinal lymph nodes    with mild associated metabolic activity.  These have developed since the    prior PET-CT scan and could represent benign and malignant etiologies    including metastatic disease or lymphoma.         2.  Mild activity associated with the enlarging nodule in the superior    segment of the left lower lobe.  This could represent primary lung cancer or    be related to the lymphadenopathy. Assessment:     1. Pre-operative cardiac risk assessment - RCRI is 1 and patient was able to perform at least 4 METs prior to her recent left foot operation. Her recent chest pain is due to chest wall pain and is easily reproducible on palpation. Pharmacologic nuclear SPECT stress test from 7/31/19 demonstrated normal myocardial perfusion with preserved LVEF. ECG today shows normal sinus rhythm with no other significant changes. Within the last 5 months, she has undergone two intermediate risk surgical procedures under general anesthesia and tolerated both without issue.   Overall, she is low to intermediate risk for an adverse perioperative cardiovascular event in the setting of an intermediate risk surgical procedure (left upper lobectomy). 2. Non-cardiac chest pain - Presentation is consistent with chest wall pain. 3. PVD - S/p left femoral artery open cut-down with thromboembolectomy of left common iliac artery and left peroneal, anterior tibial, and posterior tibial arteries on 12/18/16.  4. H/o TIA  5. Essential hypertension  6. Hyperlipidemia  7. B cell lymphoma  8. Left upper lobe adenocarcinoma   9. Remote DVT  10. S/p cholecystectomy       Plan:     1. Overall, patient is low to intermediate risk for an adverse perioperative cardiovascular event in the setting of an intermediate risk surgical procedure (left upper lobectomy). 2. No further cardiac evaluation is indicated prior to planned surgical intervention. 3. It is recommended that patient continue on aspirin 81 mg daily and atorvastatin 80 mg daily throughout the perioperative period. However, her surgery is scheduled in two days and she has apparently already started to hold her aspirin as previously instructed by surgical team.  Aspirin should be restarted as soon as possible after her surgery. 4. Continue other medications as previously prescribed. OK to hold lisinopril prior to surgery if desired by surgical team.  5. OK to take tylenol as needed for musculoskeletal pain. 6. Follow-up prn. This note was scribed in the presence of Eliazar Rizvi DO by Bhumika Mc RN. It is a pleasure to assist in the care of Ainsley Alicia. Please call with any questions. The scribes documentation has been prepared under my direction and personally reviewed by me in its entirety. I confirm that the note above accurately reflects all work, treatment, procedures, and medical decision making performed by me.   I, Dr. Abhijeet Lackey, personally performed the services described in this documentation as scribed by Bhumika Mc RN in my presence, and it is both accurate and complete to the best of our ability.          Urmila Rausch, [address] [facility]  [phone] [facility]  [phone] [facility]

## 2020-11-17 ENCOUNTER — ANESTHESIA EVENT (OUTPATIENT)
Dept: OPERATING ROOM | Age: 65
DRG: 120 | End: 2020-11-17
Payer: COMMERCIAL

## 2020-11-17 ENCOUNTER — TELEPHONE (OUTPATIENT)
Dept: ORTHOPEDIC SURGERY | Age: 65
End: 2020-11-17

## 2020-11-17 LAB
DLCO %PRED: 52 %
DLCO PRED: NORMAL
DLCO/VA %PRED: NORMAL
DLCO/VA PRED: NORMAL
DLCO/VA: NORMAL
DLCO: NORMAL
EXPIRATORY TIME-POST: NORMAL
EXPIRATORY TIME: NORMAL
FEF 25-75% %CHNG: NORMAL
FEF 25-75% %PRED-POST: NORMAL
FEF 25-75% %PRED-PRE: NORMAL
FEF 25-75% PRED: NORMAL
FEF 25-75%-POST: NORMAL
FEF 25-75%-PRE: NORMAL
FEV1 %PRED-POST: 78 %
FEV1 %PRED-PRE: 78 %
FEV1 PRED: NORMAL
FEV1-POST: NORMAL
FEV1-PRE: NORMAL
FEV1/FVC %PRED-POST: NORMAL
FEV1/FVC %PRED-PRE: NORMAL
FEV1/FVC PRED: NORMAL
FEV1/FVC-POST: 65 %
FEV1/FVC-PRE: 68 %
FVC %PRED-POST: NORMAL
FVC %PRED-PRE: NORMAL
FVC PRED: NORMAL
FVC-POST: NORMAL
FVC-PRE: NORMAL
GAW %PRED: NORMAL
GAW PRED: NORMAL
GAW: NORMAL
IC %PRED: NORMAL
IC PRED: NORMAL
IC: NORMAL
MEP: NORMAL
MIP: NORMAL
MVV %PRED-PRE: NORMAL
MVV PRED: NORMAL
MVV-PRE: NORMAL
PEF %PRED-POST: NORMAL
PEF %PRED-PRE: NORMAL
PEF PRED: NORMAL
PEF%CHNG: NORMAL
PEF-POST: NORMAL
PEF-PRE: NORMAL
RAW %PRED: NORMAL
RAW PRED: NORMAL
RAW: NORMAL
RV %PRED: NORMAL
RV PRED: NORMAL
RV: NORMAL
SVC %PRED: NORMAL
SVC PRED: NORMAL
SVC: NORMAL
TLC %PRED: 86 %
TLC PRED: NORMAL
TLC: NORMAL
VA %PRED: NORMAL
VA PRED: NORMAL
VA: NORMAL
VTG %PRED: NORMAL
VTG PRED: NORMAL
VTG: NORMAL

## 2020-11-17 ASSESSMENT — PULMONARY FUNCTION TESTS
FEV1_PERCENT_PREDICTED_POST: 78
FEV1/FVC_PRE: 68
FEV1_PERCENT_PREDICTED_PRE: 78
FEV1/FVC_POST: 65

## 2020-11-17 NOTE — PROCEDURES
[address]                 [address] [address]                               PULMONARY FUNCTION    PATIENT NAME: Nuvia Pardo                      :        1955  MED REC NO:   [id number]                          ROOM:  ACCOUNT NO:   [de-identified]                           ADMIT DATE: 2020  PROVIDER:     Sharona Otero MD    DATE OF PROCEDURE:  2020    ORDERING PROVIDER:  Mehran Prakash MD    FINDINGS:  1. Spirometry: The FEV1 is 1.70 liters or 78% of predicted. The FVC  is 2.52 liters or 88% of predicted. The FEV1/FVC ratio is 68%. There  is no demonstrative response to inhaled bronchodilators. 2.  Plethysmography:  The total lung capacity is 4.01 liters or 86% of  predicted. 3.  Diffusion capacity:  The diffusion capacity for carbon monoxide is  11.02 mL/min/mmHg, which is 52% of predicted. 4.  Flow volume loops: The tracings appear to show an obstructive  defect pattern. IMPRESSION:  1. There is a mild obstructive lung defect without demonstrative  response to inhaled bronchodilators. 2.  There is no evidence of a restrictive ventilatory defect. 3.  There is a mild reduction in diffusion capacity. 4.  Overall, these pulmonary function tests are consistent with mild  COPD.         Elen Ortez MD    D: 2020 11:22:33       T: 2020 11:59:14     [email or web address]  Job#: [id number]     Doc#: [id number]    CC:

## 2020-11-18 ENCOUNTER — HOSPITAL ENCOUNTER (INPATIENT)
Age: 65
LOS: 2 days | Discharge: NURSING, REHAB OR LONG-TERM CARE | DRG: 120 | End: 2020-11-20
Attending: THORACIC SURGERY (CARDIOTHORACIC VASCULAR SURGERY) | Admitting: THORACIC SURGERY (CARDIOTHORACIC VASCULAR SURGERY)
Payer: COMMERCIAL

## 2020-11-18 ENCOUNTER — ANESTHESIA (OUTPATIENT)
Dept: OPERATING ROOM | Age: 65
DRG: 120 | End: 2020-11-18
Payer: COMMERCIAL

## 2020-11-18 ENCOUNTER — APPOINTMENT (OUTPATIENT)
Dept: GENERAL RADIOLOGY | Age: 65
DRG: 120 | End: 2020-11-18
Attending: THORACIC SURGERY (CARDIOTHORACIC VASCULAR SURGERY)
Payer: COMMERCIAL

## 2020-11-18 VITALS
SYSTOLIC BLOOD PRESSURE: 129 MMHG | OXYGEN SATURATION: 100 % | TEMPERATURE: 96.8 F | RESPIRATION RATE: 8 BRPM | DIASTOLIC BLOOD PRESSURE: 67 MMHG

## 2020-11-18 PROBLEM — R91.8 MASS OF LOWER LOBE OF LEFT LUNG: Status: ACTIVE | Noted: 2020-11-18

## 2020-11-18 LAB
ABO/RH: NORMAL
ABO/RH: NORMAL
ANTIBODY SCREEN: NORMAL
HCT VFR BLD CALC: 38.6 % (ref 36–48)
HEMOGLOBIN: 12.4 G/DL (ref 12–16)
MCH RBC QN AUTO: 29.2 PG (ref 26–34)
MCHC RBC AUTO-ENTMCNC: 32.2 G/DL (ref 31–36)
MCV RBC AUTO: 90.6 FL (ref 80–100)
PDW BLD-RTO: 15.1 % (ref 12.4–15.4)
PLATELET # BLD: 324 K/UL (ref 135–450)
PMV BLD AUTO: 7.5 FL (ref 5–10.5)
RBC # BLD: 4.26 M/UL (ref 4–5.2)
WBC # BLD: 14.3 K/UL (ref 4–11)

## 2020-11-18 PROCEDURE — 6360000002 HC RX W HCPCS: Performed by: NURSE ANESTHETIST, CERTIFIED REGISTERED

## 2020-11-18 PROCEDURE — 2580000003 HC RX 258: Performed by: NURSE ANESTHETIST, CERTIFIED REGISTERED

## 2020-11-18 PROCEDURE — 2720000010 HC SURG SUPPLY STERILE: Performed by: THORACIC SURGERY (CARDIOTHORACIC VASCULAR SURGERY)

## 2020-11-18 PROCEDURE — 6360000002 HC RX W HCPCS: Performed by: THORACIC SURGERY (CARDIOTHORACIC VASCULAR SURGERY)

## 2020-11-18 PROCEDURE — 3600000005 HC SURGERY LEVEL 5 BASE: Performed by: THORACIC SURGERY (CARDIOTHORACIC VASCULAR SURGERY)

## 2020-11-18 PROCEDURE — 86900 BLOOD TYPING SEROLOGIC ABO: CPT

## 2020-11-18 PROCEDURE — 2700000000 HC OXYGEN THERAPY PER DAY

## 2020-11-18 PROCEDURE — 32674 THORACOSCOPY LYMPH NODE EXC: CPT | Performed by: THORACIC SURGERY (CARDIOTHORACIC VASCULAR SURGERY)

## 2020-11-18 PROCEDURE — 86901 BLOOD TYPING SEROLOGIC RH(D): CPT

## 2020-11-18 PROCEDURE — 88305 TISSUE EXAM BY PATHOLOGIST: CPT

## 2020-11-18 PROCEDURE — 88309 TISSUE EXAM BY PATHOLOGIST: CPT

## 2020-11-18 PROCEDURE — 94762 N-INVAS EAR/PLS OXIMTRY CONT: CPT

## 2020-11-18 PROCEDURE — 07B74ZX EXCISION OF THORAX LYMPHATIC, PERCUTANEOUS ENDOSCOPIC APPROACH, DIAGNOSTIC: ICD-10-PCS | Performed by: THORACIC SURGERY (CARDIOTHORACIC VASCULAR SURGERY)

## 2020-11-18 PROCEDURE — 85027 COMPLETE CBC AUTOMATED: CPT

## 2020-11-18 PROCEDURE — 2500000003 HC RX 250 WO HCPCS: Performed by: THORACIC SURGERY (CARDIOTHORACIC VASCULAR SURGERY)

## 2020-11-18 PROCEDURE — 2580000003 HC RX 258: Performed by: THORACIC SURGERY (CARDIOTHORACIC VASCULAR SURGERY)

## 2020-11-18 PROCEDURE — 3600000015 HC SURGERY LEVEL 5 ADDTL 15MIN: Performed by: THORACIC SURGERY (CARDIOTHORACIC VASCULAR SURGERY)

## 2020-11-18 PROCEDURE — 94669 MECHANICAL CHEST WALL OSCILL: CPT

## 2020-11-18 PROCEDURE — 2000000000 HC ICU R&B

## 2020-11-18 PROCEDURE — 32663 THORACOSCOPY W/LOBECTOMY: CPT | Performed by: THORACIC SURGERY (CARDIOTHORACIC VASCULAR SURGERY)

## 2020-11-18 PROCEDURE — C1729 CATH, DRAINAGE: HCPCS | Performed by: THORACIC SURGERY (CARDIOTHORACIC VASCULAR SURGERY)

## 2020-11-18 PROCEDURE — 6360000002 HC RX W HCPCS: Performed by: ANESTHESIOLOGY

## 2020-11-18 PROCEDURE — 2709999900 HC NON-CHARGEABLE SUPPLY: Performed by: THORACIC SURGERY (CARDIOTHORACIC VASCULAR SURGERY)

## 2020-11-18 PROCEDURE — 6370000000 HC RX 637 (ALT 250 FOR IP): Performed by: THORACIC SURGERY (CARDIOTHORACIC VASCULAR SURGERY)

## 2020-11-18 PROCEDURE — 71045 X-RAY EXAM CHEST 1 VIEW: CPT

## 2020-11-18 PROCEDURE — 3E0T3BZ INTRODUCTION OF ANESTHETIC AGENT INTO PERIPHERAL NERVES AND PLEXI, PERCUTANEOUS APPROACH: ICD-10-PCS | Performed by: THORACIC SURGERY (CARDIOTHORACIC VASCULAR SURGERY)

## 2020-11-18 PROCEDURE — 94640 AIRWAY INHALATION TREATMENT: CPT

## 2020-11-18 PROCEDURE — C9290 INJ, BUPIVACAINE LIPOSOME: HCPCS | Performed by: THORACIC SURGERY (CARDIOTHORACIC VASCULAR SURGERY)

## 2020-11-18 PROCEDURE — 86850 RBC ANTIBODY SCREEN: CPT

## 2020-11-18 PROCEDURE — 94761 N-INVAS EAR/PLS OXIMETRY MLT: CPT

## 2020-11-18 PROCEDURE — 36415 COLL VENOUS BLD VENIPUNCTURE: CPT

## 2020-11-18 PROCEDURE — 2500000003 HC RX 250 WO HCPCS: Performed by: NURSE ANESTHETIST, CERTIFIED REGISTERED

## 2020-11-18 PROCEDURE — 0BTJ4ZZ RESECTION OF LEFT LOWER LUNG LOBE, PERCUTANEOUS ENDOSCOPIC APPROACH: ICD-10-PCS | Performed by: THORACIC SURGERY (CARDIOTHORACIC VASCULAR SURGERY)

## 2020-11-18 PROCEDURE — 0BJ08ZZ INSPECTION OF TRACHEOBRONCHIAL TREE, VIA NATURAL OR ARTIFICIAL OPENING ENDOSCOPIC: ICD-10-PCS | Performed by: THORACIC SURGERY (CARDIOTHORACIC VASCULAR SURGERY)

## 2020-11-18 PROCEDURE — 3700000001 HC ADD 15 MINUTES (ANESTHESIA): Performed by: THORACIC SURGERY (CARDIOTHORACIC VASCULAR SURGERY)

## 2020-11-18 PROCEDURE — 3700000000 HC ANESTHESIA ATTENDED CARE: Performed by: THORACIC SURGERY (CARDIOTHORACIC VASCULAR SURGERY)

## 2020-11-18 RX ORDER — FAMOTIDINE 20 MG/1
20 TABLET, FILM COATED ORAL 2 TIMES DAILY
Status: DISCONTINUED | OUTPATIENT
Start: 2020-11-18 | End: 2020-11-20 | Stop reason: HOSPADM

## 2020-11-18 RX ORDER — AMIODARONE HYDROCHLORIDE 200 MG/1
400 TABLET ORAL 2 TIMES DAILY
Status: DISCONTINUED | OUTPATIENT
Start: 2020-11-18 | End: 2020-11-20 | Stop reason: HOSPADM

## 2020-11-18 RX ORDER — TIZANIDINE 4 MG/1
2 TABLET ORAL 2 TIMES DAILY PRN
Status: DISCONTINUED | OUTPATIENT
Start: 2020-11-18 | End: 2020-11-20 | Stop reason: HOSPADM

## 2020-11-18 RX ORDER — DEXTROSE, SODIUM CHLORIDE, AND POTASSIUM CHLORIDE 5; .45; .15 G/100ML; G/100ML; G/100ML
INJECTION INTRAVENOUS CONTINUOUS
Status: DISCONTINUED | OUTPATIENT
Start: 2020-11-18 | End: 2020-11-20

## 2020-11-18 RX ORDER — SODIUM CHLORIDE, SODIUM LACTATE, POTASSIUM CHLORIDE, CALCIUM CHLORIDE 600; 310; 30; 20 MG/100ML; MG/100ML; MG/100ML; MG/100ML
INJECTION, SOLUTION INTRAVENOUS CONTINUOUS PRN
Status: DISCONTINUED | OUTPATIENT
Start: 2020-11-18 | End: 2020-11-18 | Stop reason: SDUPTHER

## 2020-11-18 RX ORDER — ROCURONIUM BROMIDE 10 MG/ML
INJECTION, SOLUTION INTRAVENOUS PRN
Status: DISCONTINUED | OUTPATIENT
Start: 2020-11-18 | End: 2020-11-18 | Stop reason: SDUPTHER

## 2020-11-18 RX ORDER — ACETAMINOPHEN 325 MG/1
650 TABLET ORAL EVERY 4 HOURS PRN
Status: DISCONTINUED | OUTPATIENT
Start: 2020-11-18 | End: 2020-11-20 | Stop reason: HOSPADM

## 2020-11-18 RX ORDER — ONDANSETRON 2 MG/ML
INJECTION INTRAMUSCULAR; INTRAVENOUS PRN
Status: DISCONTINUED | OUTPATIENT
Start: 2020-11-18 | End: 2020-11-18 | Stop reason: SDUPTHER

## 2020-11-18 RX ORDER — LISINOPRIL 20 MG/1
40 TABLET ORAL DAILY
Status: DISCONTINUED | OUTPATIENT
Start: 2020-11-18 | End: 2020-11-20 | Stop reason: HOSPADM

## 2020-11-18 RX ORDER — ONDANSETRON 2 MG/ML
4 INJECTION INTRAMUSCULAR; INTRAVENOUS
Status: DISCONTINUED | OUTPATIENT
Start: 2020-11-18 | End: 2020-11-18

## 2020-11-18 RX ORDER — GABAPENTIN 400 MG/1
400 CAPSULE ORAL 3 TIMES DAILY
Status: DISCONTINUED | OUTPATIENT
Start: 2020-11-18 | End: 2020-11-20 | Stop reason: HOSPADM

## 2020-11-18 RX ORDER — EPHEDRINE SULFATE 50 MG/ML
INJECTION INTRAVENOUS PRN
Status: DISCONTINUED | OUTPATIENT
Start: 2020-11-18 | End: 2020-11-18 | Stop reason: SDUPTHER

## 2020-11-18 RX ORDER — SODIUM CHLORIDE 0.9 % (FLUSH) 0.9 %
10 SYRINGE (ML) INJECTION EVERY 12 HOURS SCHEDULED
Status: DISCONTINUED | OUTPATIENT
Start: 2020-11-18 | End: 2020-11-20 | Stop reason: HOSPADM

## 2020-11-18 RX ORDER — SODIUM CHLORIDE, SODIUM LACTATE, POTASSIUM CHLORIDE, CALCIUM CHLORIDE 600; 310; 30; 20 MG/100ML; MG/100ML; MG/100ML; MG/100ML
INJECTION, SOLUTION INTRAVENOUS CONTINUOUS
Status: DISCONTINUED | OUTPATIENT
Start: 2020-11-18 | End: 2020-11-18

## 2020-11-18 RX ORDER — PROPOFOL 10 MG/ML
INJECTION, EMULSION INTRAVENOUS PRN
Status: DISCONTINUED | OUTPATIENT
Start: 2020-11-18 | End: 2020-11-18 | Stop reason: SDUPTHER

## 2020-11-18 RX ORDER — MORPHINE SULFATE 2 MG/ML
1 INJECTION, SOLUTION INTRAMUSCULAR; INTRAVENOUS EVERY 5 MIN PRN
Status: DISCONTINUED | OUTPATIENT
Start: 2020-11-18 | End: 2020-11-18

## 2020-11-18 RX ORDER — MORPHINE SULFATE 2 MG/ML
2 INJECTION, SOLUTION INTRAMUSCULAR; INTRAVENOUS EVERY 5 MIN PRN
Status: DISCONTINUED | OUTPATIENT
Start: 2020-11-18 | End: 2020-11-18

## 2020-11-18 RX ORDER — OXYCODONE HYDROCHLORIDE AND ACETAMINOPHEN 5; 325 MG/1; MG/1
1 TABLET ORAL PRN
Status: DISCONTINUED | OUTPATIENT
Start: 2020-11-18 | End: 2020-11-18

## 2020-11-18 RX ORDER — KETOROLAC TROMETHAMINE 30 MG/ML
15 INJECTION, SOLUTION INTRAMUSCULAR; INTRAVENOUS ONCE
Status: COMPLETED | OUTPATIENT
Start: 2020-11-18 | End: 2020-11-18

## 2020-11-18 RX ORDER — KETOROLAC TROMETHAMINE 30 MG/ML
15 INJECTION, SOLUTION INTRAMUSCULAR; INTRAVENOUS EVERY 6 HOURS
Status: DISPENSED | OUTPATIENT
Start: 2020-11-18 | End: 2020-11-20

## 2020-11-18 RX ORDER — MORPHINE SULFATE 2 MG/ML
2 INJECTION, SOLUTION INTRAMUSCULAR; INTRAVENOUS
Status: DISCONTINUED | OUTPATIENT
Start: 2020-11-18 | End: 2020-11-20 | Stop reason: HOSPADM

## 2020-11-18 RX ORDER — ALBUTEROL SULFATE 2.5 MG/3ML
2.5 SOLUTION RESPIRATORY (INHALATION)
Status: DISCONTINUED | OUTPATIENT
Start: 2020-11-18 | End: 2020-11-20 | Stop reason: HOSPADM

## 2020-11-18 RX ORDER — DULOXETIN HYDROCHLORIDE 60 MG/1
60 CAPSULE, DELAYED RELEASE ORAL DAILY
Status: DISCONTINUED | OUTPATIENT
Start: 2020-11-18 | End: 2020-11-20 | Stop reason: HOSPADM

## 2020-11-18 RX ORDER — ATORVASTATIN CALCIUM 80 MG/1
80 TABLET, FILM COATED ORAL NIGHTLY
Status: DISCONTINUED | OUTPATIENT
Start: 2020-11-18 | End: 2020-11-20 | Stop reason: HOSPADM

## 2020-11-18 RX ORDER — TRAMADOL HYDROCHLORIDE 50 MG/1
50 TABLET ORAL EVERY 6 HOURS PRN
Status: DISCONTINUED | OUTPATIENT
Start: 2020-11-18 | End: 2020-11-20

## 2020-11-18 RX ORDER — MORPHINE SULFATE 4 MG/ML
4 INJECTION, SOLUTION INTRAMUSCULAR; INTRAVENOUS
Status: DISCONTINUED | OUTPATIENT
Start: 2020-11-18 | End: 2020-11-20 | Stop reason: HOSPADM

## 2020-11-18 RX ORDER — SODIUM CHLORIDE 0.9 % (FLUSH) 0.9 %
10 SYRINGE (ML) INJECTION PRN
Status: DISCONTINUED | OUTPATIENT
Start: 2020-11-18 | End: 2020-11-20 | Stop reason: HOSPADM

## 2020-11-18 RX ORDER — ASPIRIN 81 MG/1
81 TABLET ORAL DAILY
Status: DISCONTINUED | OUTPATIENT
Start: 2020-11-18 | End: 2020-11-20 | Stop reason: HOSPADM

## 2020-11-18 RX ORDER — FENTANYL CITRATE 50 UG/ML
INJECTION, SOLUTION INTRAMUSCULAR; INTRAVENOUS PRN
Status: DISCONTINUED | OUTPATIENT
Start: 2020-11-18 | End: 2020-11-18 | Stop reason: SDUPTHER

## 2020-11-18 RX ORDER — CETIRIZINE HYDROCHLORIDE 10 MG/1
10 TABLET ORAL DAILY
Status: DISCONTINUED | OUTPATIENT
Start: 2020-11-18 | End: 2020-11-20 | Stop reason: HOSPADM

## 2020-11-18 RX ORDER — MIDAZOLAM HYDROCHLORIDE 1 MG/ML
INJECTION INTRAMUSCULAR; INTRAVENOUS PRN
Status: DISCONTINUED | OUTPATIENT
Start: 2020-11-18 | End: 2020-11-18 | Stop reason: SDUPTHER

## 2020-11-18 RX ORDER — OXYCODONE HYDROCHLORIDE AND ACETAMINOPHEN 5; 325 MG/1; MG/1
2 TABLET ORAL PRN
Status: DISCONTINUED | OUTPATIENT
Start: 2020-11-18 | End: 2020-11-18

## 2020-11-18 RX ORDER — POLYETHYLENE GLYCOL 3350 17 G/17G
17 POWDER, FOR SOLUTION ORAL DAILY
Status: DISCONTINUED | OUTPATIENT
Start: 2020-11-18 | End: 2020-11-20 | Stop reason: HOSPADM

## 2020-11-18 RX ORDER — ERGOCALCIFEROL 1.25 MG/1
50000 CAPSULE ORAL WEEKLY
Status: DISCONTINUED | OUTPATIENT
Start: 2020-11-20 | End: 2020-11-20 | Stop reason: HOSPADM

## 2020-11-18 RX ORDER — MEPERIDINE HYDROCHLORIDE 50 MG/ML
12.5 INJECTION INTRAMUSCULAR; INTRAVENOUS; SUBCUTANEOUS EVERY 5 MIN PRN
Status: DISCONTINUED | OUTPATIENT
Start: 2020-11-18 | End: 2020-11-18

## 2020-11-18 RX ORDER — FLUTICASONE PROPIONATE 50 MCG
2 SPRAY, SUSPENSION (ML) NASAL PRN
Status: DISCONTINUED | OUTPATIENT
Start: 2020-11-18 | End: 2020-11-20 | Stop reason: HOSPADM

## 2020-11-18 RX ADMIN — LISINOPRIL 40 MG: 20 TABLET ORAL at 14:42

## 2020-11-18 RX ADMIN — ALBUTEROL SULFATE 2.5 MG: 2.5 SOLUTION RESPIRATORY (INHALATION) at 16:50

## 2020-11-18 RX ADMIN — FAMOTIDINE 20 MG: 20 TABLET ORAL at 21:54

## 2020-11-18 RX ADMIN — DULOXETINE HYDROCHLORIDE 60 MG: 60 CAPSULE, DELAYED RELEASE ORAL at 14:09

## 2020-11-18 RX ADMIN — MORPHINE SULFATE 4 MG: 4 INJECTION, SOLUTION INTRAMUSCULAR; INTRAVENOUS at 23:56

## 2020-11-18 RX ADMIN — FENTANYL CITRATE 100 MCG: 50 INJECTION INTRAMUSCULAR; INTRAVENOUS at 07:47

## 2020-11-18 RX ADMIN — CEFAZOLIN SODIUM 2 G: 10 INJECTION, POWDER, FOR SOLUTION INTRAVENOUS at 16:04

## 2020-11-18 RX ADMIN — GABAPENTIN 400 MG: 400 CAPSULE ORAL at 13:16

## 2020-11-18 RX ADMIN — TRAMADOL HYDROCHLORIDE 50 MG: 50 TABLET ORAL at 14:07

## 2020-11-18 RX ADMIN — POTASSIUM CHLORIDE, DEXTROSE MONOHYDRATE AND SODIUM CHLORIDE: 150; 5; 450 INJECTION, SOLUTION INTRAVENOUS at 22:55

## 2020-11-18 RX ADMIN — EPHEDRINE SULFATE 10 MG: 50 INJECTION INTRAVENOUS at 08:23

## 2020-11-18 RX ADMIN — SODIUM CHLORIDE, SODIUM LACTATE, POTASSIUM CHLORIDE, AND CALCIUM CHLORIDE: .6; .31; .03; .02 INJECTION, SOLUTION INTRAVENOUS at 07:43

## 2020-11-18 RX ADMIN — SUGAMMADEX 200 MG: 100 INJECTION, SOLUTION INTRAVENOUS at 09:33

## 2020-11-18 RX ADMIN — KETOROLAC TROMETHAMINE 15 MG: 30 INJECTION, SOLUTION INTRAMUSCULAR at 11:44

## 2020-11-18 RX ADMIN — CEFAZOLIN 2 G: 10 INJECTION, POWDER, FOR SOLUTION INTRAVENOUS at 07:58

## 2020-11-18 RX ADMIN — ROCURONIUM BROMIDE 50 MG: 10 SOLUTION INTRAVENOUS at 08:46

## 2020-11-18 RX ADMIN — ATORVASTATIN CALCIUM 80 MG: 80 TABLET, FILM COATED ORAL at 21:54

## 2020-11-18 RX ADMIN — MIDAZOLAM HYDROCHLORIDE 1 MG: 2 INJECTION, SOLUTION INTRAMUSCULAR; INTRAVENOUS at 07:25

## 2020-11-18 RX ADMIN — AMIODARONE HYDROCHLORIDE 400 MG: 200 TABLET ORAL at 14:09

## 2020-11-18 RX ADMIN — MORPHINE SULFATE 2 MG: 2 INJECTION, SOLUTION INTRAMUSCULAR; INTRAVENOUS at 10:45

## 2020-11-18 RX ADMIN — MIDAZOLAM HYDROCHLORIDE 1 MG: 2 INJECTION, SOLUTION INTRAMUSCULAR; INTRAVENOUS at 07:30

## 2020-11-18 RX ADMIN — SODIUM CHLORIDE, PRESERVATIVE FREE 10 ML: 5 INJECTION INTRAVENOUS at 21:55

## 2020-11-18 RX ADMIN — MORPHINE SULFATE 2 MG: 2 INJECTION, SOLUTION INTRAMUSCULAR; INTRAVENOUS at 10:29

## 2020-11-18 RX ADMIN — MORPHINE SULFATE 4 MG: 4 INJECTION, SOLUTION INTRAMUSCULAR; INTRAVENOUS at 21:54

## 2020-11-18 RX ADMIN — EPHEDRINE SULFATE 10 MG: 50 INJECTION INTRAVENOUS at 08:20

## 2020-11-18 RX ADMIN — KETOROLAC TROMETHAMINE 15 MG: 30 INJECTION, SOLUTION INTRAMUSCULAR at 18:23

## 2020-11-18 RX ADMIN — GABAPENTIN 400 MG: 400 CAPSULE ORAL at 21:54

## 2020-11-18 RX ADMIN — AMIODARONE HYDROCHLORIDE 400 MG: 200 TABLET ORAL at 21:54

## 2020-11-18 RX ADMIN — ROCURONIUM BROMIDE 50 MG: 10 SOLUTION INTRAVENOUS at 07:47

## 2020-11-18 RX ADMIN — FAMOTIDINE 20 MG: 20 TABLET ORAL at 13:16

## 2020-11-18 RX ADMIN — POTASSIUM CHLORIDE, DEXTROSE MONOHYDRATE AND SODIUM CHLORIDE: 150; 5; 450 INJECTION, SOLUTION INTRAVENOUS at 12:59

## 2020-11-18 RX ADMIN — LIDOCAINE HYDROCHLORIDE 30 MG: 10 INJECTION, SOLUTION INFILTRATION; PERINEURAL at 07:47

## 2020-11-18 RX ADMIN — EPHEDRINE SULFATE 10 MG: 50 INJECTION INTRAVENOUS at 08:48

## 2020-11-18 RX ADMIN — ALBUTEROL SULFATE 2.5 MG: 2.5 SOLUTION RESPIRATORY (INHALATION) at 19:57

## 2020-11-18 RX ADMIN — SODIUM CHLORIDE, SODIUM LACTATE, POTASSIUM CHLORIDE, AND CALCIUM CHLORIDE: .6; .31; .03; .02 INJECTION, SOLUTION INTRAVENOUS at 09:13

## 2020-11-18 RX ADMIN — ONDANSETRON 4 MG: 2 INJECTION INTRAMUSCULAR; INTRAVENOUS at 07:47

## 2020-11-18 RX ADMIN — MORPHINE SULFATE 4 MG: 4 INJECTION, SOLUTION INTRAMUSCULAR; INTRAVENOUS at 19:49

## 2020-11-18 RX ADMIN — MORPHINE SULFATE 4 MG: 4 INJECTION, SOLUTION INTRAMUSCULAR; INTRAVENOUS at 16:11

## 2020-11-18 RX ADMIN — ASPIRIN 81 MG: 81 TABLET, COATED ORAL at 14:09

## 2020-11-18 RX ADMIN — PROPOFOL 150 MG: 10 INJECTION, EMULSION INTRAVENOUS at 07:47

## 2020-11-18 ASSESSMENT — PULMONARY FUNCTION TESTS
PIF_VALUE: 29
PIF_VALUE: 19
PIF_VALUE: 25
PIF_VALUE: 23
PIF_VALUE: 14
PIF_VALUE: 30
PIF_VALUE: 2
PIF_VALUE: 26
PIF_VALUE: 4
PIF_VALUE: 25
PIF_VALUE: 22
PIF_VALUE: 31
PIF_VALUE: 30
PIF_VALUE: 34
PIF_VALUE: 19
PIF_VALUE: 34
PIF_VALUE: 3
PIF_VALUE: 27
PIF_VALUE: 28
PIF_VALUE: 26
PIF_VALUE: 8
PIF_VALUE: 32
PIF_VALUE: 16
PIF_VALUE: 21
PIF_VALUE: 34
PIF_VALUE: 24
PIF_VALUE: 1
PIF_VALUE: 22
PIF_VALUE: 35
PIF_VALUE: 18
PIF_VALUE: 1
PIF_VALUE: 35
PIF_VALUE: 22
PIF_VALUE: 34
PIF_VALUE: 22
PIF_VALUE: 35
PIF_VALUE: 22
PIF_VALUE: 2
PIF_VALUE: 29
PIF_VALUE: 10
PIF_VALUE: 27
PIF_VALUE: 24
PIF_VALUE: 35
PIF_VALUE: 34
PIF_VALUE: 25
PIF_VALUE: 23
PIF_VALUE: 24
PIF_VALUE: 25
PIF_VALUE: 35
PIF_VALUE: 32
PIF_VALUE: 1
PIF_VALUE: 26
PIF_VALUE: 2
PIF_VALUE: 25
PIF_VALUE: 19
PIF_VALUE: 22
PIF_VALUE: 24
PIF_VALUE: 35
PIF_VALUE: 33
PIF_VALUE: 22
PIF_VALUE: 25
PIF_VALUE: 21
PIF_VALUE: 31
PIF_VALUE: 31
PIF_VALUE: 2
PIF_VALUE: 2
PIF_VALUE: 32
PIF_VALUE: 32
PIF_VALUE: 22
PIF_VALUE: 27
PIF_VALUE: 35
PIF_VALUE: 21
PIF_VALUE: 36
PIF_VALUE: 34
PIF_VALUE: 34
PIF_VALUE: 22
PIF_VALUE: 32
PIF_VALUE: 32
PIF_VALUE: 0
PIF_VALUE: 22
PIF_VALUE: 32
PIF_VALUE: 3
PIF_VALUE: 31
PIF_VALUE: 31
PIF_VALUE: 36
PIF_VALUE: 26
PIF_VALUE: 22
PIF_VALUE: 7
PIF_VALUE: 28
PIF_VALUE: 26
PIF_VALUE: 24
PIF_VALUE: 19
PIF_VALUE: 32
PIF_VALUE: 19
PIF_VALUE: 31
PIF_VALUE: 18
PIF_VALUE: 28
PIF_VALUE: 1
PIF_VALUE: 32
PIF_VALUE: 2
PIF_VALUE: 32
PIF_VALUE: 25
PIF_VALUE: 31
PIF_VALUE: 24
PIF_VALUE: 28
PIF_VALUE: 32
PIF_VALUE: 33
PIF_VALUE: 17
PIF_VALUE: 31
PIF_VALUE: 23
PIF_VALUE: 25
PIF_VALUE: 31
PIF_VALUE: 3
PIF_VALUE: 28
PIF_VALUE: 32
PIF_VALUE: 24
PIF_VALUE: 30
PIF_VALUE: 4
PIF_VALUE: 19
PIF_VALUE: 0
PIF_VALUE: 6
PIF_VALUE: 10
PIF_VALUE: 34
PIF_VALUE: 34
PIF_VALUE: 33
PIF_VALUE: 35
PIF_VALUE: 0
PIF_VALUE: 1
PIF_VALUE: 34
PIF_VALUE: 32
PIF_VALUE: 34
PIF_VALUE: 16

## 2020-11-18 ASSESSMENT — PAIN SCALES - GENERAL
PAINLEVEL_OUTOF10: 8
PAINLEVEL_OUTOF10: 7
PAINLEVEL_OUTOF10: 10
PAINLEVEL_OUTOF10: 7
PAINLEVEL_OUTOF10: 10
PAINLEVEL_OUTOF10: 8
PAINLEVEL_OUTOF10: 8
PAINLEVEL_OUTOF10: 7
PAINLEVEL_OUTOF10: 3
PAINLEVEL_OUTOF10: 7
PAINLEVEL_OUTOF10: 10
PAINLEVEL_OUTOF10: 7
PAINLEVEL_OUTOF10: 9
PAINLEVEL_OUTOF10: 8
PAINLEVEL_OUTOF10: 6
PAINLEVEL_OUTOF10: 8
PAINLEVEL_OUTOF10: 8

## 2020-11-18 ASSESSMENT — PAIN DESCRIPTION - FREQUENCY
FREQUENCY: CONTINUOUS

## 2020-11-18 ASSESSMENT — PAIN DESCRIPTION - LOCATION
LOCATION: SHOULDER

## 2020-11-18 ASSESSMENT — PAIN DESCRIPTION - PROGRESSION
CLINICAL_PROGRESSION: NOT CHANGED
CLINICAL_PROGRESSION: GRADUALLY IMPROVING
CLINICAL_PROGRESSION: NOT CHANGED

## 2020-11-18 ASSESSMENT — PAIN DESCRIPTION - PAIN TYPE
TYPE: SURGICAL PAIN

## 2020-11-18 ASSESSMENT — PAIN DESCRIPTION - ORIENTATION
ORIENTATION: LEFT

## 2020-11-18 ASSESSMENT — PAIN - FUNCTIONAL ASSESSMENT: PAIN_FUNCTIONAL_ASSESSMENT: 0-10

## 2020-11-18 ASSESSMENT — PAIN DESCRIPTION - ONSET
ONSET: ON-GOING

## 2020-11-18 ASSESSMENT — ENCOUNTER SYMPTOMS: SHORTNESS OF BREATH: 0

## 2020-11-18 ASSESSMENT — LIFESTYLE VARIABLES: SMOKING_STATUS: 0

## 2020-11-18 NOTE — PROGRESS NOTES
1000 Received to unit from [facility] via bed by [employer] staff. Vitals done. Left chest tube dressing CD&I. CT placed to wall suction, 10 ml bloody drainage noted in atrium. No air leak, no crepitus. 02 4L via nasal cannula. B/p 166/79 HR 66 RR 22 100% 4l. Skin intact. No abdullahi & no baeza. LR at 50 ml/hr infusing to right hand iv site. 1015 b/p 151/96 hr 68 rr 17 99% 4L. Waiting for clinical to transfer patient into bed. 1028 Medicated with morphine 2mg per prn order for left shoulder pain, patient holding sholuder and crying. Heat karen applied & repositioned. 69 [facility] informed. Ct output another 20 ml, total output 30 ml since arrival to unit. Will continue to monitor.  Gerson Newsome

## 2020-11-18 NOTE — BRIEF OP NOTE
Brief Postoperative Note      Patient: Stuart Garcia  YOB: 1955  MRN: [id number]    Date of Procedure: 11/18/2020    Pre-Op Diagnosis: LEFT UPPER LOBE LUNG NODULE    Post-Op Diagnosis: Same       Procedure(s):  LEFT VIDEO ASSISTED THORACOSCOPIC SURGERY WITH LEFT  LOWER LOBE LOBECTOMY AND MEDIASTINAL LYMPHADENECTOMY, INTERCOSTAL NERVE BLOCK    Surgeon(s):  MD Alex Bedolla MD    Assistant:  Surgical Assistant: Freya Cunningham    Anesthesia: General    Estimated Blood Loss (mL): Minimal    Complications: 916    Specimens:   ID Type Source Tests Collected by Time Destination   A : LEVEL 10 LYMPH NODE  Tissue Lymph Node SURGICAL PATHOLOGY Rohini Wheat MD 11/18/2020 0829    B : LEVEL 11 LEFT LYMPH NODE Tissue Lymph Node SURGICAL PATHOLOGY Rohini Wheat MD 11/18/2020 0845    C : LEVEL 8 AND 9 LYMPH NODE Tissue Lymph Node SURGICAL PATHOLOGY Rohini Wheat MD 11/18/2020 0849    D : LEVEL 7 LYMPH NODE Tissue Lymph Node SURGICAL PATHOLOGY Rohini Wheat MD 11/18/2020 0850    E : LYMPH NODE 5 AND 6  Tissue Lymph Node SURGICAL PATHOLOGY Rohini Wheat MD 11/18/2020 0911    F : LEFT LOWER LOBE   Tissue Lung SURGICAL PATHOLOGY Rohini Wheat MD 11/18/2020 6392        Implants:  * No implants in log *      Drains:   Chest Tube 1 Left Pleural 24 Algerian (Active)       [REMOVED] Closed/Suction Drain Right RLQ Bulb 12 Algerian (Removed)       [REMOVED] Closed/Suction Drain Right RLQ Bulb 10 Algerian (Removed)       [REMOVED] Urethral Catheter Non-latex 16 fr (Removed)       Findings:     Electronically signed by Rohini Wheat MD on 11/18/2020 at 11:24 AM

## 2020-11-18 NOTE — PLAN OF CARE
Problem: Falls - Risk of:  Goal: Absence of physical injury  Description: Absence of physical injury  Outcome: Ongoing     Problem: Pain:  Goal: Pain level will decrease  Description: Pain level will decrease  Outcome: Ongoing  Goal: Control of acute pain  Description: Control of acute pain  Outcome: Ongoing  Goal: Control of chronic pain  Description: Control of chronic pain  Outcome: Ongoing

## 2020-11-18 NOTE — PROGRESS NOTES
Consultation H&P        Date of Admission:  No admission date for patient encounter. Date of Consultation:  2020    PCP:  Lu Lagunas MD    Referred    Chief Complaint: Adenocarcinoma left lower lobe    History of Present Illness:    We are asked to see this patient in consultation by Dr. osmany Felixbang Mir is a 63 y.o. female who asymptomatic lodging left superior segment lung nodule underwent CT-guided biopsy showed adenocarcinoma came into the clinic for surgical option     Past Medical History:  Past Medical History:   Diagnosis Date    JASPREET [facility]acute kidney injury) ([facility] [address] 2020    Arterial occlusion     Asthma     Centrilobular emphysema ([facility] [address] 2019    pt denies    Chronic bilateral low back pain with bilateral sciatica 8/3/2018    Chronic bilateral low back pain with bilateral sciatica     Hx of blood clots     DVT    Hyperlipidemia     Hypertension     Left lower lobe pulmonary nodule     MDRO (multiple drug resistant organisms) resistance 2020    Escherichia coli-URINE    Metabolic encephalopathy     Moderate single current episode of major depressive disorder ([facility] [address] 8/3/2018    Ruptured disk        Past Surgical History:  Past Surgical History:   Procedure Laterality Date     SECTION      CHOLECYSTECTOMY, LAPAROSCOPIC N/A 2020    robotic, performed by Dione Mendez MD at [address] [facility]  2020    CT NEEDLE BIOPSY LUNG PERCUTANEOUS 2020 [facility] CT SCAN    FASCIOTOMY Left 2017    due to DVT, LLE    GASTROCNEMIUS RECESSION Left 10/1/2020    performed by Kelley Humphrey MD at [address] Right 10/29/2020    RIGHT CERVICAL LYMPH NODE BIOPSY performed by Dione Mendez MD at [address] Left 10/1/2020    LEFT FOOT SECOND, THIRD, FOURTH AND FIFTH  METATARSAL HEAD RESECTION -BLOCK- -BAYLEE=5 performed by Kelley Humphrey MD at [address] Medications: Prior to Admission medications    Medication Sig Start Date End Date Taking? Authorizing Provider   albuterol sulfate  (90 Base) MCG/ACT inhaler INHALE 2 PUFFS INTO THE LUNGS EVERY 6 HOURS AS NEEDED FOR WHEEZING 11/4/20  Yes Luisito Katz MD   famotidine (PEPCID) 20 MG tablet Take 1 tablet by mouth 2 times daily as needed (heartburn) 11/2/20  Yes Luisito Katz MD   tiZANidine (ZANAFLEX) 2 MG tablet TAKE 1 TABLET BY MOUTH 2 TIMES DAILY AS NEEDED (MUSCLE SPASM AND PAIN) 11/2/20  Yes Luisito Katz MD   fluticasone (FLONASE) 50 MCG/ACT nasal spray 2 sprays by Nasal route as needed for Rhinitis or Allergies 10/5/20  Yes Luisito Katz MD   cetirizine (ZYRTEC) 10 MG tablet TAKE 1 TABLET BY MOUTH DAILY  Patient taking differently: daily as needed  10/5/20  Yes Luisito Katz MD   SPIRIVA HANDIHALER 18 MCG inhalation capsule INHALE 1 CAPSULE INTO THE LUNGS DAILY 10/5/20  Yes Luisito Katz MD   vitamin D (ERGOCALCIFEROL) 1.25 MG (70519 UT) CAPS capsule Take 1 capsule by mouth once a week 10/1/20  Yes Mundo Gupta MD   gabapentin (NEURONTIN) 400 MG capsule Take 400 mg by mouth 3 times daily. Yes Historical Provider, MD   lisinopril (PRINIVIL;ZESTRIL) 40 MG tablet Take 40 mg by mouth daily   Yes Historical Provider, MD   atorvastatin (LIPITOR) 80 MG tablet TAKE 1 TABLET BY MOUTH NIGHTLY 7/10/20  Yes Luisito Katz MD   DULoxetine (CYMBALTA) 60 MG extended release capsule TAKE 1 CAPSULE BY MOUTH DAILY 7/10/20  Yes Luisito Katz MD   traZODone (DESYREL) 50 MG tablet TAKE 1 TABLET BY MOUTH NIGHTLY 7/10/20  Yes Luisito Katz MD   nicotine (NICODERM CQ) 7 MG/24HR PLACE 1 PATCH ONTO THE SKIN DAILY (ROTATE SITES) 12/4/19  Yes Luisito Katz MD   amiodarone (CORDARONE) 200 MG tablet Take 2 tablets by mouth 2 times daily Begin in AM on 11/16.  Last dose in PM on 11/17. 11/11/20 Maya Putty, MD   aspirin 81 MG EC tablet Take 81 mg by mouth daily    Historical Provider, MD        Facility Administered Medications: Allergies:     Allergies   Allergen Reactions    Lorazepam      Confusion, hallucinations, JUST HAD TOO HIGH DOSE  Can take lorazepam but not Ativan    Codeine Hives     Tolerates Norco.        Social History:    Working:   Caffeine:   Lifestyle:    Social History     Socioeconomic History    Marital status:      Spouse name: Not on file    Number of children: 2    Years of education: Not on file    Highest education level: Some college, no degree   Occupational History    Not on file   Social Needs    Financial resource strain: Not very hard    Food insecurity     Worry: Never true     Inability: Never true    Transportation needs     Medical: No     Non-medical: No   Tobacco Use    Smoking status: Former Smoker     Packs/day: 0.50     Years: 20.00     Pack years: 10.00     Types: Cigarettes     Last attempt to quit: 2020     Years since quittin.0    Smokeless tobacco: Never Used    Tobacco comment: 1 cigarettes per day   Substance and Sexual Activity    Alcohol use: No    Drug use: Not Currently     Types: Opiates     Sexual activity: Not Currently     Partners: Male   Lifestyle    Physical activity     Days per week: Not on file     Minutes per session: Not on file    Stress: Not on file   Relationships    Social connections     Talks on phone: Not on file     Gets together: Not on file     Attends Protestant service: Not on file     Active member of club or organization: Not on file     Attends meetings of clubs or organizations: Not on file     Relationship status: Not on file    Intimate partner violence     Fear of current or ex partner: Not on file     Emotionally abused: Not on file     Physically abused: Not on file     Forced sexual activity: Not on file   Other Topics Concern    Not on file   Social History Narrative    Not on file       Family History:  Heart Disease:   Stroke:   Cancer:   Diabetes:   Hypertension:   Aneurysm/PVD: Problem Relation Age of Onset    High Blood Pressure Mother     High Cholesterol Mother     Heart Disease Mother     Coronary Art Dis Mother     Heart Attack Father         59    Cirrhosis Sister     Alcohol Abuse Sister     Other Sister         \"colostomy bag due to hole in her colon\"   [facility] Sudden Death Brother         \"suicide\"    Colon Cancer Other         maternal uncle    Breast Cancer Other         multiple maternal aunts along with uterine cancer       Review of Systems:  Constitutional:  No night sweats, headaches, weight loss. Eyes:  No glaucoma, cataracts. ENMT:  No nosebleeds, deviated septum. Cardiac:  No arrhythmias, previous MI. Vascular:  No claudication, varicosities. GI:  No PUD, heartburn. :  No kidney stones, frequent UTIs  Musculoskeletal:  No arthritis, gout. Respiratory:  No SOB, emphysema, asthma. Integumentary:  No dermatitis, itching, rash. Neurological:  No stroke, TIAs, seizures. Psychiatric:  No depression, anxiety. Endocrine: No diabetes, thyroid issues. Hematologic:  No bleeding, easy bruising. Immunologic:  No known cancer, steroid therapies. Physical Examination:    BP (!) 160/92 (Site: Right Upper Arm, Position: Sitting, Cuff Size: Medium Adult)   Pulse 70   Temp 97.8 °F (36.6 °C) (Temporal)   Ht 5' 1\" (1.549 m)   Wt 148 lb (67.1 kg)   SpO2 94%   BMI 27.96 kg/m²      BP RUE:  BP LUE:   Admission Weight: 148 lb (67.1 kg)   Hand dominance:    General appearance: NAD, well nourished  Eyes: anicteric, PERRLA  ENMT: no scars or lesions, no nasal deformity, normal dentition, no cyanosis of oral mucosa  Neck: no masses, no thyroid enlargement, no JVD. Respiratory: effort is unlabored, symmetric, no crackles, wheezes or rubs. No palpable/percussable abnormalities. Cardiovascular: regular, no murmur. PMI normal, no thrill. No carotid bruits. No edema or varicosities. Abdominal aorta cannot be appreciated given body habitus.   GI: abdomen soft, nondistended, no organomegaly. No masses. Lymphatic: no cervical/supraclavicular adenopathy  Musculoskeletal: strength and tone normal. Full ROM. No scoliosis. Extremities: warm and pink. No clubbing or petechiae. Skin: no dermatitis or ulceration. No nodularity or induration. Neuro: CN grossly intact. Sensation and motor function grossly intact. Psychiatric: oriented, appropriate mood/affect. MEDICAL DECISION MAKING/TESTING  Studies personally reviewed. Echo:     CXR:     CT  Successful CT guided left lower lobe nodule core biopsies.         Pathology is pending. PET/CT  1.  Multiple bilateral enlarged cervical, axillary, and inguinal lymph nodes    with mild associated metabolic activity.  These have developed since the    prior PET-CT scan and could represent benign and malignant etiologies    including metastatic disease or lymphoma.         2.  Mild activity associated with the enlarging nodule in the superior    segment of the left lower lobe.  This could represent primary lung cancer or    be related to the lymphadenopathy. PFT    Pathology  FINAL DIAGNOSIS:     Left lower lobe superior segment lung nodule, biopsies:   - Adenocarcinoma. Labs:   CBC: No results for input(s): WBC, HGB, HCT, MCV, PLT in the last 72 hours. BMP: No results for input(s): NA, K, CL, CO2, PHOS, BUN, CREATININE, CALCIUM, MG in the last 72 hours. Cardiac Enzymes: No results for input(s): CKTOTAL, CKMB, CKMBINDEX, TROPONINI in the last 72 hours. PT/INR: No results for input(s): PROTIME, INR in the last 72 hours. APTT: No results for input(s): APTT in the last 72 hours.   Liver Profile:  Lab Results   Component Value Date    AST 10 07/15/2020    ALT <5 07/15/2020    BILIDIR <0.2 06/15/2020    BILITOT <0.2 07/15/2020    ALKPHOS 74 07/15/2020    GGT 29 06/09/2020     Lab Results   Component Value Date    CHOL 194 09/18/2019    HDL 85 09/18/2019    TRIG 106 09/18/2019     UA:   Lab Results   Component Value Date    NITRITE Negative 05/07/2020    COLORU Yellow 11/12/2020    PHUR 6.0 11/12/2020    LABCAST > 40 Hyaline 11/10/2019    WBCUA 6-9 07/15/2020    RBCUA None seen 07/15/2020    MUCUS 4+ 07/14/2020    BACTERIA 2+ 07/14/2020    CLARITYU Clear 11/12/2020    SPECGRAV 1.020 11/12/2020    LEUKOCYTESUR Negative 11/12/2020    UROBILINOGEN 0.2 11/12/2020    BILIRUBINUR Negative 11/12/2020    BILIRUBINUR Negative 05/07/2020    BILIRUBINUR NEGATIVE 05/27/2012    BLOODU Negative 11/12/2020    GLUCOSEU Negative 11/12/2020    GLUCOSEU NEGATIVE 05/27/2012    AMORPHOUS 1+ 07/14/2020       History obtained: chart, pt    Risk factors: COPD smoker     Diagnosis: Adenocarcinoma left lower lobe    Plan:   #1 cardiology clearance  #2 I-S, amiodarone preop  #3 we will proceed with left lower lobectomy with mediastinal lymphadenectomy consideration of segmentectomy although the lesion is close to the fissure of the second      Typical periop/postop course reviewed including initial limitations on driving/heavy lifting. Risks, benefits and postoperative complications discussed including bleeding, infection, stroke, death, postop pulmonary and renal issues. I spent 45 minutes of care and visit with this patient, greater than 50% of time was spent in counseling and coordinating care, image interpretation, discussion with other caregiver.   And future planning    Boni Gerber MD FACS

## 2020-11-18 NOTE — ANESTHESIA PRE PROCEDURE
MOUTH DAILY 7/10/20  Yes Gilma Mohamud MD   traZODone (DESYREL) 50 MG tablet TAKE 1 TABLET BY MOUTH NIGHTLY 7/10/20  Yes Gilma Mohamud MD   vitamin D (ERGOCALCIFEROL) 1.25 MG (97435 UT) CAPS capsule Take 1 capsule by mouth once a week 10/1/20   Ld Hernandez MD   nicotine (NICODERM CQ) 7 MG/24HR PLACE 1 PATCH ONTO THE SKIN DAILY (ROTATE SITES) 12/4/19   Gilma Mohamud MD       Current medications:    Current Facility-Administered Medications   Medication Dose Route Frequency Provider Last Rate Last Dose    ceFAZolin (ANCEF) 2 g in dextrose 5 % 100 mL IVPB  2 g Intravenous On Call to [address] [place] [address], MD        lactated ringers infusion   Intravenous Continuous Yomi Louie MD        lidocaine 1 % (PF) injection 0.1 mL  0.1 mL Intradermal Once PRN Yomi Louie MD           Allergies:     Allergies   Allergen Reactions    Lorazepam      Confusion, hallucinations, JUST HAD TOO HIGH DOSE  Can take lorazepam but not Ativan    Codeine Hives     Tolerates Norco.       Problem List:    Patient Active Problem List   Diagnosis Code    Essential hypertension I10    Chest pain R07.9    Multiple falls R29.6    Recurrent pain of right knee M25.561    Sternal mass M89.8X8    Current smoker F17.200    LAD (lymphadenopathy), inguinal R59.0    Chronic pain syndrome G89.4    Moderate single current episode of major depressive disorder (HCC) F32.1    Chronic bilateral low back pain without sciatica M54.5, G89.29    Prediabetes R73.03    Vitamin D deficiency E55.9    History of fasciotomy Z98.890    History of DVT in adulthood Z80.65    Personal history of TIA (transient ischemic attack) Z86.73    Deep vein thrombosis (DVT) of left lower extremity (HCC) I82.402    Neuropathy G62.9    Lung nodule R91.1    Bone marrow edema R93.7    Centrilobular emphysema (HCC) J43.2    HLD (hyperlipidemia) E78.5    Hypertensive urgency I16.0    Oropharyngeal dysphagia R13.12    Muscle cramps R25.2    Pulmonary lesion of left side of chest J98.4    JASPREET (acute kidney injury) ([facility] [address] N17.9    Abnormal liver enzymes R74.8    Epigastric pain R10.13    Right upper quadrant abdominal pain R10.11    Perforated gallbladder K82.2    Anemia D64.9    Cholecystitis K81.9    Dizziness R42    Diarrhea of presumed infectious origin R19.7    Fracture of 2nd metatarsal S92.323A    Dislocation of fourth toe, left, closed, initial encounter S93.105A    Lymphadenopathy of right cervical region R59.0       Past Medical History:        Diagnosis Date    JASPREET (acute kidney injury) ([address] 2020    Arterial occlusion     Asthma     Centrilobular emphysema ([address] 2019    pt denies    Chronic bilateral low back pain with bilateral sciatica 8/3/2018    Chronic bilateral low back pain with bilateral sciatica     Hx of blood clots     DVT    Hyperlipidemia     Hypertension     Left lower lobe pulmonary nodule     MDRO (multiple drug resistant organisms) resistance 2020    Escherichia coli-URINE    Metabolic encephalopathy     Moderate single current episode of major depressive disorder ([address] 8/3/2018    Ruptured disk        Past Surgical History:        Procedure Laterality Date     SECTION  1981    CHOLECYSTECTOMY, LAPAROSCOPIC N/A 2020    robotic, performed by Araseli Velasquez MD at [address]  2020    CT NEEDLE BIOPSY LUNG PERCUTANEOUS 2020 [address] SCAN    FASCIOTOMY Left 2017    due to DVT, LLE    GASTROCNEMIUS RECESSION Left 10/1/2020    performed by Wm Santana MD at [address] Right 10/29/2020    RIGHT CERVICAL LYMPH NODE BIOPSY performed by Araseli Velasquez MD at [address] Left 10/1/2020    LEFT FOOT SECOND, THIRD, FOURTH AND FIFTH  METATARSAL HEAD RESECTION -BLOCK- -BAYLEE=5 performed by Wm Santana MD at [address] History:    Social History     Tobacco Use    Smoking status: Former Smoker     Packs/day: 0.50     Years: 20.00     Pack years: 10.00     Types: Cigarettes     Last attempt to quit: 2020     Years since quittin.0    Smokeless tobacco: Never Used    Tobacco comment: 1 cigarettes per day   Substance Use Topics    Alcohol use: No                                Counseling given: Not Answered  Comment: 1 cigarettes per day      Vital Signs (Current):   Vitals:    20 1634 20 0549   BP:  (!) 175/86   Pulse:  61   Resp:  18   Temp:  98.1 °F (36.7 °C)   SpO2:  98%   Weight: 140 lb (63.5 kg) 145 lb 11.2 oz (66.1 kg)   Height: 5' 1\" (1.549 m) 5' 1\" (1.549 m)                                              BP Readings from Last 3 Encounters:   20 (!) 175/86   20 130/60   20 (!) 160/92       NPO Status: Time of last liquid consumption:                         Time of last solid consumption:                         Date of last liquid consumption: 20                        Date of last solid food consumption: 20    BMI:   Wt Readings from Last 3 Encounters:   20 145 lb 11.2 oz (66.1 kg)   20 151 lb 8 oz (68.7 kg)   20 148 lb (67.1 kg)     Body mass index is 27.53 kg/m².     CBC:   Lab Results   Component Value Date    WBC 8.0 2020    RBC 4.19 2020    HGB 12.7 2020    HCT 36.9 2020    MCV 88.1 2020    RDW 14.2 2020     2020       CMP:   Lab Results   Component Value Date     2020    K 4.2 2020    K 3.5 10/29/2020     2020    CO2 27 2020    BUN 8 2020    CREATININE 0.6 2020    GFRAA >60 2020    GFRAA >60 2012    AGRATIO 1.1 07/15/2020    LABGLOM >60 2020    GLUCOSE 97 2020    PROT 5.8 07/15/2020    PROT 8.3 2012    CALCIUM 9.5 2020    BILITOT <0.2 07/15/2020    ALKPHOS 74 07/15/2020    AST 10 07/15/2020    ALT <5 07/15/2020       POC Tests: No results for input(s): POCGLU, POCNA, POCK, POCCL, POCBUN, POCHEMO, POCHCT in the last 72 hours. Coags:   Lab Results   Component Value Date    PROTIME 11.4 10/05/2020    INR 0.98 10/05/2020    APTT 30.4 04/24/2020       HCG (If Applicable): No results found for: PREGTESTUR, PREGSERUM, HCG, HCGQUANT     ABGs: No results found for: PHART, PO2ART, AJR7YFY, VTN7ELX, BEART, I3FDYHNP     Type & Screen (If Applicable):  No results found for: LABABO, LABRH    Drug/Infectious Status (If Applicable):  No results found for: HIV, HEPCAB    COVID-19 Screening (If Applicable):   Lab Results   Component Value Date    COVID19 Not Detected 11/12/2020         Anesthesia Evaluation  Patient summary reviewed no history of anesthetic complications:   Airway: Mallampati: II  TM distance: >3 FB   Neck ROM: full  Mouth opening: > = 3 FB Dental:          Pulmonary: breath sounds clear to auscultation  (+) COPD (no o2 req. , daily and prn inhaler):  asthma:     (-) shortness of breath, sleep apnea and not a current smoker ( recently quit, prior 1/2 ppd)          Patient did not smoke on day of surgery. Cardiovascular:    (+) hypertension:, hyperlipidemia    (-) pacemaker, valvular problems/murmurs, past MI, CAD, CABG/stent, dysrhythmias,  angina and  CHF    ECG reviewed  Rhythm: regular  Rate: normal  Echocardiogram reviewed         Beta Blocker:  Not on Beta Blocker         Neuro/Psych:   (+) neuromuscular disease (lbp w/ ble rad sxs):, depression/anxiety  (hx dep.)   (-) seizures, TIA, CVA and psychiatric history           GI/Hepatic/Renal:   (+) GERD (dysphagia):,      (-) liver disease, no renal disease and no morbid obesity       Endo/Other:    (+) : arthritis:., malignancy/cancer (L lung / lymphoma). (-) diabetes mellitus, hypothyroidism, hyperthyroidism, blood dyscrasia               Abdominal:       Abdomen: soft. Vascular:   + DVT (LLE yrs ago, req fasciotomy, resloved, no thinners now), . Anesthesia Plan      general     ASA 3     (2 piv , SHAINA baeza( bp and lab monitoring))  Induction: intravenous. MIPS: Postoperative opioids intended and Prophylactic antiemetics administered. Anesthetic plan and risks discussed with child/children and patient. Use of blood products discussed with patient whom consented to blood products. Plan discussed with CRNA. This pre-anesthesia assessment may be used as a history and physical.    DOS STAFF ADDENDUM:    Pt seen and examined, chart reviewed (including anesthesia, drug and allergy history). No interval changes to history and physical examination. Anesthetic plan, risks, benefits, alternatives, and personnel involved discussed with patient. Patient verbalized an understanding and agrees to proceed.       Guilherme Deng MD  November 18, 2020  6:32 AM          Guilherme Deng MD   11/18/2020

## 2020-11-18 NOTE — H&P
Patient was seen examined this morning image was reviewed   history and physical was reviewed no new event or complaining since seen last   I will proceed previously mentioned plan    Fareed Whitehead MD [facility]

## 2020-11-18 NOTE — ANESTHESIA PROCEDURE NOTES
Arterial Line:    An arterial line was placed using surface landmarks, in the pre-op for the following indication(s): continuous blood pressure monitoring and blood sampling needed. A 20 gauge (size), 1 and 3/4 inch (length), Arrow (type) catheter was placed, Seldinger technique used, into the right radial artery and 1% lido , 0.5 cc, secured by Tegaderm and tape and steri stripes. Anesthesia type: Local  Local infiltration: Injection    Events:  patient tolerated procedure well with no complications. Additional notes:  Consent, time out, prep, iv sed, 20 g arrow x1, good art pulse/flow, connected to pressure line, tegaderm/tape/ss, pt vee. Well!   Anesthesiologist: Bi Ortiz MD  Resident/CRNA: SHAHAB Casey CRNA  Performed: Anesthesiologist and Resident/CRNA   Preanesthetic Checklist  Completed: patient identified, site marked, pre-op evaluation, timeout performed, IV checked, risks and benefits discussed, monitors and equipment checked, anesthesia consent given, oxygen available and patient being monitored

## 2020-11-18 NOTE — ANESTHESIA POSTPROCEDURE EVALUATION
Department of Anesthesiology  Postprocedure Note    Patient: Stuart Garcia  MRN: [id number]  YOB: 1955  Date of evaluation: 11/18/2020  Time:  10:40 AM     Procedure Summary     Date:  11/18/20 Room / Location:  [place] [address] / [facility]    Anesthesia Start:  9178 Anesthesia Stop:      Procedure:  LEFT VIDEO ASSISTED THORACOSCOPIC SURGERY WITH LEFT  LOWER LOBE LOBECTOMY AND MEDIASTINAL LYMPHADENECTOMY, INTERCOSTAL NERVE BLOCK (Left Chest) Diagnosis:       Lung nodule      (LEFT UPPER LOBE LUNG NODULE)    Surgeon:  Rohini Wheat MD Responsible Provider:  Patrice Hansen MD    Anesthesia Type:  general ASA Status:  3          Anesthesia Type: general    Rosa Maria Phase I: Rosa Maria Score: 10    Rosa Maria Phase II:      Last vitals: Reviewed and per EMR flowsheets.      Vitals:    11/11/20 1634 11/18/20 0549   BP:  (!) 175/86   Pulse:  61   Resp:  18   Temp:  98.1 °F (36.7 °C)   SpO2:  98%   Weight: 140 lb (63.5 kg) 145 lb 11.2 oz (66.1 kg)   Height: 5' 1\" (1.549 m) 5' 1\" (1.549 m)     Anesthesia Post Evaluation    Patient location during evaluation: bedside  Patient participation: complete - patient participated  Level of consciousness: awake  Airway patency: patent  Nausea & Vomiting: no nausea  Complications: no  Cardiovascular status: hemodynamically stable  Respiratory status: acceptable and nasal cannula  Hydration status: euvolemic

## 2020-11-19 ENCOUNTER — APPOINTMENT (OUTPATIENT)
Dept: GENERAL RADIOLOGY | Age: 65
DRG: 120 | End: 2020-11-19
Attending: THORACIC SURGERY (CARDIOTHORACIC VASCULAR SURGERY)
Payer: COMMERCIAL

## 2020-11-19 LAB
A/G RATIO: 1.1 (ref 1.1–2.2)
ALBUMIN SERPL-MCNC: 3.4 G/DL (ref 3.4–5)
ALP BLD-CCNC: 77 U/L (ref 40–129)
ALT SERPL-CCNC: <5 U/L (ref 10–40)
ANION GAP SERPL CALCULATED.3IONS-SCNC: 2 MMOL/L (ref 3–16)
AST SERPL-CCNC: 13 U/L (ref 15–37)
BILIRUB SERPL-MCNC: <0.2 MG/DL (ref 0–1)
BUN BLDV-MCNC: 4 MG/DL (ref 7–20)
CALCIUM SERPL-MCNC: 8.5 MG/DL (ref 8.3–10.6)
CHLORIDE BLD-SCNC: 101 MMOL/L (ref 99–110)
CO2: 33 MMOL/L (ref 21–32)
CREAT SERPL-MCNC: 0.7 MG/DL (ref 0.6–1.2)
GFR AFRICAN AMERICAN: >60
GFR NON-AFRICAN AMERICAN: >60
GLOBULIN: 3 G/DL
GLUCOSE BLD-MCNC: 122 MG/DL (ref 70–99)
HCT VFR BLD CALC: 32.3 % (ref 36–48)
HEMOGLOBIN: 10.6 G/DL (ref 12–16)
MCH RBC QN AUTO: 29.9 PG (ref 26–34)
MCHC RBC AUTO-ENTMCNC: 32.7 G/DL (ref 31–36)
MCV RBC AUTO: 91.3 FL (ref 80–100)
PDW BLD-RTO: 15.2 % (ref 12.4–15.4)
PLATELET # BLD: 271 K/UL (ref 135–450)
PMV BLD AUTO: 7.5 FL (ref 5–10.5)
POTASSIUM REFLEX MAGNESIUM: 4.2 MMOL/L (ref 3.5–5.1)
RBC # BLD: 3.54 M/UL (ref 4–5.2)
SODIUM BLD-SCNC: 136 MMOL/L (ref 136–145)
TOTAL PROTEIN: 6.4 G/DL (ref 6.4–8.2)
WBC # BLD: 8 K/UL (ref 4–11)

## 2020-11-19 PROCEDURE — 94669 MECHANICAL CHEST WALL OSCILL: CPT

## 2020-11-19 PROCEDURE — 2000000000 HC ICU R&B

## 2020-11-19 PROCEDURE — 2700000000 HC OXYGEN THERAPY PER DAY

## 2020-11-19 PROCEDURE — 6360000002 HC RX W HCPCS: Performed by: THORACIC SURGERY (CARDIOTHORACIC VASCULAR SURGERY)

## 2020-11-19 PROCEDURE — 94640 AIRWAY INHALATION TREATMENT: CPT

## 2020-11-19 PROCEDURE — 2500000003 HC RX 250 WO HCPCS: Performed by: THORACIC SURGERY (CARDIOTHORACIC VASCULAR SURGERY)

## 2020-11-19 PROCEDURE — 2580000003 HC RX 258: Performed by: THORACIC SURGERY (CARDIOTHORACIC VASCULAR SURGERY)

## 2020-11-19 PROCEDURE — 85027 COMPLETE CBC AUTOMATED: CPT

## 2020-11-19 PROCEDURE — 6370000000 HC RX 637 (ALT 250 FOR IP): Performed by: THORACIC SURGERY (CARDIOTHORACIC VASCULAR SURGERY)

## 2020-11-19 PROCEDURE — 80053 COMPREHEN METABOLIC PANEL: CPT

## 2020-11-19 PROCEDURE — 71045 X-RAY EXAM CHEST 1 VIEW: CPT

## 2020-11-19 PROCEDURE — 36415 COLL VENOUS BLD VENIPUNCTURE: CPT

## 2020-11-19 PROCEDURE — 94761 N-INVAS EAR/PLS OXIMETRY MLT: CPT

## 2020-11-19 RX ORDER — TRAZODONE HYDROCHLORIDE 50 MG/1
50 TABLET ORAL NIGHTLY
Status: DISCONTINUED | OUTPATIENT
Start: 2020-11-19 | End: 2020-11-20 | Stop reason: HOSPADM

## 2020-11-19 RX ADMIN — MORPHINE SULFATE 2 MG: 2 INJECTION, SOLUTION INTRAMUSCULAR; INTRAVENOUS at 15:48

## 2020-11-19 RX ADMIN — FAMOTIDINE 20 MG: 20 TABLET ORAL at 21:13

## 2020-11-19 RX ADMIN — ALBUTEROL SULFATE 2.5 MG: 2.5 SOLUTION RESPIRATORY (INHALATION) at 15:53

## 2020-11-19 RX ADMIN — POLYETHYLENE GLYCOL 3350 17 G: 17 POWDER, FOR SOLUTION ORAL at 08:53

## 2020-11-19 RX ADMIN — ALBUTEROL SULFATE 2.5 MG: 2.5 SOLUTION RESPIRATORY (INHALATION) at 08:51

## 2020-11-19 RX ADMIN — POTASSIUM CHLORIDE, DEXTROSE MONOHYDRATE AND SODIUM CHLORIDE: 150; 5; 450 INJECTION, SOLUTION INTRAVENOUS at 08:41

## 2020-11-19 RX ADMIN — KETOROLAC TROMETHAMINE 15 MG: 30 INJECTION, SOLUTION INTRAMUSCULAR at 19:19

## 2020-11-19 RX ADMIN — ALBUTEROL SULFATE 2.5 MG: 2.5 SOLUTION RESPIRATORY (INHALATION) at 20:41

## 2020-11-19 RX ADMIN — GABAPENTIN 400 MG: 400 CAPSULE ORAL at 21:13

## 2020-11-19 RX ADMIN — TRAMADOL HYDROCHLORIDE 50 MG: 50 TABLET ORAL at 18:27

## 2020-11-19 RX ADMIN — KETOROLAC TROMETHAMINE 15 MG: 30 INJECTION, SOLUTION INTRAMUSCULAR at 01:11

## 2020-11-19 RX ADMIN — GABAPENTIN 400 MG: 400 CAPSULE ORAL at 08:46

## 2020-11-19 RX ADMIN — DULOXETINE HYDROCHLORIDE 60 MG: 60 CAPSULE, DELAYED RELEASE ORAL at 08:46

## 2020-11-19 RX ADMIN — ACETAMINOPHEN 650 MG: 325 TABLET ORAL at 19:20

## 2020-11-19 RX ADMIN — FAMOTIDINE 20 MG: 20 TABLET ORAL at 08:46

## 2020-11-19 RX ADMIN — MORPHINE SULFATE 2 MG: 2 INJECTION, SOLUTION INTRAMUSCULAR; INTRAVENOUS at 02:02

## 2020-11-19 RX ADMIN — ACETAMINOPHEN 650 MG: 325 TABLET ORAL at 13:00

## 2020-11-19 RX ADMIN — MORPHINE SULFATE 2 MG: 2 INJECTION, SOLUTION INTRAMUSCULAR; INTRAVENOUS at 06:44

## 2020-11-19 RX ADMIN — ATORVASTATIN CALCIUM 80 MG: 80 TABLET, FILM COATED ORAL at 21:13

## 2020-11-19 RX ADMIN — SODIUM CHLORIDE, PRESERVATIVE FREE 10 ML: 5 INJECTION INTRAVENOUS at 21:13

## 2020-11-19 RX ADMIN — ENOXAPARIN SODIUM 40 MG: 40 INJECTION SUBCUTANEOUS at 09:18

## 2020-11-19 RX ADMIN — KETOROLAC TROMETHAMINE 15 MG: 30 INJECTION, SOLUTION INTRAMUSCULAR at 12:59

## 2020-11-19 RX ADMIN — AMIODARONE HYDROCHLORIDE 400 MG: 200 TABLET ORAL at 21:13

## 2020-11-19 RX ADMIN — ASPIRIN 81 MG: 81 TABLET, COATED ORAL at 08:46

## 2020-11-19 RX ADMIN — TIZANIDINE 2 MG: 4 TABLET ORAL at 18:27

## 2020-11-19 RX ADMIN — MORPHINE SULFATE 4 MG: 4 INJECTION, SOLUTION INTRAMUSCULAR; INTRAVENOUS at 04:28

## 2020-11-19 RX ADMIN — AMIODARONE HYDROCHLORIDE 400 MG: 200 TABLET ORAL at 09:52

## 2020-11-19 RX ADMIN — MORPHINE SULFATE 2 MG: 2 INJECTION, SOLUTION INTRAMUSCULAR; INTRAVENOUS at 10:34

## 2020-11-19 RX ADMIN — CEFAZOLIN SODIUM 2 G: 10 INJECTION, POWDER, FOR SOLUTION INTRAVENOUS at 00:23

## 2020-11-19 RX ADMIN — SODIUM CHLORIDE, PRESERVATIVE FREE 10 ML: 5 INJECTION INTRAVENOUS at 08:53

## 2020-11-19 RX ADMIN — TRAZODONE HYDROCHLORIDE 50 MG: 50 TABLET ORAL at 21:13

## 2020-11-19 RX ADMIN — GABAPENTIN 400 MG: 400 CAPSULE ORAL at 13:00

## 2020-11-19 RX ADMIN — MORPHINE SULFATE 4 MG: 4 INJECTION, SOLUTION INTRAMUSCULAR; INTRAVENOUS at 21:13

## 2020-11-19 RX ADMIN — ALBUTEROL SULFATE 2.5 MG: 2.5 SOLUTION RESPIRATORY (INHALATION) at 11:43

## 2020-11-19 RX ADMIN — KETOROLAC TROMETHAMINE 15 MG: 30 INJECTION, SOLUTION INTRAMUSCULAR at 06:44

## 2020-11-19 ASSESSMENT — PAIN DESCRIPTION - FREQUENCY
FREQUENCY: CONTINUOUS

## 2020-11-19 ASSESSMENT — PAIN DESCRIPTION - ORIENTATION
ORIENTATION: LEFT

## 2020-11-19 ASSESSMENT — PAIN DESCRIPTION - ONSET
ONSET: ON-GOING

## 2020-11-19 ASSESSMENT — PAIN SCALES - GENERAL
PAINLEVEL_OUTOF10: 6
PAINLEVEL_OUTOF10: 6
PAINLEVEL_OUTOF10: 8
PAINLEVEL_OUTOF10: 6
PAINLEVEL_OUTOF10: 6
PAINLEVEL_OUTOF10: 7
PAINLEVEL_OUTOF10: 5
PAINLEVEL_OUTOF10: 9
PAINLEVEL_OUTOF10: 9
PAINLEVEL_OUTOF10: 7
PAINLEVEL_OUTOF10: 9
PAINLEVEL_OUTOF10: 5

## 2020-11-19 ASSESSMENT — PAIN DESCRIPTION - PROGRESSION
CLINICAL_PROGRESSION: NOT CHANGED

## 2020-11-19 ASSESSMENT — PAIN DESCRIPTION - PAIN TYPE
TYPE: SURGICAL PAIN

## 2020-11-19 ASSESSMENT — PAIN DESCRIPTION - LOCATION
LOCATION: SHOULDER

## 2020-11-19 NOTE — PROGRESS NOTES
Assumed care of pt. Received report via MA, RN. VSS. Pt on 1L O2 via nasal canula. 4 eye skin assessment complete with off going RN, see separate note for details. MAR handoff complete. Lines verified. One left chest tube present currently to suction. Pt repositioned. Currently complaining of pain, will administer medication when due see MAR for details. Shift assessment to follow. Will continue to monitor.

## 2020-11-19 NOTE — PROGRESS NOTES
Pt's daughter, Geoffrey Casiano, called for update. Update given. All questions and concerns addressed. Daughter stated that pt does not have a pain tolerance and expects pain and discomfort to be completely resolved with medication. Daughter stated that she tried to explain to pt that pain medication does not always work like that especially after surgery and with chest tube present. Daughter voiced concerns regarding pt plans upon discharge. Stated that doesn't know if pt would be able to go home where she lives alone. Daughter, Geoffrey Casiano, is from [place] and will be returning home on Saturday 11/21. Geoffrey Casiano stated that pt does have a daughter that is local but they dont have the best relationship. Writer explained that pt would be evaluated by PT/OT prior to discharge and plan for placement would be discussed. Daughter, Geoffrey Casiano, had no further questions or concerns at this time. Will continue to monitor.

## 2020-11-19 NOTE — PROGRESS NOTES
Rounds done with Dr Deneen Alanis and team. Chest tube clamped by MD. Order to unclamp for SOB or respiratory distress, CXR in 4 hours and okay to restart home Trazodone dose.  Barbara Hashimoto

## 2020-11-19 NOTE — PROGRESS NOTES
4 Eyes Skin Assessment     The patient is being assess for   Shift Handoff    I agree that 2 RN's have performed a thorough Head to Toe Skin Assessment on the patient. ALL assessment sites listed below have been assessed. Areas assessed by both nurses:   [x]   Head, Face, and Ears   [x]   Shoulders, Back, and Chest, Abdomen  [x]   Arms, Elbows, and Hands   [x]   Coccyx, Sacrum, and Ischium  [x]   Legs, Feet, and Heels        *    **SHARE this note so that the co-signing nurse is able to place an eSignature**    Co-signer eSignature: Electronically signed by Micki Navas RN on 11/19/20 at 7:34 AM EST    Does the Patient have Skin Breakdown?   Yes LDA WOUND CARE was Initiated documentation include the Elina-wound, Wound Assessment, Measurements, Dressing Treatment, Drainage, and Color\",          Sumit Prevention initiated:  Yes   Wound Care Orders initiated:  NA      WOC nurse consulted for Pressure Injury (Stage 3,4, Unstageable, DTI, NWPT, Complex wounds)and New or Established Ostomies:  NA      Primary Nurse eSignature: Electronically signed by Demetris Becker RN on 11/19/20 at 7:58 AM EST      \

## 2020-11-19 NOTE — PROGRESS NOTES
Pt requested home medication of trazodone 50mg tablet. Writer stated that medication was not ordered and would be discussed with MD in the am. Pt verbalized understanding. Will continue to monitor.

## 2020-11-19 NOTE — PLAN OF CARE
Problem: Falls - Risk of:  Goal: Will remain free from falls  Description: Will remain free from falls  11/19/2020 0925 by Mayra Avila RN  Outcome: Ongoing     Problem: Falls - Risk of:  Goal: Absence of physical injury  Description: Absence of physical injury  11/19/2020 0925 by Mayra Avila RN  Outcome: Ongoing     Problem: Pain:  Goal: Pain level will decrease  Description: Pain level will decrease  11/19/2020 0925 by Mayra Avila RN  Outcome: Ongoing     Problem: Pain:  Goal: Control of acute pain  Description: Control of acute pain  11/19/2020 0925 by Mayra Avila RN  Outcome: Ongoing

## 2020-11-20 ENCOUNTER — APPOINTMENT (OUTPATIENT)
Dept: GENERAL RADIOLOGY | Age: 65
DRG: 120 | End: 2020-11-20
Attending: THORACIC SURGERY (CARDIOTHORACIC VASCULAR SURGERY)
Payer: COMMERCIAL

## 2020-11-20 VITALS
HEART RATE: 75 BPM | BODY MASS INDEX: 28.17 KG/M2 | HEIGHT: 61 IN | SYSTOLIC BLOOD PRESSURE: 130 MMHG | WEIGHT: 149.2 LBS | RESPIRATION RATE: 18 BRPM | OXYGEN SATURATION: 95 % | TEMPERATURE: 97.9 F | DIASTOLIC BLOOD PRESSURE: 72 MMHG

## 2020-11-20 LAB
ANION GAP SERPL CALCULATED.3IONS-SCNC: 5 MMOL/L (ref 3–16)
BASOPHILS ABSOLUTE: 0 K/UL (ref 0–0.2)
BASOPHILS RELATIVE PERCENT: 0.6 %
BUN BLDV-MCNC: 9 MG/DL (ref 7–20)
CALCIUM SERPL-MCNC: 9 MG/DL (ref 8.3–10.6)
CHLORIDE BLD-SCNC: 99 MMOL/L (ref 99–110)
CO2: 33 MMOL/L (ref 21–32)
CREAT SERPL-MCNC: 0.8 MG/DL (ref 0.6–1.2)
EOSINOPHILS ABSOLUTE: 0.3 K/UL (ref 0–0.6)
EOSINOPHILS RELATIVE PERCENT: 4.1 %
GFR AFRICAN AMERICAN: >60
GFR NON-AFRICAN AMERICAN: >60
GLUCOSE BLD-MCNC: 99 MG/DL (ref 70–99)
HCT VFR BLD CALC: 35 % (ref 36–48)
HEMOGLOBIN: 11.6 G/DL (ref 12–16)
LYMPHOCYTES ABSOLUTE: 2.6 K/UL (ref 1–5.1)
LYMPHOCYTES RELATIVE PERCENT: 32.9 %
MCH RBC QN AUTO: 29.9 PG (ref 26–34)
MCHC RBC AUTO-ENTMCNC: 33.1 G/DL (ref 31–36)
MCV RBC AUTO: 90.2 FL (ref 80–100)
MONOCYTES ABSOLUTE: 0.4 K/UL (ref 0–1.3)
MONOCYTES RELATIVE PERCENT: 5.5 %
NEUTROPHILS ABSOLUTE: 4.5 K/UL (ref 1.7–7.7)
NEUTROPHILS RELATIVE PERCENT: 56.9 %
PDW BLD-RTO: 14.6 % (ref 12.4–15.4)
PLATELET # BLD: 316 K/UL (ref 135–450)
PMV BLD AUTO: 7.7 FL (ref 5–10.5)
POTASSIUM REFLEX MAGNESIUM: 4.3 MMOL/L (ref 3.5–5.1)
RBC # BLD: 3.88 M/UL (ref 4–5.2)
SODIUM BLD-SCNC: 137 MMOL/L (ref 136–145)
WBC # BLD: 8 K/UL (ref 4–11)

## 2020-11-20 PROCEDURE — 71045 X-RAY EXAM CHEST 1 VIEW: CPT

## 2020-11-20 PROCEDURE — 94640 AIRWAY INHALATION TREATMENT: CPT

## 2020-11-20 PROCEDURE — 6370000000 HC RX 637 (ALT 250 FOR IP): Performed by: NURSE PRACTITIONER

## 2020-11-20 PROCEDURE — 80048 BASIC METABOLIC PNL TOTAL CA: CPT

## 2020-11-20 PROCEDURE — 85025 COMPLETE CBC W/AUTO DIFF WBC: CPT

## 2020-11-20 PROCEDURE — 6370000000 HC RX 637 (ALT 250 FOR IP): Performed by: THORACIC SURGERY (CARDIOTHORACIC VASCULAR SURGERY)

## 2020-11-20 PROCEDURE — 6360000002 HC RX W HCPCS: Performed by: THORACIC SURGERY (CARDIOTHORACIC VASCULAR SURGERY)

## 2020-11-20 PROCEDURE — 99024 POSTOP FOLLOW-UP VISIT: CPT | Performed by: THORACIC SURGERY (CARDIOTHORACIC VASCULAR SURGERY)

## 2020-11-20 PROCEDURE — 94669 MECHANICAL CHEST WALL OSCILL: CPT

## 2020-11-20 PROCEDURE — 2580000003 HC RX 258: Performed by: THORACIC SURGERY (CARDIOTHORACIC VASCULAR SURGERY)

## 2020-11-20 PROCEDURE — 36415 COLL VENOUS BLD VENIPUNCTURE: CPT

## 2020-11-20 RX ORDER — FUROSEMIDE 20 MG/1
20 TABLET ORAL 2 TIMES DAILY
Qty: 60 TABLET | Refills: 3 | Status: SHIPPED | OUTPATIENT
Start: 2020-11-20 | End: 2021-03-29 | Stop reason: ALTCHOICE

## 2020-11-20 RX ORDER — OXYCODONE HYDROCHLORIDE 5 MG/1
5 TABLET ORAL EVERY 6 HOURS PRN
Qty: 28 TABLET | Refills: 0 | Status: SHIPPED | OUTPATIENT
Start: 2020-11-20 | End: 2020-11-27

## 2020-11-20 RX ORDER — POTASSIUM CHLORIDE 750 MG/1
10 TABLET, EXTENDED RELEASE ORAL 2 TIMES DAILY WITH MEALS
Status: DISCONTINUED | OUTPATIENT
Start: 2020-11-20 | End: 2020-11-20 | Stop reason: HOSPADM

## 2020-11-20 RX ORDER — OXYCODONE HYDROCHLORIDE 5 MG/1
5 TABLET ORAL EVERY 6 HOURS PRN
Status: DISCONTINUED | OUTPATIENT
Start: 2020-11-20 | End: 2020-11-20 | Stop reason: HOSPADM

## 2020-11-20 RX ORDER — FUROSEMIDE 20 MG/1
20 TABLET ORAL 2 TIMES DAILY
Status: DISCONTINUED | OUTPATIENT
Start: 2020-11-20 | End: 2020-11-20 | Stop reason: HOSPADM

## 2020-11-20 RX ORDER — POTASSIUM CHLORIDE 750 MG/1
10 TABLET, EXTENDED RELEASE ORAL 2 TIMES DAILY WITH MEALS
Qty: 60 TABLET | Refills: 3 | Status: SHIPPED | OUTPATIENT
Start: 2020-11-20

## 2020-11-20 RX ORDER — AMIODARONE HYDROCHLORIDE 400 MG/1
400 TABLET ORAL 2 TIMES DAILY
Qty: 15 TABLET | Refills: 0 | Status: ON HOLD | OUTPATIENT
Start: 2020-11-20 | End: 2021-03-15 | Stop reason: HOSPADM

## 2020-11-20 RX ADMIN — SODIUM CHLORIDE, PRESERVATIVE FREE 10 ML: 5 INJECTION INTRAVENOUS at 08:22

## 2020-11-20 RX ADMIN — DULOXETINE HYDROCHLORIDE 60 MG: 60 CAPSULE, DELAYED RELEASE ORAL at 08:16

## 2020-11-20 RX ADMIN — GABAPENTIN 400 MG: 400 CAPSULE ORAL at 14:37

## 2020-11-20 RX ADMIN — ASPIRIN 81 MG: 81 TABLET, COATED ORAL at 08:13

## 2020-11-20 RX ADMIN — ENOXAPARIN SODIUM 40 MG: 40 INJECTION SUBCUTANEOUS at 08:13

## 2020-11-20 RX ADMIN — POTASSIUM CHLORIDE 10 MEQ: 750 TABLET, EXTENDED RELEASE ORAL at 17:28

## 2020-11-20 RX ADMIN — CETIRIZINE HYDROCHLORIDE 10 MG: 10 TABLET, FILM COATED ORAL at 08:13

## 2020-11-20 RX ADMIN — KETOROLAC TROMETHAMINE 15 MG: 30 INJECTION, SOLUTION INTRAMUSCULAR at 00:33

## 2020-11-20 RX ADMIN — ALBUTEROL SULFATE 2.5 MG: 2.5 SOLUTION RESPIRATORY (INHALATION) at 07:40

## 2020-11-20 RX ADMIN — GABAPENTIN 400 MG: 400 CAPSULE ORAL at 08:13

## 2020-11-20 RX ADMIN — POTASSIUM CHLORIDE 10 MEQ: 750 TABLET, EXTENDED RELEASE ORAL at 10:12

## 2020-11-20 RX ADMIN — AMIODARONE HYDROCHLORIDE 400 MG: 200 TABLET ORAL at 08:13

## 2020-11-20 RX ADMIN — FAMOTIDINE 20 MG: 20 TABLET ORAL at 08:13

## 2020-11-20 RX ADMIN — LISINOPRIL 40 MG: 20 TABLET ORAL at 08:16

## 2020-11-20 RX ADMIN — ALBUTEROL SULFATE 2.5 MG: 2.5 SOLUTION RESPIRATORY (INHALATION) at 11:37

## 2020-11-20 RX ADMIN — ERGOCALCIFEROL 50000 UNITS: 1.25 CAPSULE ORAL at 08:13

## 2020-11-20 RX ADMIN — FUROSEMIDE 20 MG: 20 TABLET ORAL at 09:51

## 2020-11-20 RX ADMIN — MORPHINE SULFATE 2 MG: 2 INJECTION, SOLUTION INTRAMUSCULAR; INTRAVENOUS at 09:38

## 2020-11-20 RX ADMIN — FUROSEMIDE 20 MG: 20 TABLET ORAL at 17:28

## 2020-11-20 RX ADMIN — OXYCODONE 5 MG: 5 TABLET ORAL at 14:37

## 2020-11-20 RX ADMIN — ALBUTEROL SULFATE 2.5 MG: 2.5 SOLUTION RESPIRATORY (INHALATION) at 15:59

## 2020-11-20 RX ADMIN — KETOROLAC TROMETHAMINE 15 MG: 30 INJECTION, SOLUTION INTRAMUSCULAR at 08:13

## 2020-11-20 RX ADMIN — TRAMADOL HYDROCHLORIDE 50 MG: 50 TABLET ORAL at 11:59

## 2020-11-20 RX ADMIN — POLYETHYLENE GLYCOL 3350 17 G: 17 POWDER, FOR SOLUTION ORAL at 08:16

## 2020-11-20 RX ADMIN — MORPHINE SULFATE 4 MG: 4 INJECTION, SOLUTION INTRAMUSCULAR; INTRAVENOUS at 04:47

## 2020-11-20 ASSESSMENT — PAIN DESCRIPTION - FREQUENCY
FREQUENCY: INTERMITTENT
FREQUENCY: INTERMITTENT

## 2020-11-20 ASSESSMENT — PAIN DESCRIPTION - PAIN TYPE
TYPE: SURGICAL PAIN
TYPE: SURGICAL PAIN

## 2020-11-20 ASSESSMENT — PAIN DESCRIPTION - LOCATION
LOCATION: CHEST
LOCATION: CHEST

## 2020-11-20 ASSESSMENT — PAIN DESCRIPTION - PROGRESSION
CLINICAL_PROGRESSION: NOT CHANGED

## 2020-11-20 ASSESSMENT — PAIN SCALES - GENERAL
PAINLEVEL_OUTOF10: 10
PAINLEVEL_OUTOF10: 0
PAINLEVEL_OUTOF10: 6
PAINLEVEL_OUTOF10: 10
PAINLEVEL_OUTOF10: 0
PAINLEVEL_OUTOF10: 10
PAINLEVEL_OUTOF10: 0
PAINLEVEL_OUTOF10: 8
PAINLEVEL_OUTOF10: 10

## 2020-11-20 ASSESSMENT — PAIN DESCRIPTION - DESCRIPTORS
DESCRIPTORS: ACHING;SORE
DESCRIPTORS: ACHING;SORE

## 2020-11-20 ASSESSMENT — PAIN DESCRIPTION - ONSET
ONSET: ON-GOING
ONSET: ON-GOING

## 2020-11-20 ASSESSMENT — PAIN DESCRIPTION - ORIENTATION
ORIENTATION: LEFT
ORIENTATION: LEFT

## 2020-11-20 NOTE — OP NOTE
[address], [place]                                OPERATIVE REPORT    PATIENT NAME: Enrico Lanes                      :        1955  MED REC NO:   [id number]                          ROOM:       [id number]  ACCOUNT NO:   [de-identified]                           ADMIT DATE: 2020  PROVIDER:     Fiorella Thurston MD    DATE OF PROCEDURE:  2020    PREOPERATIVE DIAGNOSIS:  Left lower lobe non-small cell lung cancer. POSTOPERATIVE DIAGNOSIS:  Left lower lobe non-small cell lung cancer. OPERATION PERFORMED:  1. Left video-assist thoracoscopy with left lower lobectomy. 2.  Thoracoscopic mediastinal lymphadenectomy. 3.  Intercostal nerve block. 4.  Bronchoscopy. STAFF SURGEON:  Fiorella Thurston MD    FIRST ASSIST:  Dr. Verito Kapadia and Liam Casas. Experienced first assist was required due to the complexity of the  procedure, was present for the entire procedure. ESTIMATED BLOOD LOSS:  50 mL. COMPLICATIONS:  None immediate. PROCEDURE DESCRIPTION:  The patient was taken to the operating room  after informed written consent was obtained, lying supine under general  anesthesia, ET tube was placed with no difficulty. Bronchoscopy was  performed. No endobronchial mass could be seen or lesion. I confirmed  the presence of the double-lumen tube in appropriate position. The  patient was turned left side up, prepped and draped in standard fashion. Ports for the course were done in standard manner. Next, four ports  were inserted in standard fashion. On inspection, the fissure was  complete. Bovie was used to dissect over the fissure. Posterior  patches were identified, dissected and transected by using an Endo ADAM  stapler gold load. Next, moving to the inferior pulmonary ligament,  _____ was removed, sent for pathology.   Dissection around the pulmonary  vein was performed and stapler was entered. Vein was transected by  using an Endo ADAM stapler gold load Curved Tip. Next, by using _____  purple color stapler was used. Stapler was fired into the fissure. Specimen was placed in the bag, sent for pathology. Next, we turned to  level 7 lymph node. Dissection was performed around the rosalba and  level 7 lymph node was removed, sent for pathology. Next, 5 and 6 lymph  node was dissected, removed and sent for pathology. Through the  fissure, we removed level 11 and 10 lymph nodes and sent for pathology. Next, we used Exparel and Marcaine and total of 40 mL mixed with the  Marcaine and Exparel, injected to each intercostal space. Next, chest  tube was placed through one of the hole. The rest of the holes was  closed in appropriate level. The lung was insufflated. The patient was dispositioned to recovery room in stable condition  without any complication.         Cole Buchanan MD    D: 11/19/2020 21:24:51       T: 11/20/2020 5:18:28     [id number]  Job#: [id number]     Doc#: [id number]    CC:

## 2020-11-20 NOTE — PROGRESS NOTES
CVTS Thoracic Progress Note:          CC:  Post op follow up 11/18/20 LEFT VIDEO ASSISTED THORACOSCOPIC SURGERY WITH LEFT  LOWER LOBE LOBECTOMY AND MEDIASTINAL LYMPHADENECTOMY, INTERCOSTAL NERVE BLOCK     Subj: awake, sitting up in chair, appears comfortable. Obj:    Blood pressure (!) 164/83, pulse 75, temperature 98.7 °F (37.1 °C), temperature source Oral, resp. rate 19, height 5' 1\" (1.549 m), weight 149 lb 3.2 oz (67.7 kg), SpO2 97 %, not currently breastfeeding. Lungs CTAB but diminished to left   Abdomen soft, non-tender    Incision w/ occlusive dressing, CDI    Chest tube with 130--220= 350 ml serosanguinous drainage in last 24 hours/no airleak    Diagnostics:   Recent Labs     11/18/20  1142 11/19/20  0420   WBC 14.3* 8.0   HGB 12.4 10.6*   HCT 38.6 32.3*    271                                                                  Recent Labs     11/19/20  0420      K 4.2      CO2 33*   BUN 4*   CREATININE 0.7   GLUCOSE 122*     CXR: 11/19/20   Impression    No appreciable pneumothorax.         Developing right basilar airspace disease. Assess/Plan:   LLL lung nodule: s/p LL lobectomy  No air leak noted. Removing chest tube this morning. Will check CXR two hours afterward. Low dose lasix and potassium started. HTN: controlled, continue home meds     Insomnia: improving, continue home trazodone 50 mg QHS    Prophylaxis: Amio, lovenox, miralax    Dispo: pt will d/c home likely tomorrow.     ________________________________________________________________    Graciela Benjamin CNP  11/20/2020  10:00 AM

## 2020-11-20 NOTE — PROGRESS NOTES
Chest tube meets criteria to remove per open heart protocol. If chest tubes do not meet criteria, a specific order has been entered to remove. No air leak, no crepitus. Patient instructed on procedure. Dressings removed. Incision within normal limits. Site cleansed and prepped per protocol. Chest tube removed without difficulty. Vaseline gauze and dry sterile dressing applied. Bilateral breath sounds audible. Oxygen saturation 94 on room air.

## 2020-11-20 NOTE — PROGRESS NOTES
Patient called out several times wanting pain meds while I was in helping code another patient. When I was finished helping in that room I went to administer that patient her pain meds and she was so angry that she stated she will just talk to a DrPascual In the morning about it and she didn't want anything at that time.

## 2020-11-23 NOTE — DISCHARGE SUMMARY
Cardiac, Vascular & Thoracic Surgery  Discharge Summary    Patient:  Minesh Gong 1955 [id number]   Admission Date:  11/18/2020  5:13 AM  Discharge Date:  11/20/2020 MOM    Principle Diagnosis:  Left upper lobe lung nodule     Secondary Diagnosis:  Active Problems:    Mass of lower lobe of left lung  Resolved Problems:    * No resolved hospital problems. *      Procedure:  11/18/20 LEFT VIDEO ASSISTED THORACOSCOPIC SURGERY WITH LEFT LOWER LOBE LOBECTOMY AND MEDIASTINAL LYMPHADENECTOMY, INTERCOSTAL NERVE BLOCK    History:  The patient is a 63 y.o. female with history of HTN, HLD, emphysema, current smoker, and chronic back pain who underwent CT guided needle biopsy of a left superior segment lung nodule which showed adenocarcinoma. Our service was consulted for surgical intervention. Hospital Course: The patient underwent Left video-assist thoracoscopy with left lower lobectomy, thoracoscopic mediastinal lymphadenectomy, intercostal nerve block, and bronchoscopy on 11/18/20 with Dr. John Bolanos. Ms. Neli Bustamante underwent typical post operative course without complications. Pt was tolerating diet and ambulating at time of discharge. The patient was discharged on 11/20/2020 to home.      Discharged Condition: stable    Disposition:  home    Medications:  Aspirin:continue  ACE/ARB:continue  Amiodarone:continue  Statin:continue    Discharge Medications:   [address] Medication Instructions [facility]:[id number]    Printed on:11/23/20 1232   Medication Information                      albuterol sulfate  (90 Base) MCG/ACT inhaler  INHALE 2 PUFFS INTO THE LUNGS EVERY 6 HOURS AS NEEDED FOR WHEEZING             amiodarone (PACERONE) 400 MG tablet  Take 1 tablet by mouth 2 times daily             aspirin 81 MG EC tablet  Take 81 mg by mouth daily             atorvastatin (LIPITOR) 80 MG tablet  TAKE 1 TABLET BY MOUTH NIGHTLY             cetirizine (ZYRTEC) 10 MG tablet  TAKE 1 TABLET BY MOUTH DAILY DULoxetine (CYMBALTA) 60 MG extended release capsule  TAKE 1 CAPSULE BY MOUTH DAILY             famotidine (PEPCID) 20 MG tablet  Take 1 tablet by mouth 2 times daily as needed (heartburn)             fluticasone (FLONASE) 50 MCG/ACT nasal spray  2 sprays by Nasal route as needed for Rhinitis or Allergies             furosemide (LASIX) 20 MG tablet  Take 1 tablet by mouth 2 times daily             gabapentin (NEURONTIN) 400 MG capsule  Take 400 mg by mouth 3 times daily. lisinopril (PRINIVIL;ZESTRIL) 40 MG tablet  Take 40 mg by mouth daily             nicotine (NICODERM CQ) 7 MG/24HR  PLACE 1 PATCH ONTO THE SKIN DAILY (ROTATE SITES)             oxyCODONE (ROXICODONE) 5 MG immediate release tablet  Take 1 tablet by mouth every 6 hours as needed for Pain for up to 7 days. potassium chloride (KLOR-CON M) 10 MEQ extended release tablet  Take 1 tablet by mouth 2 times daily (with meals)             SPIRIVA HANDIHALER 18 MCG inhalation capsule  INHALE 1 CAPSULE INTO THE LUNGS DAILY             tiZANidine (ZANAFLEX) 2 MG tablet  TAKE 1 TABLET BY MOUTH 2 TIMES DAILY AS NEEDED (MUSCLE SPASM AND PAIN)             traZODone (DESYREL) 50 MG tablet  TAKE 1 TABLET BY MOUTH NIGHTLY             vitamin D (ERGOCALCIFEROL) 1.25 MG (89886 UT) CAPS capsule  Take 1 capsule by mouth once a week                 Patient Instructions: Activity: DO NOT LIFT, PUSH, OR PULL ANYTHING OVER 5 POUNDS FOR 6 WEEK from the day of surgery  Diet:  regular diet  Wound Care:  [address] YOUR INCISIONS DAILY WITH A CLEAN WASHCLOTH AND ANTIBACTERIAL SOAP. Do not wash your incisions after you have cleansed other parts of your body    Follow up with Cardiothoracic Surgeon, Dr. Padma Gorman in 2 weeks.     Ulices Barboza, APRN-CNP

## 2020-12-01 NOTE — TELEPHONE ENCOUNTER
.  Last office visit 5/7/2020     Last written 8-  90 with 3      Next office visit scheduled Visit date not found    Requested Prescriptions     Pending Prescriptions Disp Refills    hydrOXYzine (ATARAX) 25 MG tablet [Pharmacy Med Name: HYDROXYZINE HCL 25 MG TABS] 90 tablet 3     Sig: TAKE 1 TABLET BY MOUTH EVERY 8 HOURS AS NEEDED FOR ANXIETY

## 2020-12-03 RX ORDER — HYDROXYZINE HYDROCHLORIDE 25 MG/1
25 TABLET, FILM COATED ORAL EVERY 8 HOURS PRN
Qty: 90 TABLET | Refills: 3 | Status: SHIPPED | OUTPATIENT
Start: 2020-12-03 | End: 2021-01-02

## 2020-12-09 ENCOUNTER — TELEPHONE (OUTPATIENT)
Dept: CARDIOTHORACIC SURGERY | Age: 65
End: 2020-12-09

## 2020-12-09 NOTE — TELEPHONE ENCOUNTER
Patient was no show to her OV with Dr. Deneen Alanis on Monday this week. Reached out to her today. She reports that she is doing fine. She has seen Dr. Blanca Smith to review pathology and she is scheduled to receive 4 rounds of chemo with his office. She does not want to drive to our office for a post operative visit. She states that she is doing fine. Denies any incisional issues. She will call if she changes her mind or has any new concerns or issues.

## 2020-12-21 RX ORDER — DULOXETIN HYDROCHLORIDE 60 MG/1
60 CAPSULE, DELAYED RELEASE ORAL DAILY
Qty: 90 CAPSULE | Refills: 1 | Status: SHIPPED | OUTPATIENT
Start: 2020-12-21 | End: 2021-03-29 | Stop reason: ALTCHOICE

## 2020-12-21 RX ORDER — ATORVASTATIN CALCIUM 80 MG/1
80 TABLET, FILM COATED ORAL DAILY
Qty: 90 TABLET | Refills: 1 | Status: SHIPPED | OUTPATIENT
Start: 2020-12-21

## 2020-12-21 RX ORDER — TRAZODONE HYDROCHLORIDE 50 MG/1
50 TABLET ORAL NIGHTLY
Qty: 90 TABLET | Refills: 1 | Status: SHIPPED | OUTPATIENT
Start: 2020-12-21

## 2020-12-21 RX ORDER — GABAPENTIN 400 MG/1
400 CAPSULE ORAL 3 TIMES DAILY
Qty: 90 CAPSULE | Refills: 2 | Status: SHIPPED | OUTPATIENT
Start: 2020-12-21 | End: 2021-03-29 | Stop reason: SDUPTHER

## 2020-12-21 NOTE — TELEPHONE ENCOUNTER
Refill Request     Last Seen: 5/7/2020    Last Written: Gabapentin Not written previously  Duloxetine #90  1rf  7/10/2020  Trazodone #90  1rf  7/10/2020  Atorvastatin #90  1rf  7/10/2020    Next Appointment:   Future Appointments   Date Time Provider Elia Lennoni   2/19/2021 10:45 AM Maco Flynn MD [facility]       Appointment scheduled      Requested Prescriptions     Pending Prescriptions Disp Refills    gabapentin (NEURONTIN) 400 MG capsule 90 capsule 1     Sig: Take 1 capsule by mouth 3 times daily.     DULoxetine (CYMBALTA) 60 MG extended release capsule 90 capsule 1     Sig: Take 1 capsule by mouth daily    traZODone (DESYREL) 50 MG tablet 90 tablet 1     Sig: Take 1 tablet by mouth nightly    atorvastatin (LIPITOR) 80 MG tablet 90 tablet 1     Sig: Take 1 tablet by mouth nightly Indications: pt has been taking in a.m. - had 1/1/17 a.m. dose

## 2020-12-28 ENCOUNTER — TELEPHONE (OUTPATIENT)
Dept: CARDIOTHORACIC SURGERY | Age: 65
End: 2020-12-28

## 2020-12-28 RX ORDER — TRAMADOL HYDROCHLORIDE 50 MG/1
50 TABLET ORAL EVERY 6 HOURS PRN
Qty: 28 TABLET | Refills: 0 | Status: ON HOLD | OUTPATIENT
Start: 2020-12-28 | End: 2021-01-06 | Stop reason: HOSPADM

## 2020-12-28 NOTE — TELEPHONE ENCOUNTER
Patient called today to report that she is still experiencing significant post op pain at incision site. She reports pain with any type of movement/sneezing/coughing/ etc. She is already on gabapentin for other reasons. Reports trying OTC ibuprofen with no relief. Has been told by previous physician that she is not to take Tylenol. Also has history of some kidney issues although recent labs look WNL. She has not come in for a post op visit yet with Dr. Jarocho Infante. Offered her OV with Dr. Jarocho Infante next week and she is agreeable to come in. Gave script for Tramadol and advised to try to take at night only with ibuprofen throughout the day. She is agreeable to try. I did advise that she needs to keep this office visit with Dr. Jarocho Infante or there will be nothing more we can do for her. She verbalizes understanding.

## 2021-01-03 ENCOUNTER — HOSPITAL ENCOUNTER (OUTPATIENT)
Age: 66
Setting detail: OBSERVATION
Discharge: HOME | End: 2021-01-06
Attending: STUDENT IN AN ORGANIZED HEALTH CARE EDUCATION/TRAINING PROGRAM | Admitting: HOSPITALIST
Payer: COMMERCIAL

## 2021-01-03 ENCOUNTER — APPOINTMENT (OUTPATIENT)
Dept: CT IMAGING | Age: 66
End: 2021-01-03
Payer: COMMERCIAL

## 2021-01-03 DIAGNOSIS — R07.9 CHEST PAIN, UNSPECIFIED TYPE: ICD-10-CM

## 2021-01-03 DIAGNOSIS — R55 SYNCOPE AND COLLAPSE: Primary | ICD-10-CM

## 2021-01-03 DIAGNOSIS — C34.90 NON-SMALL CELL LUNG CANCER, UNSPECIFIED LATERALITY (HCC): ICD-10-CM

## 2021-01-03 LAB
A/G RATIO: 1 (ref 1.1–2.2)
ALBUMIN SERPL-MCNC: 4.7 G/DL (ref 3.4–5)
ALP BLD-CCNC: 100 U/L (ref 40–129)
ALT SERPL-CCNC: 7 U/L (ref 10–40)
ANION GAP SERPL CALCULATED.3IONS-SCNC: 12 MMOL/L (ref 3–16)
AST SERPL-CCNC: 17 U/L (ref 15–37)
BASOPHILS ABSOLUTE: 0.1 K/UL (ref 0–0.2)
BASOPHILS RELATIVE PERCENT: 0.9 %
BILIRUB SERPL-MCNC: 0.4 MG/DL (ref 0–1)
BUN BLDV-MCNC: 12 MG/DL (ref 7–20)
CALCIUM SERPL-MCNC: 10.3 MG/DL (ref 8.3–10.6)
CHLORIDE BLD-SCNC: 93 MMOL/L (ref 99–110)
CO2: 28 MMOL/L (ref 21–32)
CREAT SERPL-MCNC: 1.4 MG/DL (ref 0.6–1.2)
EOSINOPHILS ABSOLUTE: 0 K/UL (ref 0–0.6)
EOSINOPHILS RELATIVE PERCENT: 0.3 %
GFR AFRICAN AMERICAN: 46
GFR NON-AFRICAN AMERICAN: 38
GLOBULIN: 4.6 G/DL
GLUCOSE BLD-MCNC: 111 MG/DL (ref 70–99)
HCT VFR BLD CALC: 41.1 % (ref 36–48)
HEMOGLOBIN: 14 G/DL (ref 12–16)
LIPASE: 34 U/L (ref 13–60)
LYMPHOCYTES ABSOLUTE: 3.5 K/UL (ref 1–5.1)
LYMPHOCYTES RELATIVE PERCENT: 33.6 %
MAGNESIUM: 1.7 MG/DL (ref 1.8–2.4)
MCH RBC QN AUTO: 30.3 PG (ref 26–34)
MCHC RBC AUTO-ENTMCNC: 34.1 G/DL (ref 31–36)
MCV RBC AUTO: 88.9 FL (ref 80–100)
MONOCYTES ABSOLUTE: 0.6 K/UL (ref 0–1.3)
MONOCYTES RELATIVE PERCENT: 5.5 %
NEUTROPHILS ABSOLUTE: 6.2 K/UL (ref 1.7–7.7)
NEUTROPHILS RELATIVE PERCENT: 59.7 %
PDW BLD-RTO: 14 % (ref 12.4–15.4)
PLATELET # BLD: 329 K/UL (ref 135–450)
PMV BLD AUTO: 7.8 FL (ref 5–10.5)
POTASSIUM REFLEX MAGNESIUM: 3.3 MMOL/L (ref 3.5–5.1)
PRO-BNP: 130 PG/ML (ref 0–124)
RBC # BLD: 4.62 M/UL (ref 4–5.2)
SODIUM BLD-SCNC: 133 MMOL/L (ref 136–145)
TOTAL PROTEIN: 9.3 G/DL (ref 6.4–8.2)
TROPONIN: <0.01 NG/ML
WBC # BLD: 10.3 K/UL (ref 4–11)

## 2021-01-03 PROCEDURE — 96361 HYDRATE IV INFUSION ADD-ON: CPT

## 2021-01-03 PROCEDURE — 6360000004 HC RX CONTRAST MEDICATION: Performed by: STUDENT IN AN ORGANIZED HEALTH CARE EDUCATION/TRAINING PROGRAM

## 2021-01-03 PROCEDURE — 83690 ASSAY OF LIPASE: CPT

## 2021-01-03 PROCEDURE — 85025 COMPLETE CBC W/AUTO DIFF WBC: CPT

## 2021-01-03 PROCEDURE — 80053 COMPREHEN METABOLIC PANEL: CPT

## 2021-01-03 PROCEDURE — 6370000000 HC RX 637 (ALT 250 FOR IP): Performed by: STUDENT IN AN ORGANIZED HEALTH CARE EDUCATION/TRAINING PROGRAM

## 2021-01-03 PROCEDURE — 84484 ASSAY OF TROPONIN QUANT: CPT

## 2021-01-03 PROCEDURE — 83880 ASSAY OF NATRIURETIC PEPTIDE: CPT

## 2021-01-03 PROCEDURE — 96365 THER/PROPH/DIAG IV INF INIT: CPT

## 2021-01-03 PROCEDURE — 83735 ASSAY OF MAGNESIUM: CPT

## 2021-01-03 PROCEDURE — 96375 TX/PRO/DX INJ NEW DRUG ADDON: CPT

## 2021-01-03 PROCEDURE — 71260 CT THORAX DX C+: CPT

## 2021-01-03 PROCEDURE — 2580000003 HC RX 258: Performed by: STUDENT IN AN ORGANIZED HEALTH CARE EDUCATION/TRAINING PROGRAM

## 2021-01-03 PROCEDURE — 96366 THER/PROPH/DIAG IV INF ADDON: CPT

## 2021-01-03 PROCEDURE — 6360000002 HC RX W HCPCS: Performed by: STUDENT IN AN ORGANIZED HEALTH CARE EDUCATION/TRAINING PROGRAM

## 2021-01-03 PROCEDURE — 93005 ELECTROCARDIOGRAM TRACING: CPT | Performed by: STUDENT IN AN ORGANIZED HEALTH CARE EDUCATION/TRAINING PROGRAM

## 2021-01-03 PROCEDURE — 99285 EMERGENCY DEPT VISIT HI MDM: CPT

## 2021-01-03 RX ORDER — HYDROXYZINE PAMOATE 25 MG/1
25 CAPSULE ORAL
Status: ACTIVE | OUTPATIENT
Start: 2021-01-03 | End: 2021-01-03

## 2021-01-03 RX ORDER — 0.9 % SODIUM CHLORIDE 0.9 %
500 INTRAVENOUS SOLUTION INTRAVENOUS ONCE
Status: COMPLETED | OUTPATIENT
Start: 2021-01-03 | End: 2021-01-03

## 2021-01-03 RX ORDER — MORPHINE SULFATE 2 MG/ML
2 INJECTION, SOLUTION INTRAMUSCULAR; INTRAVENOUS ONCE
Status: COMPLETED | OUTPATIENT
Start: 2021-01-03 | End: 2021-01-03

## 2021-01-03 RX ORDER — OXYCODONE HYDROCHLORIDE AND ACETAMINOPHEN 5; 325 MG/1; MG/1
1 TABLET ORAL ONCE
Status: COMPLETED | OUTPATIENT
Start: 2021-01-03 | End: 2021-01-03

## 2021-01-03 RX ORDER — POTASSIUM CHLORIDE 20 MEQ/1
20 TABLET, EXTENDED RELEASE ORAL ONCE
Status: COMPLETED | OUTPATIENT
Start: 2021-01-03 | End: 2021-01-03

## 2021-01-03 RX ORDER — MAGNESIUM SULFATE 1 G/100ML
1 INJECTION INTRAVENOUS ONCE
Status: COMPLETED | OUTPATIENT
Start: 2021-01-03 | End: 2021-01-03

## 2021-01-03 RX ADMIN — SODIUM CHLORIDE 500 ML: 9 INJECTION, SOLUTION INTRAVENOUS at 18:38

## 2021-01-03 RX ADMIN — MAGNESIUM SULFATE HEPTAHYDRATE 1 G: 1 INJECTION, SOLUTION INTRAVENOUS at 20:33

## 2021-01-03 RX ADMIN — IOPAMIDOL 85 ML: 755 INJECTION, SOLUTION INTRAVENOUS at 18:23

## 2021-01-03 RX ADMIN — OXYCODONE HYDROCHLORIDE AND ACETAMINOPHEN 1 TABLET: 5; 325 TABLET ORAL at 21:25

## 2021-01-03 RX ADMIN — POTASSIUM CHLORIDE 20 MEQ: 1500 TABLET, EXTENDED RELEASE ORAL at 18:38

## 2021-01-03 RX ADMIN — MORPHINE SULFATE 2 MG: 2 INJECTION, SOLUTION INTRAMUSCULAR; INTRAVENOUS at 18:38

## 2021-01-03 NOTE — ED PROVIDER NOTES
[address] ED      CHIEF COMPLAINT  Shortness of Breath (for the past 2 days. pt had lung suergey on 11/17. pt is having pain at the incision site. pt received zofran in route/ )       HISTORY OF PRESENT ILLNESS  Janessa Beltrán is a 63 y.o. female with a past medical history of DVT, hyperlipidemia, hypertension, pulmonary nodule, lymphoma  and JASPREET, who presents to the ED complaining of pain at surgical site. Had sugery 11/17- left lower lobe removed. Has had pain since that time. Pain in left lower chest and radiates around to back. Also reports midsternal chest pressure for 2-3 days. Has used toradol, ice packs, lidocaine. Reports significant SOB, worse with exertion. Reports multiple episodes of syncope when she tries to stand up, occurs almost immediately upon standing up. reports that she falls back onto the couch. Denies true fall. Denies head injury. She is not on blood thinners. She also reports lightheadedness when at rest.    Has hx of blood clot in leg. Reports some leg cramping. Stress Test 7/2019  Summary    Normal LV function.   Jennifer Barbaitz is normal isotope uptake at stress and rest. There is no evidence of    myocardial ischemia or scar.    Normal myocardial perfusion study. No other complaints, modifying factors or associated symptoms. I have reviewed the following from the nursing documentation.     Past Medical History:   Diagnosis Date    JASPREET (acute kidney injury) ([facility] [address] 4/24/2020    Arterial occlusion     Asthma     Centrilobular emphysema ([facility] [address] 5/20/2019    pt denies    Chronic bilateral low back pain with bilateral sciatica 8/3/2018    Chronic bilateral low back pain with bilateral sciatica     Hx of blood clots     DVT    Hyperlipidemia     Hypertension     Left lower lobe pulmonary nodule     MDRO (multiple drug resistant organisms) resistance 02/27/2020    Escherichia coli-URINE    Metabolic encephalopathy     Moderate single current episode of major Smoking status: Former Smoker     Packs/day: 0.50     Years: 20.00     Pack years: 10.00     Types: Cigarettes     Quit date: 2020     Years since quittin.1    Smokeless tobacco: Never Used    Tobacco comment: 1 cigarettes per day   Substance and Sexual Activity    Alcohol use: No    Drug use: Not Currently     Types: Opiates     Sexual activity: Not Currently     Partners: Male   Lifestyle    Physical activity     Days per week: Not on file     Minutes per session: Not on file    Stress: Not on file   Relationships    Social connections     Talks on phone: Not on file     Gets together: Not on file     Attends Confucianism service: Not on file     Active member of club or organization: Not on file     Attends meetings of clubs or organizations: Not on file     Relationship status: Not on file    Intimate partner violence     Fear of current or ex partner: Not on file     Emotionally abused: Not on file     Physically abused: Not on file     Forced sexual activity: Not on file   Other Topics Concern    Not on file   Social History Narrative    Not on file     Current Facility-Administered Medications   Medication Dose Route Frequency Provider Last Rate Last Admin    amiodarone (CORDARONE) tablet 400 mg  400 mg Oral BID Lexine Free, MD   400 mg at 21 0856    aspirin EC tablet 81 mg  81 mg Oral Daily Lexine Free, MD   81 mg at 21 0856    atorvastatin (LIPITOR) tablet 80 mg  80 mg Oral Nightly Lexine Free, MD   80 mg at 21 0109    DULoxetine (CYMBALTA) extended release capsule 60 mg  60 mg Oral Daily Lexine Free, MD   60 mg at 21 0856    furosemide (LASIX) tablet 20 mg  20 mg Oral BID Lexine Free, MD   20 mg at 21 0856    gabapentin (NEURONTIN) capsule 400 mg  400 mg Oral TID Lexine Free, MD   400 mg at 21 1341    lisinopril (PRINIVIL;ZESTRIL) tablet 2.5 mg  2.5 mg Oral Daily Lexine Free, MD   2.5 mg at 21 0856    [START ON 2021] nicotine (NICODERM CQ) 7 MG/24HR 1 patch  1 patch Transdermal Daily Gabbie Bingham MD        potassium chloride (KLOR-CON M) extended release tablet 10 mEq  10 mEq Oral BID WC Gabbie Bingham MD   10 mEq at 01/04/21 0856    tiotropium (SPIRIVA RESPIMAT) 2.5 MCG/ACT inhaler 2 puff  2 puff Inhalation Daily Gabbie Bingham MD   2 puff at 01/04/21 0929    tiZANidine (ZANAFLEX) tablet 2 mg  2 mg Oral BID PRN Gabbie Bingham MD   2 mg at 01/04/21 0602    traZODone (DESYREL) tablet 50 mg  50 mg Oral Nightly Gabbie Bingham MD   50 mg at 01/04/21 0108    enoxaparin (LOVENOX) injection 40 mg  40 mg Subcutaneous Daily Gabbie Bingham MD   40 mg at 01/04/21 0857    promethazine (PHENERGAN) tablet 12.5 mg  12.5 mg Oral Q6H PRN Gabbie Bingham MD        Or    [facility] injection 4 mg  4 mg Intravenous Q6H PRN Gabbie Bingham MD        polyethylene glycol [facility]) packet 17 g  17 g Oral Daily PRN Gabbie Bingham MD        acetaminophen (TYLENOL) suppository 650 mg  650 mg Rectal Q6H PRN Gabbie Bingham MD        sodium chloride flush 0.9 % injection 10 mL  10 mL Intravenous 2 times per day Gabbie Bingham MD   10 mL at 01/04/21 0859    sodium chloride flush 0.9 % injection 10 mL  10 mL Intravenous PRN Gabbie Bingham MD        acetaminophen (TYLENOL) tablet 650 mg  650 mg Oral Q6H PRN Gabbie Bingham MD        ketorolac (TORADOL) injection 15 mg  15 mg Intravenous Q6H PRN Gabbie Bingham MD   15 mg at 01/04/21 1341    albuterol sulfate  (90 Base) MCG/ACT inhaler 2 puff  2 puff Inhalation Q4H PRN Dick Chery MD         Current Outpatient Medications   Medication Sig Dispense Refill    traMADol (ULTRAM) 50 MG tablet Take 1 tablet by mouth every 6 hours as needed for Pain for up to 7 days. Intended supply: 7 days.  Take lowest dose possible to manage pain 28 tablet 0    fluticasone (FLONASE) 50 MCG/ACT nasal spray USE 2 SPRAYS BY NASAL ROUTE AS NEEDED FOR RHINITIS OR ALLERGIES 16 g 5    lisinopril (PRINIVIL;ZESTRIL) 40 MG tablet TAKE 1 TABLET BY MOUTH DAILY 90 tablet 1    gabapentin (NEURONTIN) 400 MG capsule Take 1 capsule by mouth 3 times daily for 90 days. 90 capsule 2    DULoxetine (CYMBALTA) 60 MG extended release capsule Take 1 capsule by mouth daily 90 capsule 1    traZODone (DESYREL) 50 MG tablet Take 1 tablet by mouth nightly 90 tablet 1    atorvastatin (LIPITOR) 80 MG tablet Take 1 tablet by mouth daily 90 tablet 1    amiodarone (PACERONE) 400 MG tablet Take 1 tablet by mouth 2 times daily 15 tablet 0    furosemide (LASIX) 20 MG tablet Take 1 tablet by mouth 2 times daily 60 tablet 3    potassium chloride (KLOR-CON M) 10 MEQ extended release tablet Take 1 tablet by mouth 2 times daily (with meals) 60 tablet 3    aspirin 81 MG EC tablet Take 81 mg by mouth daily      albuterol sulfate  (90 Base) MCG/ACT inhaler INHALE 2 PUFFS INTO THE LUNGS EVERY 6 HOURS AS NEEDED FOR WHEEZING 18 g 2    tiZANidine (ZANAFLEX) 2 MG tablet TAKE 1 TABLET BY MOUTH 2 TIMES DAILY AS NEEDED (MUSCLE SPASM AND PAIN) 90 tablet 1    SPIRIVA HANDIHALER 18 MCG inhalation capsule INHALE 1 CAPSULE INTO THE LUNGS DAILY 90 capsule 1    vitamin D (ERGOCALCIFEROL) 1.25 MG (40106 UT) CAPS capsule Take 1 capsule by mouth once a week 12 capsule 0    nicotine (NICODERM CQ) 7 MG/24HR PLACE 1 PATCH ONTO THE SKIN DAILY (ROTATE SITES) 14 patch 0     Allergies   Allergen Reactions    Lorazepam      Confusion, hallucinations, JUST HAD TOO HIGH DOSE  Can take lorazepam but not Ativan    Codeine Hives     Tolerates Norco.       REVIEW OF SYSTEMS  10 systems reviewed, pertinent positives per HPI otherwise noted to be negative. PHYSICAL EXAM  /83   Pulse 83   Temp 98.2 °F (36.8 °C) (Oral)   Resp 20   Ht 5' 1\" (1.549 m)   Wt 130 lb (59 kg)   SpO2 97%   BMI 24.56 kg/m²    GENERAL APPEARANCE: Awake and alert. Cooperative. moderate distress. HENT: Normocephalic. Atraumatic. Mucous membranes are moist  NECK: Supple.   Full range of motion of the neck without stiffness or pain. EYES: PERRL. EOM's grossly intact. HEART/CHEST: RRR. No murmurs. Chest wall is tender to palpation. LUNGS: Respirations unlabored. CTAB. Good air exchange. Speaking comfortably in full sentences. ABDOMEN: No tenderness. Soft. Non-distended. No masses. No organomegaly. No guarding or rebound. MUSCULOSKELETAL: No extremity edema. Compartments soft. No deformity. No tenderness in the extremities. All extremities neurovascularly intact. SKIN: Warm and dry. No acute rashes. NEUROLOGICAL: Alert and oriented. CN's 2-12 intact. No gross facial drooping. Strength 5/5, sensation intact. PSYCHIATRIC: Normal mood and affect. LABS  I have reviewed all labs for this visit.    Results for orders placed or performed during the hospital encounter of 01/03/21   CBC Auto Differential   Result Value Ref Range    WBC 10.3 4.0 - 11.0 K/uL    RBC 4.62 4.00 - 5.20 M/uL    Hemoglobin 14.0 12.0 - 16.0 g/dL    Hematocrit 41.1 36.0 - 48.0 %    MCV 88.9 80.0 - 100.0 fL    MCH 30.3 26.0 - 34.0 pg    MCHC 34.1 31.0 - 36.0 g/dL    RDW 14.0 12.4 - 15.4 %    Platelets 105 837 - 773 K/uL    MPV 7.8 5.0 - 10.5 fL    Neutrophils % 59.7 %    Lymphocytes % 33.6 %    Monocytes % 5.5 %    Eosinophils % 0.3 %    Basophils % 0.9 %    Neutrophils Absolute 6.2 1.7 - 7.7 K/uL    Lymphocytes Absolute 3.5 1.0 - 5.1 K/uL    Monocytes Absolute 0.6 0.0 - 1.3 K/uL    Eosinophils Absolute 0.0 0.0 - 0.6 K/uL    Basophils Absolute 0.1 0.0 - 0.2 K/uL   Comprehensive Metabolic Panel w/ Reflex to MG   Result Value Ref Range    Sodium 133 (L) 136 - 145 mmol/L    Potassium reflex Magnesium 3.3 (L) 3.5 - 5.1 mmol/L    Chloride 93 (L) 99 - 110 mmol/L    CO2 28 21 - 32 mmol/L    Anion Gap 12 3 - 16    Glucose 111 (H) 70 - 99 mg/dL    BUN 12 7 - 20 mg/dL    CREATININE 1.4 (H) 0.6 - 1.2 mg/dL    GFR Non- 38 (A) >60    GFR  46 (A) >60    Calcium 10.3 8.3 - 10.6 mg/dL    Total Protein 9.3 (H) 6.4 exam unremarkable. Differential diagnosis includes but is not limited to: post surgical pain, Pneumonia, pneumothorax, pleural effusion, ACS, CHF exacerbation, COPD exacerbation, asthma, pulmonary embolism, arrhythmia, anemia    ED Course as of Jan 04 1430   Fito Buchanan Jan 03, 2021   1708 No Leukocytosis, anemia, thrombocytopenia. [ER]   1711 Lipase within normal limits, low suspicion for pancreatitis. [ER]   1711 Hyponatremia, hypochloremia, hypokalemia. Will give repletion.    [ER]   9638 Patient has acute kidney injury. Patient receiving fluids. [ER]   1712 Liver function testing unremarkable. [ER]   2020 CT PE: IMPRESSION:  1. No evidence of pulmonary embolus.     2. Postsurgical changes in the left lower lobe of partial resection. Some  fluid noted along the surgical line.     3.  Mild emphysematous changes. No worrisome pulmonary nodules. No focal  consolidation or gross effusion.     4. Shotty to borderline mediastinal lymph nodes    [ER]      ED Course User Index  [ER] Anastasia Stephens MD     Patient has history of DVT. She is 8w s/p surgery with intractable pain. She also reports multiple episodes of syncope today. CT PE completed and did not show evidence of PE. Patient does not have any evidence of DVT on exam.      Low suspicion for aortic pathology. Patient is not hypertensive. Patient has strong pulses in the bilateral radial and femoral arteries. Pain was not maximal at onset. There is no evidence of mediastinal widening on chest x-ray. Patient does not have any neurologic deficits. The evidence indicates that the patient is very low risk for Aortic Dissection and this is consistent with my clinical intuition. The risk of further workup or hospitalization for aortic dissection is likely higher than the risk of the patient having an aortic dissection. Low suspicion for esophageal perforation. History not consistent with this etiology.   There is no crepitus on exam.  No evidence of pneumomediastinum on chest x-ray. EKG showed no evidence of ischemia. Troponin was within normal limits. Heart score is 4, placing patient at moderate risk for major cardiac event within the next 6 weeks. BNP was minimally elevated, low suspicion for CHF. CT showed no evidence of pneumonia, pleural effusion, or pneumothorax. CT showed some fluid noted along surgical line, this could be causing irritation and pain. Mild hyponatremia, hypokalemia, and hypochloremia. Repleted. Patient has JASPREET, receiving fluids. Patient orthostatics vitals are positive. Syncope may be from orthostatic hypotension. Based on results of work-up, I am concerned for JASPREET, chest pain,. At this time, do feel the patient requires admission for further work-up and management. Discussed the patient with hospital team.    CLINICAL IMPRESSION  1. Syncope and collapse    2. Chest pain, unspecified type        Blood pressure 101/56, pulse 69, temperature 98.3 °F (36.8 °C), temperature source Oral, resp. rate 14, height 5' 1\" (1.549 m), weight 130 lb (59 kg), SpO2 96%, not currently breastfeeding. 49 Opal Coronel was admitted in stable condition. DISCLAIMER: This chart was created using Dragon dictation software. Efforts were made by me to ensure accuracy, however some errors may be present due to limitations of this technology and occasionally words are not transcribed correctly.         Ezra Alanis MD  01/04/21 1532

## 2021-01-04 PROBLEM — R55 SYNCOPE AND COLLAPSE: Status: ACTIVE | Noted: 2021-01-04

## 2021-01-04 LAB
EKG ATRIAL RATE: 77 BPM
EKG DIAGNOSIS: NORMAL
EKG P AXIS: 66 DEGREES
EKG P-R INTERVAL: 146 MS
EKG Q-T INTERVAL: 384 MS
EKG QRS DURATION: 94 MS
EKG QTC CALCULATION (BAZETT): 434 MS
EKG R AXIS: 64 DEGREES
EKG T AXIS: 61 DEGREES
EKG VENTRICULAR RATE: 77 BPM
SARS-COV-2, NAAT: NOT DETECTED

## 2021-01-04 PROCEDURE — 6370000000 HC RX 637 (ALT 250 FOR IP): Performed by: HOSPITALIST

## 2021-01-04 PROCEDURE — G0378 HOSPITAL OBSERVATION PER HR: HCPCS

## 2021-01-04 PROCEDURE — 2580000003 HC RX 258: Performed by: HOSPITALIST

## 2021-01-04 PROCEDURE — 6370000000 HC RX 637 (ALT 250 FOR IP): Performed by: INTERNAL MEDICINE

## 2021-01-04 PROCEDURE — 6360000002 HC RX W HCPCS: Performed by: HOSPITALIST

## 2021-01-04 PROCEDURE — 96376 TX/PRO/DX INJ SAME DRUG ADON: CPT

## 2021-01-04 PROCEDURE — 96372 THER/PROPH/DIAG INJ SC/IM: CPT

## 2021-01-04 PROCEDURE — 6370000000 HC RX 637 (ALT 250 FOR IP)

## 2021-01-04 PROCEDURE — 93010 ELECTROCARDIOGRAM REPORT: CPT | Performed by: INTERNAL MEDICINE

## 2021-01-04 PROCEDURE — U0002 COVID-19 LAB TEST NON-CDC: HCPCS

## 2021-01-04 PROCEDURE — 96375 TX/PRO/DX INJ NEW DRUG ADDON: CPT

## 2021-01-04 PROCEDURE — 94640 AIRWAY INHALATION TREATMENT: CPT

## 2021-01-04 RX ORDER — POTASSIUM CHLORIDE 750 MG/1
10 TABLET, EXTENDED RELEASE ORAL 2 TIMES DAILY WITH MEALS
Status: DISCONTINUED | OUTPATIENT
Start: 2021-01-04 | End: 2021-01-06 | Stop reason: HOSPADM

## 2021-01-04 RX ORDER — TRAZODONE HYDROCHLORIDE 50 MG/1
50 TABLET ORAL NIGHTLY
Status: DISCONTINUED | OUTPATIENT
Start: 2021-01-04 | End: 2021-01-06 | Stop reason: HOSPADM

## 2021-01-04 RX ORDER — ALBUTEROL SULFATE 90 UG/1
2 AEROSOL, METERED RESPIRATORY (INHALATION) EVERY 4 HOURS PRN
Status: DISCONTINUED | OUTPATIENT
Start: 2021-01-04 | End: 2021-01-06 | Stop reason: HOSPADM

## 2021-01-04 RX ORDER — DULOXETIN HYDROCHLORIDE 60 MG/1
60 CAPSULE, DELAYED RELEASE ORAL DAILY
Status: DISCONTINUED | OUTPATIENT
Start: 2021-01-04 | End: 2021-01-06 | Stop reason: HOSPADM

## 2021-01-04 RX ORDER — LISINOPRIL 5 MG/1
2.5 TABLET ORAL DAILY
Status: DISCONTINUED | OUTPATIENT
Start: 2021-01-04 | End: 2021-01-06 | Stop reason: HOSPADM

## 2021-01-04 RX ORDER — POLYETHYLENE GLYCOL 3350 17 G/17G
17 POWDER, FOR SOLUTION ORAL DAILY PRN
Status: DISCONTINUED | OUTPATIENT
Start: 2021-01-04 | End: 2021-01-04

## 2021-01-04 RX ORDER — PROMETHAZINE HYDROCHLORIDE 25 MG/1
12.5 TABLET ORAL EVERY 6 HOURS PRN
Status: DISCONTINUED | OUTPATIENT
Start: 2021-01-04 | End: 2021-01-06 | Stop reason: HOSPADM

## 2021-01-04 RX ORDER — ATORVASTATIN CALCIUM 40 MG/1
TABLET, FILM COATED ORAL
Status: COMPLETED
Start: 2021-01-04 | End: 2021-01-04

## 2021-01-04 RX ORDER — AMIODARONE HYDROCHLORIDE 200 MG/1
400 TABLET ORAL 2 TIMES DAILY
Status: DISCONTINUED | OUTPATIENT
Start: 2021-01-04 | End: 2021-01-06 | Stop reason: HOSPADM

## 2021-01-04 RX ORDER — ALBUTEROL SULFATE 2.5 MG/3ML
2.5 SOLUTION RESPIRATORY (INHALATION) EVERY 4 HOURS PRN
Status: DISCONTINUED | OUTPATIENT
Start: 2021-01-04 | End: 2021-01-04

## 2021-01-04 RX ORDER — ATORVASTATIN CALCIUM 40 MG/1
80 TABLET, FILM COATED ORAL NIGHTLY
Status: DISCONTINUED | OUTPATIENT
Start: 2021-01-04 | End: 2021-01-04 | Stop reason: SDUPTHER

## 2021-01-04 RX ORDER — ONDANSETRON 2 MG/ML
4 INJECTION INTRAMUSCULAR; INTRAVENOUS EVERY 6 HOURS PRN
Status: DISCONTINUED | OUTPATIENT
Start: 2021-01-04 | End: 2021-01-06 | Stop reason: HOSPADM

## 2021-01-04 RX ORDER — SODIUM CHLORIDE 0.9 % (FLUSH) 0.9 %
10 SYRINGE (ML) INJECTION PRN
Status: DISCONTINUED | OUTPATIENT
Start: 2021-01-04 | End: 2021-01-04

## 2021-01-04 RX ORDER — GABAPENTIN 400 MG/1
400 CAPSULE ORAL 3 TIMES DAILY
Status: DISCONTINUED | OUTPATIENT
Start: 2021-01-04 | End: 2021-01-06 | Stop reason: HOSPADM

## 2021-01-04 RX ORDER — SODIUM CHLORIDE 0.9 % (FLUSH) 0.9 %
10 SYRINGE (ML) INJECTION PRN
Status: DISCONTINUED | OUTPATIENT
Start: 2021-01-04 | End: 2021-01-06 | Stop reason: HOSPADM

## 2021-01-04 RX ORDER — GABAPENTIN 400 MG/1
CAPSULE ORAL
Status: COMPLETED
Start: 2021-01-04 | End: 2021-01-04

## 2021-01-04 RX ORDER — SODIUM CHLORIDE 0.9 % (FLUSH) 0.9 %
10 SYRINGE (ML) INJECTION EVERY 12 HOURS SCHEDULED
Status: DISCONTINUED | OUTPATIENT
Start: 2021-01-04 | End: 2021-01-04

## 2021-01-04 RX ORDER — TRAZODONE HYDROCHLORIDE 50 MG/1
TABLET ORAL
Status: COMPLETED
Start: 2021-01-04 | End: 2021-01-04

## 2021-01-04 RX ORDER — TIZANIDINE 4 MG/1
2 TABLET ORAL 2 TIMES DAILY PRN
Status: DISCONTINUED | OUTPATIENT
Start: 2021-01-04 | End: 2021-01-06 | Stop reason: HOSPADM

## 2021-01-04 RX ORDER — SODIUM CHLORIDE 0.9 % (FLUSH) 0.9 %
10 SYRINGE (ML) INJECTION EVERY 12 HOURS SCHEDULED
Status: DISCONTINUED | OUTPATIENT
Start: 2021-01-04 | End: 2021-01-06 | Stop reason: HOSPADM

## 2021-01-04 RX ORDER — LORAZEPAM 0.5 MG/1
0.5 TABLET ORAL 2 TIMES DAILY PRN
COMMUNITY
End: 2021-03-29 | Stop reason: ALTCHOICE

## 2021-01-04 RX ORDER — ACETAMINOPHEN 650 MG/1
650 SUPPOSITORY RECTAL EVERY 6 HOURS PRN
Status: DISCONTINUED | OUTPATIENT
Start: 2021-01-04 | End: 2021-01-04

## 2021-01-04 RX ORDER — ASPIRIN 81 MG/1
81 TABLET ORAL DAILY
Status: DISCONTINUED | OUTPATIENT
Start: 2021-01-04 | End: 2021-01-06 | Stop reason: HOSPADM

## 2021-01-04 RX ORDER — ATORVASTATIN CALCIUM 80 MG/1
80 TABLET, FILM COATED ORAL NIGHTLY
Status: DISCONTINUED | OUTPATIENT
Start: 2021-01-04 | End: 2021-01-06 | Stop reason: HOSPADM

## 2021-01-04 RX ORDER — LORAZEPAM 1 MG/1
0.5 TABLET ORAL 2 TIMES DAILY PRN
Status: DISCONTINUED | OUTPATIENT
Start: 2021-01-04 | End: 2021-01-05

## 2021-01-04 RX ORDER — ACETAMINOPHEN 650 MG/1
650 SUPPOSITORY RECTAL EVERY 6 HOURS PRN
Status: DISCONTINUED | OUTPATIENT
Start: 2021-01-04 | End: 2021-01-06 | Stop reason: HOSPADM

## 2021-01-04 RX ORDER — FUROSEMIDE 20 MG/1
20 TABLET ORAL 2 TIMES DAILY
Status: DISCONTINUED | OUTPATIENT
Start: 2021-01-04 | End: 2021-01-06 | Stop reason: HOSPADM

## 2021-01-04 RX ORDER — KETOROLAC TROMETHAMINE 30 MG/ML
15 INJECTION, SOLUTION INTRAMUSCULAR; INTRAVENOUS EVERY 6 HOURS PRN
Status: DISCONTINUED | OUTPATIENT
Start: 2021-01-04 | End: 2021-01-06 | Stop reason: HOSPADM

## 2021-01-04 RX ORDER — AMIODARONE HYDROCHLORIDE 200 MG/1
TABLET ORAL
Status: COMPLETED
Start: 2021-01-04 | End: 2021-01-04

## 2021-01-04 RX ORDER — POLYETHYLENE GLYCOL 3350 17 G/17G
17 POWDER, FOR SOLUTION ORAL DAILY PRN
Status: DISCONTINUED | OUTPATIENT
Start: 2021-01-04 | End: 2021-01-06 | Stop reason: HOSPADM

## 2021-01-04 RX ORDER — ACETAMINOPHEN 325 MG/1
650 TABLET ORAL EVERY 6 HOURS PRN
Status: DISCONTINUED | OUTPATIENT
Start: 2021-01-04 | End: 2021-01-04

## 2021-01-04 RX ORDER — ACETAMINOPHEN 325 MG/1
650 TABLET ORAL EVERY 6 HOURS PRN
Status: DISCONTINUED | OUTPATIENT
Start: 2021-01-04 | End: 2021-01-06 | Stop reason: HOSPADM

## 2021-01-04 RX ADMIN — ATORVASTATIN CALCIUM 80 MG: 80 TABLET, FILM COATED ORAL at 22:24

## 2021-01-04 RX ADMIN — AMIODARONE HYDROCHLORIDE 400 MG: 200 TABLET ORAL at 08:56

## 2021-01-04 RX ADMIN — DULOXETINE HYDROCHLORIDE 60 MG: 60 CAPSULE, DELAYED RELEASE ORAL at 08:56

## 2021-01-04 RX ADMIN — FUROSEMIDE 20 MG: 20 TABLET ORAL at 08:56

## 2021-01-04 RX ADMIN — ATORVASTATIN CALCIUM 80 MG: 40 TABLET, FILM COATED ORAL at 01:09

## 2021-01-04 RX ADMIN — TRAZODONE HYDROCHLORIDE 50 MG: 50 TABLET ORAL at 01:08

## 2021-01-04 RX ADMIN — POTASSIUM CHLORIDE 10 MEQ: 750 TABLET, EXTENDED RELEASE ORAL at 08:56

## 2021-01-04 RX ADMIN — FUROSEMIDE 20 MG: 20 TABLET ORAL at 17:39

## 2021-01-04 RX ADMIN — ENOXAPARIN SODIUM 40 MG: 40 INJECTION SUBCUTANEOUS at 08:57

## 2021-01-04 RX ADMIN — TIOTROPIUM BROMIDE INHALATION SPRAY 2 PUFF: 3.12 SPRAY, METERED RESPIRATORY (INHALATION) at 09:29

## 2021-01-04 RX ADMIN — POTASSIUM CHLORIDE 10 MEQ: 750 TABLET, EXTENDED RELEASE ORAL at 17:39

## 2021-01-04 RX ADMIN — ASPIRIN 81 MG: 81 TABLET, COATED ORAL at 08:56

## 2021-01-04 RX ADMIN — TRAZODONE HYDROCHLORIDE 50 MG: 50 TABLET ORAL at 22:25

## 2021-01-04 RX ADMIN — LISINOPRIL 2.5 MG: 5 TABLET ORAL at 08:56

## 2021-01-04 RX ADMIN — GABAPENTIN 400 MG: 400 CAPSULE ORAL at 13:41

## 2021-01-04 RX ADMIN — KETOROLAC TROMETHAMINE 15 MG: 30 INJECTION, SOLUTION INTRAMUSCULAR at 13:41

## 2021-01-04 RX ADMIN — GABAPENTIN 400 MG: 400 CAPSULE ORAL at 01:07

## 2021-01-04 RX ADMIN — KETOROLAC TROMETHAMINE 15 MG: 30 INJECTION, SOLUTION INTRAMUSCULAR at 20:48

## 2021-01-04 RX ADMIN — AMIODARONE HYDROCHLORIDE 400 MG: 200 TABLET ORAL at 22:25

## 2021-01-04 RX ADMIN — AMIODARONE HYDROCHLORIDE 400 MG: 200 TABLET ORAL at 01:08

## 2021-01-04 RX ADMIN — TIZANIDINE 2 MG: 4 TABLET ORAL at 18:41

## 2021-01-04 RX ADMIN — Medication 10 ML: at 08:59

## 2021-01-04 RX ADMIN — GABAPENTIN 400 MG: 400 CAPSULE ORAL at 08:56

## 2021-01-04 RX ADMIN — KETOROLAC TROMETHAMINE 15 MG: 30 INJECTION, SOLUTION INTRAMUSCULAR at 06:02

## 2021-01-04 RX ADMIN — GABAPENTIN 400 MG: 400 CAPSULE ORAL at 22:23

## 2021-01-04 RX ADMIN — TIZANIDINE 2 MG: 4 TABLET ORAL at 06:02

## 2021-01-04 RX ADMIN — LORAZEPAM 0.5 MG: 1 TABLET ORAL at 20:48

## 2021-01-04 ASSESSMENT — PAIN SCALES - GENERAL
PAINLEVEL_OUTOF10: 8
PAINLEVEL_OUTOF10: 0

## 2021-01-04 ASSESSMENT — PAIN - FUNCTIONAL ASSESSMENT: PAIN_FUNCTIONAL_ASSESSMENT: PREVENTS OR INTERFERES SOME ACTIVE ACTIVITIES AND ADLS

## 2021-01-04 ASSESSMENT — PAIN DESCRIPTION - PAIN TYPE: TYPE: ACUTE PAIN;CHRONIC PAIN

## 2021-01-04 ASSESSMENT — PAIN DESCRIPTION - ONSET: ONSET: ON-GOING

## 2021-01-04 NOTE — PROGRESS NOTES
RESPIRATORY THERAPY ASSESSMENT    Name:  Álvaro Valdez  Medical Record Number:  [id number]  Age: 63 y.o. Gender: female  : 1955  Today's Date:  2021  Room:      Assessment     Is the patient being admitted for a COPD or Asthma exacerbation? No   (If yes the patient will be seen every 4 hours for the first 24 hours and then reassessed)    Patient Admission Diagnosis      Allergies  Allergies   Allergen Reactions    Lorazepam      Confusion, hallucinations, JUST HAD TOO HIGH DOSE  Can take lorazepam but not Ativan    Codeine Hives     Tolerates Norco.       Minimum Predicted Vital Capacity:     na          Actual Vital Capacity:      na              Pulmonary History:current smoker  Home Oxygen Therapy:  room air  Home Respiratory Therapy:Albuterol MDI PRN,Spiriva Daily  Current Respiratory Therapy:  Alb HHN Q4PRN,Spiriva Daily  Treatment Type: MDI  Medications: Tiotropium    Respiratory Severity Index(RSI)   Patients with orders for inhalation medications, oxygen, or any therapeutic treatment modality will be placed on Respiratory Protocol. They will be assessed with the first treatment and at least every 72 hours thereafter. The following severity scale will be used to determine frequency of treatment intervention.     Smoking History: Pulmonary Disease or Smoking History, Greater than 15 pack year = 2    Social History  Social History     Tobacco Use    Smoking status: Former Smoker     Packs/day: 0.50     Years: 20.00     Pack years: 10.00     Types: Cigarettes     Quit date: 2020     Years since quittin.1    Smokeless tobacco: Never Used    Tobacco comment: 1 cigarettes per day   Substance Use Topics    Alcohol use: No    Drug use: Not Currently     Types: Opiates        Recent Surgical History: None = 0  Past Surgical History  Past Surgical History:   Procedure Laterality Date     SECTION      CHOLECYSTECTOMY, LAPAROSCOPIC N/A 2020    robotic, performed by Vasiliy Ramos MD at [address] [facility] NEEDLE BIOPSY LUNG PERCUTANEOUS  11/2/2020    CT NEEDLE BIOPSY LUNG PERCUTANEOUS 11/2/2020 [facility] CT SCAN    FASCIOTOMY Left 01/2017    due to DVT, LLE    GASTROCNEMIUS RECESSION Left 10/1/2020    performed by Kevyn Rosales MD at [facility] Right 10/29/2020    RIGHT CERVICAL LYMPH NODE BIOPSY performed by Vasiliy Ramos MD at [facility] [address] Left 10/1/2020    LEFT FOOT SECOND, THIRD, FOURTH AND FIFTH  METATARSAL HEAD RESECTION -BLOCK- -BAYLEE=5 performed by Kevyn Rosales MD at [address] [employer] Left 11/18/2020    LEFT VIDEO ASSISTED THORACOSCOPIC SURGERY WITH LEFT  LOWER LOBE LOBECTOMY AND MEDIASTINAL LYMPHADENECTOMY, INTERCOSTAL NERVE BLOCK performed by Surendra Vieyra MD at [facility]       Level of Consciousness: Alert, Oriented, and Cooperative = 0    Level of Activity: Walking with assistance = 1    Respiratory Pattern: Regular Pattern; RR 8-20 = 0    Breath Sounds: Diminshed bilaterally and/or crackles = 2    Sputum   ,  ,    Cough: Strong, spontaneous, non-productive = 0    Vital Signs   BP (!) 117/54   Pulse 62   Temp 98.3 °F (36.8 °C) (Oral)   Resp 14   Ht 5' 1\" (1.549 m)   Wt 130 lb (59 kg)   SpO2 (!) 88%   BMI 24.56 kg/m²   SPO2 (COPD values may differ): Greater than or equal to 92% on room air = 0    Peak Flow (asthma only): not applicable = 0    RSI: 5-6 = Q4hr PRN (every four hours as needed) for dyspnea        Plan       Goals: medication delivery, mobilize retained secretions, volume expansion and improve oxygenation    Patient/caregiver was educated on the proper method of use for Respiratory Care Devices:  Yes      Level of patient/caregiver understanding able to:   ? Verbalize understanding   ? Demonstrate understanding       ? Teach back        ? Needs reinforcement       ? No available caregiver               ?   Other:     Response to education:  Excellent     Is patient being placed on Home Treatment Regimen? Yes     Does the patient have everything they need prior to discharge? NA     Comments: chart reviewed    Plan of Care: Albuterol MDI Q4PRN, Spiriva Daily    Electronically signed by Sarah Harrell RCP on 1/4/2021 at 10:25 AM    Respiratory Protocol Guidelines     1. Assessment and treatment by Respiratory Therapy will be initiated for medication and therapeutic interventions upon initiation of aerosolized medication. 2. Physician will be contacted for respiratory rate (RR) greater than 35 breaths per minute. Therapy will be held for heart rate (HR) greater than 140 beats per minute, pending direction from physician. 3. Bronchodilators will be administered via Metered Dose Inhaler (MDI) with spacer when the following criteria are met:  a. Alert and cooperative     b. HR < 140 bpm  c. RR < 30 bpm                d. Can demonstrate a 2-3 second inspiratory hold  4. Bronchodilators will be administered via Hand Held Nebulizer ESTELITA [facility]) to patients when ANY of the following criteria are met  a. Incognizant or uncooperative          b. Patients treated with HHN at Home        c. Unable to demonstrate proper use of MDI with spacer     d. RR > 30 bpm   5. Bronchodilators will be delivered via Metered Dose Inhaler (MDI), HHN, Aerogen to intubated patients on mechanical ventilation. 6. Inhalation medication orders will be delivered and/or substituted as outlined below. Aerosolized Medications Ordering and Administration Guidelines:    1. All Medications will be ordered by a physician, and their frequency and/or modality will be adjusted as defined by the patients Respiratory Severity Index (RSI) score. 2. If the patient does not have documented COPD, consider discontinuing anticholinergics when RSI is less than 9.  3. If the bronchospasm worsens (increased RSI), then the bronchodilator frequency can be increased to a maximum of every 4 hours.   If greater than every 4 hours is required, the physician will be

## 2021-01-04 NOTE — PROGRESS NOTES
t slept well after 2 am. Pt moved into a hospital bed. C/O rib pain. Pt medicated with Toradol 15 mg IVP and Zanaflex 2 mg po.

## 2021-01-04 NOTE — FLOWSHEET NOTE
01/04/21 0854   Vitals   Temp 98.3 °F (36.8 °C)   Temp Source Oral   Pulse 62   Heart Rate Source Monitor   Resp 11   BP (!) 117/54   MAP (mmHg) 71   BP Location Right upper arm   BP Upper/Lower Upper   BP Method Automatic   Patient Position Lying left side   Oxygen Therapy   SpO2 (!) 88 %   Patient resting quietly in bed. No s/s of distress noted. Shift assessment complete, see flow sheet. Denies needs at this time. Call light within reach. Will continue to monitor.

## 2021-01-04 NOTE — H&P
Hospital Medicine History & Physical      PCP: Stephanie Guevara MD    Date of Admission: 1/3/2021    Date of Service: Pt seen/examined on 21 and Admitted to Inpatient with expected LOS greater than two midnights    Chief Complaint:  Chest pain, syncope      History Of Present Illness:    63 y.o. female who presented to [facility] c/o persistent pain to chest .nnot sure if it was the anxiety of trying to decide what to do about the CA I fell broke my foot have 4 pins in my feet I hurt all the time when they did the bx hit ny ribs and hurt. Feel fine stand up and get dizzy for second or 2 then nearly pass out.  Was told need to put nerve blocks in   Pt says there 2 tumors in there a LN one and other was a \"carcinoma\"     Past Medical History:          Diagnosis Date    JASPREET (acute kidney injury) ([facility] [address] 2020    Arterial occlusion     Asthma     Centrilobular emphysema ([facility] [address] 2019    pt denies    Chronic bilateral low back pain with bilateral sciatica 8/3/2018    Chronic bilateral low back pain with bilateral sciatica     Hx of blood clots     DVT    Hyperlipidemia     Hypertension     Left lower lobe pulmonary nodule     MDRO (multiple drug resistant organisms) resistance 2020    Escherichia coli-URINE    Metabolic encephalopathy     Moderate single current episode of major depressive disorder ([facility] [address] 8/3/2018    Ruptured disk        Past Surgical History:          Procedure Laterality Date     SECTION  1981    CHOLECYSTECTOMY, LAPAROSCOPIC N/A 2020    robotic, [facility] Varun Sutton [facility]  2020    CT NEEDLE BIOPSY LUNG PERCUTANEOUS 2020 Shaina Kinds CT SCAN    FASCIOTOMY Left 2017    due to DVT, LLE    GASTROCNEMIUS RECESSION Left 10/1/2020    performed by Varsha Green MD at [address] Right 10/29/2020    RIGHT CERVICAL LYMPH NODE BIOPSY performed by Varun Sutton MD at [address] Left 10/1/2020    LEFT FOOT SECOND, THIRD, FOURTH AND FIFTH  METATARSAL HEAD RESECTION -BLOCK- -BAYLEE=5 performed by Steven Schmid MD at [address] Left 11/18/2020    LEFT VIDEO ASSISTED THORACOSCOPIC SURGERY WITH LEFT  LOWER LOBE LOBECTOMY AND MEDIASTINAL LYMPHADENECTOMY, INTERCOSTAL NERVE BLOCK performed by Shayy Manley MD at [address]       Medications Prior to Admission:      Prior to Admission medications    Medication Sig Start Date End Date Taking? Authorizing Provider   traMADol (ULTRAM) 50 MG tablet Take 1 tablet by mouth every 6 hours as needed for Pain for up to 7 days. Intended supply: 7 days. Take lowest dose possible to manage pain 12/28/20 1/4/21 Yes Shayy Manley MD   fluticasone (FLONASE) 50 MCG/ACT nasal spray USE 2 SPRAYS BY NASAL ROUTE AS NEEDED FOR RHINITIS OR ALLERGIES 12/21/20  Yes Priya Sullivan MD   lisinopril (PRINIVIL;ZESTRIL) 40 MG tablet TAKE 1 TABLET BY MOUTH DAILY 12/21/20  Yes Priya Sullivan MD   gabapentin (NEURONTIN) 400 MG capsule Take 1 capsule by mouth 3 times daily for 90 days.  12/21/20 3/21/21 Yes Priya Sullivan MD   DULoxetine (CYMBALTA) 60 MG extended release capsule Take 1 capsule by mouth daily 12/21/20  Yes Priya Sullivan MD   traZODone (DESYREL) 50 MG tablet Take 1 tablet by mouth nightly 12/21/20  Yes Priya Sullivan MD   atorvastatin (LIPITOR) 80 MG tablet Take 1 tablet by mouth daily 12/21/20  Yes Priya Sullivan MD   amiodarone (PACERONE) 400 MG tablet Take 1 tablet by mouth 2 times daily 11/20/20  Yes Shayy Manley MD   furosemide (LASIX) 20 MG tablet Take 1 tablet by mouth 2 times daily 11/20/20  Yes Shayy Manley MD   potassium chloride (KLOR-CON M) 10 MEQ extended release tablet Take 1 tablet by mouth 2 times daily (with meals) 11/20/20  Yes Shayy Manley MD   aspirin 81 MG EC tablet Take 81 mg by mouth daily   Yes Ingrid Prasad MD   albuterol sulfate  (90 Base) MCG/ACT inhaler INHALE 2 PUFFS INTO THE LUNGS EVERY 6 HOURS AS NEEDED FOR WHEEZING 11/4/20  Yes Lena Hathaway MD   tiZANidine (ZANAFLEX) 2 MG tablet TAKE 1 TABLET BY MOUTH 2 TIMES DAILY AS NEEDED (MUSCLE SPASM AND PAIN) 11/2/20  Yes Lena Hathaway MD   SPIRIVA HANDIHALER 18 MCG inhalation capsule INHALE 1 CAPSULE INTO THE LUNGS DAILY 10/5/20  Yes Lena Hathaway MD   vitamin D (ERGOCALCIFEROL) 1.25 MG (96798 UT) CAPS capsule Take 1 capsule by mouth once a week 10/1/20  Yes Roz Lou MD   nicotine (NICODERM CQ) 7 MG/24HR PLACE 1 PATCH ONTO THE SKIN DAILY (ROTATE SITES) 12/4/19   Lena Hathaway MD       Allergies:  Lorazepam and Codeine    Social History:      The patient currently lives alone     TOBACCO:   reports that she quit smoking about 2 months ago. Her smoking use included cigarettes. She has a 10.00 pack-year smoking history. She has never used smokeless tobacco.  ETOH:   reports no history of alcohol use. E-Cigarettes/Vaping Use     Questions Responses    E-Cigarette/Vaping Use Never User    Start Date     Passive Exposure     Quit Date     Counseling Given     Comments             Family History:          Problem Relation Age of Onset    High Blood Pressure Mother     High Cholesterol Mother     Heart Disease Mother     Coronary Art Dis Mother     Heart Attack Father         59    Cirrhosis Sister     Alcohol Abuse Sister     Other Sister         \"colostomy bag due to hole in her colon\"   Georgianna Olszewski Sudden Death Brother         \"suicide\"    Colon Cancer Other         maternal uncle    Breast Cancer Other         multiple maternal aunts along with uterine cancer       REVIEW OF SYSTEMS:   Pertinent positives as noted in the HPI. All other systems reviewed and negative.     PHYSICAL EXAM PERFORMED:    BP 88/75   Pulse 71   Temp 98.2 °F (36.8 °C) (Oral)   Resp 20   Ht 5' 1\" (1.549 m)   Wt 130 lb (59 kg)   SpO2 100%   BMI 24.56 kg/m²     General appearance:  No  distress, appears stated age and cooperative. HEENT:  Normal cephalic, atraumatic without obvious deformity. Pupils equal, round, and reactive to light. Extra ocular muscles intact. Conjunctivae/corneas clear. Neck: Supple, with full range of motion. No jugular venous distention. Trachea midline. Respiratory:  Normal respiratory effort. Clear to auscultation, bilaterally without Rales/Wheezes/Rhonchi. Cardiovascular:  Regular rate and rhythm with normal S1/S2 without murmurs, rubs or gallops. Abdomen: Soft, non-tender, non-distended with normal bowel sounds. Musculoskeletal:  No clubbing, cyanosis or edema bilaterally. Full range of motion without deformity. Skin: Skin color, texture, turgor normal.  No rashes or lesions. Neurologic:  Neurovascularly intact without any focal sensory/motor deficits. Cranial nerves: II-XII intact, grossly non-focal.  Psychiatric:  Alert and oriented, thought content appropriate, normal insight  Capillary Refill: Brisk,< 3 seconds   Peripheral Pulses: +2 palpable, equal bilaterally       Labs:     Recent Labs     01/03/21  1637   WBC 10.3   HGB 14.0   HCT 41.1        Recent Labs     01/03/21  1637   *   K 3.3*   CL 93*   CO2 28   BUN 12   CREATININE 1.4*   CALCIUM 10.3     Recent Labs     01/03/21  1637   AST 17   ALT 7*   BILITOT 0.4   ALKPHOS 100     No results for input(s): INR in the last 72 hours. Recent Labs     01/03/21  1637   TROPONINI <0.01       Urinalysis:      Lab Results   Component Value Date    NITRU Negative 11/12/2020    WBCUA 6-9 07/15/2020    BACTERIA 2+ 07/14/2020    RBCUA None seen 07/15/2020    BLOODU Negative 11/12/2020    SPECGRAV 1.020 11/12/2020    GLUCOSEU Negative 11/12/2020    GLUCOSEU NEGATIVE 05/27/2012       Radiology:   *    CT CHEST PULMONARY EMBOLISM W CONTRAST   Final Result   1. No evidence of pulmonary embolus. 2.  Postsurgical changes in the left lower lobe of partial resection. Some   fluid noted along the surgical line.       3.  Mild emphysematous changes. No worrisome pulmonary nodules. No focal   consolidation or gross effusion. 4.  Shotty to borderline mediastinal lymph nodes             ASSESSMENT:    There are no active hospital problems to display for this patient. Syncope       history lung nodules  Status post left lower lobe lung resection  Anxiety  Chronic pain     --ativan helped. I think this is component of chronic pain dependence on opioids takes oxydone 5mg every 6-8hr now I dont have it all the time only every once in awhile  Tried atarax she said her daughter looked it up and its 90% benadryl    Took clonopin for years  hasnt seen a pain med specialist     1. Check orthostatics   2. Mobilize pt  3. Needs referral to pain specialist may warrantpsych for chronic anxiety or start a med for anxiety. recently got lorazepam form her oncologist     depression  --just sits ans stares ans thinks about the CA doesn't want to do anything   --currently on cymbalta     Hyponatremia  --    Hypokalemia  --K3.3    Acute renal failure  --CR 1.4 was 0.8 in November  --hydrate recheck renal fx     \"I worked for Drs  the [employer] isnt really made for depression \"           DVT Prophylaxis:   Diet: No diet orders on file  Code Status: Prior    PT/OT Eval Status: independent make sure can mobilize without weakness or dizziness make sure not orthostatic will need pain control and anxiety control     Dispo - home     75 minutes    Miles Rodríguez MD    Thank you Jossy Yi MD for the opportunity to be involved in this patient's care. If you have any questions or concerns please feel free to contact me at [phone].

## 2021-01-05 LAB
A/G RATIO: 0.9 (ref 1.1–2.2)
ALBUMIN SERPL-MCNC: 3.6 G/DL (ref 3.4–5)
ALP BLD-CCNC: 82 U/L (ref 40–129)
ALT SERPL-CCNC: 6 U/L (ref 10–40)
ANION GAP SERPL CALCULATED.3IONS-SCNC: 9 MMOL/L (ref 3–16)
AST SERPL-CCNC: 15 U/L (ref 15–37)
BASOPHILS ABSOLUTE: 0.1 K/UL (ref 0–0.2)
BASOPHILS RELATIVE PERCENT: 0.7 %
BILIRUB SERPL-MCNC: <0.2 MG/DL (ref 0–1)
BUN BLDV-MCNC: 18 MG/DL (ref 7–20)
CALCIUM SERPL-MCNC: 8.9 MG/DL (ref 8.3–10.6)
CHLORIDE BLD-SCNC: 95 MMOL/L (ref 99–110)
CO2: 25 MMOL/L (ref 21–32)
CREAT SERPL-MCNC: 1.4 MG/DL (ref 0.6–1.2)
EOSINOPHILS ABSOLUTE: 0.2 K/UL (ref 0–0.6)
EOSINOPHILS RELATIVE PERCENT: 2.6 %
GFR AFRICAN AMERICAN: 46
GFR NON-AFRICAN AMERICAN: 38
GLOBULIN: 3.8 G/DL
GLUCOSE BLD-MCNC: 92 MG/DL (ref 70–99)
HCT VFR BLD CALC: 36.6 % (ref 36–48)
HEMOGLOBIN: 12.2 G/DL (ref 12–16)
LYMPHOCYTES ABSOLUTE: 2.8 K/UL (ref 1–5.1)
LYMPHOCYTES RELATIVE PERCENT: 29.8 %
MCH RBC QN AUTO: 30 PG (ref 26–34)
MCHC RBC AUTO-ENTMCNC: 33.4 G/DL (ref 31–36)
MCV RBC AUTO: 89.7 FL (ref 80–100)
MONOCYTES ABSOLUTE: 0.6 K/UL (ref 0–1.3)
MONOCYTES RELATIVE PERCENT: 6.4 %
NEUTROPHILS ABSOLUTE: 5.6 K/UL (ref 1.7–7.7)
NEUTROPHILS RELATIVE PERCENT: 60.5 %
PDW BLD-RTO: 14.1 % (ref 12.4–15.4)
PLATELET # BLD: 261 K/UL (ref 135–450)
PMV BLD AUTO: 8 FL (ref 5–10.5)
POTASSIUM REFLEX MAGNESIUM: 3.8 MMOL/L (ref 3.5–5.1)
RBC # BLD: 4.07 M/UL (ref 4–5.2)
SODIUM BLD-SCNC: 129 MMOL/L (ref 136–145)
TOTAL PROTEIN: 7.4 G/DL (ref 6.4–8.2)
WBC # BLD: 9.3 K/UL (ref 4–11)

## 2021-01-05 PROCEDURE — 80053 COMPREHEN METABOLIC PANEL: CPT

## 2021-01-05 PROCEDURE — 96372 THER/PROPH/DIAG INJ SC/IM: CPT

## 2021-01-05 PROCEDURE — 6370000000 HC RX 637 (ALT 250 FOR IP): Performed by: HOSPITALIST

## 2021-01-05 PROCEDURE — G0378 HOSPITAL OBSERVATION PER HR: HCPCS

## 2021-01-05 PROCEDURE — 99232 SBSQ HOSP IP/OBS MODERATE 35: CPT | Performed by: PHYSICIAN ASSISTANT

## 2021-01-05 PROCEDURE — 6370000000 HC RX 637 (ALT 250 FOR IP): Performed by: PHYSICIAN ASSISTANT

## 2021-01-05 PROCEDURE — 2580000003 HC RX 258: Performed by: HOSPITALIST

## 2021-01-05 PROCEDURE — 36415 COLL VENOUS BLD VENIPUNCTURE: CPT

## 2021-01-05 PROCEDURE — 2580000003 HC RX 258: Performed by: PHYSICIAN ASSISTANT

## 2021-01-05 PROCEDURE — 85025 COMPLETE CBC W/AUTO DIFF WBC: CPT

## 2021-01-05 PROCEDURE — 6360000002 HC RX W HCPCS: Performed by: HOSPITALIST

## 2021-01-05 PROCEDURE — 96376 TX/PRO/DX INJ SAME DRUG ADON: CPT

## 2021-01-05 PROCEDURE — 6370000000 HC RX 637 (ALT 250 FOR IP): Performed by: INTERNAL MEDICINE

## 2021-01-05 RX ORDER — LORAZEPAM 1 MG/1
1 TABLET ORAL 2 TIMES DAILY PRN
Status: DISCONTINUED | OUTPATIENT
Start: 2021-01-05 | End: 2021-01-06 | Stop reason: HOSPADM

## 2021-01-05 RX ORDER — MAGNESIUM SULFATE 1 G/100ML
1 INJECTION INTRAVENOUS PRN
Status: DISCONTINUED | OUTPATIENT
Start: 2021-01-05 | End: 2021-01-06 | Stop reason: HOSPADM

## 2021-01-05 RX ORDER — LORAZEPAM 1 MG/1
0.5 TABLET ORAL ONCE
Status: COMPLETED | OUTPATIENT
Start: 2021-01-05 | End: 2021-01-05

## 2021-01-05 RX ORDER — OXYCODONE HYDROCHLORIDE 5 MG/1
5 TABLET ORAL EVERY 6 HOURS PRN
Status: DISCONTINUED | OUTPATIENT
Start: 2021-01-05 | End: 2021-01-06 | Stop reason: HOSPADM

## 2021-01-05 RX ORDER — SODIUM CHLORIDE 9 MG/ML
INJECTION, SOLUTION INTRAVENOUS CONTINUOUS
Status: DISCONTINUED | OUTPATIENT
Start: 2021-01-05 | End: 2021-01-06 | Stop reason: HOSPADM

## 2021-01-05 RX ADMIN — OXYCODONE HYDROCHLORIDE 5 MG: 5 TABLET ORAL at 20:49

## 2021-01-05 RX ADMIN — KETOROLAC TROMETHAMINE 15 MG: 30 INJECTION, SOLUTION INTRAMUSCULAR at 23:45

## 2021-01-05 RX ADMIN — GABAPENTIN 400 MG: 400 CAPSULE ORAL at 20:49

## 2021-01-05 RX ADMIN — ASPIRIN 81 MG: 81 TABLET, COATED ORAL at 08:53

## 2021-01-05 RX ADMIN — TIOTROPIUM BROMIDE INHALATION SPRAY 2 PUFF: 3.12 SPRAY, METERED RESPIRATORY (INHALATION) at 07:59

## 2021-01-05 RX ADMIN — SODIUM CHLORIDE: 9 INJECTION, SOLUTION INTRAVENOUS at 16:40

## 2021-01-05 RX ADMIN — TRAZODONE HYDROCHLORIDE 50 MG: 50 TABLET ORAL at 20:50

## 2021-01-05 RX ADMIN — ENOXAPARIN SODIUM 40 MG: 40 INJECTION SUBCUTANEOUS at 08:49

## 2021-01-05 RX ADMIN — Medication 10 ML: at 08:46

## 2021-01-05 RX ADMIN — Medication 10 ML: at 00:12

## 2021-01-05 RX ADMIN — AMIODARONE HYDROCHLORIDE 400 MG: 200 TABLET ORAL at 08:53

## 2021-01-05 RX ADMIN — LORAZEPAM 0.5 MG: 1 TABLET ORAL at 13:58

## 2021-01-05 RX ADMIN — AMIODARONE HYDROCHLORIDE 400 MG: 200 TABLET ORAL at 20:50

## 2021-01-05 RX ADMIN — KETOROLAC TROMETHAMINE 15 MG: 30 INJECTION, SOLUTION INTRAMUSCULAR at 03:27

## 2021-01-05 RX ADMIN — GABAPENTIN 400 MG: 400 CAPSULE ORAL at 08:53

## 2021-01-05 RX ADMIN — GABAPENTIN 400 MG: 400 CAPSULE ORAL at 16:43

## 2021-01-05 RX ADMIN — OXYCODONE HYDROCHLORIDE 5 MG: 5 TABLET ORAL at 13:57

## 2021-01-05 RX ADMIN — LISINOPRIL 2.5 MG: 5 TABLET ORAL at 08:50

## 2021-01-05 RX ADMIN — LORAZEPAM 1 MG: 1 TABLET ORAL at 23:45

## 2021-01-05 RX ADMIN — ATORVASTATIN CALCIUM 80 MG: 80 TABLET, FILM COATED ORAL at 20:50

## 2021-01-05 RX ADMIN — DULOXETINE HYDROCHLORIDE 60 MG: 60 CAPSULE, DELAYED RELEASE ORAL at 08:53

## 2021-01-05 RX ADMIN — KETOROLAC TROMETHAMINE 15 MG: 30 INJECTION, SOLUTION INTRAMUSCULAR at 17:55

## 2021-01-05 RX ADMIN — LORAZEPAM 0.5 MG: 1 TABLET ORAL at 08:48

## 2021-01-05 RX ADMIN — FUROSEMIDE 20 MG: 20 TABLET ORAL at 08:53

## 2021-01-05 RX ADMIN — POTASSIUM CHLORIDE 10 MEQ: 750 TABLET, EXTENDED RELEASE ORAL at 18:49

## 2021-01-05 RX ADMIN — KETOROLAC TROMETHAMINE 15 MG: 30 INJECTION, SOLUTION INTRAMUSCULAR at 11:05

## 2021-01-05 RX ADMIN — Medication 10 ML: at 20:49

## 2021-01-05 RX ADMIN — POTASSIUM CHLORIDE 10 MEQ: 750 TABLET, EXTENDED RELEASE ORAL at 08:50

## 2021-01-05 ASSESSMENT — PAIN SCALES - GENERAL
PAINLEVEL_OUTOF10: 9
PAINLEVEL_OUTOF10: 3
PAINLEVEL_OUTOF10: 0
PAINLEVEL_OUTOF10: 6
PAINLEVEL_OUTOF10: 7
PAINLEVEL_OUTOF10: 9
PAINLEVEL_OUTOF10: 0
PAINLEVEL_OUTOF10: 7
PAINLEVEL_OUTOF10: 7

## 2021-01-05 ASSESSMENT — PAIN DESCRIPTION - FREQUENCY: FREQUENCY: CONTINUOUS

## 2021-01-05 ASSESSMENT — PAIN DESCRIPTION - PROGRESSION: CLINICAL_PROGRESSION: NOT CHANGED

## 2021-01-05 ASSESSMENT — PAIN - FUNCTIONAL ASSESSMENT: PAIN_FUNCTIONAL_ASSESSMENT: PREVENTS OR INTERFERES SOME ACTIVE ACTIVITIES AND ADLS

## 2021-01-05 ASSESSMENT — PAIN DESCRIPTION - PAIN TYPE: TYPE: ACUTE PAIN

## 2021-01-05 ASSESSMENT — PAIN DESCRIPTION - ONSET: ONSET: ON-GOING

## 2021-01-05 NOTE — PROGRESS NOTES
Progress Note    Admit Date:  1/3/2021    Admitted for syncope      Subjective:  Ms. Jennifer Dillard was telling her nurse she wanted to leave AMA this morning. Severely depresses about her cancer dx and wanted to go home \" and drink\". Patient reports this dx has been very difficult for her and she lives at home alone. She tries to see friends and work with her horses to calm her anxiety, but it has gotten so bad that she cries often. She has had friends and her  die from cancer and has seen the effect of chemo. Objective:   Patient Vitals for the past 4 hrs:   Temp Temp src Pulse Resp SpO2   01/05/21 0845 97.2 °F (36.2 °C) Oral 64 16 98 %   01/05/21 0800 -- -- -- -- 98 %        Intake/Output Summary (Last 24 hours) at 1/5/2021 1133  Last data filed at 1/4/2021 1335  Gross per 24 hour   Intake 270 ml   Output --   Net 270 ml       Physical Exam:  Gen: No distress. Very tearful   Eyes: No conjunctival injection. ENT: No discharge. Pharynx clear. Neck: Trachea midline. Resp: No accessory muscle use. No crackles. No wheezes. No rhonchi. CV: Regular rate. Regular rhythm. No murmur. No rub. No edema. Capillary Refill: Brisk,< 3 seconds   Peripheral Pulses: +2 palpable, equal bilaterally   GI: Non-tender. Non-distended. Normal bowel sounds. Skin: Warm and dry. Well healed scars to the LLE and L anterior chest wall   M/S: No cyanosis. No joint deformity. No clubbing. Neuro: Awake.  Grossly nonfocal    Psych: Oriented x 3. + anxiety, +hopelessness, + pressured speech    Scheduled Meds:   amiodarone  400 mg Oral BID    aspirin  81 mg Oral Daily    DULoxetine  60 mg Oral Daily    furosemide  20 mg Oral BID    gabapentin  400 mg Oral TID    lisinopril  2.5 mg Oral Daily    nicotine  1 patch Transdermal Daily    potassium chloride  10 mEq Oral BID WC    tiotropium  2 puff Inhalation Daily    traZODone  50 mg Oral Nightly    enoxaparin  40 mg Subcutaneous Daily    sodium chloride flush  10 mL Intravenous 2 times per day    atorvastatin  80 mg Oral Nightly       Continuous Infusions:      PRN Meds:  tiZANidine, promethazine **OR** ondansetron, polyethylene glycol, [DISCONTINUED] acetaminophen **OR** acetaminophen, sodium chloride flush, acetaminophen **OR** [DISCONTINUED] acetaminophen, ketorolac, albuterol sulfate HFA, LORazepam      Data:  CBC:   Recent Labs     21  1637 21  0753   WBC 10.3 9.3   HGB 14.0 12.2   HCT 41.1 36.6   MCV 88.9 89.7    261     BMP:   Recent Labs     21  1637 21  0753   * 129*   K 3.3* 3.8   CL 93* 95*   CO2 28 25   BUN 12 18   CREATININE 1.4* 1.4*     LIVER PROFILE:   Recent Labs     21  1637 21  0753   AST 17 15   ALT 7* 6*   LIPASE 34.0  --    BILITOT 0.4 <0.2   ALKPHOS 100 82       CULTURES  SARS-CoV-2, NAAT Not Detected         RADIOLOGY  CT CHEST PULMONARY EMBOLISM W CONTRAST   Final Result   1. No evidence of pulmonary embolus. 2.  Postsurgical changes in the left lower lobe of partial resection. Some   fluid noted along the surgical line. 3.  Mild emphysematous changes. No worrisome pulmonary nodules. No focal   consolidation or gross effusion.       4.  Shotty to borderline mediastinal lymph nodes             Assessment/Plan:  Syncope  - reported to the ER--stands up and fall back  - Denies true syncope to me, states it feels like extreme fatigue and she was feeling like she had another PE and pre-syncope   - EKG with NSR with LVH  - Check Echo  - Orthostatic VS   - IVF Fluids     Chest Pain   - reports it was a tightness 2/2 her anxiety   - No longer having any pain   - EKG with LVH   - Troponin negative     Hypokalemia   Hypomagnesemia  - Mild, K: 3.3, Ma.7   - Sliding scale replacement     NSCLC  - s/p left lower lobectomy and mediastinal lymphadenectomy 20   - Following with  Dr Pagan-->reports plans for chemotherapy     JASPREET   - Baseline creatinine ~0.9  - Creatinine on admission 1.4-->1.4  - likely 2/2 poor PO intake    - Continue IVF and monitor     Hyponatremia  - not eating/drinking much   - Hold Lasix   - IVF   - Monitor     HTN  - BP is labile  - Continue home medications     Chronic Pain   - Continue neurontin, gabapentin, zanaflex    COPD   - No AE   - Monitor     HLD  - Continue statin     Hx of DVT  - no longer on [facility]  - CTPA negative for PE     MDD  Anxiety   - severe since her dx of cancer   - Not eating/drinking much, frequent crying and obsessive thoughts around the dx   - not established with psychiatry  - Psychiatry consult: patient is struggling with daily activities of self care with her obsessive thoughts about her dx, mood is very depressed and feels hopeless.  Is interested in speaking with psychiatry     DVT Prophylaxis: lovenox   Diet: DIET GENERAL;  Code Status: Full Code    Lesly Grimaldo 11:33 AM 1/5/2021

## 2021-01-05 NOTE — PROGRESS NOTES
Pt arrived to same day surgery from ED tonight via wheelchair. Pt is A+OX4 bilateral lung sounds are diminished. Pt denies SOB or any pain. Pt able to ambulate to restroom without complication. Pt is upset with [facility] and does not understand why she is staying here if we will not treat her pain. Sts she is unable to be prescribed pain meds for her pain because she smokes marijuana. Pt sts she is having diarrhea since yesterday. Sts she believes it is related to anxiety sts her cancer has returned and she has no support at home. Call light within reach, will continue to monitor.

## 2021-01-05 NOTE — PROGRESS NOTES
Report called to Cleotha Denver, RN for transfer of care. Transport requested to move patient from ED bed 14 to Same day surgery bed 11.

## 2021-01-05 NOTE — CARE COORDINATION
Case Management Assessment  Initial Evaluation      Patient Name: Sandra Rose  YOB: 1955  Diagnosis: Syncope and collapse [R55]  Date / Time: 1/3/2021  3:56 PM    Admission status/Date: 1/3/2020 Observation   Chart Reviewed: Yes . C19 negative 1/4/2020     Patient Interviewed: Yes   Family Interviewed:  No      Hospitalization in the last 30 days:  No      Health Care Decision Maker :   Primary Decision Maker: Karen Choudhury Child - [phone]    Secondary Decision Maker: Handy Huntley - Child - [phone]    (CM - must 1st enter selection under Navigator - emergency contact- Health Care Decision Maker Relationship and pick relationship)   Who do you trust or have selected to make healthcare decisions for you      Met with: pt  Interview conducted  (bedside/phone): bedside    Current PCP: Sherron Rolon MD    Financial  [employer]  Precert required for SNF : Y          3 night stay required -  N    ADLS  Support Systems/Care Needs:  friends and family  Transportation: friend    Meal Preparation: self    Housing  Living Arrangements: pt lives alone in an apartment. Steps: 3 flights of steps. Pt did state that she manages them and stated \"I have too\".  Pt expressed no concerns of returning home with the steps  Intent for return to present living arrangements: Yes  Identified Issues: none noted    Home Care Information  Active with Home Health Care : No Agency:(Services)     Passport/Waiver : No  :                      Phone Number:    Passport/Waiver Services: n/a          Durable Medical Equiptment   DME Provider:   Equipment:   Walker___Cane___RTS___ BSC___Shower Chair___Hospital Bed___W/C____Other________  02 at ____Liter(s)---wears(frequency)_______ HHN ___ CPAP___ BiPap___   N/A___x_      Home O2 Use :  No    If No for home O2---if presently on O2 during hospitalization:  No  if yes CM to follow for potential DC O2 need  Informed of need for care provider to bring portable home O2 tawanda on day of discharge for nursing to connect prior to leaving:   Not Indicated  Verbalized agreement/Understanding:   Not Indicated    Community Service Affiliation  Dialysis:  No    · Agency:  · Location:  · Dialysis Schedule:  · Phone:   · Fax: Other Community Services: Pt stated she is starting Chemo tomorrow    DISCHARGE PLAN: Explained Case Management role/services. Chart review completed. Met with pt at bedside. She stated that she is independent at home prior to admission. Pt plans on returning home. Discussed discharge needs and pt feels she would benefit from skilled [facility]. Explained a list of [facility] companies would be provided but she declined needing or wanting a list stating \"just find me someone\". She is agreeable to [facility]) and agreeable to another agency if they are not able to accept. Verified her emergency contacts and pt stated that [facility] should not be on the list as she recently Jose's presents and money\". She requested Piper be removed and her daughter Tatiana Osorio be added to get medical information. Pt stated she has not reported the missing presents/money to anyone. She expressed no safety concerns of returning home. She denied needs or questions from CM at this time. Called and spoke with Nilsa Chong, liaison with [facility] for the referral. She stated she would review to see if they can accept. Attempted deleting pt's daughter Jose Luis Livingston but it would not delete or deactivate as she is secondary healthcare decision maker. Notified RN CM Supervisor to see what next steps. Addendum at 10:30am: deleted pt's daughter Jose Luis Livingston per pt's verbal request     CM will follow. Please notify CM if needs or concerns arise. Addendum at 2:41pm: Per note from Nilsa Chong [facility] liaison they are able to accept pt.

## 2021-01-05 NOTE — PROGRESS NOTES
Pt is tearful c/o not having support at home. This RN spent time in pts' room actively listening and comforting pt. Pt sts she is having abdominal pain and bouts of diarrhea. Pt has had 3 BM's thus far- bedside commode placed in pts' room. PRN ativan was given earlier per [facility]. Pt continues to show NSR on monitor. Denies SOB. Will continue to monitor.

## 2021-01-05 NOTE — PROGRESS NOTES
Shift assessment complete, morning medications given, ativan given for increase in anxiety patient resting with no complaints of anxiety and pain, will continue to monitor.  Marta Flores RN

## 2021-01-06 VITALS
BODY MASS INDEX: 24.55 KG/M2 | OXYGEN SATURATION: 97 % | HEART RATE: 76 BPM | SYSTOLIC BLOOD PRESSURE: 123 MMHG | WEIGHT: 130 LBS | TEMPERATURE: 97.7 F | HEIGHT: 61 IN | DIASTOLIC BLOOD PRESSURE: 73 MMHG | RESPIRATION RATE: 16 BRPM

## 2021-01-06 LAB
ANION GAP SERPL CALCULATED.3IONS-SCNC: 6 MMOL/L (ref 3–16)
BUN BLDV-MCNC: 18 MG/DL (ref 7–20)
CALCIUM SERPL-MCNC: 8.3 MG/DL (ref 8.3–10.6)
CHLORIDE BLD-SCNC: 106 MMOL/L (ref 99–110)
CO2: 26 MMOL/L (ref 21–32)
CREAT SERPL-MCNC: 1.1 MG/DL (ref 0.6–1.2)
GFR AFRICAN AMERICAN: >60
GFR NON-AFRICAN AMERICAN: 50
GLUCOSE BLD-MCNC: 96 MG/DL (ref 70–99)
LV EF: 58 %
LVEF MODALITY: NORMAL
MAGNESIUM: 1.3 MG/DL (ref 1.8–2.4)
POTASSIUM REFLEX MAGNESIUM: 3.9 MMOL/L (ref 3.5–5.1)
SODIUM BLD-SCNC: 138 MMOL/L (ref 136–145)

## 2021-01-06 PROCEDURE — 6370000000 HC RX 637 (ALT 250 FOR IP): Performed by: PHYSICIAN ASSISTANT

## 2021-01-06 PROCEDURE — 80048 BASIC METABOLIC PNL TOTAL CA: CPT

## 2021-01-06 PROCEDURE — 96376 TX/PRO/DX INJ SAME DRUG ADON: CPT

## 2021-01-06 PROCEDURE — G0378 HOSPITAL OBSERVATION PER HR: HCPCS

## 2021-01-06 PROCEDURE — 83735 ASSAY OF MAGNESIUM: CPT

## 2021-01-06 PROCEDURE — 2580000003 HC RX 258: Performed by: PHYSICIAN ASSISTANT

## 2021-01-06 PROCEDURE — 36415 COLL VENOUS BLD VENIPUNCTURE: CPT

## 2021-01-06 PROCEDURE — 6360000002 HC RX W HCPCS: Performed by: HOSPITALIST

## 2021-01-06 PROCEDURE — 2580000003 HC RX 258: Performed by: HOSPITALIST

## 2021-01-06 PROCEDURE — 99244 OFF/OP CNSLTJ NEW/EST MOD 40: CPT | Performed by: PSYCHIATRY & NEUROLOGY

## 2021-01-06 PROCEDURE — 6360000002 HC RX W HCPCS: Performed by: PHYSICIAN ASSISTANT

## 2021-01-06 PROCEDURE — 96366 THER/PROPH/DIAG IV INF ADDON: CPT

## 2021-01-06 PROCEDURE — 6370000000 HC RX 637 (ALT 250 FOR IP): Performed by: HOSPITALIST

## 2021-01-06 PROCEDURE — 96372 THER/PROPH/DIAG INJ SC/IM: CPT

## 2021-01-06 PROCEDURE — 93306 TTE W/DOPPLER COMPLETE: CPT

## 2021-01-06 PROCEDURE — 99238 HOSP IP/OBS DSCHRG MGMT 30/<: CPT | Performed by: PHYSICIAN ASSISTANT

## 2021-01-06 PROCEDURE — 94640 AIRWAY INHALATION TREATMENT: CPT

## 2021-01-06 RX ORDER — OXYCODONE HYDROCHLORIDE 5 MG/1
5 TABLET ORAL EVERY 8 HOURS PRN
Qty: 9 TABLET | Refills: 0 | Status: SHIPPED | OUTPATIENT
Start: 2021-01-06 | End: 2021-01-09

## 2021-01-06 RX ORDER — MAGNESIUM SULFATE IN WATER 40 MG/ML
4 INJECTION, SOLUTION INTRAVENOUS ONCE
Status: COMPLETED | OUTPATIENT
Start: 2021-01-06 | End: 2021-01-06

## 2021-01-06 RX ADMIN — DULOXETINE HYDROCHLORIDE 60 MG: 60 CAPSULE, DELAYED RELEASE ORAL at 09:47

## 2021-01-06 RX ADMIN — KETOROLAC TROMETHAMINE 15 MG: 30 INJECTION, SOLUTION INTRAMUSCULAR at 06:28

## 2021-01-06 RX ADMIN — POTASSIUM CHLORIDE 10 MEQ: 750 TABLET, EXTENDED RELEASE ORAL at 09:46

## 2021-01-06 RX ADMIN — ENOXAPARIN SODIUM 40 MG: 40 INJECTION SUBCUTANEOUS at 09:46

## 2021-01-06 RX ADMIN — Medication 10 ML: at 09:48

## 2021-01-06 RX ADMIN — SODIUM CHLORIDE: 9 INJECTION, SOLUTION INTRAVENOUS at 03:26

## 2021-01-06 RX ADMIN — OXYCODONE HYDROCHLORIDE 5 MG: 5 TABLET ORAL at 03:26

## 2021-01-06 RX ADMIN — SODIUM CHLORIDE: 9 INJECTION, SOLUTION INTRAVENOUS at 09:49

## 2021-01-06 RX ADMIN — LORAZEPAM 1 MG: 1 TABLET ORAL at 12:22

## 2021-01-06 RX ADMIN — GABAPENTIN 400 MG: 400 CAPSULE ORAL at 09:46

## 2021-01-06 RX ADMIN — AMIODARONE HYDROCHLORIDE 400 MG: 200 TABLET ORAL at 09:46

## 2021-01-06 RX ADMIN — MAGNESIUM SULFATE HEPTAHYDRATE 4 G: 40 INJECTION, SOLUTION INTRAVENOUS at 12:22

## 2021-01-06 RX ADMIN — OXYCODONE HYDROCHLORIDE 5 MG: 5 TABLET ORAL at 09:50

## 2021-01-06 RX ADMIN — TIOTROPIUM BROMIDE INHALATION SPRAY 2 PUFF: 3.12 SPRAY, METERED RESPIRATORY (INHALATION) at 08:35

## 2021-01-06 RX ADMIN — ASPIRIN 81 MG: 81 TABLET, COATED ORAL at 09:47

## 2021-01-06 ASSESSMENT — PAIN SCALES - GENERAL
PAINLEVEL_OUTOF10: 7
PAINLEVEL_OUTOF10: 6
PAINLEVEL_OUTOF10: 6

## 2021-01-06 NOTE — PROGRESS NOTES
Discharge paperwork completed with patient. Prescription for Oxycodone provided to patient. Peripheral IV removed by patient (without staff knowledge). Assessed site for bleeding, dry dressing and tape applied. Patient belongings packed and transport request made.

## 2021-01-06 NOTE — CONSULTS
Psychiatry Initial Consultation    Admission Date:    1/3/2021    Reason for Consult:  Depression    HPI:   Patient is a 63year old female with no formal past psychiatric history. She was admitted for syncope. Yesterday patient requested to leave the hospital AMA. She was noted to be tearful and endorse depression related to an underlying cancer diagnosis which prompted psychiatry consult. Since yesterday it seems that patient retracted her request to discharge AMA. Today she reports that she has been struggling with depressed mood related to muliple issues. She alluded to several past life stressors, particularly discord in interpersonal relationships. She was vague in describing these issues and many sound very chronic, going back to her youth. More recently she has struggled with medical problems. Pt was found to have tumors in her lung 2 to 3 years ago. It seems that she did not have surgery until recently, however. Pt expressed feeling as though her medical issues were missed in the past and that she had delayed treatment. She is now being recommended chemotherapy and is anxious about this as she has witnessed several family members go through chemo and she feels as though \"it killed their insides. \"  She is planning to proceed, however, she is just anxious about undergoing the treatment. With that being said, patient denies hopelessness or suicidal ideation. She states that she has a strong teresa in god and knows \"I'm going to get through this, I just don't know quite how, yet. \"  She is not interested in any changes to psychiatric medications. Pt has taken several antidepressants in the past and reports allergic reactions to these. She was amenable to considering referral to a cancer support group.     Duration: Subacute  Severity: Moderate  Context: as above  Associated symptoms: as above    Past Psychiatric History:    Previous Diagnoses: None formal, but has been treated for depressive symptoms by her PCP  PreviousHosp:None  OutpatientTx: None     Med Trials: lexapro, paroxetine prescribed by PCP. Pt reports that she developed allergic dermatitis from both. She is currently on duloxetine but conceptualizes herself as taking it for inflammation not psychiatric reasons. Suicidality: No previous attempts. History of violence: Denies    Past Medical History:  Past Medical History:   Diagnosis Date    JASPREET (acute kidney injury) ([facility] 4/24/2020    Arterial occlusion     Asthma     Centrilobular emphysema ([facility] 5/20/2019    pt denies    Chronic bilateral low back pain with bilateral sciatica 8/3/2018    Chronic bilateral low back pain with bilateral sciatica     Hx of blood clots     DVT    Hyperlipidemia     Hypertension     Left lower lobe pulmonary nodule     MDRO (multiple drug resistant organisms) resistance 02/27/2020    Escherichia coli-URINE    Metabolic encephalopathy     Moderate single current episode of major depressive disorder ([facility] 8/3/2018    Non-small cell lung cancer ([facility]     Ruptured disk        Home Medications:  Prior to Admission medications    Medication Sig Start Date End Date Taking? Authorizing Provider   oxyCODONE (ROXICODONE) 5 MG immediate release tablet Take 1 tablet by mouth every 8 hours as needed for Pain for up to 3 days. 1/6/21 1/9/21 Yes MELISSA Cantu   LORazepam (ATIVAN) 0.5 MG tablet Take 0.5 mg by mouth 2 times daily as needed for Anxiety (1 to 2 times per day). Yes Historical Provider, MD   fluticasone (FLONASE) 50 MCG/ACT nasal spray USE 2 SPRAYS BY NASAL ROUTE AS NEEDED FOR RHINITIS OR ALLERGIES 12/21/20  Yes Stacey Zimmer MD   lisinopril (PRINIVIL;ZESTRIL) 40 MG tablet TAKE 1 TABLET BY MOUTH DAILY 12/21/20  Yes Stacey Zimmer MD   gabapentin (NEURONTIN) 400 MG capsule Take 1 capsule by mouth 3 times daily for 90 days.  12/21/20 3/21/21 Yes Stacey Zimmer MD   DULoxetine (CYMBALTA) 60 MG extended release capsule Take 1 capsule by mouth daily 12/21/20  Yes Gregg Infante MD   traZODone (DESYREL) 50 MG tablet Take 1 tablet by mouth nightly 12/21/20  Yes Gregg Infante MD   atorvastatin (LIPITOR) 80 MG tablet Take 1 tablet by mouth daily 12/21/20  Yes Gregg Infante MD   amiodarone (PACERONE) 400 MG tablet Take 1 tablet by mouth 2 times daily 11/20/20  Yes Refugio Patterson MD   furosemide (LASIX) 20 MG tablet Take 1 tablet by mouth 2 times daily 11/20/20  Yes Refugio Patterson MD   potassium chloride (KLOR-CON M) 10 MEQ extended release tablet Take 1 tablet by mouth 2 times daily (with meals) 11/20/20  Yes Refugio Patterson MD   aspirin 81 MG EC tablet Take 81 mg by mouth daily   Yes Ingird Prasad MD   albuterol sulfate  (90 Base) MCG/ACT inhaler INHALE 2 PUFFS INTO THE LUNGS EVERY 6 HOURS AS NEEDED FOR WHEEZING 11/4/20  Yes Gregg Infante MD   tiZANidine (ZANAFLEX) 2 MG tablet TAKE 1 TABLET BY MOUTH 2 TIMES DAILY AS NEEDED (MUSCLE SPASM AND PAIN) 11/2/20  Yes Gregg Infante MD   Mortimer Pae 18 MCG inhalation capsule INHALE 1 CAPSULE INTO THE LUNGS DAILY 10/5/20  Yes Gregg Infante MD   vitamin D (ERGOCALCIFEROL) 1.25 MG (25043 UT) CAPS capsule Take 1 capsule by mouth once a week 10/1/20  Yes Cristine Farah MD   nicotine (Onesimo July) 7 MG/24HR PLACE 1 PATCH ONTO THE SKIN DAILY (ROTATE SITES) 12/4/19   Gregg Infante MD       Chemical Dependency History:   Tobacco: Daily smoker  Alcohol: Denies  Illicit: Denies    Family Hx:    Family History   Problem Relation Age of Onset    High Blood Pressure Mother     High Cholesterol Mother     Heart Disease Mother     Coronary Art Dis Mother     Heart Attack Father         59    Cirrhosis Sister     Alcohol Abuse Sister     Other Sister         \"colostomy bag due to hole in her colon\"    Sudden Death Brother         \"suicide\"    Colon Cancer Other         maternal uncle    Breast Cancer Other         multiple maternal aunts along with uterine cancer     Social Hx:   Retired. . Has two adult daughters. Current Medications Ordered:   magnesium sulfate  4 g Intravenous Once    amiodarone  400 mg Oral BID    aspirin  81 mg Oral Daily    DULoxetine  60 mg Oral Daily    [Held by provider] furosemide  20 mg Oral BID    gabapentin  400 mg Oral TID    lisinopril  2.5 mg Oral Daily    nicotine  1 patch Transdermal Daily    potassium chloride  10 mEq Oral BID WC    tiotropium  2 puff Inhalation Daily    traZODone  50 mg Oral Nightly    enoxaparin  40 mg Subcutaneous Daily    sodium chloride flush  10 mL Intravenous 2 times per day    atorvastatin  80 mg Oral Nightly      PRN Meds: LORazepam, oxyCODONE, magnesium sulfate, perflutren lipid microspheres, tiZANidine, promethazine **OR** ondansetron, polyethylene glycol, [DISCONTINUED] acetaminophen **OR** acetaminophen, sodium chloride flush, acetaminophen **OR** [DISCONTINUED] acetaminophen, ketorolac, albuterol sulfate HFA     ROS:    Psychiatric: + for depressed mood ; Negative for hopelessness, SI, HI, AVH, delusions  All other systems were reviewed and negative except as previously documented in HPI.     PE:    /73   Pulse 76   Temp 97.7 °F (36.5 °C) (Oral)   Resp 16   Ht 5' 1\" (1.549 m)   Wt 130 lb (59 kg)   SpO2 97%   BMI 24.56 kg/m²       Motor / Gait: no abnormalities noted, nml gait and station    Mental Status Examination:    Appearance: WF, appears stated age, wearing casual clothing, fair grooming and fair hygiene   Behavior/Attitude toward examiner:  cooperative, attentive and fair eye contact  Speech:  spontaneous, normal rate, normal volume and well articulated   Mood:  \"It's been rough lately\"  Affect:  Mood congruent   Thought processes:  Linear  Thought Content: denies SI, denies HI, no delusions voiced, no obsessions, denies hopelessness  Perceptions: denies AVH, not RTIS  Attention: wnl  Abstraction: wnl  Cognition: Average fund of knowledge, Alert and oriented to person, place, time, and situation, immediate, recent, and remote recall grossly intact  Insight: limited insight   Judgment: limited judgment      LAB: Reviewed all labs obtained during admission to date. Dx:   Primary Psychiatric (DSM V) Diagnosis: Adjustment disorder with depressed mood  Secondary Psychiatric (DSM V) Diagnoses: None  Chemical Dependency Diagnoses: Tobacco use disorder, severe, dependence    Recommendations:    1. Patient does not require psychiatric admission as she is not suicidal, homicidal, or grossly impaired in her ability to perform basic living functions. 2.  Patient declined adjustment to her home duloxetine. Recommend continuing current dose. 3.  Recommend referring patient to a cancer support group and/or counselor to process her emotions related to cancer diagnosis. Spent > 80 minutes face to face with patient of which >50% was spent counseling and providing education regarding diagnosis, treatment options, and prognosis. Thank you for consult. Please do not hesitate to contact provider if there are additional questions regarding patient.     Deborah Renee MD  Staff Psychiatrist

## 2021-01-06 NOTE — DISCHARGE INSTR - COC
Continuity of Care Form    Patient Name: Adam Jenkins   :  1955  MRN:  [id number]    Admit date:  1/3/2021  Discharge date:  21    Code Status Order: Full Code   Advance Directives:     Admitting Physician:  Vashti Moran MD  PCP: Zhang Elizabeth MD    Discharging Nurse: Yane Ledesma Unit/Room#: EN11/EN-11  Discharging Unit Phone Number: [phone]    Emergency Contact:   Extended Emergency Contact Information  Primary Emergency Contact: Salina Ramos  Home Phone: [phone]  Relation: Other  Secondary Emergency Contact: [address] [facility], [address] Phone: [phone]  Relation: Child    Past Surgical History:  Past Surgical History:   Procedure Laterality Date   [address], LAPAROSCOPIC N/A 2020    robotic, performed by Aiyana Zavala MD at [facility]  2020    CT NEEDLE BIOPSY LUNG PERCUTANEOUS 2020 [facility] CT SCAN    FASCIOTOMY Left 2017    due to DVT, LLE    GASTROCNEMIUS RECESSION Left 10/1/2020    [facility] Right 10/29/2020    RIGHT CERVICAL LYMPH NODE BIOPSY performed by Aiyana Zavala MD at [facility] Left 10/1/2020    LEFT FOOT SECOND, THIRD, FOURTH AND FIFTH  METATARSAL HEAD RESECTION -BLOCK- -BAYLEE=5 performed by James Dominguez MD at [address] Left 2020    LEFT VIDEO ASSISTED THORACOSCOPIC SURGERY WITH LEFT  LOWER LOBE LOBECTOMY AND MEDIASTINAL LYMPHADENECTOMY, INTERCOSTAL NERVE BLOCK performed by Janice Perez MD at [address]       Immunization History:   Immunization History   Administered Date(s) Administered    Influenza, Quadv, IM, PF (6 mo and older Fluzone, Flulaval, Fluarix, and 3 yrs and older Afluria) 2019    Pneumococcal Polysaccharide ([id number]) 2019    Tdap (Boostrix, Adacel) 2019       Active Problems:  Patient Active Problem List   Diagnosis Code    Essential hypertension I10    Chest pain R07.9    Multiple falls R29.6    Recurrent pain of right knee M25.561    Sternal mass M89.8X8    Current smoker F17.200    LAD (lymphadenopathy), inguinal R59.0    Chronic pain syndrome G89.4    Moderate single current episode of major depressive disorder (HCC) F32.1    Chronic bilateral low back pain without sciatica M54.5, G89.29    Prediabetes R73.03    Vitamin D deficiency E55.9    History of fasciotomy Z98.890    History of DVT in adulthood Z80.65    Personal history of TIA (transient ischemic attack) Z86.73    Deep vein thrombosis (DVT) of left lower extremity (HCC) I82.402    Neuropathy G62.9    Lung nodule R91.1    Bone marrow edema R93.7    Centrilobular emphysema (HCC) J43.2    HLD (hyperlipidemia) E78.5    Hypertensive urgency I16.0    Oropharyngeal dysphagia R13.12    Muscle cramps R25.2    Pulmonary lesion of left side of chest J98.4    JASPREET (acute kidney injury) ([facility] N17.9    Abnormal liver enzymes R74.8    Epigastric pain R10.13    Right upper quadrant abdominal pain R10.11    Perforated gallbladder K82.2    Anemia D64.9    Cholecystitis K81.9    Dizziness R42    Diarrhea of presumed infectious origin R19.7    Fracture of 2nd metatarsal S92.323A    Dislocation of fourth toe, left, closed, initial encounter S93.105A    Lymphadenopathy of right cervical region R59.0    Mass of lower lobe of left lung R91.8    Syncope and collapse R55    Hypokalemia E87.6    Hypomagnesemia E83.42    Non-small cell lung cancer (HCC) C34.90    Hyponatremia E87.1    Anxiety F41.9    Depression F32.9       Isolation/Infection:   Isolation          No Isolation        Patient Infection Status     Infection Onset Added Last Indicated Last Indicated By Review Planned Expiration Resolved Resolved By    None active    Resolved    COVID-19 Rule Out 01/04/21 01/04/21 01/04/21 COVID-19 (Ordered)   01/04/21 Rule-Out Test Resulted    COVID-19 Rule Out 11/09/20 11/09/20 11/09/20 COVID-19 (Ordered)   11/10/20 Rule-Out Test Resulted    COVID-19 Rule Out 10/27/20 10/27/20 10/27/20 COVID-19 (Ordered)   10/27/20 Rule-Out Test Resulted    COVID-19 Rule Out 10/05/20 10/05/20 10/05/20 COVID-19 (Ordered)   10/05/20 Rule-Out Test Resulted    COVID-19 Rule Out 07/16/20 07/16/20 07/16/20 COVID-19 (Ordered)   07/16/20 Rule-Out Test Resulted    C-diff Rule Out 07/14/20 07/14/20 07/14/20 Clostridium difficile toxin/antigen (Ordered)   10/29/20 Vasyl Durant RN    MDRO (multi-drug resistant organism) 02/27/20 02/29/20 02/27/20 Culture, Urine   04/27/20 Carmina Montalvo RN          Nurse Assessment:  Last Vital Signs: /70   Pulse 72   Temp 98 °F (36.7 °C) (Oral)   Resp 16   Ht 5' 1\" (1.549 m)   Wt 130 lb (59 kg)   SpO2 97%   BMI 24.56 kg/m²     Last documented pain score (0-10 scale): Pain Level: 6  Last Weight:   Wt Readings from Last 1 Encounters:   01/03/21 130 lb (59 kg)     Mental Status:  oriented and alert    IV Access:  - None    Nursing Mobility/ADLs:  Walking   Independent  Transfer  Independent  Bathing  Independent  Dressing  Independent  Toileting  Independent  Feeding  [address]  Independent  Med Delivery   whole    Wound Care Documentation and Therapy:        Elimination:  Continence:   · Bowel: Yes  · Bladder: Yes  Urinary Catheter: None   Colostomy/Ileostomy/Ileal Conduit: No       Date of Last BM: 1/5/21  No intake or output data in the 24 hours ending 01/06/21 1359  I/O last 3 completed shifts: In: 240 [P.O.:240]  Out: -     Safety Concerns:     None    Impairments/Disabilities:      None    Nutrition Therapy:  Current Nutrition Therapy:   - Oral Diet:  General    Routes of Feeding: Oral  Liquids:  Thin Liquids  Daily Fluid Restriction: no  Last Modified Barium Swallow with Video (Video Swallowing Test): not done    Treatments at the Time of Hospital Discharge:   Respiratory Treatments:   Oxygen Therapy:  is not on home oxygen therapy. Ventilator:    - No ventilator support    Rehab Therapies: Physical Therapy, Occupational Therapy and SN, SW  Weight Bearing Status/Restrictions: No weight bearing restirctions  Other Medical Equipment (for information only, NOT a DME order): Other Treatments:     Patient's personal belongings (please select all that are sent with patient):  None    RN SIGNATURE:  {Esignature:[id number]}    CASE MANAGEMENT/SOCIAL WORK SECTION    Inpatient Status Date: Observation status from 1/3/21    Readmission Risk Assessment Score:  Readmission Risk              Risk of Unplanned Readmission:        0           Discharging to Facility/ Agency   · Name: [facility]  · Address:  · Phone: [phone]  · Fax:    HOME HEALTH CARE: LEVEL 1 STANDARD    Home health agency to establish plan of care for patient over 60 day period   Nursing  Initial home SN evaluation visit to occur within 24-48 hours for:  medication management  VS and clinical assessment  S&S chronic disease exacerbation education + when to contact MD / NP  care coordination  Medication Reconciliation during 1st SN visit       PT/OT   Evaluate with goal of regaining prior level of functioning   OT to evaluate if patient has [address] needs for personal care      PCP Visit scheduled within 7 days of hospital discharge   MSW    Dialysis Facility (if applicable)   · Name:  · Address:  · Dialysis Schedule:  · Phone:  · Fax:    / signature: Electronically signed by Kortney Pacheco RN on 1/6/21 at 2:01 PM EST    PHYSICIAN SECTION    Prognosis: Fair    Condition at Discharge: Stable    Rehab Potential (if transferring to Rehab):  Fair    Recommended Labs or Other Treatments After Discharge: SN PT/OT MSW  HCV Program and Telehealth    Physician Certification: I certify the above information and transfer of Josesito Olmos  is necessary for the continuing treatment of the diagnosis listed and that she requires Home Care for less 30 days. Update Admission H&P: No change in H&P    PHYSICIAN SIGNATURE:  Electronically signed by BRANT Powers RN on 1/6/21 at 2:01 PM EST

## 2021-01-06 NOTE — PROGRESS NOTES
Shift assessment complete. See flow sheet. Patient resting in bed watching TV. Respirations easy and even. Pt complains of 7/10 back pain. Oxycodone given with scheduled meds. Pt states no additional needs at this time. Call light and bedside table in reach. Will follow.

## 2021-01-06 NOTE — PROGRESS NOTES
Contacted [facility] to verify receipt of consult placed on 1/5/21. Advised patient will be seen this date.

## 2021-01-06 NOTE — CARE COORDINATION
DISCHARGE ORDER  Date/Time 2021 2:52 PM  Completed by: Shivani Crowder MSW, LSW. Case Management    Patient Name: Sarwat Abernathy      : 1955  Admitting Diagnosis: Syncope and collapse [R55]      Admit order Date and Status: 2020 Observation   (verify MD's last order for status of admission)      Noted discharge order. If applicable PT/OT recommendation at Discharge: n/a  DME recommendation by PT/OT:n/a    Confirmed discharge plan: Yes  with whom pt  If pt confirmed DC plan does family need to be contacted by CM No    Discharge Plan: Reviewed chart. Role of discharge planner explained and patient verbalized understanding. Discharge order is noted. Spoke with MELISSA Merrill who states pt was cleared by Psychiatry (see her note from today). Met with pt at bedside. She stated she would have a ride home. She is agreeable to [address] [facility]). Pt denied further needs or questions from . Pt aware Adult Protective Services (APS) will be called but she may not meet with age requirement and she stated understanding. Called and spoke with [facility] liaison to make of the discharge today. She is aware a SW was added. She stated that [facility] not [facility] but they can attempt. [facility] liaison to fax orders to [address] call to [facility] Adult Protective Services at [phone] and left a message for intake asking for a call back to make the referral as this was the only option. Has Home O2 in place on admit:  No    Pt is being d/c'd to home today. Pt's O2 sats are 97% on RA. Discharge timeout done with [facility]. All discharge needs and concerns addressed. Addendum at 3:30pm: Received call back from [facility] Kate Vora (APS).  Provided her with referral information (pt's name//address/phone number/admission diagnosis/alert and oriented/dischargng today with [facility] (RN/PT/OT/SW if approved by insurance), lives alone, that pt reported her daughterTammy [phone] [address] presents and money and has not reported it. [address] stated no additional information needed from [address] [place] [address].

## 2021-01-06 NOTE — DISCHARGE SUMMARY
Name:  Fatuma Scott  Room:    MRN:    [id number]    Discharge Summary      This discharge summary is in conjunction with a complete physical exam done on the day of discharge. Discharging Physician: Dr. Shanika Red: 1/3/2021  Discharge: 2021     HPI taken from admission H&P:    63 y.o. female who presented to [facility] c/o persistent pain to chest .nnot sure if it was the anxiety of trying to decide what to do about the CA I fell broke my foot have 4 pins in my feet I hurt all the time when they did the bx hit ny ribs and hurt. Feel fine stand up and get dizzy for second or 2 then nearly pass out.  Was told need to put nerve blocks in   Pt says there 2 tumors in there a LN one and other was a \"carcinoma\"     Diagnoses this Admission and Hospital Course   Syncope  - reported to the ER--stands up and fall back  - Denies true syncope to me, states it feels like extreme fatigue and she was feeling like she had another PE and pre-syncope   - EKG with NSR with LVH  - Check Echo: normal EF with grade I DD  - IVF Fluids      Chest Pain--resolved  - reports it was a tightness 2/2 her anxiety   - No longer having any pain   - EKG with LVH   - Troponin negative      Hypokalemia   Hypomagnesemia  - Mild, K: 3.3, Ma.7   - Sliding scale replacement      NSCLC  - s/p left lower lobectomy and mediastinal lymphadenectomy 20   - Following with  Dr Pagan-->reports plans for chemotherapy      JASPREET   - Baseline creatinine ~0.9  - Creatinine on admission 1.4-->1.4-->1.1  - likely 2/2 poor PO intake    - Continue IVF and monitor      Hyponatremia--improved  - not eating/drinking much   - Hold Lasix-->d/c on Lasix once daily instead of twice x 1 week and then can resume home dosing if having better PO intake   - IVF   - Monitor      HTN  - BP is labile  - Continue home medications      Chronic Pain   - Continue neurontin, gabapentin, zanaflex     COPD   - No AE   - Monitor      HLD  - Continue statin      Hx of DVT  - no longer on AC  - CTPA negative for PE      MDD  Anxiety   - severe since her dx of cancer   - Not eating/drinking much, frequent crying and obsessive thoughts around the dx   - not established with psychiatry  - Psychiatry consult: recommended increasing Cymbalta, patient declined, recommends she follows up with a cancer support group    Procedures (Please Review Full Report for Details)  Echo    Consults    Psychiatry     Physical Exam at Discharge:    /70   Pulse 72   Temp 98 °F (36.7 °C) (Oral)   Resp 16   Ht 5' 1\" (1.549 m)   Wt 130 lb (59 kg)   SpO2 97%   BMI 24.56 kg/m²   Gen: No distress. Very tearful   Eyes: No conjunctival injection. ENT: No discharge. Pharynx clear. Neck: Trachea midline. Resp: No accessory muscle use. No crackles. No wheezes. No rhonchi. CV: Regular rate. Regular rhythm. No murmur. No rub. No edema. Capillary Refill: Brisk,< 3 seconds   Peripheral Pulses: +2 palpable, equal bilaterally   GI: Non-tender. Non-distended. Normal bowel sounds. Skin: Warm and dry. Well healed scars to the LLE and L anterior chest wall   M/S: No cyanosis. No joint deformity. No clubbing. Neuro: Awake. Grossly nonfocal    Psych: Oriented x 3. No anxiety     CBC:   Recent Labs     01/03/21  1637 01/05/21  0753   WBC 10.3 9.3   HGB 14.0 12.2   HCT 41.1 36.6   MCV 88.9 89.7    261     BMP:   Recent Labs     01/03/21  1637 01/05/21  0753 01/06/21  0715   * 129* 138   K 3.3* 3.8 3.9   CL 93* 95* 106   CO2 28 25 26   BUN 12 18 18   CREATININE 1.4* 1.4* 1.1     LIVER PROFILE:   Recent Labs     01/03/21  1637 01/05/21  0753   AST 17 15   ALT 7* 6*   LIPASE 34.0  --    BILITOT 0.4 <0.2   ALKPHOS 100 82       CULTURES  SARS-CoV-2, NAAT Not Detected      RADIOLOGY  CT CHEST PULMONARY EMBOLISM W CONTRAST   Final Result   1. No evidence of pulmonary embolus. 2.  Postsurgical changes in the left lower lobe of partial resection.   Some   fluid noted along the surgical line. 3.  Mild emphysematous changes. No worrisome pulmonary nodules. No focal   consolidation or gross effusion. 4.  Shotty to borderline mediastinal lymph nodes           Echo  Conclusions      Summary   Normal left ventricular systolic function with an estimated ejection   fraction of 55-60%. No regional wall motion abnormalities are seen. There is mild concentric left ventricular hypertrophy. Grade I diastolic dysfunction with normal filling pressure. Mild tricuspid regurgitation. Normal systolic pulmonary artery pressure (SPAP) estimated at 34 mmHg (RA   pressure 8 mmHg). Mild pulmonic regurgitation present. 12/19/2016 55-60% EF         Discharge Medications     Medication List      STOP taking these medications    cetirizine 10 MG tablet  Commonly known as: ZYRTEC     famotidine 20 MG tablet  Commonly known as: PEPCID     lidocaine 5 %  Commonly known as: LIDODERM        ASK your doctor about these medications    albuterol sulfate  (90 Base) MCG/ACT inhaler  INHALE 2 PUFFS INTO THE LUNGS EVERY 6 HOURS AS NEEDED FOR WHEEZING     amiodarone 400 MG tablet  Commonly known as: PACERONE  Take 1 tablet by mouth 2 times daily     aspirin 81 MG EC tablet     atorvastatin 80 MG tablet  Commonly known as: LIPITOR  Take 1 tablet by mouth daily     DULoxetine 60 MG extended release capsule  Commonly known as: CYMBALTA  Take 1 capsule by mouth daily     fluticasone 50 MCG/ACT nasal spray  Commonly known as: FLONASE  USE 2 SPRAYS BY NASAL ROUTE AS NEEDED FOR RHINITIS OR ALLERGIES     furosemide 20 MG tablet  Commonly known as: LASIX  Take 1 tablet by mouth 2 times daily     gabapentin 400 MG capsule  Commonly known as: NEURONTIN  Take 1 capsule by mouth 3 times daily for 90 days.      lisinopril 40 MG tablet  Commonly known as: PRINIVIL;ZESTRIL  TAKE 1 TABLET BY MOUTH DAILY     LORazepam 0.5 MG tablet  Commonly known as: ATIVAN     nicotine 7 MG/24HR  Commonly known as: Stephanie Settle CQ  PLACE 1 PATCH ONTO THE SKIN DAILY (ROTATE SITES)     potassium chloride 10 MEQ extended release tablet  Commonly known as: KLOR-CON M  Take 1 tablet by mouth 2 times daily (with meals)     Spiriva HandiHaler 18 MCG inhalation capsule  Generic drug: tiotropium  INHALE 1 CAPSULE INTO THE LUNGS DAILY     tiZANidine 2 MG tablet  Commonly known as: ZANAFLEX  TAKE 1 TABLET BY MOUTH 2 TIMES DAILY AS NEEDED (MUSCLE SPASM AND PAIN)     traMADol 50 MG tablet  Commonly known as: Ultram  Take 1 tablet by mouth every 6 hours as needed for Pain for up to 7 days. Intended supply: 7 days. Take lowest dose possible to manage pain  Ask about: Should I take this medication?     traZODone 50 MG tablet  Commonly known as: DESYREL  Take 1 tablet by mouth nightly     vitamin D 1.25 MG (46687 UT) Caps capsule  Commonly known as: ERGOCALCIFEROL  Take 1 capsule by mouth once a week          Discharged in stable condition to home     Follow Up:   Follow up with PCP, oncology     Dewayne Perry PA-C  1/6/2021 2:03 PM
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
## 2021-01-11 ENCOUNTER — TELEPHONE (OUTPATIENT)
Dept: CARDIOTHORACIC SURGERY | Age: 66
End: 2021-01-11

## 2021-01-13 ENCOUNTER — HOSPITAL ENCOUNTER (OUTPATIENT)
Dept: NUCLEAR MEDICINE | Age: 66
Discharge: HOME | End: 2021-01-13
Payer: COMMERCIAL

## 2021-01-13 DIAGNOSIS — C77.1 SECONDARY AND UNSPECIFIED MALIGNANT NEOPLASM OF INTRATHORACIC LYMPH NODES (HCC): ICD-10-CM

## 2021-01-13 DIAGNOSIS — R52 PAIN: ICD-10-CM

## 2021-01-13 DIAGNOSIS — C85.13 B-CELL LYMPHOMA OF INTRA-ABDOMINAL LYMPH NODES, UNSPECIFIED B-CELL LYMPHOMA TYPE (HCC): ICD-10-CM

## 2021-01-13 DIAGNOSIS — C34.90 NON-SMALL CELL LUNG CANCER, UNSPECIFIED LATERALITY (HCC): ICD-10-CM

## 2021-01-13 PROCEDURE — 3430000000 HC RX DIAGNOSTIC RADIOPHARMACEUTICAL: Performed by: NURSE PRACTITIONER

## 2021-01-13 PROCEDURE — A9503 TC99M MEDRONATE: HCPCS | Performed by: NURSE PRACTITIONER

## 2021-01-13 PROCEDURE — 78306 BONE IMAGING WHOLE BODY: CPT

## 2021-01-13 RX ORDER — TC 99M MEDRONATE 20 MG/10ML
27 INJECTION, POWDER, LYOPHILIZED, FOR SOLUTION INTRAVENOUS
Status: COMPLETED | OUTPATIENT
Start: 2021-01-13 | End: 2021-01-13

## 2021-01-13 RX ADMIN — TC 99M MEDRONATE 27 MILLICURIE: 20 INJECTION, POWDER, LYOPHILIZED, FOR SOLUTION INTRAVENOUS at 08:01

## 2021-01-14 ENCOUNTER — TELEPHONE (OUTPATIENT)
Dept: CARDIOTHORACIC SURGERY | Age: 66
End: 2021-01-14

## 2021-01-14 NOTE — TELEPHONE ENCOUNTER
Dr. Steve Han reviewed imaging from recent hospitalization. He does not feel that pleural effusion is significant or that it needs to be drained. There is nothing in her scans to suggest that post op pain is coming from something else. He feels it is most likely nerve related. He would be happy to refer patient to pain specialist if she would like. I spoke to patient. She verbalizes understanding and is agreeable with Dr. Carlos Stein recommendations. She is scheduled to start chemo tomorrow and states that Dr. Elina Motta has been managing her pain medications. She does not feel she needs a pain management referral at this time. I did tell her to please call us back if she should change her mind. She is agreeable to this.

## 2021-01-22 DIAGNOSIS — J45.20 MILD INTERMITTENT ASTHMA WITHOUT COMPLICATION: ICD-10-CM

## 2021-01-22 RX ORDER — ALBUTEROL SULFATE 90 UG/1
AEROSOL, METERED RESPIRATORY (INHALATION)
Qty: 18 G | Refills: 2 | Status: SHIPPED | OUTPATIENT
Start: 2021-01-22 | End: 2021-04-26

## 2021-01-22 NOTE — TELEPHONE ENCOUNTER
Refill Request     Last Seen: 5/7/2020    Last Written: 11/4/2020    Next Appointment:   Future Appointments   Date Time Provider Elai Pulliam   2/19/2021 10:45 AM MD ANJUM Barillas             Requested Prescriptions     Pending Prescriptions Disp Refills    albuterol sulfate  (90 Base) MCG/ACT inhaler [Pharmacy Med Name: ALBUTEROL HFA 90MCG INH (VENTOLIN)] 18 g 2     Sig: INHALE 2 PUFFS INTO THE LUNGS EVERY 6 HOURS AS NEEDED FOR WHEEZING

## 2021-02-02 ENCOUNTER — OFFICE VISIT (OUTPATIENT)
Dept: FAMILY MEDICINE CLINIC | Age: 66
End: 2021-02-02
Payer: COMMERCIAL

## 2021-02-02 VITALS
DIASTOLIC BLOOD PRESSURE: 60 MMHG | SYSTOLIC BLOOD PRESSURE: 110 MMHG | BODY MASS INDEX: 27.32 KG/M2 | WEIGHT: 144.6 LBS | TEMPERATURE: 96.6 F | OXYGEN SATURATION: 97 % | HEART RATE: 68 BPM

## 2021-02-02 DIAGNOSIS — I10 ESSENTIAL HYPERTENSION: Primary | ICD-10-CM

## 2021-02-02 PROCEDURE — 3017F COLORECTAL CA SCREEN DOC REV: CPT | Performed by: NURSE PRACTITIONER

## 2021-02-02 PROCEDURE — G8427 DOCREV CUR MEDS BY ELIG CLIN: HCPCS | Performed by: NURSE PRACTITIONER

## 2021-02-02 PROCEDURE — G8484 FLU IMMUNIZE NO ADMIN: HCPCS | Performed by: NURSE PRACTITIONER

## 2021-02-02 PROCEDURE — 1036F TOBACCO NON-USER: CPT | Performed by: NURSE PRACTITIONER

## 2021-02-02 PROCEDURE — 99214 OFFICE O/P EST MOD 30 MIN: CPT | Performed by: NURSE PRACTITIONER

## 2021-02-02 PROCEDURE — G8419 CALC BMI OUT NRM PARAM NOF/U: HCPCS | Performed by: NURSE PRACTITIONER

## 2021-02-02 RX ORDER — BLOOD PRESSURE TEST KIT
1 KIT MISCELLANEOUS 2 TIMES DAILY
Qty: 1 KIT | Refills: 0 | Status: SHIPPED | OUTPATIENT
Start: 2021-02-02

## 2021-02-02 ASSESSMENT — ENCOUNTER SYMPTOMS: RESPIRATORY NEGATIVE: 1

## 2021-02-02 NOTE — Clinical Note
Dr. Donya Ferrari,   This pt states that she has asked you \"three times\" to provide her a letter to give to her apartment complex stating that it is ok for her to have a service dog. I informed her that I did not see any previous messages regarding this, but that I would send you a message today.

## 2021-02-02 NOTE — PROGRESS NOTES
2021     Sandra Rose (:  1955) is a 63 y.o. female, here for evaluation of the following medical concerns:    FRAN  Awilda Mcginnis is here today to discuss her BP. She states that it \"goes all over the place\". She states that she monitors her BP at home with her neighbor's wrist cuff and it runs around 120/80, but will sometimes be 190/110. Review of Systems   Constitutional: Negative. Respiratory: Negative. Cardiovascular: Negative. Neurological: Negative. Prior to Visit Medications    Medication Sig Taking? Authorizing Provider   Blood Pressure KIT 1 kit by Does not apply route 2 times daily Yes Olivia Wright, APRN - CNP   albuterol sulfate  (90 Base) MCG/ACT inhaler INHALE 2 PUFFS INTO THE LUNGS EVERY 6 HOURS AS NEEDED FOR WHEEZING Yes Sherron Rolon MD   LORazepam (ATIVAN) 0.5 MG tablet Take 0.5 mg by mouth 2 times daily as needed for Anxiety (1 to 2 times per day). Yes Historical Provider, MD   fluticasone (FLONASE) 50 MCG/ACT nasal spray USE 2 SPRAYS BY NASAL ROUTE AS NEEDED FOR RHINITIS OR ALLERGIES Yes Sherron Rolon MD   gabapentin (NEURONTIN) 400 MG capsule Take 1 capsule by mouth 3 times daily for 90 days.  Yes Sherron Rolon MD   DULoxetine (CYMBALTA) 60 MG extended release capsule Take 1 capsule by mouth daily Yes Sherron Rolon MD   traZODone (DESYREL) 50 MG tablet Take 1 tablet by mouth nightly Yes Sherron Rolon MD   atorvastatin (LIPITOR) 80 MG tablet Take 1 tablet by mouth daily Yes Sherron Rolon MD   potassium chloride (KLOR-CON M) 10 MEQ extended release tablet Take 1 tablet by mouth 2 times daily (with meals) Yes Anastasiya Galaviz MD   aspirin 81 MG EC tablet Take 81 mg by mouth daily Yes Historical Provider, MD Madrigal Clos 18 MCG inhalation capsule INHALE 1 CAPSULE INTO THE LUNGS DAILY Yes Sherron Rolon MD   vitamin D (ERGOCALCIFEROL) 1.25 MG (00027 UT) CAPS capsule Take 1 capsule by mouth once a week Yes Brenden Alarcon MD lisinopril (PRINIVIL;ZESTRIL) 40 MG tablet TAKE 1 TABLET BY MOUTH DAILY  Patient not taking: Reported on 2021  Aleida Jameson MD   amiodarone (PACERONE) 400 MG tablet Take 1 tablet by mouth 2 times daily  Marva Trivedi MD   furosemide (LASIX) 20 MG tablet Take 1 tablet by mouth 2 times daily  Patient not taking: Reported on 2021  Marva Trivedi MD   tiZANidine (ZANAFLEX) 2 MG tablet TAKE 1 TABLET BY MOUTH 2 TIMES DAILY AS NEEDED (MUSCLE SPASM AND PAIN)  Patient not taking: Reported on 2021  Aleida Jameson MD   nicotine (NICODERM CQ) 7 MG/24HR PLACE 1 PATCH ONTO THE SKIN DAILY ([address]  Patient not taking: Reported on 2021  Aleida Jameson MD        Social History     Tobacco Use    Smoking status: Former Smoker     Packs/day: 0.50     Years: 20.00     Pack years: 10.00     Types: Cigarettes     Quit date: 2020     Years since quittin.2    Smokeless tobacco: Never Used    Tobacco comment: 1 cigarettes per day   Substance Use Topics    Alcohol use: No        Vitals:    21 1459   BP: 110/60   Site: Right Upper Arm   Position: Sitting   Cuff Size: Medium Adult   Pulse: 68   Temp: 96.6 °F (35.9 °C)   TempSrc: Temporal   SpO2: 97%   Weight: 144 lb 9.6 oz (65.6 kg)     Estimated body mass index is 27.32 kg/m² as calculated from the following:    Height as of 1/3/21: 5' 1\" (1.549 m). Weight as of this encounter: 144 lb 9.6 oz (65.6 kg). Physical Exam  Constitutional:       Appearance: Normal appearance. HENT:      Head: Normocephalic. Neck:      Musculoskeletal: Normal range of motion. Cardiovascular:      Rate and Rhythm: Regular rhythm. Heart sounds: Normal heart sounds. Pulmonary:      Effort: Pulmonary effort is normal.      Breath sounds: Normal breath sounds. Skin:     General: Skin is warm and dry. Neurological:      Mental Status: She is alert.    Psychiatric:         Attention and Perception: Attention and perception normal. Mood and Affect: Mood is anxious. Mood is not depressed. Affect is not labile, flat or tearful. Speech: Speech is rapid and pressured. Behavior: Behavior is cooperative. ASSESSMENT/PLAN:  1. Essential hypertension  See HPI. BP here today was normal.  Pt has been using her neighbor's wrist BP cuff and has been getting very different readings. I discussed with her that the wrist cuffs are not the most accurate. Will order an arm cuff and advised her to check it BID and PRN if she develops any symptoms. I instructed her on the proper way to check her BP - no caffeine or tobacco use for 30 minutes prior to testing, sitting with feet flat on the ground, arm at heart level and no talking. I advised her to record her readings and to send to us in one week. - Blood Pressure KIT; 1 kit by Does not apply route 2 times daily  Dispense: 1 kit; Refill: 0      Return in about 4 weeks (around 3/2/2021) for Hypertension. An electronic signature was used to authenticate this note.     --SHAHAB Barton - CNP on 2/2/2021 at 4:43 PM

## 2021-02-04 ENCOUNTER — TELEPHONE (OUTPATIENT)
Dept: FAMILY MEDICINE CLINIC | Age: 66
End: 2021-02-04

## 2021-02-04 NOTE — TELEPHONE ENCOUNTER
After reviewing notations and reflecting on past experiences with this pt, I do not feel this is a safe, therapeutic relationship. Please start the discharge process from the practice as a whole. Thank you.

## 2021-02-04 NOTE — TELEPHONE ENCOUNTER
----- Message from SHAHAB Orr - CNP sent at 2/3/2021  4:11 PM EST -----  Hi Dr. Karen Conner,   I saw Ms. Alcazar yesterday for her BP \"being all over the place\". I sent you a copy of the visit to review. The pt was very anxious, and her speech was very rapid. She was unable to sit still, and was fidgeting with her jewelry and water bottle constantly throughout the entire visit. Before she even explained why she was in to see me, she started to complain about how unhappy she was with the care that she had rec'd from you over the past 3 years and told me that \"you had no business being a doctor, and was the worst she's ever known\". She went on to say that you did not f/u about a chest CT, didn't share information with Dr. Flex Carmona, and that she had asked you \"three times\" to fill out paperwork stating that she could have a service/emotional support dog. I informed her that I did not see any messages in her chart regarding this, and it appears that all tests done 2-3 years ago were addressed by you and Dr. Flex Carmona. I informed her that I would gladly send you a message regarding wanting the service/emotional support dog. I had to constantly redirect her as to why she was here to see me. I did not feel that her comments were appropriate. I informed her that I thought very highly of you as a healthcare provider, and that I often collaborated with you regarding pt's of mine. I advised her that if she was unhappy with the care she was receiving that she could call our  and request that she switch providers.   She said that she had planned to try to switch to Dr. Shabnam Ponce b/c she has seen her in the past.

## 2021-02-12 ENCOUNTER — TELEPHONE (OUTPATIENT)
Dept: PULMONOLOGY | Age: 66
End: 2021-02-12

## 2021-02-21 ENCOUNTER — HOSPITAL ENCOUNTER (EMERGENCY)
Age: 66
Discharge: HOME | End: 2021-02-21
Payer: COMMERCIAL

## 2021-02-21 VITALS
OXYGEN SATURATION: 100 % | HEIGHT: 61 IN | SYSTOLIC BLOOD PRESSURE: 130 MMHG | RESPIRATION RATE: 26 BRPM | TEMPERATURE: 97.6 F | BODY MASS INDEX: 23.03 KG/M2 | WEIGHT: 122 LBS | DIASTOLIC BLOOD PRESSURE: 80 MMHG | HEART RATE: 96 BPM

## 2021-02-21 DIAGNOSIS — T45.1X5A CHEMOTHERAPY INDUCED NAUSEA AND VOMITING: Primary | ICD-10-CM

## 2021-02-21 DIAGNOSIS — R51.9 ACUTE NONINTRACTABLE HEADACHE, UNSPECIFIED HEADACHE TYPE: ICD-10-CM

## 2021-02-21 DIAGNOSIS — R11.2 CHEMOTHERAPY INDUCED NAUSEA AND VOMITING: Primary | ICD-10-CM

## 2021-02-21 LAB
A/G RATIO: 1.2 (ref 1.1–2.2)
ALBUMIN SERPL-MCNC: 5.1 G/DL (ref 3.4–5)
ALP BLD-CCNC: 114 U/L (ref 40–129)
ALT SERPL-CCNC: 12 U/L (ref 10–40)
ANION GAP SERPL CALCULATED.3IONS-SCNC: 16 MMOL/L (ref 3–16)
ANISOCYTOSIS: ABNORMAL
AST SERPL-CCNC: 20 U/L (ref 15–37)
BASOPHILS ABSOLUTE: 0 K/UL (ref 0–0.2)
BASOPHILS RELATIVE PERCENT: 0 %
BILIRUB SERPL-MCNC: 0.3 MG/DL (ref 0–1)
BUN BLDV-MCNC: 10 MG/DL (ref 7–20)
CALCIUM SERPL-MCNC: 9.6 MG/DL (ref 8.3–10.6)
CHLORIDE BLD-SCNC: 90 MMOL/L (ref 99–110)
CO2: 26 MMOL/L (ref 21–32)
CREAT SERPL-MCNC: 1.3 MG/DL (ref 0.6–1.2)
EOSINOPHILS ABSOLUTE: 0 K/UL (ref 0–0.6)
EOSINOPHILS RELATIVE PERCENT: 0 %
GFR AFRICAN AMERICAN: 50
GFR NON-AFRICAN AMERICAN: 41
GLOBULIN: 4.2 G/DL
GLUCOSE BLD-MCNC: 127 MG/DL (ref 70–99)
HCT VFR BLD CALC: 39.1 % (ref 36–48)
HEMATOLOGY PATH CONSULT: YES
HEMOGLOBIN: 13.6 G/DL (ref 12–16)
LIPASE: 25 U/L (ref 13–60)
LYMPHOCYTES ABSOLUTE: 5.6 K/UL (ref 1–5.1)
LYMPHOCYTES RELATIVE PERCENT: 88 %
MCH RBC QN AUTO: 31 PG (ref 26–34)
MCHC RBC AUTO-ENTMCNC: 34.8 G/DL (ref 31–36)
MCV RBC AUTO: 89.1 FL (ref 80–100)
MONOCYTES ABSOLUTE: 0.3 K/UL (ref 0–1.3)
MONOCYTES RELATIVE PERCENT: 4 %
NEUTROPHILS ABSOLUTE: 0.5 K/UL (ref 1.7–7.7)
NEUTROPHILS RELATIVE PERCENT: 8 %
PDW BLD-RTO: 15.5 % (ref 12.4–15.4)
PLATELET # BLD: 227 K/UL (ref 135–450)
PMV BLD AUTO: 7.8 FL (ref 5–10.5)
POTASSIUM SERPL-SCNC: 3.6 MMOL/L (ref 3.5–5.1)
RBC # BLD: 4.39 M/UL (ref 4–5.2)
SLIDE REVIEW: ABNORMAL
SODIUM BLD-SCNC: 132 MMOL/L (ref 136–145)
TOTAL PROTEIN: 9.3 G/DL (ref 6.4–8.2)
WBC # BLD: 6.4 K/UL (ref 4–11)

## 2021-02-21 PROCEDURE — 80053 COMPREHEN METABOLIC PANEL: CPT

## 2021-02-21 PROCEDURE — 96374 THER/PROPH/DIAG INJ IV PUSH: CPT

## 2021-02-21 PROCEDURE — 6360000002 HC RX W HCPCS: Performed by: PHYSICIAN ASSISTANT

## 2021-02-21 PROCEDURE — 99285 EMERGENCY DEPT VISIT HI MDM: CPT

## 2021-02-21 PROCEDURE — 85025 COMPLETE CBC W/AUTO DIFF WBC: CPT

## 2021-02-21 PROCEDURE — 83690 ASSAY OF LIPASE: CPT

## 2021-02-21 PROCEDURE — 2580000003 HC RX 258: Performed by: PHYSICIAN ASSISTANT

## 2021-02-21 RX ORDER — DROPERIDOL 2.5 MG/ML
1.25 INJECTION, SOLUTION INTRAMUSCULAR; INTRAVENOUS ONCE
Status: COMPLETED | OUTPATIENT
Start: 2021-02-21 | End: 2021-02-21

## 2021-02-21 RX ORDER — ONDANSETRON 4 MG/1
4-8 TABLET, ORALLY DISINTEGRATING ORAL EVERY 8 HOURS PRN
Qty: 20 TABLET | Refills: 0 | Status: SHIPPED | OUTPATIENT
Start: 2021-02-21

## 2021-02-21 RX ORDER — SODIUM CHLORIDE, SODIUM LACTATE, POTASSIUM CHLORIDE, CALCIUM CHLORIDE 600; 310; 30; 20 MG/100ML; MG/100ML; MG/100ML; MG/100ML
1000 INJECTION, SOLUTION INTRAVENOUS ONCE
Status: COMPLETED | OUTPATIENT
Start: 2021-02-21 | End: 2021-02-21

## 2021-02-21 RX ADMIN — SODIUM CHLORIDE, SODIUM LACTATE, POTASSIUM CHLORIDE, AND CALCIUM CHLORIDE 1000 ML: .6; .31; .03; .02 INJECTION, SOLUTION INTRAVENOUS at 17:35

## 2021-02-21 RX ADMIN — DROPERIDOL 1.25 MG: 2.5 INJECTION, SOLUTION INTRAMUSCULAR; INTRAVENOUS at 18:29

## 2021-02-21 ASSESSMENT — PAIN DESCRIPTION - LOCATION: LOCATION: ABDOMEN

## 2021-02-21 NOTE — ED NOTES
Bed: 08  Expected date:   Expected time:   Means of arrival:   Comments:  Alexis Wheeler  02/21/21 0127

## 2021-02-22 ENCOUNTER — CARE COORDINATION (OUTPATIENT)
Dept: CARE COORDINATION | Age: 66
End: 2021-02-22

## 2021-02-22 LAB — HEMATOLOGY PATH CONSULT: NORMAL

## 2021-02-22 NOTE — CARE COORDINATION
Patient contacted regarding recent discharge and COVID-19 risk. Discussed COVID-19 related testing which was not done at this time. Test results were not done. Patient informed of results, if available? N/A     Care Transition Nurse/ Ambulatory Care Manager contacted the patient by telephone to perform post discharge assessment. Verified name and  with patient as identifiers. Patient has following risk factors of: immunocompromised. CTN/ACM reviewed discharge instructions, medical action plan and red flags related to discharge diagnosis. Reviewed and educated them on any new and changed medications related to discharge diagnosis. Advised obtaining a 90-day supply of all daily and as-needed medications. Education provided regarding infection prevention, and signs and symptoms of COVID-19 and when to seek medical attention with patient who verbalized understanding. Discussed exposure protocols and quarantine from [address] you at higher risk for severe illness  and given an opportunity for questions and concerns. The patient agrees to contact the COVID-19 hotline [phone] or PCP office for questions related to their healthcare. CTN/ACM provided contact information for future reference. From CDC: Are you at higher risk for severe illness?  Wash your hands often.  Avoid close contact (6 feet, which is about two arm lengths) with people who are sick.  Put distance between yourself and other people if COVID-19 is spreading in your community.  Clean and disinfect frequently touched surfaces.  Avoid all cruise travel and non-essential air travel.  Call your healthcare professional if you have concerns about COVID-19 and your underlying condition or if you are sick. For more information on steps you can take to protect yourself, see CDC's How to Efren for follow-up call in 5-7 days based on severity of symptoms and risk factors.     Pt reports she has been

## 2021-02-23 ASSESSMENT — ENCOUNTER SYMPTOMS
BACK PAIN: 0
ABDOMINAL PAIN: 0
VOMITING: 1
COUGH: 0
NAUSEA: 1
SHORTNESS OF BREATH: 0
EYE PAIN: 0
SORE THROAT: 0

## 2021-02-24 NOTE — ED PROVIDER NOTES
[address] ED  EMERGENCY DEPARTMENT ENCOUNTER        Pt Name: Jameson Plaza  MRN: [id number]  [place] 1955  Date of evaluation: 2/21/2021  Provider: MELISSA Baez  PCP: Chu Conley MD    DC. I have evaluated this patient. My supervising physician was available for consultation. CHIEF COMPLAINT       Chief Complaint   Patient presents with    Other     spasms everywhere started 0700., #2 of 4 chemo on last wednesday for lung cancer    Headache    Dizziness       HISTORY OF PRESENT ILLNESS   (Location, Timing/Onset, Context/Setting, Quality, Duration, Modifying Factors, Severity, Associated Signs and Symptoms)  Note limiting factors. Jameson Plaza is a 63 y.o. female presents to the emergency department for nausea, vomiting, muscle spasms, headache. Patient reports that she received her second dose of chemotherapy on Wednesday for lung cancer. Since then has had a significant decrease in oral intake due to nausea and vomiting. Has developed myalgias, spasms, gradual onset headache from the nausea and vomiting. Has pain medicine but no medications for nausea and vomiting. Denies fever, chest pain, shortness of breath, abdominal pain, diarrhea, dysuria, neck pain or stiffness. Nursing Notes were all reviewed and agreed with or any disagreements were addressed in the HPI. REVIEW OF SYSTEMS    (2-9 systems for level 4, 10 or more for level 5)     Review of Systems   Constitutional: Negative for fever. HENT: Negative for sore throat. Eyes: Negative for pain and visual disturbance. Respiratory: Negative for cough and shortness of breath. Cardiovascular: Negative for chest pain. Gastrointestinal: Positive for nausea and vomiting. Negative for abdominal pain. Genitourinary: Negative for dysuria and frequency. Musculoskeletal: Positive for myalgias. Negative for back pain and neck pain. Skin: Negative for rash.    Neurological: Positive for light-headedness and headaches. Negative for weakness and numbness. Psychiatric/Behavioral: Negative for confusion. Positives and Pertinent negatives as per HPI. Except as noted above in the ROS, all other systems were reviewed and negative.        PAST MEDICAL HISTORY     Past Medical History:   Diagnosis Date    JASPREET (acute kidney injury) ([facility] 2020    Arterial occlusion     Asthma     Centrilobular emphysema ([facility] 2019    pt denies    Chronic bilateral low back pain with bilateral sciatica 8/3/2018    Chronic bilateral low back pain with bilateral sciatica     Hx of blood clots     DVT    Hyperlipidemia     Hypertension     Left lower lobe pulmonary nodule     MDRO (multiple drug resistant organisms) resistance 2020    Escherichia coli-URINE    Metabolic encephalopathy     Moderate single current episode of major depressive disorder ([facility] 8/3/2018    Non-small cell lung cancer ([facility]     Ruptured disk          SURGICAL HISTORY     Past Surgical History:   Procedure Laterality Date     SECTION      CHOLECYSTECTOMY, LAPAROSCOPIC N/A 2020    robotic, performed by Pura Mchugh MD at [facility]  2020    CT NEEDLE BIOPSY LUNG PERCUTANEOUS 2020 [address] SCAN    FASCIOTOMY Left 2017    due to DVT, LLE    GASTROCNEMIUS RECESSION Left 10/1/2020    performed by Charlie Ramos MD at [facility] Right 10/29/2020    RIGHT CERVICAL LYMPH NODE BIOPSY performed by Pura Mchugh MD at [facility] Left 10/1/2020    LEFT FOOT SECOND, THIRD, FOURTH AND FIFTH  METATARSAL HEAD RESECTION -BLOCK- -BAYLEE=5 performed by Charlie Ramos MD at [address] [facility] [address] Left 2020    LEFT VIDEO ASSISTED THORACOSCOPIC SURGERY WITH LEFT  LOWER LOBE LOBECTOMY AND MEDIASTINAL LYMPHADENECTOMY, INTERCOSTAL NERVE BLOCK performed by Carlos Brito MD at [address] [facility] Discharge Medication List as of 2/21/2021  6:58 PM      CONTINUE these medications which have NOT CHANGED    Details   Blood Pressure KIT 2 TIMES DAILY Starting Tue 2/2/2021, Disp-1 kit, R-0, Normal      albuterol sulfate  (90 Base) MCG/ACT inhaler INHALE 2 PUFFS INTO THE LUNGS EVERY 6 HOURS AS NEEDED FOR WHEEZING, Disp-18 g, R-2Normal      LORazepam (ATIVAN) 0.5 MG tablet Take 0.5 mg by mouth 2 times daily as needed for Anxiety (1 to 2 times per day). Historical Med      fluticasone (FLONASE) 50 MCG/ACT nasal spray USE 2 SPRAYS BY NASAL ROUTE AS NEEDED FOR RHINITIS OR ALLERGIES, Disp-16 g, R-5Normal      lisinopril (PRINIVIL;ZESTRIL) 40 MG tablet TAKE 1 TABLET BY MOUTH DAILY, Disp-90 tablet, R-1Normal      gabapentin (NEURONTIN) 400 MG capsule Take 1 capsule by mouth 3 times daily for 90 days. , Disp-90 capsule, R-2Normal      DULoxetine (CYMBALTA) 60 MG extended release capsule Take 1 capsule by mouth daily, Disp-90 capsule, R-1Normal      traZODone (DESYREL) 50 MG tablet Take 1 tablet by mouth nightly, Disp-90 tablet, R-1Normal      atorvastatin (LIPITOR) 80 MG tablet Take 1 tablet by mouth daily, Disp-90 tablet, R-1Normal      amiodarone (PACERONE) 400 MG tablet Take 1 tablet by mouth 2 times daily, Disp-15 tablet,R-0Print      furosemide (LASIX) 20 MG tablet Take 1 tablet by mouth 2 times daily, Disp-60 tablet,R-3Normal      potassium chloride (KLOR-CON M) 10 MEQ extended release tablet Take 1 tablet by mouth 2 times daily (with meals), Disp-60 tablet,R-3Normal      aspirin 81 MG EC tablet Take 81 mg by mouth dailyHistorical Med      tiZANidine (ZANAFLEX) 2 MG tablet TAKE 1 TABLET BY MOUTH 2 TIMES DAILY AS NEEDED (MUSCLE SPASM AND PAIN), Disp-90 tablet,R-1Normal      SPIRIVA HANDIHALER 18 MCG inhalation capsule INHALE 1 CAPSULE INTO THE LUNGS DAILY, Disp-90 capsule,R-1Normal      vitamin D (ERGOCALCIFEROL) 1.25 MG (16923 UT) CAPS capsule Take 1 capsule by mouth once a week, Disp-12 capsule,R-0Print nicotine (NICODERM CQ) 7 MG/24HR PLACE 1 PATCH ONTO THE SKIN DAILY (ROTATE SITES), Disp-14 patch, R-0Normal               ALLERGIES     Lorazepam    FAMILYHISTORY       Family History   Problem Relation Age of Onset    High Blood Pressure Mother     High Cholesterol Mother     Heart Disease Mother     Coronary Art Dis Mother     Heart Attack Father         59    Cirrhosis Sister     Alcohol Abuse Sister     Other Sister         \"colostomy bag due to hole in her colon\"   Zeb Re Sudden Death Brother         \"suicide\"    Colon Cancer Other         maternal uncle    Breast Cancer Other         multiple maternal aunts along with uterine cancer          SOCIAL HISTORY       Social History     Tobacco Use    Smoking status: Former Smoker     Packs/day: 0.50     Years: 20.00     Pack years: 10.00     Types: Cigarettes     Quit date: 2020     Years since quittin.3    Smokeless tobacco: Never Used    Tobacco comment: 1 cigarettes per day   Substance Use Topics    Alcohol use: No    Drug use: Not Currently     Types: Opiates        SCREENINGS    Kayce Coma Scale  Eye Opening: Spontaneous  Best Verbal Response: Oriented  Best Motor Response: Obeys commands  [place] Coma Scale Score: 15        PHYSICAL EXAM    (up to 7 for level 4, 8 or more for level 5)     ED Triage Vitals [21 1631]   BP Temp Temp Source Pulse Resp SpO2 Height Weight   (!) 128/101 97.6 °F (36.4 °C) Oral 100 16 98 % 5' 1\" (1.549 m) 122 lb (55.3 kg)       Physical Exam  Vitals signs reviewed. Constitutional:       Appearance: She is not diaphoretic. HENT:      Nose: No congestion or rhinorrhea. Eyes:      General: No scleral icterus. Extraocular Movements: Extraocular movements intact. Conjunctiva/sclera: Conjunctivae normal.      Pupils: Pupils are equal, round, and reactive to light. Neck:      Musculoskeletal: Normal range of motion and neck supple. No neck rigidity or muscular tenderness.    Cardiovascular: Rate and Rhythm: Normal rate and regular rhythm. Pulses: Normal pulses. Heart sounds: Normal heart sounds. No murmur. No friction rub. No gallop. Pulmonary:      Effort: Pulmonary effort is normal. No respiratory distress. Breath sounds: Normal breath sounds. No stridor. No wheezing, rhonchi or rales. Abdominal:      General: There is no distension. Palpations: Abdomen is soft. Tenderness: There is no abdominal tenderness. There is no right CVA tenderness, left CVA tenderness, guarding or rebound. Musculoskeletal: Normal range of motion. General: No swelling or tenderness. Skin:     General: Skin is warm and dry. Capillary Refill: Capillary refill takes less than 2 seconds. Coloration: Skin is not jaundiced or pale. Neurological:      General: No focal deficit present. Mental Status: She is alert and oriented to person, place, and time. Cranial Nerves: No cranial nerve deficit. Sensory: No sensory deficit. Motor: No weakness. Gait: Gait normal.   Psychiatric:         Mood and Affect: Mood normal.         Behavior: Behavior normal.         DIAGNOSTIC RESULTS   LABS:    Labs Reviewed   COMPREHENSIVE METABOLIC PANEL - Abnormal; Notable for the following components:       Result Value    Sodium 132 (*)     Chloride 90 (*)     Glucose 127 (*)     CREATININE 1.3 (*)     GFR Non- 41 (*)     GFR  50 (*)     Total Protein 9.3 (*)     Albumin 5.1 (*)     All other components within normal limits    Narrative:     Performed at:  [facility],  [place], [facility]   Phone [phone]   CBC WITH AUTO DIFFERENTIAL - Abnormal; Notable for the following components:    RDW 15.5 (*)     Neutrophils Absolute 0.5 (*)     Lymphocytes Absolute 5.6 (*)     Anisocytosis Occasional (*)     All other components within normal limits    Narrative:     CALL  Prakash  [facility] tel. [phone],  Hematology results called to and read back by Raisa Dickson RN, 02/21/2021  18:03, by [facility]  Performed at:  [facility],  [place], [facility]   Phone [phone]   LIPASE    Narrative:     Performed at:  [facility],  [place], [facility]   Phone [phone]   BLOOD SMEAR REVIEW    Narrative:     Performed at:  [facility]) - [facility],  [place], [facility]   Phone [phone]       All other labs were within normal range or not returned as of this dictation. EKG: All EKG's are interpreted by the Emergency Department Physician in the absence of a cardiologist.  Please see their note for interpretation of EKG. RADIOLOGY:   Non-plain film images such as CT, Ultrasound and MRI are read by the radiologist. Plain radiographic images are visualized and preliminarily interpreted by the ED Provider with the below findings:        Interpretation per the Radiologist below, if available at the time of this note:    No orders to display     No results found. PROCEDURES   Unless otherwise noted below, none     Procedures    CRITICAL CARE TIME   N/A    CONSULTS:  None      EMERGENCY DEPARTMENT COURSE and DIFFERENTIAL DIAGNOSIS/MDM:   Vitals:    Vitals:    02/21/21 1645 02/21/21 1708 02/21/21 1830 02/21/21 1905   BP: (!) 151/104 (!) 100/53 131/82 130/80   Pulse: 101 99 86 96   Resp: 26 26     Temp:       TempSrc:       SpO2: 94% 95% 100% 100%   Weight:       Height:           Patient was given the following medications:  Medications   lactated ringers infusion 1,000 mL (0 mLs Intravenous Stopped 2/21/21 1857)   droperidol (INAPSINE) injection 1.25 mg (1.25 mg Intravenous Given 2/21/21 1829)           66-year old female presents for nausea, vomiting, myalgias, headache secondary to chemotherapy.  No fever, neurological deficit, meningismus; low concern for meningitis. No thunderclap onset headache concerning for [facility]. Nonperitoneal abdominal exam. Labs without evidence of JASPREET. Symptoms resolved with IV fluids, droperidol. Repeat nonperitoneal abdominal exams. Tolerating oral intake. Appropriate for discharge with outpatient follow up. Prescribed Zofran. Instructed to return for new or worsening symptoms including but not limited to fever, thunderclap onset headache, neck pain or stiffness, worsening nausea or vomiting, chest pain, shortness of breath, abdominal pain, any other symptoms she is concerned about. Instructed to follow up with oncologist within 1 week. Verbal and written discharge instructions and return precautions given. FINAL IMPRESSION      1. Chemotherapy induced nausea and vomiting    2.  Acute nonintractable headache, unspecified headache type          DISPOSITION/PLAN   DISPOSITION Decision To Discharge 02/21/2021 06:56:53 PM      PATIENT REFERREDTO:  Antionette Avina MD  [address]  [phone]    Call in 1 day        DISCHARGE MEDICATIONS:  Discharge Medication List as of 2/21/2021  6:58 PM      START taking these medications    Details   ondansetron (ZOFRAN ODT) 4 MG disintegrating tablet Take 1-2 tablets by mouth every 8 hours as needed for Nausea or Vomiting May Sub regular tablet (non-ODT) if insurance does not cover ODT., Disp-20 tablet, R-0Normal             DISCONTINUED MEDICATIONS:  Discharge Medication List as of 2/21/2021  6:58 PM                 (Please note that portions of this note were completed with a voice recognition program.  Efforts were made to edit the dictations but occasionally words are mis-transcribed.)    MELISSA Lui (electronically signed)         MELISSA Lui  02/23/21 4517

## 2021-02-25 DIAGNOSIS — J43.2 CENTRILOBULAR EMPHYSEMA (HCC): ICD-10-CM

## 2021-02-25 RX ORDER — TIOTROPIUM BROMIDE 18 UG/1
CAPSULE ORAL; RESPIRATORY (INHALATION)
Qty: 90 CAPSULE | Refills: 1 | OUTPATIENT
Start: 2021-02-25

## 2021-02-25 RX ORDER — CETIRIZINE HYDROCHLORIDE 10 MG/1
TABLET ORAL
Qty: 90 TABLET | Refills: 1 | OUTPATIENT
Start: 2021-02-25

## 2021-03-02 ENCOUNTER — HOSPITAL ENCOUNTER (OUTPATIENT)
Dept: GENERAL RADIOLOGY | Age: 66
Discharge: HOME | End: 2021-03-02
Payer: COMMERCIAL

## 2021-03-02 ENCOUNTER — HOSPITAL ENCOUNTER (OUTPATIENT)
Age: 66
Discharge: HOME | End: 2021-03-02
Payer: COMMERCIAL

## 2021-03-02 DIAGNOSIS — C34.12 CANCER OF BRONCHUS OF LEFT UPPER LOBE (HCC): ICD-10-CM

## 2021-03-02 DIAGNOSIS — R52 PAIN: ICD-10-CM

## 2021-03-02 DIAGNOSIS — C85.13 B-CELL LYMPHOMA OF INTRA-ABDOMINAL LYMPH NODES, UNSPECIFIED B-CELL LYMPHOMA TYPE (HCC): ICD-10-CM

## 2021-03-02 PROCEDURE — 71046 X-RAY EXAM CHEST 2 VIEWS: CPT

## 2021-03-02 PROCEDURE — 74019 RADEX ABDOMEN 2 VIEWS: CPT

## 2021-03-03 ENCOUNTER — CARE COORDINATION (OUTPATIENT)
Dept: CARE COORDINATION | Age: 66
End: 2021-03-03

## 2021-03-03 NOTE — CARE COORDINATION
Outreach attempt regarding pt 1 week f/u from her recent ED visit. Pt was unable to reach today and ACM unable to leave a VM due to no VM system set up at this time. ACM will make one more outreach attempt prior to ceasing attempts.

## 2021-03-09 ENCOUNTER — CARE COORDINATION (OUTPATIENT)
Dept: CARE COORDINATION | Age: 66
End: 2021-03-09

## 2021-03-09 NOTE — CARE COORDINATION
Final outreach attempt regarding f/u from ED visit on 2.21.21 and attempt to enroll pt into the CM program. Pt was unable to reach today and does not have a VM system set up on her phone. ACM unable to leave a message. No further outreach at this time.

## 2021-03-12 ENCOUNTER — APPOINTMENT (OUTPATIENT)
Dept: CT IMAGING | Age: 66
DRG: 052 | End: 2021-03-12
Payer: COMMERCIAL

## 2021-03-12 ENCOUNTER — HOSPITAL ENCOUNTER (INPATIENT)
Age: 66
LOS: 3 days | Discharge: HOME | DRG: 052 | End: 2021-03-15
Attending: EMERGENCY MEDICINE | Admitting: INTERNAL MEDICINE
Payer: COMMERCIAL

## 2021-03-12 ENCOUNTER — APPOINTMENT (OUTPATIENT)
Dept: GENERAL RADIOLOGY | Age: 66
DRG: 052 | End: 2021-03-12
Payer: COMMERCIAL

## 2021-03-12 ENCOUNTER — APPOINTMENT (OUTPATIENT)
Dept: GENERAL RADIOLOGY | Age: 66
End: 2021-03-12
Payer: COMMERCIAL

## 2021-03-12 ENCOUNTER — HOSPITAL ENCOUNTER (EMERGENCY)
Age: 66
Discharge: STILL ADMITTED OR ADMINISTRATIVE | End: 2021-03-12
Attending: EMERGENCY MEDICINE
Payer: COMMERCIAL

## 2021-03-12 VITALS — TEMPERATURE: 96.7 F | SYSTOLIC BLOOD PRESSURE: 153 MMHG | HEART RATE: 85 BPM | DIASTOLIC BLOOD PRESSURE: 107 MMHG

## 2021-03-12 DIAGNOSIS — G93.40 ACUTE ENCEPHALOPATHY: Primary | ICD-10-CM

## 2021-03-12 LAB
A/G RATIO: 1.1 (ref 1.1–2.2)
ALBUMIN SERPL-MCNC: 4.3 G/DL (ref 3.4–5)
ALP BLD-CCNC: 82 U/L (ref 40–129)
ALT SERPL-CCNC: 13 U/L (ref 10–40)
AMPHETAMINE SCREEN, URINE: ABNORMAL
ANION GAP SERPL CALCULATED.3IONS-SCNC: 12 MMOL/L (ref 3–16)
ANISOCYTOSIS: ABNORMAL
AST SERPL-CCNC: 23 U/L (ref 15–37)
ATYPICAL LYMPHOCYTE RELATIVE PERCENT: 4 % (ref 0–6)
BANDED NEUTROPHILS RELATIVE PERCENT: 2 % (ref 0–7)
BARBITURATE SCREEN URINE: ABNORMAL
BASE EXCESS VENOUS: 2.2 MMOL/L (ref -3–3)
BASOPHILS ABSOLUTE: 0 K/UL (ref 0–0.2)
BASOPHILS RELATIVE PERCENT: 0 %
BENZODIAZEPINE SCREEN, URINE: POSITIVE
BILIRUB SERPL-MCNC: 0.3 MG/DL (ref 0–1)
BILIRUBIN URINE: NEGATIVE
BLOOD, URINE: NEGATIVE
BUN BLDV-MCNC: 33 MG/DL (ref 7–20)
CALCIUM SERPL-MCNC: 8.9 MG/DL (ref 8.3–10.6)
CANNABINOID SCREEN URINE: ABNORMAL
CARBOXYHEMOGLOBIN: 1.6 % (ref 0–1.5)
CHLORIDE BLD-SCNC: 95 MMOL/L (ref 99–110)
CLARITY: CLEAR
CO2: 26 MMOL/L (ref 21–32)
COCAINE METABOLITE SCREEN URINE: ABNORMAL
COLOR: YELLOW
CREAT SERPL-MCNC: 1.9 MG/DL (ref 0.6–1.2)
EKG ATRIAL RATE: 85 BPM
EKG DIAGNOSIS: NORMAL
EKG P AXIS: 56 DEGREES
EKG P-R INTERVAL: 144 MS
EKG Q-T INTERVAL: 396 MS
EKG QRS DURATION: 104 MS
EKG QTC CALCULATION (BAZETT): 471 MS
EKG R AXIS: 48 DEGREES
EKG T AXIS: 34 DEGREES
EKG VENTRICULAR RATE: 85 BPM
EOSINOPHILS ABSOLUTE: 0 K/UL (ref 0–0.6)
EOSINOPHILS RELATIVE PERCENT: 0 %
ETHANOL: NORMAL MG/DL (ref 0–0.08)
GFR AFRICAN AMERICAN: 32
GFR NON-AFRICAN AMERICAN: 27
GLOBULIN: 3.8 G/DL
GLUCOSE BLD-MCNC: 101 MG/DL (ref 70–99)
GLUCOSE BLD-MCNC: 101 MG/DL (ref 70–99)
GLUCOSE URINE: NEGATIVE MG/DL
HCO3 VENOUS: 25.2 MMOL/L (ref 23–29)
HCT VFR BLD CALC: 31 % (ref 36–48)
HEMOGLOBIN: 10.6 G/DL (ref 12–16)
KETONES, URINE: NEGATIVE MG/DL
LACTIC ACID, SEPSIS: 0.6 MMOL/L (ref 0.4–1.9)
LEUKOCYTE ESTERASE, URINE: NEGATIVE
LYMPHOCYTES ABSOLUTE: 2.5 K/UL (ref 1–5.1)
LYMPHOCYTES RELATIVE PERCENT: 51 %
Lab: ABNORMAL
MACROCYTES: ABNORMAL
MCH RBC QN AUTO: 31.2 PG (ref 26–34)
MCHC RBC AUTO-ENTMCNC: 34.3 G/DL (ref 31–36)
MCV RBC AUTO: 91 FL (ref 80–100)
METHADONE SCREEN, URINE: ABNORMAL
METHEMOGLOBIN VENOUS: 0.5 %
MICROCYTES: ABNORMAL
MICROSCOPIC EXAMINATION: NORMAL
MONOCYTES ABSOLUTE: 0.3 K/UL (ref 0–1.3)
MONOCYTES RELATIVE PERCENT: 6 %
NEUTROPHILS ABSOLUTE: 1.8 K/UL (ref 1.7–7.7)
NEUTROPHILS RELATIVE PERCENT: 37 %
NITRITE, URINE: NEGATIVE
O2 CONTENT, VEN: 15 VOL %
O2 SAT, VEN: 99 %
O2 THERAPY: ABNORMAL
OPIATE SCREEN URINE: ABNORMAL
OXYCODONE URINE: ABNORMAL
PCO2, VEN: 33.8 MMHG (ref 40–50)
PDW BLD-RTO: 16.9 % (ref 12.4–15.4)
PERFORMED ON: ABNORMAL
PH UA: 5
PH UA: 5.5 (ref 5–8)
PH VENOUS: 7.49 (ref 7.35–7.45)
PHENCYCLIDINE SCREEN URINE: ABNORMAL
PLATELET # BLD: 175 K/UL (ref 135–450)
PMV BLD AUTO: 7.5 FL (ref 5–10.5)
PO2, VEN: 182.5 MMHG (ref 25–40)
POTASSIUM SERPL-SCNC: 3.9 MMOL/L (ref 3.5–5.1)
PRO-BNP: 122 PG/ML (ref 0–124)
PROPOXYPHENE SCREEN: ABNORMAL
PROTEIN UA: NEGATIVE MG/DL
RBC # BLD: 3.41 M/UL (ref 4–5.2)
SODIUM BLD-SCNC: 133 MMOL/L (ref 136–145)
SPECIFIC GRAVITY UA: 1.01 (ref 1–1.03)
TCO2 CALC VENOUS: 26 MMOL/L
TOTAL PROTEIN: 8.1 G/DL (ref 6.4–8.2)
TROPONIN: <0.01 NG/ML
URINE TYPE: NORMAL
UROBILINOGEN, URINE: 0.2 E.U./DL
WBC # BLD: 4.6 K/UL (ref 4–11)

## 2021-03-12 PROCEDURE — 99285 EMERGENCY DEPT VISIT HI MDM: CPT

## 2021-03-12 PROCEDURE — 83880 ASSAY OF NATRIURETIC PEPTIDE: CPT

## 2021-03-12 PROCEDURE — 85025 COMPLETE CBC W/AUTO DIFF WBC: CPT

## 2021-03-12 PROCEDURE — 2580000003 HC RX 258: Performed by: INTERNAL MEDICINE

## 2021-03-12 PROCEDURE — 93010 ELECTROCARDIOGRAM REPORT: CPT | Performed by: INTERNAL MEDICINE

## 2021-03-12 PROCEDURE — 1200000000 HC SEMI PRIVATE

## 2021-03-12 PROCEDURE — 6360000002 HC RX W HCPCS: Performed by: INTERNAL MEDICINE

## 2021-03-12 PROCEDURE — 93005 ELECTROCARDIOGRAM TRACING: CPT | Performed by: EMERGENCY MEDICINE

## 2021-03-12 PROCEDURE — G0378 HOSPITAL OBSERVATION PER HR: HCPCS

## 2021-03-12 PROCEDURE — 72125 CT NECK SPINE W/O DYE: CPT

## 2021-03-12 PROCEDURE — 99281 EMR DPT VST MAYX REQ PHY/QHP: CPT

## 2021-03-12 PROCEDURE — 96372 THER/PROPH/DIAG INJ SC/IM: CPT

## 2021-03-12 PROCEDURE — 83605 ASSAY OF LACTIC ACID: CPT

## 2021-03-12 PROCEDURE — 70450 CT HEAD/BRAIN W/O DYE: CPT

## 2021-03-12 PROCEDURE — 82077 ASSAY SPEC XCP UR&BREATH IA: CPT

## 2021-03-12 PROCEDURE — 6370000000 HC RX 637 (ALT 250 FOR IP): Performed by: INTERNAL MEDICINE

## 2021-03-12 PROCEDURE — 71045 X-RAY EXAM CHEST 1 VIEW: CPT

## 2021-03-12 PROCEDURE — 80307 DRUG TEST PRSMV CHEM ANLYZR: CPT

## 2021-03-12 PROCEDURE — 84484 ASSAY OF TROPONIN QUANT: CPT

## 2021-03-12 PROCEDURE — 81003 URINALYSIS AUTO W/O SCOPE: CPT

## 2021-03-12 PROCEDURE — 80053 COMPREHEN METABOLIC PANEL: CPT

## 2021-03-12 PROCEDURE — 36415 COLL VENOUS BLD VENIPUNCTURE: CPT

## 2021-03-12 PROCEDURE — 82803 BLOOD GASES ANY COMBINATION: CPT

## 2021-03-12 RX ORDER — ACETAMINOPHEN 650 MG/1
650 SUPPOSITORY RECTAL EVERY 6 HOURS PRN
Status: DISCONTINUED | OUTPATIENT
Start: 2021-03-12 | End: 2021-03-15 | Stop reason: HOSPADM

## 2021-03-12 RX ORDER — ONDANSETRON 2 MG/ML
4 INJECTION INTRAMUSCULAR; INTRAVENOUS EVERY 6 HOURS PRN
Status: DISCONTINUED | OUTPATIENT
Start: 2021-03-12 | End: 2021-03-15 | Stop reason: HOSPADM

## 2021-03-12 RX ORDER — ASPIRIN 81 MG/1
81 TABLET ORAL DAILY
Status: DISCONTINUED | OUTPATIENT
Start: 2021-03-12 | End: 2021-03-15 | Stop reason: HOSPADM

## 2021-03-12 RX ORDER — PROMETHAZINE HYDROCHLORIDE 25 MG/1
12.5 TABLET ORAL EVERY 6 HOURS PRN
Status: DISCONTINUED | OUTPATIENT
Start: 2021-03-12 | End: 2021-03-15 | Stop reason: HOSPADM

## 2021-03-12 RX ORDER — LISINOPRIL 20 MG/1
40 TABLET ORAL DAILY
Status: DISCONTINUED | OUTPATIENT
Start: 2021-03-12 | End: 2021-03-15 | Stop reason: HOSPADM

## 2021-03-12 RX ORDER — SODIUM CHLORIDE 0.9 % (FLUSH) 0.9 %
10 SYRINGE (ML) INJECTION PRN
Status: DISCONTINUED | OUTPATIENT
Start: 2021-03-12 | End: 2021-03-15 | Stop reason: HOSPADM

## 2021-03-12 RX ORDER — POLYETHYLENE GLYCOL 3350 17 G/17G
17 POWDER, FOR SOLUTION ORAL DAILY PRN
Status: DISCONTINUED | OUTPATIENT
Start: 2021-03-12 | End: 2021-03-15 | Stop reason: HOSPADM

## 2021-03-12 RX ORDER — ALBUTEROL SULFATE 90 UG/1
2 AEROSOL, METERED RESPIRATORY (INHALATION) EVERY 6 HOURS PRN
Status: DISCONTINUED | OUTPATIENT
Start: 2021-03-12 | End: 2021-03-15 | Stop reason: HOSPADM

## 2021-03-12 RX ORDER — ZIPRASIDONE MESYLATE 20 MG/ML
20 INJECTION, POWDER, LYOPHILIZED, FOR SOLUTION INTRAMUSCULAR ONCE
Status: COMPLETED | OUTPATIENT
Start: 2021-03-12 | End: 2021-03-12

## 2021-03-12 RX ORDER — TRAZODONE HYDROCHLORIDE 50 MG/1
50 TABLET ORAL NIGHTLY
Status: DISCONTINUED | OUTPATIENT
Start: 2021-03-12 | End: 2021-03-15 | Stop reason: HOSPADM

## 2021-03-12 RX ORDER — ATORVASTATIN CALCIUM 80 MG/1
80 TABLET, FILM COATED ORAL DAILY
Status: DISCONTINUED | OUTPATIENT
Start: 2021-03-12 | End: 2021-03-15 | Stop reason: HOSPADM

## 2021-03-12 RX ORDER — FUROSEMIDE 20 MG/1
20 TABLET ORAL 2 TIMES DAILY
Status: DISCONTINUED | OUTPATIENT
Start: 2021-03-12 | End: 2021-03-15 | Stop reason: HOSPADM

## 2021-03-12 RX ORDER — CLONAZEPAM 0.5 MG/1
0.5 TABLET ORAL 3 TIMES DAILY PRN
Status: DISCONTINUED | OUTPATIENT
Start: 2021-03-12 | End: 2021-03-15 | Stop reason: HOSPADM

## 2021-03-12 RX ORDER — ACETAMINOPHEN 325 MG/1
650 TABLET ORAL EVERY 6 HOURS PRN
Status: DISCONTINUED | OUTPATIENT
Start: 2021-03-12 | End: 2021-03-15 | Stop reason: HOSPADM

## 2021-03-12 RX ORDER — CLONAZEPAM 1 MG/1
1 TABLET ORAL 2 TIMES DAILY PRN
COMMUNITY
End: 2021-03-29

## 2021-03-12 RX ORDER — SODIUM CHLORIDE 0.9 % (FLUSH) 0.9 %
10 SYRINGE (ML) INJECTION EVERY 12 HOURS SCHEDULED
Status: DISCONTINUED | OUTPATIENT
Start: 2021-03-12 | End: 2021-03-15 | Stop reason: HOSPADM

## 2021-03-12 RX ORDER — TIZANIDINE 4 MG/1
2 TABLET ORAL 2 TIMES DAILY PRN
Status: DISCONTINUED | OUTPATIENT
Start: 2021-03-12 | End: 2021-03-15 | Stop reason: HOSPADM

## 2021-03-12 RX ORDER — ALBUTEROL SULFATE 2.5 MG/3ML
2.5 SOLUTION RESPIRATORY (INHALATION) EVERY 6 HOURS PRN
Status: DISCONTINUED | OUTPATIENT
Start: 2021-03-12 | End: 2021-03-12

## 2021-03-12 RX ORDER — GABAPENTIN 400 MG/1
400 CAPSULE ORAL 3 TIMES DAILY
Status: DISCONTINUED | OUTPATIENT
Start: 2021-03-12 | End: 2021-03-15 | Stop reason: HOSPADM

## 2021-03-12 RX ADMIN — SODIUM CHLORIDE, PRESERVATIVE FREE 10 ML: 5 INJECTION INTRAVENOUS at 21:41

## 2021-03-12 RX ADMIN — TRAZODONE HYDROCHLORIDE 50 MG: 50 TABLET ORAL at 21:41

## 2021-03-12 RX ADMIN — GABAPENTIN 400 MG: 400 CAPSULE ORAL at 21:41

## 2021-03-12 RX ADMIN — ZIPRASIDONE MESYLATE 20 MG: 20 INJECTION, POWDER, LYOPHILIZED, FOR SOLUTION INTRAMUSCULAR at 21:41

## 2021-03-12 NOTE — ED NOTES
[address] [facility], [facility].  Call for info/questions     Nick Leonard RN  03/12/21 4364

## 2021-03-12 NOTE — ED NOTES
Pt screams when IV in left hand is flushed. Line is patent and not infiltrated but patient does not tolerate flushing of line.      Tay Barahona RN  03/12/21 6264

## 2021-03-12 NOTE — PROGRESS NOTES
Patient transferred to the floor. VSS. Bilateral wrist restraints secured. Telemetry box on, sinus noted on the monitor. Patient confused in bed, will complete as much of the admission as possible at this time.

## 2021-03-12 NOTE — ED NOTES
Report to [facility]. Care transferred.      Geraldine Slaughter RN  03/12/21 0080

## 2021-03-12 NOTE — H&P
Hospital Medicine History & Physical      PCP: Darlin Ayala MD    Date of Admission: 3/12/2021    Date of Service: Pt seen/examined on  and Admitted to Inpatient with expected LOS greater than two midnights due to medical therapy. Chief Complaint:  Altered mental status      History Of Present Illness:       63 y.o. female w/ hx lung CA followed outpt by Dr Adri Ambrose of [facility] active w/ Chemo on  who presented to [facility] found down by neighbors of unclear duration , minimally responsive w/ pills and alcohol present. Tox screen was negative for Etoh and patient denies drinking Etoh, but was positive for Benzos. Initial hx was limited due to poor mental status but patient has improved and is w/out specific c/o,    The patient denies any fever/chills or other signs/sxs of systemic illness or identifiable aggravating/alleviating factors.       Past Medical History:          Diagnosis Date    JASPREET (acute kidney injury) ([facility] 2020    Arterial occlusion     Asthma     Centrilobular emphysema ([address] 2019    pt denies    Chronic bilateral low back pain with bilateral sciatica 8/3/2018    Chronic bilateral low back pain with bilateral sciatica     Hx of blood clots     DVT    Hyperlipidemia     Hypertension     Left lower lobe pulmonary nodule     MDRO (multiple drug resistant organisms) resistance 2020    Escherichia coli-URINE    Metabolic encephalopathy     Moderate single current episode of major depressive disorder ([address] 8/3/2018    Non-small cell lung cancer ([address]     Ruptured disk        Past Surgical History:          Procedure Laterality Date     SECTION      CHOLECYSTECTOMY, LAPAROSCOPIC N/A 2020    robotic, performed by Navin Malave MD at [address]  2020    CT NEEDLE BIOPSY LUNG PERCUTANEOUS 2020 [address] SCAN    FASCIOTOMY Left 2017    due to DVT, LLE    GASTROCNEMIUS RECESSION Left 10/1/2020    [facility] 10/29/2020    RIGHT CERVICAL LYMPH NODE BIOPSY [facility] by [facility] Left 10/1/2020    LEFT FOOT SECOND, THIRD, FOURTH AND FIFTH  METATARSAL HEAD RESECTION -BLOCK- -BAYLEE=5 [address] Cristine Farah MD [address] Left 11/18/2020    LEFT VIDEO ASSISTED THORACOSCOPIC SURGERY WITH LEFT  LOWER LOBE LOBECTOMY AND MEDIASTINAL LYMPHADENECTOMY, INTERCOSTAL NERVE BLOCK performed by Refugio Patterson MD at [address]       Medications Prior to Admission:      Prior to Admission medications    Medication Sig Start Date End Date Taking? Authorizing Provider   clonazePAM (KLONOPIN) 1 MG tablet Take 1 mg by mouth 2 times daily as needed. Yes Historical Provider, MD   ondansetron (ZOFRAN ODT) 4 MG disintegrating tablet Take 1-2 tablets by mouth every 8 hours as needed for Nausea or Vomiting May Sub regular tablet (non-ODT) if insurance does not cover ODT. 2/21/21  Yes Remus Jeans, PA   Blood Pressure KIT 1 kit by Does not apply route 2 times daily 2/2/21  Yes SHAHAB Rider - CNP   albuterol sulfate  (90 Base) MCG/ACT inhaler INHALE 2 PUFFS INTO THE LUNGS EVERY 6 HOURS AS NEEDED FOR WHEEZING 1/22/21  Yes Gregg Infante MD   fluticasone (FLONASE) 50 MCG/ACT nasal spray USE 2 SPRAYS BY NASAL ROUTE AS NEEDED FOR RHINITIS OR ALLERGIES 12/21/20  Yes Gregg Infante MD   lisinopril (PRINIVIL;ZESTRIL) 40 MG tablet TAKE 1 TABLET BY MOUTH DAILY 12/21/20  Yes Gregg Infante MD   gabapentin (NEURONTIN) 400 MG capsule Take 1 capsule by mouth 3 times daily for 90 days.  12/21/20 3/21/21 Yes Gregg Infante MD   DULoxetine (CYMBALTA) 60 MG extended release capsule Take 1 capsule by mouth daily 12/21/20  Yes Gregg Infante MD   traZODone (DESYREL) 50 MG tablet Take 1 tablet by mouth nightly 12/21/20  Yes Gregg Infante MD   atorvastatin (LIPITOR) 80 MG tablet Take 1 tablet by mouth daily 12/21/20  Yes Roselia Lehman MD   furosemide (LASIX) 20 MG tablet Take 1 tablet by mouth 2 times daily 11/20/20  Yes Rogelio Buckley MD   potassium chloride (KLOR-CON M) 10 MEQ extended release tablet Take 1 tablet by mouth 2 times daily (with meals) 11/20/20  Yes Rogelio Buckley MD   aspirin 81 MG EC tablet Take 81 mg by mouth daily   Yes Historical Provider, MD   tiZANidine (ZANAFLEX) 2 MG tablet TAKE 1 TABLET BY MOUTH 2 TIMES DAILY AS NEEDED (MUSCLE SPASM AND PAIN) 11/2/20  Yes Roselia Lehman MD   SPIRIVA HANDIHALER 18 MCG inhalation capsule INHALE 1 CAPSULE INTO THE LUNGS DAILY 10/5/20  Yes Roselia Lehman MD   vitamin D (ERGOCALCIFEROL) 1.25 MG (86131 UT) CAPS capsule Take 1 capsule by mouth once a week 10/1/20  Yes Paulo Elizabeth MD   nicotine (NICODERM CQ) 7 MG/24HR PLACE 1 PATCH ONTO THE SKIN DAILY (ROTATE SITES) 12/4/19  Yes Roselia Lehman MD   LORazepam (ATIVAN) 0.5 MG tablet Take 0.5 mg by mouth 2 times daily as needed for Anxiety (1 to 2 times per day). Historical Provider, MD   amiodarone (PACERONE) 400 MG tablet Take 1 tablet by mouth 2 times daily 11/20/20   Rogelio Buckley MD       Allergies:  Lorazepam    Social History:      The patient currently lives independently    TOBACCO:   reports that she quit smoking about 4 months ago. Her smoking use included cigarettes. She has a 10.00 pack-year smoking history. She has never used smokeless tobacco.  ETOH:   reports no history of alcohol use. Family History:      Reviewed in detail and negative for DM, CVA.  Positive as follows:        Problem Relation Age of Onset    High Blood Pressure Mother     High Cholesterol Mother     Heart Disease Mother     Coronary Art Dis Mother     Heart Attack Father         59    Cirrhosis Sister     Alcohol Abuse Sister     Other Sister         \"colostomy bag due to hole in her colon\"    Sudden Death Brother         \"suicide\"    Colon Cancer Other maternal uncle    Breast Cancer Other         multiple maternal aunts along with uterine cancer       REVIEW OF SYSTEMS:   Pertinent positives as noted in the HPI. All other systems reviewed and negative. PHYSICAL EXAM:    /68   Pulse 83   Temp 97.9 °F (36.6 °C) (Oral)   Resp 16   SpO2 96%     General appearance:  No apparent distress, appears stated age and cooperative. HEENT:  Normal cephalic, atraumatic without obvious deformity. Pupils equal, round, and reactive to light. Extra ocular muscles intact. Conjunctivae/corneas clear. Neck: Supple, with full range of motion. No jugular venous distention. Trachea midline. Respiratory:  Normal respiratory effort. Clear to auscultation, bilaterally without Rales/Wheezes/Rhonchi. Cardiovascular:  Regular rate and rhythm with normal S1/S2 without murmurs, rubs or gallops. Abdomen: Soft, non-tender, non-distended with normal bowel sounds. Musculoskeletal:  No clubbing, cyanosis or edema bilaterally. Full range of motion without deformity. Skin: Skin color, texture, turgor normal.  No rashes or lesions. Neurologic:  Neurovascularly intact without any focal sensory/motor deficits. Cranial nerves: II-XII intact, grossly non-focal.  Psychiatric:  Alert and oriented, thought content appropriate, normal insight  Capillary Refill: Brisk,< 3 seconds   Peripheral Pulses: +2 palpable, equal bilaterally       CXR:  I have reviewed the CXR with the following interpretation: no acute ASD  EKG:  I have reviewed the EKG with the following interpretation: no acute ischemic changes. Labs:     Recent Labs     03/12/21  1102   WBC 4.6   HGB 10.6*   HCT 31.0*        Recent Labs     03/12/21  1102   *   K 3.9   CL 95*   CO2 26   BUN 33*   CREATININE 1.9*   CALCIUM 8.9     Recent Labs     03/12/21  1102   AST 23   ALT 13   BILITOT 0.3   ALKPHOS 82     No results for input(s): INR in the last 72 hours.   Recent Labs     03/12/21  1102   TROPONINI <0.01 Urinalysis:      Lab Results   Component Value Date    NITRU Negative 03/12/2021    WBCUA 6-9 07/15/2020    BACTERIA 2+ 07/14/2020    RBCUA None seen 07/15/2020    BLOODU Negative 03/12/2021    SPECGRAV 1.010 03/12/2021    GLUCOSEU Negative 03/12/2021    GLUCOSEU NEGATIVE 05/27/2012         ASSESSMENT:    Active Hospital Problems    Diagnosis Date Noted    Encephalopathy [G93.40] 03/12/2021    Lung cancer [facility] [C34.90]     Hyponatremia [E87.1]     Anemia [D64.9]     ARF (acute renal failure) ([facility] [N17.9] 04/24/2020    Hyperlipidemia [E78.5] 07/30/2019    HTN (hypertension) [I10]        PLAN:      Lung Cancer - Non-small cell lung Cancer. S/P recent Chemo. Encephalopathy - acute toxic, likely 2nd to polypharmacy. Will continue to follow clinical response w/ supportive care PRN. HTN - w/out known CAD and no evidence of active signs/sxs of ischemia/failure. Currently controlled on home meds w/ vitals reviewed and documented as above. HyperLipidemia - controlled on home Statin. Continue, w/ f/u and med adjustment w/ PCP    ARF - w/ elevated BUN/Cr ratio c/w pre-renal azotemia. Given IVF hydration and follow serial labs. Reviewed and documented as above. HypoNatremia - etiology clinically unable to determine but likely hypovolemic. Follow serial labs on IVF. Reviewed and documented as above. Anemia - etiology clinically unable to determine, w/out evidence of active bleeding/hemolysis. Asymptomatic w/out indication for transfusion. Follow serial labs. Reviewed and documented as above. DVT Prophylaxis: LMWH  Diet: DIET GENERAL;  Code Status: Full Code      PT/OT Eval Status: not yet ordered. Dispo - Possibly Sunday/Monday 14/15 March pending clinical course. Mitul Cardenas MD    Thank you Jack Love MD for the opportunity to be involved in this patient's care. If you have any questions or concerns please feel free to contact me at [phone].

## 2021-03-12 NOTE — ED NOTES
Bed: 03  Expected date:   Expected time:   Means of arrival:   Comments:  Julius Zapien RN  03/12/21 [id number]

## 2021-03-12 NOTE — ED NOTES
Bilateral wrist restraints applied d/t patient pulling at tubes/wires, attempting to get out of bed.      Danika Hoover RN  03/12/21 [id number]

## 2021-03-12 NOTE — PROGRESS NOTES
Patient's daughter spoke with RN, informed RN that patient is \"probably bipolar\" and requested a phychiatric evaluation.

## 2021-03-12 NOTE — ED NOTES
Wrist restraints remain in place. Bed alarm active. Pt continues to move around in bed, moving legs around in air.      Timur Philippe RN  03/12/21 [id number]

## 2021-03-12 NOTE — PROGRESS NOTES
4 Eyes Skin Assessment     The patient is being assess for   Admission    I agree that 2 RN's have performed a thorough Head to Toe Skin Assessment on the patient. ALL assessment sites listed below have been assessed. Areas assessed by both nurses:   [x]   Head, Face, and Ears   [x]   Shoulders, Back, and Chest, Abdomen  [x]   Arms, Elbows, and Hands   [x]   Coccyx, Sacrum, and Ischium  [x]   Legs, Feet, and Heels          **SHARE this note so that the co-signing nurse is able to place an eSignature**    Co-signer eSignature: Electronically signed by Leslie Cool RN on 3/12/21 at 5:04 PM EST    Does the Patient have Skin Breakdown?   No          Sumit Prevention initiated:  No   Wound Care Orders initiated:  No      [facility] nurse consulted for Pressure Injury (Stage 3,4, Unstageable, DTI, NWPT, Complex wounds)and New or Established Ostomies:  No      Primary Nurse eSignature: Electronically signed by Russell Rowland RN on 3/12/21 at 4:22 PM EST

## 2021-03-12 NOTE — PROGRESS NOTES
RESPIRATORY THERAPY ASSESSMENT    Name:  Brett Bowman  Medical Record Number:  [id number]  Age: 63 y.o. Gender: female  : 1955  Today's Date:  3/12/2021  Room:  [facility]6-01    Assessment     Is the patient being admitted for a COPD or Asthma exacerbation? No   (If yes the patient will be seen every 4 hours for the first 24 hours and then reassessed)    Patient Admission Diagnosis      Allergies  Allergies   Allergen Reactions    Lorazepam      Confusion, hallucinations, JUST HAD TOO HIGH DOSE  Can take lorazepam but not Ativan       Minimum Predicted Vital Capacity:     n/a          Actual Vital Capacity:      n/a              Pulmonary History:COPD, Asthma, CHF/Pulmonary Edema and Lung CA, CAD  Home Oxygen Therapy:  room air  Home Respiratory Therapy:Albuterol mdi prn, Spiriva Daily  Current Respiratory Therapy:  Albuterol q6prn, Spiriva Daily          Respiratory Severity Index(RSI)   Patients with orders for inhalation medications, oxygen, or any therapeutic treatment modality will be placed on Respiratory Protocol. They will be assessed with the first treatment and at least every 72 hours thereafter. The following severity scale will be used to determine frequency of treatment intervention.     Smoking History: Pulmonary Disease or Smoking History, Greater than 15 pack year = 2    Social History  Social History     Tobacco Use    Smoking status: Former Smoker     Packs/day: 0.50     Years: 20.00     Pack years: 10.00     Types: Cigarettes     Quit date: 2020     Years since quittin.3    Smokeless tobacco: Never Used    Tobacco comment: 1 cigarettes per day   Substance Use Topics    Alcohol use: No    Drug use: Not Currently     Types: Opiates        Recent Surgical History: None = 0  Past Surgical History  Past Surgical History:   Procedure Laterality Date     SECTION      CHOLECYSTECTOMY, LAPAROSCOPIC N/A 2020    robotic, performed by Mari Joyce MD at [address]    CT NEEDLE BIOPSY LUNG PERCUTANEOUS  11/2/2020    CT NEEDLE BIOPSY LUNG PERCUTANEOUS 11/2/2020 [facility] CT SCAN    FASCIOTOMY Left 01/2017    due to DVT, LLE    GASTROCNEMIUS RECESSION Left 10/1/2020    performed by Brennon Almaraz MD at [facility] Right 10/29/2020    RIGHT CERVICAL LYMPH NODE BIOPSY performed by Javier Erwin MD at [facility] Left 10/1/2020    LEFT FOOT SECOND, THIRD, FOURTH AND FIFTH  METATARSAL HEAD RESECTION -BLOCK- -BAYLEE=5 performed by Brennon Almaraz MD at [email or web address] Left 11/18/2020    LEFT VIDEO ASSISTED THORACOSCOPIC SURGERY WITH LEFT  LOWER LOBE LOBECTOMY AND MEDIASTINAL LYMPHADENECTOMY, INTERCOSTAL NERVE BLOCK performed by Emi Altamirano MD at [address]       Level of Consciousness: Alert, Follows Commands but Disoriented = 1    Level of Activity: Walking unassisted = 0    Respiratory Pattern: Regular Pattern; RR 8-20 = 0    Breath Sounds: Clear = 0    Sputum   ,  ,    Cough: Strong, spontaneous, non-productive = 0    Vital Signs   /84   Pulse 79   Temp 96.7 °F (35.9 °C) (Rectal)   Resp 16   SpO2 95%   SPO2 (COPD values may differ): Greater than or equal to 92% on room air = 0    Peak Flow (asthma only): not applicable = 0    RSI: 0-4 = See once and convert to home regimen or discontinue        Plan       Goals: medication delivery    Patient/caregiver was educated on the proper method of use for Respiratory Care Devices:  No: pt refusing care      Level of patient/caregiver understanding able to:   ? Verbalize understanding   ? Demonstrate understanding       ? Teach back        ? Needs reinforcement       ? No available caregiver               ? Other:     Response to education:  Poor     Is patient being placed on Home Treatment Regimen? Yes     Does the patient have everything they need prior to discharge?   NA     Comments: Chart assessed    Plan of Care: Home regimen    Electronically signed by Hugo To Papito Boudreaux RCP on 3/12/2021 at 4:53 PM    Respiratory Protocol Guidelines     1. Assessment and treatment by Respiratory Therapy will be initiated for medication and therapeutic interventions upon initiation of aerosolized medication. 2. Physician will be contacted for respiratory rate (RR) greater than 35 breaths per minute. Therapy will be held for heart rate (HR) greater than 140 beats per minute, pending direction from physician. 3. Bronchodilators will be administered via Metered Dose Inhaler (MDI) with spacer when the following criteria are met:  a. Alert and cooperative     b. HR < 140 bpm  c. RR < 30 bpm                d. Can demonstrate a 23 second inspiratory hold  4. Bronchodilators will be administered via Hand Held Nebulizer ESTEL[facility]) to patients when ANY of the following criteria are met  a. Incognizant or uncooperative          b. Patients treated with HHN at Home        c. Unable to demonstrate proper use of MDI with spacer     d. RR > 30 bpm   5. Bronchodilators will be delivered via Metered Dose Inhaler (MDI), HHN, Aerogen to intubated patients on mechanical ventilation. 6. Inhalation medication orders will be delivered and/or substituted as outlined below. Aerosolized Medications Ordering and Administration Guidelines:    1. All Medications will be ordered by a physician, and their frequency and/or modality will be adjusted as defined by the patients Respiratory Severity Index (RSI) score. 2. If the patient does not have documented COPD, consider discontinuing anticholinergics when RSI is less than 9.  3. If the bronchospasm worsens (increased RSI), then the bronchodilator frequency can be increased to a maximum of every 4 hours. If greater than every 4 hours is required, the physician will be contacted. 4. If the bronchospasm improves, the frequency of the bronchodilator can be decreased, based on the patient's RSI, but not less than home treatment regimen frequency.   5. Bronchodilator(s) will be discontinued if patient has a RSI less than 9 and has received no scheduled or as needed treatment for 72  Hrs. Patients Ordered on a Mucolytic Agent:    1. Must always be administered with a bronchodilator. 2. Discontinue if patient experiences worsened bronchospasm, or secretions have lessened to the point that the patient is able to clear them with a cough. Anti-inflammatory and Combination Medications:    1. If the patient lacks prior history of lung disease, is not using inhaled anti-inflammatory medication at home, and lacks wheezing by examination or by history for at least 24 hours, contact physician for possible discontinuation.

## 2021-03-12 NOTE — ED PROVIDER NOTES
CHIEF COMPLAINT  Altered Mental Status (found down at home, neighbor called EMS. pt minimally responsive. empty wine bottles and prescription pills throughout pt home.)      HISTORY OF PRESENT ILLNESS  Teri Riojas is a 63 y.o. female with a history of acute kidney injury; emphysema; chronic low back pain; history of blood clots and DVT; metabolic encephalopathy; depression; non-small cell cancer; who presents to the ED complaining of altered mental status/ confusion. Neighbors called police because they found her lying on the ground with pills stuck to her back and alcohol nearby. Upon arrival she could not tell us her name although her vital signs were otherwise stable. She was moving all 4 extremities. Following commands somewhat. No other complaints, modifying factors or associated symptoms. She was unable to provide any further history. I have reviewed the following from the nursing documentation.     Past Medical History:   Diagnosis Date    JASPREET (acute kidney injury) ([facility] [facility] 2020    Arterial occlusion     Asthma     Centrilobular emphysema ([address] 2019    pt denies    Chronic bilateral low back pain with bilateral sciatica 8/3/2018    Chronic bilateral low back pain with bilateral sciatica     Hx of blood clots     DVT    Hyperlipidemia     Hypertension     Left lower lobe pulmonary nodule     MDRO (multiple drug resistant organisms) resistance 2020    Escherichia coli-URINE    Metabolic encephalopathy     Moderate single current episode of major depressive disorder ([address] 8/3/2018    Non-small cell lung cancer ([address]     Ruptured disk      Past Surgical History:   Procedure Laterality Date     SECTION  1981    CHOLECYSTECTOMY, LAPAROSCOPIC N/A 2020    robotic, performed by Catalina Chin MD at [address]  2020    CT NEEDLE BIOPSY LUNG PERCUTANEOUS 2020 [address] SCAN    FASCIOTOMY Left 2017    due to DVT, LLE  GASTROCNEMIUS RECESSION Left 10/1/2020    performed by Tracie Oconnor MD at [facility] Right 10/29/2020    RIGHT CERVICAL LYMPH NODE BIOPSY performed by Rodney Wilkerson MD at [facility] Left 10/1/2020    LEFT FOOT SECOND, THIRD, FOURTH AND FIFTH  METATARSAL HEAD RESECTION -BLOCK- -BAYLEE=5 performed by Tracie Oconnor MD at [address] Left 2020    LEFT VIDEO ASSISTED THORACOSCOPIC SURGERY WITH LEFT  LOWER LOBE LOBECTOMY AND MEDIASTINAL LYMPHADENECTOMY, INTERCOSTAL NERVE BLOCK performed by Clementina Castro MD at [facility] History   Problem Relation Age of Onset    High Blood Pressure Mother     High Cholesterol Mother     Heart Disease Mother     Coronary Art Dis Mother     Heart Attack Father         59    Cirrhosis Sister     Alcohol Abuse Sister     Other Sister         \"colostomy bag due to hole in her colon\"   [address] Sudden Death Brother         \"suicide\"    Colon Cancer Other         maternal uncle    Breast Cancer Other         multiple maternal aunts along with uterine cancer     Social History     Socioeconomic History    Marital status:      Spouse name: Not on file    Number of children: 2    Years of education: Not on file    Highest education level: Some college, no degree   Occupational History    Not on file   Social Needs    Financial resource strain: Not very hard    Food insecurity     Worry: Never true     Inability: Never true    Transportation needs     Medical: No     Non-medical: No   Tobacco Use    Smoking status: Former Smoker     Packs/day: 0.50     Years: 20.00     Pack years: 10.00     Types: Cigarettes     Quit date: 2020     Years since quittin.3    Smokeless tobacco: Never Used    Tobacco comment: 1 cigarettes per day   Substance and Sexual Activity    Alcohol use: No    Drug use: Not Currently     Types: Opiates     Sexual activity: Not Currently     Partners: Male   Lifestyle Intravenous PRN Aniyah Shelton MD        enoxaparin (LOVENOX) injection 30 mg  30 mg Subcutaneous Daily Aniyah Shelton MD   30 mg at 03/13/21 4081    promethazine (PHENERGAN) tablet 12.5 mg  12.5 mg Oral Q6H PRN Aniyah Shelton MD        Or    ondansetron [facility]) injection 4 mg  4 mg Intravenous Q6H PRN Aniyah Shelton MD        polyethylene glycol [facility]) packet 17 g  17 g Oral Daily PRN Aniyah Shelton MD        acetaminophen (TYLENOL) tablet 650 mg  650 mg Oral Q6H PRN Aniyah Shelton MD        Or   [facility] acetaminophen (TYLENOL) suppository 650 mg  650 mg Rectal Q6H PRN Aniyah Shelton MD        albuterol sulfate  (90 Base) MCG/ACT inhaler 2 puff  2 puff Inhalation Q6H PRN Aniyah Shelton MD         Allergies   Allergen Reactions    Lorazepam      Confusion, hallucinations, JUST HAD TOO HIGH DOSE  Can take lorazepam but not Ativan       REVIEW OF SYSTEMS  Unable to obtain secondary to her medical condition    PHYSICAL EXAM  /81   Pulse 82   Temp 97.5 °F (36.4 °C) (Oral)   Resp 16   SpO2 96%   GENERAL APPEARANCE: Awake and alert. Intermittently cooperative and agitated; appears delirious. Mild anxious distress. HEAD: Normocephalic. Atraumatic. EYES: EOM's grossly intact. ENT: Mucous membranes are moist.   NECK: Supple, trachea midline. HEART: RRR. Normal S1S2, no rubs, gallops, or murmurs noted  LUNGS: Respirations unlabored. CTAB. Good air exchange. No wheezes, rales, or rhonchi. Speaking comfortably in full sentences. ABDOMEN: Soft. Non-distended. Non-tender. No guarding or rebound. Normal bowel sounds. EXTREMITIES: No peripheral edema. MAEE. No acute deformities. SKIN: Warm and dry. No acute rashes. NEUROLOGICAL: Alert but mumbling and cannot assess orientation. CN II-XII appear grossly intact. No gross facial drooping. Cannot assess sensation or NIH stroke scale secondary to lack of cooperation; No truncal instability  PSYCHIATRIC: Anxious mood and affect.     LABS  I have reviewed all labs for this visit.    Results for orders placed or performed during the hospital encounter of 03/12/21   CBC Auto Differential   Result Value Ref Range    WBC 4.6 4.0 - 11.0 K/uL    RBC 3.41 (L) 4.00 - 5.20 M/uL    Hemoglobin 10.6 (L) 12.0 - 16.0 g/dL    Hematocrit 31.0 (L) 36.0 - 48.0 %    MCV 91.0 80.0 - 100.0 fL    MCH 31.2 26.0 - 34.0 pg    MCHC 34.3 31.0 - 36.0 g/dL    RDW 16.9 (H) 12.4 - 15.4 %    Platelets 804 175 - 194 K/uL    MPV 7.5 5.0 - 10.5 fL    Neutrophils % 37.0 %    Lymphocytes % 51.0 %    Monocytes % 6.0 %    Eosinophils % 0.0 %    Basophils % 0.0 %    Neutrophils Absolute 1.8 1.7 - 7.7 K/uL    Lymphocytes Absolute 2.5 1.0 - 5.1 K/uL    Monocytes Absolute 0.3 0.0 - 1.3 K/uL    Eosinophils Absolute 0.0 0.0 - 0.6 K/uL    Basophils Absolute 0.0 0.0 - 0.2 K/uL    Bands Relative 2 0 - 7 %    Atypical Lymphocytes Relative 4 0 - 6 %    Anisocytosis Occasional (A)     Macrocytes Occasional (A)     Microcytes Occasional (A)    Comprehensive Metabolic Panel   Result Value Ref Range    Sodium 133 (L) 136 - 145 mmol/L    Potassium 3.9 3.5 - 5.1 mmol/L    Chloride 95 (L) 99 - 110 mmol/L    CO2 26 21 - 32 mmol/L    Anion Gap 12 3 - 16    Glucose 101 (H) 70 - 99 mg/dL    BUN 33 (H) 7 - 20 mg/dL    CREATININE 1.9 (H) 0.6 - 1.2 mg/dL    GFR Non-African American 27 (A) >60    GFR  32 (A) >60    Calcium 8.9 8.3 - 10.6 mg/dL    Total Protein 8.1 6.4 - 8.2 g/dL    Albumin 4.3 3.4 - 5.0 g/dL    Albumin/Globulin Ratio 1.1 1.1 - 2.2    Total Bilirubin 0.3 0.0 - 1.0 mg/dL    Alkaline Phosphatase 82 40 - 129 U/L    ALT 13 10 - 40 U/L    AST 23 15 - 37 U/L    Globulin 3.8 g/dL   Urinalysis   Result Value Ref Range    Color, UA Yellow Straw/Yellow    Clarity, UA Clear Clear    Glucose, Ur Negative Negative mg/dL    Bilirubin Urine Negative Negative    Ketones, Urine Negative Negative mg/dL    Specific Gravity, UA 1.010 1.005 - 1.030    Blood, Urine Negative Negative    pH, UA 5.5 5.0 - 8.0 Protein, UA Negative Negative mg/dL    Urobilinogen, Urine 0.2 <2.0 E.U./dL    Nitrite, Urine Negative Negative    Leukocyte Esterase, Urine Negative Negative    Microscopic Examination Not Indicated     Urine Type NotGiven    Blood Gas, Venous   Result Value Ref Range    pH, [place] 7.491 (H) 7.350 - 7.450    pCO2, [place] 33.8 (L) 40.0 - 50.0 mmHg    pO2, [place] 182.5 (H) 25 - 40 mmHg    HCO3, Venous 25.2 23.0 - 29.0 mmol/L    Base Excess, [place] 2.2 -3.0 - 3.0 mmol/L    O2 Sat, [place] 99 Not Established %    Carboxyhemoglobin 1.6 (H) 0.0 - 1.5 %    MetHgb, [place] 0.5 <1.5 %    TC02 (Calc), [place] 26 Not Established mmol/L    O2 Content, [place] 15 Not Established VOL %    O2 Therapy Unknown    Lactate, Sepsis   Result Value Ref Range    Lactic Acid, Sepsis 0.6 0.4 - 1.9 mmol/L   Urine Drug Screen   Result Value Ref Range    Amphetamine Screen, Urine Neg Negative <1000ng/mL    Barbiturate Screen, Ur Neg Negative <200 ng/mL    Benzodiazepine Screen, Urine POSITIVE (A) Negative <200 ng/mL    Cannabinoid Scrn, Ur Neg Negative <50 ng/mL    Cocaine Metabolite Screen, Urine Neg Negative <300 ng/mL    Opiate Scrn, Ur Neg Negative <300 ng/mL    PCP Screen, Urine Neg Negative <25 ng/mL    Methadone Screen, Urine Neg Negative <300 ng/mL    Propoxyphene Scrn, Ur Neg Negative <300 ng/mL    Oxycodone Urine Neg Negative <100 ng/ml    pH, UA 5.0     Drug Screen Comment: see below    Brain Natriuretic Peptide   Result Value Ref Range    Pro- 0 - 124 pg/mL   Troponin   Result Value Ref Range    Troponin <0.01 <0.01 ng/mL   Ethanol   Result Value Ref Range    Ethanol Lvl None Detected mg/dL   EKG 12 Lead   Result Value Ref Range    Ventricular Rate 85 BPM    Atrial Rate 85 BPM    P-R Interval 144 ms    QRS Duration 104 ms    Q-T Interval 396 ms    QTc Calculation (Bazett) 471 ms    P Axis 56 degrees    R Axis 48 degrees    T Axis 34 degrees    Diagnosis       Baseline artifactSinus rhythmOtherwise normal ECGWhen compared with ECG of 03-JAN-2021 16:44,anterolateral T wave changes have resolvedConfirmed by Jose Taylor MD, Favian Lamas ([id number]) on 3/12/2021 6:28:40 PM       EKG  The Ekg interpreted by myself  normal sinus rhythm with a rate of 85 with some fusion complexes  Axis is   Normal  QTc is  471  Intervals and Durations are unremarkable. Nonspecific ST-T wave changes appreciated. No evidence of acute ischemia. No significant change from prior EKG dated January 3, 2021    Cardiac Monitoring: Sinus rhythm without ectopy        RADIOLOGY  X-RAYS:  I have reviewed radiologic plain film image(s). ALL OTHER NON-PLAIN FILM IMAGES SUCH AS CT, ULTRASOUND AND MRI HAVE BEEN READ BY THE RADIOLOGIST. CT Head WO Contrast   Final Result   No acute intracranial abnormality. Mild white matter changes consistent with chronic small vessel ischemic   disease. CT Cervical Spine WO Contrast   Final Result   1. Negative for cervical spine fracture   2. Cervical adenopathy         XR CHEST PORTABLE   Final Result   No acute cardiopulmonary disease. Interval decrease in left pleural effusion. Rechecks: Physical assessment performed. During the course of her ED stay her sensorium improved mildly and she now is speaking more clearly but continues to only follow commands intermittently. ED COURSE/MDM  Patient seen and evaluated. Old records reviewed. Patient has been undergoing chemotherapy and was on Ativan for muscle spasms and this may have contributed to see her symptoms. Labs and imaging reviewed and results discussed with patient. Patient was given soft restraints in the ED. Patient was reassessed as noted above . No acute pathology was noted; this may be secondary to overmedication or encephalopathy from another condition. No obvious source of infection  Review of her chart reveals that she has been fired from her primary care doctor and as per family by phone she has a history of misusing her medications.   She will be

## 2021-03-13 PROCEDURE — 6370000000 HC RX 637 (ALT 250 FOR IP): Performed by: INTERNAL MEDICINE

## 2021-03-13 PROCEDURE — 1200000000 HC SEMI PRIVATE

## 2021-03-13 PROCEDURE — 96372 THER/PROPH/DIAG INJ SC/IM: CPT

## 2021-03-13 PROCEDURE — G0378 HOSPITAL OBSERVATION PER HR: HCPCS

## 2021-03-13 PROCEDURE — 6360000002 HC RX W HCPCS: Performed by: INTERNAL MEDICINE

## 2021-03-13 PROCEDURE — 2580000003 HC RX 258: Performed by: INTERNAL MEDICINE

## 2021-03-13 RX ORDER — OXYCODONE HYDROCHLORIDE 5 MG/1
10 TABLET ORAL EVERY 4 HOURS PRN
Status: DISCONTINUED | OUTPATIENT
Start: 2021-03-13 | End: 2021-03-15 | Stop reason: HOSPADM

## 2021-03-13 RX ORDER — SODIUM CHLORIDE 9 MG/ML
INJECTION, SOLUTION INTRAVENOUS CONTINUOUS
Status: ACTIVE | OUTPATIENT
Start: 2021-03-13 | End: 2021-03-14

## 2021-03-13 RX ADMIN — GABAPENTIN 400 MG: 400 CAPSULE ORAL at 13:54

## 2021-03-13 RX ADMIN — TIZANIDINE 2 MG: 4 TABLET ORAL at 14:06

## 2021-03-13 RX ADMIN — OXYCODONE 10 MG: 5 TABLET ORAL at 22:44

## 2021-03-13 RX ADMIN — SODIUM CHLORIDE, PRESERVATIVE FREE 10 ML: 5 INJECTION INTRAVENOUS at 08:07

## 2021-03-13 RX ADMIN — SODIUM CHLORIDE: 9 INJECTION, SOLUTION INTRAVENOUS at 12:14

## 2021-03-13 RX ADMIN — ASPIRIN 81 MG: 81 TABLET, COATED ORAL at 08:07

## 2021-03-13 RX ADMIN — TRAZODONE HYDROCHLORIDE 50 MG: 50 TABLET ORAL at 20:01

## 2021-03-13 RX ADMIN — Medication 10 ML: at 12:15

## 2021-03-13 RX ADMIN — LISINOPRIL 40 MG: 20 TABLET ORAL at 08:08

## 2021-03-13 RX ADMIN — CLONAZEPAM 0.5 MG: 0.5 TABLET ORAL at 11:36

## 2021-03-13 RX ADMIN — ACETAMINOPHEN 650 MG: 325 TABLET ORAL at 11:36

## 2021-03-13 RX ADMIN — ENOXAPARIN SODIUM 30 MG: 30 INJECTION SUBCUTANEOUS at 08:07

## 2021-03-13 RX ADMIN — GABAPENTIN 400 MG: 400 CAPSULE ORAL at 20:01

## 2021-03-13 RX ADMIN — ATORVASTATIN CALCIUM 80 MG: 80 TABLET, FILM COATED ORAL at 08:09

## 2021-03-13 RX ADMIN — GABAPENTIN 400 MG: 400 CAPSULE ORAL at 08:07

## 2021-03-13 RX ADMIN — OXYCODONE 10 MG: 5 TABLET ORAL at 18:11

## 2021-03-13 ASSESSMENT — PAIN SCALES - GENERAL: PAINLEVEL_OUTOF10: 8

## 2021-03-13 NOTE — PROGRESS NOTES
Pt out of restraints. Calls out appropriately. Cooperative. Not pulling at lines or equipment. Avasys remains in place for pt safety.

## 2021-03-13 NOTE — PROGRESS NOTES
Assessment complete. VSS. Afebrile. Denies pain. Repositioned with bedside table over pt, breakfast on way. Pt is alert to self, year and that she is in a hospital. She thought it was [employer] Restraints remain in place. No injury noted.

## 2021-03-13 NOTE — CARE COORDINATION
CASE MANAGEMENT INITIAL ASSESSMENT      Reviewed chart and completed assessment via telephone with:patient   Explained Case Management role/services. yes    Primary contact information:[facility] Decision Maker :   Primary Decision Maker: Jelly Suazo Child - [phone]          Can this person be reached and be able to respond quickly, such as within a few minutes or hours? Yes  Who would be your back-up decision maker? Name [id number]   Phone [phone] date/status:3/12/2021  Diagnosis:encephalopathy    Is this a Readmission?:  No      [facility]   Precert required for SNF: Yes       3 night stay required: No    Living arrangements, Adls, care needs, prior to admission:lives alone on a 3rd floor apartment. [place]PT[place]. Does not drive but has a long time friend who drives her to all her dr appointments and where ever she might need to go. Transportation:friend    Durable Medical Equipment at home:  Walker__Cane__RTS__ BSC__Shower Chair__  02__ HHN__ CPAP__  BiPap__  Hospital Bed__ W/C___ Other__________    Services in the home and/or outpatient, prior to admission:denies    PT/OT recs:not ordered    Hospital Exemption Notification (HEN):need for snf    Barriers to discharge:none    Plan/comments:patient plans to return to home and denies needs. States her friend is always available to assist if needed.      ECOC on chart for MD signature

## 2021-03-13 NOTE — PLAN OF CARE
Problem: Falls - Risk of:  Goal: Will remain free from falls  Description: Will remain free from falls  Outcome: Ongoing     Problem: Falls - Risk of:  Goal: Will remain free from falls  Description: Will remain free from falls  Outcome: Ongoing

## 2021-03-14 LAB
ALBUMIN SERPL-MCNC: 3.7 G/DL (ref 3.4–5)
ANION GAP SERPL CALCULATED.3IONS-SCNC: 9 MMOL/L (ref 3–16)
BUN BLDV-MCNC: 15 MG/DL (ref 7–20)
CALCIUM SERPL-MCNC: 8 MG/DL (ref 8.3–10.6)
CHLORIDE BLD-SCNC: 99 MMOL/L (ref 99–110)
CO2: 27 MMOL/L (ref 21–32)
CREAT SERPL-MCNC: 0.8 MG/DL (ref 0.6–1.2)
GFR AFRICAN AMERICAN: >60
GFR NON-AFRICAN AMERICAN: >60
GLUCOSE BLD-MCNC: 110 MG/DL (ref 70–99)
HCT VFR BLD CALC: 27.3 % (ref 36–48)
HEMOGLOBIN: 9.3 G/DL (ref 12–16)
MAGNESIUM: 0.9 MG/DL (ref 1.8–2.4)
MCH RBC QN AUTO: 31.3 PG (ref 26–34)
MCHC RBC AUTO-ENTMCNC: 34.1 G/DL (ref 31–36)
MCV RBC AUTO: 91.9 FL (ref 80–100)
PDW BLD-RTO: 17 % (ref 12.4–15.4)
PHOSPHORUS: 2.8 MG/DL (ref 2.5–4.9)
PLATELET # BLD: 147 K/UL (ref 135–450)
PMV BLD AUTO: 7.8 FL (ref 5–10.5)
POTASSIUM SERPL-SCNC: 3.7 MMOL/L (ref 3.5–5.1)
RBC # BLD: 2.97 M/UL (ref 4–5.2)
SODIUM BLD-SCNC: 135 MMOL/L (ref 136–145)
WBC # BLD: 3.9 K/UL (ref 4–11)

## 2021-03-14 PROCEDURE — 96366 THER/PROPH/DIAG IV INF ADDON: CPT

## 2021-03-14 PROCEDURE — 96372 THER/PROPH/DIAG INJ SC/IM: CPT

## 2021-03-14 PROCEDURE — 6370000000 HC RX 637 (ALT 250 FOR IP): Performed by: INTERNAL MEDICINE

## 2021-03-14 PROCEDURE — G0378 HOSPITAL OBSERVATION PER HR: HCPCS

## 2021-03-14 PROCEDURE — 2580000003 HC RX 258: Performed by: INTERNAL MEDICINE

## 2021-03-14 PROCEDURE — 85027 COMPLETE CBC AUTOMATED: CPT

## 2021-03-14 PROCEDURE — 6360000002 HC RX W HCPCS: Performed by: INTERNAL MEDICINE

## 2021-03-14 PROCEDURE — 80069 RENAL FUNCTION PANEL: CPT

## 2021-03-14 PROCEDURE — 36415 COLL VENOUS BLD VENIPUNCTURE: CPT

## 2021-03-14 PROCEDURE — 83735 ASSAY OF MAGNESIUM: CPT

## 2021-03-14 PROCEDURE — 96365 THER/PROPH/DIAG IV INF INIT: CPT

## 2021-03-14 PROCEDURE — 94640 AIRWAY INHALATION TREATMENT: CPT

## 2021-03-14 PROCEDURE — 96361 HYDRATE IV INFUSION ADD-ON: CPT

## 2021-03-14 PROCEDURE — 1200000000 HC SEMI PRIVATE

## 2021-03-14 RX ADMIN — CLONAZEPAM 0.5 MG: 0.5 TABLET ORAL at 13:37

## 2021-03-14 RX ADMIN — GABAPENTIN 400 MG: 400 CAPSULE ORAL at 08:45

## 2021-03-14 RX ADMIN — OXYCODONE 10 MG: 5 TABLET ORAL at 06:40

## 2021-03-14 RX ADMIN — MAGNESIUM SULFATE 6000 MG: 500 INJECTION, SOLUTION INTRAMUSCULAR; INTRAVENOUS at 12:31

## 2021-03-14 RX ADMIN — GABAPENTIN 400 MG: 400 CAPSULE ORAL at 13:37

## 2021-03-14 RX ADMIN — ENOXAPARIN SODIUM 30 MG: 30 INJECTION SUBCUTANEOUS at 08:44

## 2021-03-14 RX ADMIN — TIOTROPIUM BROMIDE INHALATION SPRAY 2 PUFF: 3.12 SPRAY, METERED RESPIRATORY (INHALATION) at 07:59

## 2021-03-14 RX ADMIN — ATORVASTATIN CALCIUM 80 MG: 80 TABLET, FILM COATED ORAL at 08:45

## 2021-03-14 RX ADMIN — NYSTATIN 500000 UNITS: 500000 SUSPENSION ORAL at 13:37

## 2021-03-14 RX ADMIN — GABAPENTIN 400 MG: 400 CAPSULE ORAL at 20:02

## 2021-03-14 RX ADMIN — TRAZODONE HYDROCHLORIDE 50 MG: 50 TABLET ORAL at 20:02

## 2021-03-14 RX ADMIN — NYSTATIN 500000 UNITS: 500000 SUSPENSION ORAL at 10:07

## 2021-03-14 RX ADMIN — SODIUM CHLORIDE: 9 INJECTION, SOLUTION INTRAVENOUS at 04:06

## 2021-03-14 RX ADMIN — NYSTATIN 500000 UNITS: 500000 SUSPENSION ORAL at 20:02

## 2021-03-14 RX ADMIN — NYSTATIN 500000 UNITS: 500000 SUSPENSION ORAL at 16:47

## 2021-03-14 RX ADMIN — ASPIRIN 81 MG: 81 TABLET, COATED ORAL at 08:45

## 2021-03-14 RX ADMIN — OXYCODONE 10 MG: 5 TABLET ORAL at 11:29

## 2021-03-14 ASSESSMENT — PAIN SCALES - GENERAL
PAINLEVEL_OUTOF10: 7

## 2021-03-14 ASSESSMENT — PAIN DESCRIPTION - PAIN TYPE: TYPE: CHRONIC PAIN

## 2021-03-14 ASSESSMENT — PAIN DESCRIPTION - LOCATION
LOCATION: MOUTH
LOCATION: GENERALIZED

## 2021-03-14 NOTE — PROGRESS NOTES
Hospitalist Progress Note      PCP: Florance Aschoff, MD    Date of Admission: 3/12/2021    Chief Complaint: Altered Mental Status    Subjective: No new c/o. Medications:  Reviewed    Infusion Medications   Scheduled Medications    aspirin  81 mg Oral Daily    atorvastatin  80 mg Oral Daily    furosemide  20 mg Oral BID    gabapentin  400 mg Oral TID    lisinopril  40 mg Oral Daily    nicotine  1 patch Transdermal Daily    tiotropium  2 puff Inhalation Daily    traZODone  50 mg Oral Nightly    sodium chloride flush  10 mL Intravenous 2 times per day    enoxaparin  30 mg Subcutaneous Daily     PRN Meds: oxyCODONE, clonazePAM, tiZANidine, sodium chloride flush, promethazine **OR** ondansetron, polyethylene glycol, acetaminophen **OR** acetaminophen, albuterol sulfate HFA      Intake/Output Summary (Last 24 hours) at 3/14/2021 0837  Last data filed at 3/14/2021 1009  Gross per 24 hour   Intake 2530 ml   Output 1000 ml   Net 1530 ml       Physical Exam Performed:    BP (!) 96/55   Pulse 91   Temp 97.6 °F (36.4 °C) (Oral)   Resp 16   SpO2 95%     General appearance: No apparent distress, appears stated age and cooperative. HEENT: Pupils equal, round, and reactive to light. Conjunctivae/corneas clear. Neck: Supple, with full range of motion. No jugular venous distention. Trachea midline. Respiratory:  Normal respiratory effort. Clear to auscultation, bilaterally without Rales/Wheezes/Rhonchi. Cardiovascular: Regular rate and rhythm with normal S1/S2 without murmurs, rubs or gallops. Abdomen: Soft, non-tender, non-distended with normal bowel sounds. Musculoskeletal: No clubbing, cyanosis or edema bilaterally. Full range of motion without deformity. Skin: Skin color, texture, turgor normal.  No rashes or lesions. Neurologic:  Neurovascularly intact without any focal sensory/motor deficits.  Cranial nerves: II-XII intact, grossly non-focal.  Psychiatric: Alert and oriented, thought content appropriate, normal insight  Capillary Refill: Brisk,< 3 seconds   Peripheral Pulses: +2 palpable, equal bilaterally       Labs:   Recent Labs     03/12/21  1102   WBC 4.6   HGB 10.6*   HCT 31.0*        Recent Labs     03/12/21  1102   *   K 3.9   CL 95*   CO2 26   BUN 33*   CREATININE 1.9*   CALCIUM 8.9     Recent Labs     03/12/21  1102   AST 23   ALT 13   BILITOT 0.3   ALKPHOS 82     No results for input(s): INR in the last 72 hours. Recent Labs     03/12/21  1102   TROPONINI <0.01       Urinalysis:      Lab Results   Component Value Date    NITRU Negative 03/12/2021    WBCUA 6-9 07/15/2020    BACTERIA 2+ 07/14/2020    RBCUA None seen 07/15/2020    BLOODU Negative 03/12/2021    SPECGRAV 1.010 03/12/2021    GLUCOSEU Negative 03/12/2021    GLUCOSEU NEGATIVE 05/27/2012       Consults:    IP CONSULT TO HOSPITALIST      Assessment/Plan:    Active Hospital Problems    Diagnosis    Encephalopathy [G93.40]    Lung cancer ([facility] [address] [C34.90]    Hyponatremia [E87.1]    Anemia [D64.9]    ARF (acute renal failure) (HCC) [N17.9]    Hyperlipidemia [E78.5]    HTN (hypertension) [I10]          Lung Cancer - Non-small cell lung Cancer. S/P recent Chemo - ongoing - scheduled for Weds. Nystatin S/S for hx oral thrush. Followed outpt by Dr. Dallin Chun of [facility] - consulted and appreciated in advance.      Encephalopathy - acute toxic, likely 2nd to polypharmacy. Will continue to follow clinical response w/ supportive care PRN. May benefit from MRI brain - NOT YET ordered as clinically improving - deferred to [facility] discretion.      HTN - w/out known CAD and no evidence of active signs/sxs of ischemia/failure. Currently controlled on home meds w/ vitals reviewed and documented as above.     HyperLipidemia - controlled on home Statin. Continue, w/ f/u and med adjustment w/ PCP     ARF - w/ elevated BUN/Cr ratio c/w pre-renal azotemia. Given IVF hydration and follow serial labs.   Reviewed and documented as above.    HypoNatremia - etiology clinically unable to determine but likely hypovolemic. Follow serial labs on IVF. Reviewed and documented as above.     HypoMagnesemia - critically low, etiology clinically unable to determine. Follow serial labs and replace PRN - ordered aggressively IV 14 March. Reviewed and documented as above. Anemia - etiology clinically unable to determine, w/out evidence of active bleeding/hemolysis. Stable and asymptomatic w/out indication for transfusion. Follow serial labs. Reviewed and documented as above.        DVT Prophylaxis: LMWH  Diet: DIET GENERAL;  Code Status: Full Code      PT/OT Eval Status: not yet ordered.      Dispo - Possibly Monday 15 March at the earliest pending clinical course and subspecialty recs.      Casey Stover MD

## 2021-03-14 NOTE — ACP (ADVANCE CARE PLANNING)
[address]       :  1955              MRN:  [id number]  Admission Date: 3/12/2021 11:09 AM  Date of Discussion:  2021      Purpose of Encounter: Advanced care planning in light of Known Lung Cancer. Parties in attendance: :Kwesi Encarnacion MD, Family members: N/A. Decisional Capacity:Yes      Objective/Medical Story: 64 yo female w/ known hx of Non-small cell Lung CA, actively in Chemo followed by FIDEL MOULTON, admitted w/ encephalopathy, now resolved and able to have meaningful discussion. Active Diagnoses: Active Hospital Problems    Diagnosis Date Noted    Encephalopathy [G93.40] 2021    Lung cancer ([facility] [address] [C34.90]     Hyponatremia [E87.1]     Anemia [D64.9]     ARF (acute renal failure) ([facility] [address] [N17.9] 2020    Hyperlipidemia [E78.5] 2019    HTN (hypertension) [I10]        These active diagnoses are of sufficient risk that focused discussion on advance care planning is indicated in order to allow the patient to thoughtfully consider personal goals of care; and if situations arise that prevent the ability to personally give input, to ensure appropriate representation of their personal desires for different levels and agressiveness of care. Goals of Care Determinations: Patient wishes to focus on Recovery. Code Status: At this time patient wishes to be Full Code         Time Spent on Advanced Planning Documents: 16 mins. The following items were considered in medical decision making:  Independent review of images , Review / order clinical lab tests. Review / order radiology tests, Decision to obtain old records. Advanced Care Planning Documents:  Completed advances directives, advanced directives in chart. Electronically signed by Lizzie Paniagua MD on 3/14/2021 at 2:41 PM    Thank you Adam Montana MD for the opportunity to be involved in this patient's care.  If you have any questions or concerns

## 2021-03-14 NOTE — PLAN OF CARE
Problem: Falls - Risk of:  Goal: Will remain free from falls  Description: Will remain free from falls  Outcome: Ongoing     Problem: Non-Violent Restraints  Goal: Patient's dignity will be maintained  Outcome: Ongoing     Problem: Skin Integrity:  Goal: Absence of new skin breakdown  Description: Absence of new skin breakdown  Outcome: Ongoing

## 2021-03-14 NOTE — CONSULTS
2020    robotic, performed by Iain Stockton MD at [facility] 81 CT NEEDLE BIOPSY LUNG PERCUTANEOUS  2020    CT NEEDLE BIOPSY LUNG PERCUTANEOUS 2020 [address] SCAN    FASCIOTOMY Left 2017    due to DVT, LLE    GASTROCNEMIUS RECESSION Left 10/1/2020    [facility] Right 10/29/2020    RIGHT CERVICAL LYMPH NODE BIOPSY [facility] Left 10/1/2020    LEFT FOOT SECOND, THIRD, FOURTH AND FIFTH  METATARSAL HEAD RESECTION -BLOCK- -BAYLEE=5 performed by Roberto Duggan MD at [address] Left 2020    LEFT VIDEO ASSISTED THORACOSCOPIC SURGERY WITH LEFT  LOWER LOBE LOBECTOMY AND MEDIASTINAL LYMPHADENECTOMY, INTERCOSTAL NERVE BLOCK performed by Tory Green MD at [address] [place] [address]:     Social History     Socioeconomic History    Marital status:      Spouse name: Not on file    Number of children: 2    Years of education: Not on file    Highest education level: Some college, no degree   Occupational History    Not on file   Social Needs    Financial resource strain: Not very hard    Food insecurity     Worry: Never true     Inability: Never true    Transportation needs     Medical: No     Non-medical: No   Tobacco Use    Smoking status: Former Smoker     Packs/day: 0.50     Years: 20.00     Pack years: 10.00     Types: Cigarettes     Quit date: 2020     Years since quittin.3    Smokeless tobacco: Never Used    Tobacco comment: 1 cigarettes per day   Substance and Sexual Activity    Alcohol use: No    Drug use: Not Currently     Types: Opiates     Sexual activity: Not Currently     Partners: Male   Lifestyle    Physical activity     Days per week: Not on file     Minutes per session: Not on file    Stress: Not on file   Relationships    Social connections     Talks on phone: Not on file     Gets together: Not on file     Attends Cheondoism service: Not on file     Active member of club or organization: Not on file     Attends meetings of clubs or organizations: Not on file     Relationship status: Not on file    Intimate partner violence     Fear of current or ex partner: Not on file     Emotionally abused: Not on file     Physically abused: Not on file     Forced sexual activity: Not on file   Other Topics Concern    Not on file   Social History Narrative    Not on file       FAMILY HISTORY:     Family History   Problem Relation Age of Onset    High Blood Pressure Mother     High Cholesterol Mother     Heart Disease Mother     Coronary Art Dis Mother     Heart Attack Father         59    Cirrhosis Sister     Alcohol Abuse Sister     Other Sister         \"colostomy bag due to hole in her colon\"   Evonne Downy Sudden Death Brother         \"suicide\"    Colon Cancer Other         maternal uncle    Breast Cancer Other         multiple maternal aunts along with uterine cancer       ALLERGIES:     Allergies as of 03/12/2021 - Review Complete 03/12/2021   Allergen Reaction Noted    Lorazepam  06/11/2020       MEDICATIONS:     No current facility-administered medications on file prior to encounter. Current Outpatient Medications on File Prior to Encounter   Medication Sig Dispense Refill    clonazePAM (KLONOPIN) 1 MG tablet Take 1 mg by mouth 2 times daily as needed.  ondansetron (ZOFRAN ODT) 4 MG disintegrating tablet Take 1-2 tablets by mouth every 8 hours as needed for Nausea or Vomiting May Sub regular tablet (non-ODT) if insurance does not cover ODT.  20 tablet 0    Blood Pressure KIT 1 kit by Does not apply route 2 times daily 1 kit 0    albuterol sulfate  (90 Base) MCG/ACT inhaler INHALE 2 PUFFS INTO THE LUNGS EVERY 6 HOURS AS NEEDED FOR WHEEZING 18 g 2    fluticasone (FLONASE) 50 MCG/ACT nasal spray USE 2 SPRAYS BY NASAL ROUTE AS NEEDED FOR RHINITIS OR ALLERGIES 16 g 5    lisinopril (PRINIVIL;ZESTRIL) 40 MG tablet TAKE 1 TABLET BY MOUTH DAILY 90 tablet 1    gabapentin (NEURONTIN) 400 MG capsule Take 1 capsule by mouth 3 times daily for 90 days. 90 capsule 2    DULoxetine (CYMBALTA) 60 MG extended release capsule Take 1 capsule by mouth daily 90 capsule 1    traZODone (DESYREL) 50 MG tablet Take 1 tablet by mouth nightly 90 tablet 1    atorvastatin (LIPITOR) 80 MG tablet Take 1 tablet by mouth daily 90 tablet 1    furosemide (LASIX) 20 MG tablet Take 1 tablet by mouth 2 times daily 60 tablet 3    potassium chloride (KLOR-CON M) 10 MEQ extended release tablet Take 1 tablet by mouth 2 times daily (with meals) 60 tablet 3    aspirin 81 MG EC tablet Take 81 mg by mouth daily      tiZANidine (ZANAFLEX) 2 MG tablet TAKE 1 TABLET BY MOUTH 2 TIMES DAILY AS NEEDED (MUSCLE SPASM AND PAIN) 90 tablet 1    SPIRIVA HANDIHALER 18 MCG inhalation capsule INHALE 1 CAPSULE INTO THE LUNGS DAILY 90 capsule 1    vitamin D (ERGOCALCIFEROL) 1.25 MG (65141 UT) CAPS capsule Take 1 capsule by mouth once a week 12 capsule 0    nicotine (NICODERM CQ) 7 MG/24HR PLACE 1 PATCH ONTO THE SKIN DAILY (ROTATE SITES) 14 patch 0    LORazepam (ATIVAN) 0.5 MG tablet Take 0.5 mg by mouth 2 times daily as needed for Anxiety (1 to 2 times per day).  amiodarone (PACERONE) 400 MG tablet Take 1 tablet by mouth 2 times daily 15 tablet 0     REVIEW OF SYSTEMS:       10 point ROS completed. Pertinent positives in HPI, otherwise negative.      PHYSICAL EXAM:       Vitals:    03/14/21 0802   BP:    Pulse:    Resp:    Temp:    SpO2: 95%       General appearance: alert and cooperative  Head: Normocephalic, without obvious abnormality, atraumatic  Neck: No palpable lymphadenopathy in supraclavicular or cervical chains  Lungs: Clear to auscultation bilaterally, no audible rales, wheezes or crackles  Heart: Regular rate and rhythm, S1, S2 normal  Abdomen: Soft, non-tender; bowel sounds normal; no masses,  no organomegaly  Extremities: without cyanosis, clubbing, edema or asymmetry Skin: No jaundice, purpura or petechiae      LABS:     Lab Results   Component Value Date    WBC 3.9 (L) 03/14/2021    HGB 9.3 (L) 03/14/2021    HCT 27.3 (L) 03/14/2021    MCV 91.9 03/14/2021     03/14/2021       Lab Results   Component Value Date    GLUCOSE 101 (H) 03/12/2021    BUN 33 (H) 03/12/2021    CREATININE 1.9 (H) 03/12/2021    K 3.9 03/12/2021    PHOS 3.9 06/18/2020       Lab Results   Component Value Date    ALKPHOS 82 03/12/2021    ALT 13 03/12/2021    AST 23 03/12/2021    BILITOT 0.3 03/12/2021    BILIDIR <0.2 06/15/2020    PROT 8.1 03/12/2021       IMAGING:     Xr Chest (2 Vw)  Result Date: 3/2/2021  1. Stable left pleural effusion 2. No radiographic findings of intrathoracic adenopathy     Ct Head Wo Contrast  Result Date: 3/12/2021  No acute intracranial abnormality. Mild white matter changes consistent with chronic small vessel ischemic disease. Ct Cervical Spine Wo Contrast  Result Date: 3/12/2021  1. Negative for cervical spine fracture 2. Cervical adenopathy     Xr Chest Portable  Result Date: 3/12/2021  No acute cardiopulmonary disease. Interval decrease in left pleural effusion. Xr Abdomen (2 Views)  Result Date: 3/3/2021  Nonspecific nonobstructive bowel gas pattern. STAGING:     Cancer Staging  IIB    ASSESSMENT:     Problem List Items Addressed This Visit     None      Visit Diagnoses     Acute encephalopathy    -  Primary                PLAN:     1. MS Change    - dehydration and likely medications contributing  - improved now    2. JASPREET    - suspect dehydration  - CMP pending    3. Stage IIB NSCLC    - s/p surgery 11/18/2020  - T1bN1  - on adjuvant chemotherapy with carbo/alimta  - has 1 more cycle of chemo to go  - monitor CBC    4. B Cell lymphoma, suspected marginal zone    - incidentally diagnosed on PET scan for lung cancer workup  - has not required treatment at this point     5.  Pain    - pt prescribed oxycodone  - her LORENZA screen was negative for this on

## 2021-03-15 VITALS
SYSTOLIC BLOOD PRESSURE: 85 MMHG | TEMPERATURE: 98.8 F | DIASTOLIC BLOOD PRESSURE: 46 MMHG | RESPIRATION RATE: 16 BRPM | OXYGEN SATURATION: 96 % | HEART RATE: 88 BPM

## 2021-03-15 LAB
ALBUMIN SERPL-MCNC: 3.2 G/DL (ref 3.4–5)
ANION GAP SERPL CALCULATED.3IONS-SCNC: 8 MMOL/L (ref 3–16)
BUN BLDV-MCNC: 12 MG/DL (ref 7–20)
CALCIUM SERPL-MCNC: 8.1 MG/DL (ref 8.3–10.6)
CHLORIDE BLD-SCNC: 101 MMOL/L (ref 99–110)
CO2: 26 MMOL/L (ref 21–32)
CREAT SERPL-MCNC: 0.7 MG/DL (ref 0.6–1.2)
GFR AFRICAN AMERICAN: >60
GFR NON-AFRICAN AMERICAN: >60
GLUCOSE BLD-MCNC: 126 MG/DL (ref 70–99)
HCT VFR BLD CALC: 25.9 % (ref 36–48)
HEMOGLOBIN: 9 G/DL (ref 12–16)
MAGNESIUM: 1.4 MG/DL (ref 1.8–2.4)
MCH RBC QN AUTO: 32 PG (ref 26–34)
MCHC RBC AUTO-ENTMCNC: 34.7 G/DL (ref 31–36)
MCV RBC AUTO: 92.2 FL (ref 80–100)
PDW BLD-RTO: 17.3 % (ref 12.4–15.4)
PHOSPHORUS: 2 MG/DL (ref 2.5–4.9)
PLATELET # BLD: 140 K/UL (ref 135–450)
PMV BLD AUTO: 7.8 FL (ref 5–10.5)
POTASSIUM SERPL-SCNC: 3.8 MMOL/L (ref 3.5–5.1)
RBC # BLD: 2.81 M/UL (ref 4–5.2)
SODIUM BLD-SCNC: 135 MMOL/L (ref 136–145)
WBC # BLD: 3.3 K/UL (ref 4–11)

## 2021-03-15 PROCEDURE — 83735 ASSAY OF MAGNESIUM: CPT

## 2021-03-15 PROCEDURE — 6370000000 HC RX 637 (ALT 250 FOR IP): Performed by: INTERNAL MEDICINE

## 2021-03-15 PROCEDURE — 2580000003 HC RX 258: Performed by: INTERNAL MEDICINE

## 2021-03-15 PROCEDURE — 36415 COLL VENOUS BLD VENIPUNCTURE: CPT

## 2021-03-15 PROCEDURE — 2580000003 HC RX 258: Performed by: NURSE PRACTITIONER

## 2021-03-15 PROCEDURE — 2500000003 HC RX 250 WO HCPCS: Performed by: NURSE PRACTITIONER

## 2021-03-15 PROCEDURE — 96376 TX/PRO/DX INJ SAME DRUG ADON: CPT

## 2021-03-15 PROCEDURE — 80069 RENAL FUNCTION PANEL: CPT

## 2021-03-15 PROCEDURE — 85027 COMPLETE CBC AUTOMATED: CPT

## 2021-03-15 PROCEDURE — 94640 AIRWAY INHALATION TREATMENT: CPT

## 2021-03-15 PROCEDURE — G0378 HOSPITAL OBSERVATION PER HR: HCPCS

## 2021-03-15 PROCEDURE — 96375 TX/PRO/DX INJ NEW DRUG ADDON: CPT

## 2021-03-15 PROCEDURE — 96372 THER/PROPH/DIAG INJ SC/IM: CPT

## 2021-03-15 PROCEDURE — 6360000002 HC RX W HCPCS: Performed by: INTERNAL MEDICINE

## 2021-03-15 RX ORDER — MAGNESIUM SULFATE IN WATER 40 MG/ML
2000 INJECTION, SOLUTION INTRAVENOUS ONCE
Status: COMPLETED | OUTPATIENT
Start: 2021-03-15 | End: 2021-03-15

## 2021-03-15 RX ORDER — CALCIUM CARBONATE 200(500)MG
1000 TABLET,CHEWABLE ORAL 2 TIMES DAILY
Status: DISCONTINUED | OUTPATIENT
Start: 2021-03-15 | End: 2021-03-15 | Stop reason: HOSPADM

## 2021-03-15 RX ADMIN — SODIUM PHOSPHATE, MONOBASIC, MONOHYDRATE AND SODIUM PHOSPHATE, DIBASIC, ANHYDROUS 10 MMOL: 276; 142 INJECTION, SOLUTION INTRAVENOUS at 12:08

## 2021-03-15 RX ADMIN — GABAPENTIN 400 MG: 400 CAPSULE ORAL at 15:27

## 2021-03-15 RX ADMIN — NYSTATIN 500000 UNITS: 500000 SUSPENSION ORAL at 15:27

## 2021-03-15 RX ADMIN — SODIUM CHLORIDE, PRESERVATIVE FREE 10 ML: 5 INJECTION INTRAVENOUS at 08:36

## 2021-03-15 RX ADMIN — ASPIRIN 81 MG: 81 TABLET, COATED ORAL at 08:33

## 2021-03-15 RX ADMIN — ENOXAPARIN SODIUM 40 MG: 30 INJECTION SUBCUTANEOUS at 08:34

## 2021-03-15 RX ADMIN — OXYCODONE 10 MG: 5 TABLET ORAL at 10:12

## 2021-03-15 RX ADMIN — TIOTROPIUM BROMIDE INHALATION SPRAY 2 PUFF: 3.12 SPRAY, METERED RESPIRATORY (INHALATION) at 07:50

## 2021-03-15 RX ADMIN — MAGNESIUM SULFATE HEPTAHYDRATE 2000 MG: 40 INJECTION, SOLUTION INTRAVENOUS at 15:27

## 2021-03-15 RX ADMIN — NYSTATIN 500000 UNITS: 500000 SUSPENSION ORAL at 08:34

## 2021-03-15 RX ADMIN — GABAPENTIN 400 MG: 400 CAPSULE ORAL at 08:34

## 2021-03-15 RX ADMIN — OXYCODONE 10 MG: 5 TABLET ORAL at 05:47

## 2021-03-15 RX ADMIN — ATORVASTATIN CALCIUM 80 MG: 80 TABLET, FILM COATED ORAL at 08:33

## 2021-03-15 ASSESSMENT — PAIN SCALES - GENERAL
PAINLEVEL_OUTOF10: 7

## 2021-03-15 ASSESSMENT — PAIN DESCRIPTION - ORIENTATION: ORIENTATION: RIGHT

## 2021-03-15 NOTE — PROGRESS NOTES
Hospitalist Progress Note      PCP: Zhang Elizabeth MD    Date of Admission: 3/12/2021    Chief Complaint: Altered Mental Status    Subjective: No new c/o. Medications:  Reviewed    Infusion Medications   Scheduled Medications    enoxaparin  40 mg Subcutaneous Daily    nystatin  5 mL Oral 4x Daily    aspirin  81 mg Oral Daily    atorvastatin  80 mg Oral Daily    furosemide  20 mg Oral BID    gabapentin  400 mg Oral TID    lisinopril  40 mg Oral Daily    nicotine  1 patch Transdermal Daily    tiotropium  2 puff Inhalation Daily    traZODone  50 mg Oral Nightly    sodium chloride flush  10 mL Intravenous 2 times per day     PRN Meds: oxyCODONE, clonazePAM, tiZANidine, sodium chloride flush, promethazine **OR** ondansetron, polyethylene glycol, acetaminophen **OR** acetaminophen, albuterol sulfate HFA      Intake/Output Summary (Last 24 hours) at 3/15/2021 0933  Last data filed at 3/15/2021 8357  Gross per 24 hour   Intake 1930 ml   Output 2650 ml   Net -720 ml       Physical Exam Performed:    BP (!) 98/53   Pulse 78   Temp 99.5 °F (37.5 °C) (Oral)   Resp 16   SpO2 99%     General appearance: No apparent distress, appears stated age and cooperative. HEENT: Pupils equal, round, and reactive to light. Conjunctivae/corneas clear. Neck: Supple, with full range of motion. No jugular venous distention. Trachea midline. Respiratory:  Normal respiratory effort. Clear to auscultation, bilaterally without Rales/Wheezes/Rhonchi. Cardiovascular: Regular rate and rhythm with normal S1/S2 without murmurs, rubs or gallops. Abdomen: Soft, non-tender, non-distended with normal bowel sounds. Musculoskeletal: No clubbing, cyanosis or edema bilaterally. Full range of motion without deformity. Skin: Skin color, texture, turgor normal.  No rashes or lesions. Neurologic:  Neurovascularly intact without any focal sensory/motor deficits.  Cranial nerves: II-XII intact, grossly non-focal.  Psychiatric: Alert and oriented, thought content appropriate, normal insight  Capillary Refill: Brisk,< 3 seconds   Peripheral Pulses: +2 palpable, equal bilaterally       Labs:   Recent Labs     03/12/21  1102 03/14/21  0857 03/15/21  0615   WBC 4.6 3.9* 3.3*   HGB 10.6* 9.3* 9.0*   HCT 31.0* 27.3* 25.9*    147 140     Recent Labs     03/12/21  1102 03/14/21  0857 03/15/21  0615   * 135* 135*   K 3.9 3.7 3.8   CL 95* 99 101   CO2 26 27 26   BUN 33* 15 12   CREATININE 1.9* 0.8 0.7   CALCIUM 8.9 8.0* 8.1*   PHOS  --  2.8 2.0*     Recent Labs     03/12/21  1102   AST 23   ALT 13   BILITOT 0.3   ALKPHOS 82     No results for input(s): INR in the last 72 hours. Recent Labs     03/12/21  1102   TROPONINI <0.01       Urinalysis:      Lab Results   Component Value Date    NITRU Negative 03/12/2021    WBCUA 6-9 07/15/2020    BACTERIA 2+ 07/14/2020    RBCUA None seen 07/15/2020    BLOODU Negative 03/12/2021    SPECGRAV 1.010 03/12/2021    GLUCOSEU Negative 03/12/2021    GLUCOSEU NEGATIVE 05/27/2012       Consults:    IP CONSULT TO HOSPITALIST  IP CONSULT TO ONCOLOGY      Assessment/Plan:    Active Hospital Problems    Diagnosis    Encephalopathy [G93.40]    Lung cancer ([facility] [address] [C34.90]    Hyponatremia [E87.1]    Anemia [D64.9]    ARF (acute renal failure) (HCC) [N17.9]    Hyperlipidemia [E78.5]    HTN (hypertension) [I10]          Lung Cancer - Non-small cell lung Cancer. S/P recent Chemo - ongoing - scheduled for Weds. Nystatin S/S for hx oral thrush. Followed outpt by Dr. Ida Timmons of [facility] - consulted and appreciated w/out need for change in tx or other inpatient w/up - signed off.     Encephalopathy - acute toxic, likely 2nd to polypharmacy. Will continue to follow clinical response w/ supportive care PRN.      HTN - w/out known CAD and no evidence of active signs/sxs of ischemia/failure. Currently controlled on home meds w/ vitals reviewed and documented as above.     HyperLipidemia - controlled on home Statin. Continue, w/ f/u and med adjustment w/ PCP     ARF - w/ elevated BUN/Cr ratio c/w pre-renal azotemia. Given IVF hydration and follow serial labs - resolved. Reviewed and documented as above.     HypoNatremia - etiology clinically unable to determine but likely hypovolemic. Follow serial labs on IVF. Reviewed and documented as above.     HypoMagnesemia - critically low, etiology clinically unable to determine. Follow serial labs and replace PRN - ordered aggressively IV 14 March. Reviewed and documented as above. Anemia - etiology clinically unable to determine, w/out evidence of active bleeding/hemolysis. Stable and asymptomatic w/out indication for transfusion. Follow serial labs.   Reviewed and documented as above.        DVT Prophylaxis: LMWH  Diet: DIET GENERAL;  Code Status: Full Code      PT/OT Eval Status: not yet ordered.      Dispo - Likely Monday 15 March pending clinical course     Karen rIby MD

## 2021-03-15 NOTE — PROGRESS NOTES
Pt discharged home with sister via wheelchair, accompanied by RN. Pt sent home with after visit summary and follow up instructions. All questions answered.

## 2021-03-15 NOTE — PROGRESS NOTES
ONCOLOGY HEMATOLOGY CARE PROGRESS NOTE      SUBJECTIVE:     She tells me that she took too much of her \"meds\" because she opened the bottle and pills spilled out everywhere and she wasn't sure which pill was which. Her mentation is back to baseline now. ROS:   The remaining 10 point review of symptoms is unremarkable. OBJECTIVE        Physical    VITALS:  BP (!) 98/53   Pulse 78   Temp 99.5 °F (37.5 °C) (Oral)   Resp 16   SpO2 99%   TEMPERATURE:  Current - Temp: 99.5 °F (37.5 °C); Max - Temp  Av.4 °F (36.9 °C)  Min: 97.7 °F (36.5 °C)  Max: 99.5 °F (37.5 °C)  PULSE OXIMETRY RANGE: SpO2  Av.7 %  Min: 93 %  Max: 99 %  24HR INTAKE/OUTPUT:      Intake/Output Summary (Last 24 hours) at 3/15/2021 9202  Last data filed at 3/15/2021 1746  Gross per 24 hour   Intake 1930 ml   Output 2650 ml   Net -720 ml       CONSTITUTIONAL:  awake, alert, cooperative, no apparent distress, HEENT oral pharynx , no scleral icterus  HEMATOLOGIC/LYMPHATICS:  no cervical lymphadenopathy, no supraclavicular lymphadenopathy, no axillary lymphadenopathy and no inguinal lymphadenopathy  LUNGS:  No increased work of breathing, good air exchange, clear to auscultation bilaterally, no crackles or wheezing  CARDIOVASCULAR:  , regular rate and rhythm, normal S1 and S2, no S3 or S4, and no murmur noted  ABDOMEN:  No scars, normal bowel sounds, soft, non-distended, non-tender, no masses palpated, no hepatosplenomegally  MUSCULOSKELETAL:  There is no redness, warmth, or swelling of the joints. EXTREMETIES: No clubbing cynosis or edema  NEUROLOGIC:  Awake, alert, oriented to name, place and time. Cranial nerves II-XII are grossly intact. Motor is 5 out of 5 bilaterally.    SKIN:  no bruising or bleeding      Data      Recent Labs     21  1102 21  0857 03/15/21  0615   WBC 4.6 3.9* 3.3*   HGB 10.6* 9.3* 9.0*   HCT 31.0* 27.3* 25.9*    147 140   MCV 91.0 91.9 92.2        Recent Labs     03/12/21  1102 03/14/21  0857 03/15/21  0615   * 135* 135*   K 3.9 3.7 3.8   CL 95* 99 101   CO2 26 27 26   PHOS  --  2.8 2.0*   BUN 33* 15 12   CREATININE 1.9* 0.8 0.7     Recent Labs     03/12/21  1102   AST 23   ALT 13   BILITOT 0.3   ALKPHOS 82       Magnesium:    Lab Results   Component Value Date    MG 0.90 03/14/2021    MG 1.30 01/06/2021    MG 1.70 01/03/2021         Problem List  Patient Active Problem List   Diagnosis    HTN (hypertension)    Chest pain    Multiple falls    Recurrent pain of right knee    Sternal mass    Current smoker    LAD (lymphadenopathy), inguinal    Chronic pain syndrome    Moderate single current episode of major depressive disorder (HCC)    Chronic bilateral low back pain without sciatica    Prediabetes    Vitamin D deficiency    History of fasciotomy    History of DVT in adulthood    Personal history of TIA (transient ischemic attack)    Deep vein thrombosis (DVT) of left lower extremity (HCC)    Neuropathy    Lung nodule    Bone marrow edema    Centrilobular emphysema (HCC)    Hyperlipidemia    Hypertensive urgency    Oropharyngeal dysphagia    Muscle cramps    Pulmonary lesion of left side of chest    ARF (acute renal failure) (HCC)    Abnormal liver enzymes    Epigastric pain    Right upper quadrant abdominal pain    Perforated gallbladder    Anemia    Cholecystitis    Dizziness    Diarrhea of presumed infectious origin    Fracture of 2nd metatarsal    Dislocation of fourth toe, left, closed, initial encounter    Lymphadenopathy of right cervical region    Mass of lower lobe of left lung    Syncope and collapse    Hypokalemia    Hypomagnesemia    Lung cancer (HCC)    Hyponatremia    Anxiety    Depression    Encephalopathy       ASSESSMENT AND PLAN:       1. MS Change     - dehydration and likely medications contributing  - improved now     2.  JASPREET     - suspect dehydration  - CMP pending  - phos low at 2- will give Na phosphate replacement      3. Stage IIB NSCLC     - s/p surgery 11/18/2020  - T1bN1  - on adjuvant chemotherapy with carbo/alimta  - has 1 more cycle of chemo to go  - monitor CBC, stable      4. B Cell lymphoma, suspected marginal zone     - incidentally diagnosed on PET scan for lung cancer workup  - has not required treatment at this point   - WBC 3.3, Hgb 9, and plt 140 today      5. Pain     - pt prescribed oxycodone  - her LORENZA screen was negative for this on admission  - raises concern for drug diversion  - she assures me she takes it when informed of her negative screen  - will plan to recheck screen in the office at next appt 3/24     6.  Nutrition  - phos 2- replace  - Na 135 - dehydrated  - Ca is 8.1- start Tums BID      Disposition: OK for d/c from oncology standpoint        ONCOLOGIC DISPOSITION: can dc home today, follow up with [facility] per standing appointment prior to admit 3/24- UDS on 3/24 and office orders updated       Fransico Zaman CNP  [facility]   Please contact through [facility]

## 2021-03-16 ENCOUNTER — CARE COORDINATION (OUTPATIENT)
Dept: CASE MANAGEMENT | Age: 66
End: 2021-03-16

## 2021-03-16 NOTE — CARE COORDINATION
[facility] Transitions Initial Follow Up Call    Call within 2 business days of discharge: Yes    Patient: Jaylan Wallace Patient : 1955   MRN: [id number]  Reason for Admission: encephalopathy  Discharge Date: 3/15/21 RARS: Readmission Risk Score: 37      Last Discharge [facility]       Complaint Diagnosis Description Type Department Provider    3/12/21 Altered Mental Status Acute encephalopathy ED to Hosp-Admission (Discharged) (ADMITTED) Carly Brewster MD; Reginald Mays. .. Attempted to reach patient via phone for initial post hospital transition call. \"voicemail box has not been set up yet\". No option to leave return call number or request for call back. Care Transitions 24 Hour Call    Care Transitions Interventions         Follow Up  No future appointments.     Sandi Yu RN

## 2021-03-16 NOTE — DISCHARGE SUMMARY
Hospital Medicine Discharge Summary    Patient ID: Jameson Plaza      Patient's PCP: Chu Conley MD    Admit Date: 3/12/2021     Discharge Date: 3/15/2021      Admitting Physician: Buck Jean-Baptiste MD     Discharge Physician: Buck Jean-Baptiste MD     Discharge Diagnoses: Active Hospital Problems    Diagnosis    Encephalopathy [G93.40]    Lung cancer ([address] [C34.90]    Hyponatremia [E87.1]    Anemia [D64.9]    ARF (acute renal failure) (HCC) [N17.9]    Hyperlipidemia [E78.5]    HTN (hypertension) [I10]       The patient was seen and examined on day of discharge and this discharge summary is in conjunction with any daily progress note from day of discharge. Hospital Course:       Lung Cancer - Non-small cell lung Cancer.  S/P recent Chemo - ongoing - scheduled for Weds. Nystatin S/S for hx oral thrush. Followed outpt by Dr. Ruy Reeder of [facility] - consulted and appreciated w/out need for change in tx or other inpatient w/up - signed off.     Encephalopathy - acute toxic, likely 2nd to polypharmacy.  Will continue to follow clinical response w/ supportive care PRN.      HTN - w/out known CAD and no evidence of active signs/sxs of ischemia/failure. Currently controlled on home meds      HyperLipidemia - controlled on home Statin. Continue, w/ f/u and med adjustment w/ PCP     ARF - w/ elevated BUN/Cr ratio c/w pre-renal azotemia. Given IVF hydration and follow serial labs - resolved.       HypoNatremia - etiology clinically unable to determine but likely hypovolemic.  Followed serial labs on IVF.       HypoMagnesemia - critically low, etiology clinically unable to determine. Followed serial labs and replaced PRN - ordered aggressively IV 14 March.        Anemia - etiology clinically unable to determine, w/out evidence of active bleeding/hemolysis. Stable and asymptomatic w/out indication for transfusion.        Labs:  For convenience and continuity at follow-up the following most recent labs are provided:      CBC:    Lab Results   Component Value Date    WBC 3.3 03/15/2021    HGB 9.0 03/15/2021    HCT 25.9 03/15/2021     03/15/2021       Renal:    Lab Results   Component Value Date     03/15/2021    K 3.8 03/15/2021    K 3.9 01/06/2021     03/15/2021    CO2 26 03/15/2021    BUN 12 03/15/2021    CREATININE 0.7 03/15/2021    CALCIUM 8.1 03/15/2021    PHOS 2.0 03/15/2021         Significant Diagnostic Studies    Radiology:   CT Head WO Contrast   Final Result   No acute intracranial abnormality. Mild white matter changes consistent with chronic small vessel ischemic   disease. CT Cervical Spine WO Contrast   Final Result   1. Negative for cervical spine fracture   2. Cervical adenopathy         XR CHEST PORTABLE   Final Result   No acute cardiopulmonary disease. Interval decrease in left pleural effusion. Consults:     IP CONSULT TO HOSPITALIST  IP CONSULT TO ONCOLOGY    Disposition: home     Condition at Discharge: Stable    Discharge Instructions/Follow-up:  w/ PCP 1-2 weeks and subspecialists as arranged. Code Status:  Full Code    Activity: activity as tolerated    Diet: regular diet      Discharge Medications:     Discharge Medication List as of 3/15/2021  5:12 PM           Details   clonazePAM (KLONOPIN) 1 MG tablet Take 1 mg by mouth 2 times daily as needed. Historical Med      ondansetron (ZOFRAN ODT) 4 MG disintegrating tablet Take 1-2 tablets by mouth every 8 hours as needed for Nausea or Vomiting May Sub regular tablet (non-ODT) if insurance does not cover ODT., Disp-20 tablet, R-0Normal      Blood Pressure KIT 2 TIMES DAILY Starting Tue 2/2/2021, Disp-1 kit, R-0, Normal      albuterol sulfate  (90 Base) MCG/ACT inhaler INHALE 2 PUFFS INTO THE LUNGS EVERY 6 HOURS AS NEEDED FOR WHEEZING, Disp-18 g, R-2Normal      LORazepam (ATIVAN) 0.5 MG tablet Take 0.5 mg by mouth 2 times daily as needed for Anxiety (1 to 2 times per day). Historical Med fluticasone (FLONASE) 50 MCG/ACT nasal spray USE 2 SPRAYS BY NASAL ROUTE AS NEEDED FOR RHINITIS OR ALLERGIES, Disp-16 g, R-5Normal      lisinopril (PRINIVIL;ZESTRIL) 40 MG tablet TAKE 1 TABLET BY MOUTH DAILY, Disp-90 tablet, R-1Normal      gabapentin (NEURONTIN) 400 MG capsule Take 1 capsule by mouth 3 times daily for 90 days. , Disp-90 capsule, R-2Normal      DULoxetine (CYMBALTA) 60 MG extended release capsule Take 1 capsule by mouth daily, Disp-90 capsule, R-1Normal      traZODone (DESYREL) 50 MG tablet Take 1 tablet by mouth nightly, Disp-90 tablet, R-1Normal      atorvastatin (LIPITOR) 80 MG tablet Take 1 tablet by mouth daily, Disp-90 tablet, R-1Normal      furosemide (LASIX) 20 MG tablet Take 1 tablet by mouth 2 times daily, Disp-60 tablet,R-3Normal      potassium chloride (KLOR-CON M) 10 MEQ extended release tablet Take 1 tablet by mouth 2 times daily (with meals), Disp-60 tablet,R-3Normal      aspirin 81 MG EC tablet Take 81 mg by mouth dailyHistorical Med      tiZANidine (ZANAFLEX) 2 MG tablet TAKE 1 TABLET BY MOUTH 2 TIMES DAILY AS NEEDED (MUSCLE SPASM AND PAIN), Disp-90 tablet,R-1Normal      SPIRIVA HANDIHALER 18 MCG inhalation capsule INHALE 1 CAPSULE INTO THE LUNGS DAILY, Disp-90 capsule,R-1Normal      vitamin D (ERGOCALCIFEROL) 1.25 MG (57482 UT) CAPS capsule Take 1 capsule by mouth once a week, Disp-12 capsule,R-0Print      nicotine (NICODERM CQ) 7 MG/24HR PLACE 1 PATCH ONTO THE SKIN DAILY (ROTATE SITES), Disp-14 patch, R-0Normal             Time Spent on discharge is more than 30 minutes in the examination, evaluation, counseling and review of medications and discharge plan. Signed:    Sarah Beth Cleveland MD   3/16/2021      Thank you Brayan Rausch MD for the opportunity to be involved in this patient's care. If you have any questions or concerns please feel free to contact me at [phone].

## 2021-03-17 ENCOUNTER — CARE COORDINATION (OUTPATIENT)
Dept: CASE MANAGEMENT | Age: 66
End: 2021-03-17

## 2021-03-17 NOTE — CARE COORDINATION
following risk factors of: immunocompromised. Education provided regarding infection prevention, and signs and symptoms of COVID-19 and when to seek medical attention with patient who verbalized understanding. Discussed exposure protocols and quarantine From CDC: Are you at higher risk for severe illness?   and given an opportunity for questions and concerns. The patient agrees to contact the COVID-19 hotline [phone] or PCP office for questions related to COVID-19. For more information on steps you can take to protect yourself, see CDC's How to Protect Yourself     Discussed follow-up appointments. If no appointment was previously scheduled, appointment scheduling offered: Yes. Is follow up appointment scheduled within 7 days of discharge? Yes    Plan for follow-up call in 7-10 days based on severity of symptoms and risk factors. Patient answered call and verified . Patient agreeable to transition call. Patient continues to be tired, but feeling better since being home. CTN request to complete medication reconciliation, but client declined at initial transition call. CTN did reviewed stopped medication and patient aware. Patient called PCP office yesterday and scheduled post hospitalization visit for 3/29/21. Patient confirms that she has transportation to [facility]. Patient denies any acute needs at present time. Agreeable to f/u calls. Educated on the use of urgent care or physicians 24 hr access line if assistance is needed after hours. CTN provided contact information for future needs.           Care Transitions 24 Hour Call    Do you have any ongoing symptoms?: No  Do you have a copy of your discharge instructions?: Yes  Do you have all of your prescriptions and are they filled?: Yes  Have you been contacted by a [facility] [place] Pharmacist?: No  Have you scheduled your follow up appointment?: Yes  How are you going to get to your appointment?: Car - family or friend to transport  Were you discharged with any Home Care or Post Acute Services: No  Do you feel like you have everything you need to keep you well at home?: Yes  Care Transitions Interventions         Follow Up  Future Appointments   Date Time Provider Elia Pulliam   3/29/2021  2:20 PM SHAHAB Francois - LEIGHANN Taylor RN

## 2021-03-18 DIAGNOSIS — J43.2 CENTRILOBULAR EMPHYSEMA (HCC): ICD-10-CM

## 2021-03-18 RX ORDER — TIOTROPIUM BROMIDE 18 UG/1
CAPSULE ORAL; RESPIRATORY (INHALATION)
Qty: 90 CAPSULE | Refills: 1 | OUTPATIENT
Start: 2021-03-18

## 2021-03-18 RX ORDER — HYDROXYZINE HYDROCHLORIDE 25 MG/1
TABLET, FILM COATED ORAL
Qty: 90 TABLET | Refills: 3 | OUTPATIENT
Start: 2021-03-18

## 2021-03-18 RX ORDER — GABAPENTIN 400 MG/1
CAPSULE ORAL
Qty: 90 CAPSULE | Refills: 2 | OUTPATIENT
Start: 2021-03-18

## 2021-03-18 RX ORDER — CETIRIZINE HYDROCHLORIDE 10 MG/1
TABLET ORAL
Qty: 90 TABLET | Refills: 1 | OUTPATIENT
Start: 2021-03-18

## 2021-03-24 ENCOUNTER — CARE COORDINATION (OUTPATIENT)
Dept: CASE MANAGEMENT | Age: 66
End: 2021-03-24

## 2021-03-24 NOTE — CARE COORDINATION
[facility] Transitions Follow Up Call    3/24/2021    Patient: Jostin Galindo  Patient : 1955   MRN: [id number]  Reason for Admission: encephalopathy  Discharge Date: 3/15/21 RARS: Readmission Risk Score: 37         Attempted to reach patient via phone for transition call.  voice mail box has not been set up yet. no option to l/m        Care Transitions Subsequent and Final Call    Subsequent and Final Calls  Care Transitions Interventions  Other Interventions:            Follow Up  Future Appointments   Date Time Provider Elia Pulliam   3/29/2021  2:20 PM Destin James, APRN - CNP Samantha Bond RN

## 2021-03-25 DIAGNOSIS — J45.20 MILD INTERMITTENT ASTHMA WITHOUT COMPLICATION: ICD-10-CM

## 2021-03-25 RX ORDER — ALBUTEROL SULFATE 90 UG/1
AEROSOL, METERED RESPIRATORY (INHALATION)
Qty: 18 G | Refills: 2 | OUTPATIENT
Start: 2021-03-25

## 2021-03-25 RX ORDER — FAMOTIDINE 20 MG/1
TABLET, FILM COATED ORAL
Qty: 180 TABLET | Refills: 1 | OUTPATIENT
Start: 2021-03-25

## 2021-03-25 RX ORDER — TIZANIDINE 2 MG/1
TABLET ORAL
Qty: 90 TABLET | Refills: 1 | OUTPATIENT
Start: 2021-03-25

## 2021-03-25 RX ORDER — GABAPENTIN 400 MG/1
CAPSULE ORAL
Qty: 90 CAPSULE | Refills: 2 | OUTPATIENT
Start: 2021-03-25

## 2021-03-26 ENCOUNTER — CARE COORDINATION (OUTPATIENT)
Dept: CASE MANAGEMENT | Age: 66
End: 2021-03-26
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
## 2021-03-27 RX ORDER — HYDROXYZINE HYDROCHLORIDE 25 MG/1
TABLET, FILM COATED ORAL
Qty: 90 TABLET | Refills: 3 | OUTPATIENT
Start: 2021-03-27

## 2021-03-27 RX ORDER — GABAPENTIN 400 MG/1
CAPSULE ORAL
Qty: 90 CAPSULE | Refills: 2 | OUTPATIENT
Start: 2021-03-27

## 2021-03-29 ENCOUNTER — OFFICE VISIT (OUTPATIENT)
Dept: FAMILY MEDICINE CLINIC | Age: 66
End: 2021-03-29
Payer: COMMERCIAL

## 2021-03-29 VITALS
DIASTOLIC BLOOD PRESSURE: 80 MMHG | TEMPERATURE: 97 F | SYSTOLIC BLOOD PRESSURE: 136 MMHG | HEART RATE: 84 BPM | OXYGEN SATURATION: 98 % | BODY MASS INDEX: 26.38 KG/M2 | WEIGHT: 139.6 LBS

## 2021-03-29 DIAGNOSIS — I10 ESSENTIAL HYPERTENSION: Primary | ICD-10-CM

## 2021-03-29 DIAGNOSIS — Z71.6 ENCOUNTER FOR SMOKING CESSATION COUNSELING: ICD-10-CM

## 2021-03-29 DIAGNOSIS — G62.9 NEUROPATHY: ICD-10-CM

## 2021-03-29 DIAGNOSIS — Z12.31 ENCOUNTER FOR SCREENING MAMMOGRAM FOR MALIGNANT NEOPLASM OF BREAST: ICD-10-CM

## 2021-03-29 PROCEDURE — 99214 OFFICE O/P EST MOD 30 MIN: CPT | Performed by: NURSE PRACTITIONER

## 2021-03-29 PROCEDURE — G8427 DOCREV CUR MEDS BY ELIG CLIN: HCPCS | Performed by: NURSE PRACTITIONER

## 2021-03-29 PROCEDURE — 3017F COLORECTAL CA SCREEN DOC REV: CPT | Performed by: NURSE PRACTITIONER

## 2021-03-29 PROCEDURE — G8484 FLU IMMUNIZE NO ADMIN: HCPCS | Performed by: NURSE PRACTITIONER

## 2021-03-29 PROCEDURE — 1036F TOBACCO NON-USER: CPT | Performed by: NURSE PRACTITIONER

## 2021-03-29 PROCEDURE — G8419 CALC BMI OUT NRM PARAM NOF/U: HCPCS | Performed by: NURSE PRACTITIONER

## 2021-03-29 PROCEDURE — 1111F DSCHRG MED/CURRENT MED MERGE: CPT | Performed by: NURSE PRACTITIONER

## 2021-03-29 RX ORDER — OXYCODONE HYDROCHLORIDE 10 MG/1
TABLET ORAL
COMMUNITY
Start: 2021-03-25

## 2021-03-29 RX ORDER — BLOOD PRESSURE TEST KIT
KIT MISCELLANEOUS
Qty: 1 KIT | Refills: 0 | Status: SHIPPED | OUTPATIENT
Start: 2021-03-29

## 2021-03-29 RX ORDER — GABAPENTIN 400 MG/1
400 CAPSULE ORAL 3 TIMES DAILY
Qty: 90 CAPSULE | Refills: 2 | Status: SHIPPED | OUTPATIENT
Start: 2021-03-29 | End: 2021-06-27

## 2021-03-29 RX ORDER — PANTOPRAZOLE SODIUM 20 MG/1
TABLET, DELAYED RELEASE ORAL
COMMUNITY
Start: 2021-03-25

## 2021-03-29 RX ORDER — VALSARTAN 80 MG/1
80 TABLET ORAL DAILY
Qty: 30 TABLET | Refills: 3 | Status: SHIPPED | OUTPATIENT
Start: 2021-03-29

## 2021-03-29 RX ORDER — PROCHLORPERAZINE MALEATE 10 MG
TABLET ORAL
COMMUNITY
Start: 2021-03-25

## 2021-03-29 RX ORDER — LIDOCAINE 50 MG/G
PATCH TOPICAL
COMMUNITY
Start: 2021-03-25

## 2021-03-29 NOTE — PROGRESS NOTES
Patient: Jaylan Wallace is a 63 y.o. female who presents today with the following Chief Complaint(s):  Chief Complaint   Patient presents with   [facility] Patient     Establish Care         HPI-this is a 29-year-old female patient establishing care with me today. She has a history of hypertension and was on lisinopril 40 mg tablet daily but she has not been taking this for approximately 4 to 5 days. She reports that this did not help that she tried to get her doctor to change it and it was never done. I will be starting her on a ARB today and ordering a blood pressure monitoring kit and she was encouraged to take her blood pressure daily and then call me next week with the readings. Dose adjustment might be needed  She also has a history of neuropathy in which she was taking gabapentin 400 mg tablet 3 times daily. She is needing a refill of this today she denies no side effects from the medication and does well on it. [place] was [place] no diversion noted at this time. She does have a history of lung cancer and lymphoma in which she is currently in treatment. She does see an oncologist and currently receiving chemotherapy. She is a previous past smoker and is requesting patches to help with her cravings. Patches will be ordered for her and she was encouraged to not start back up smoking due to the lung cancer  She is in need of a mammography and an order was placed today. She was encouraged to get this done as soon as possible    Current Outpatient Medications   Medication Sig Dispense Refill    prochlorperazine (COMPAZINE) 10 MG tablet TAKE 10 MG (1 TAB) BY MOUTH  EVERY 6 HOURS AS NEEDED FOR NAUSEA AND VOMITING.  pantoprazole (PROTONIX) 20 MG tablet TAKE 1 TABLET BY MOUTH DAILY FOR 30 DAYS      oxyCODONE HCl (OXY-IR) 10 MG immediate release tablet TAKE 1 TABLET (10 MG) BY MOUTH EVERY 4 TO 6 HOURS AS NEEDED FOR PAIN.       lidocaine (LIDODERM) 5 % PLACE 1 PATCH ONTO THE SKIN DAILY 12 HOURS ON, 12 HOURS OFF.      nicotine (NICODERM CQ) 7 MG/24HR PLACE 1 PATCH ONTO THE SKIN DAILY (ROTATE SITES) 14 patch 0    valsartan (DIOVAN) 80 MG tablet Take 1 tablet by mouth daily 30 tablet 3    Blood Pressure KIT Take BP at least daily 1 kit 0    gabapentin (NEURONTIN) 400 MG capsule Take 1 capsule by mouth 3 times daily for 90 days. 90 capsule 2    ondansetron (ZOFRAN ODT) 4 MG disintegrating tablet Take 1-2 tablets by mouth every 8 hours as needed for Nausea or Vomiting May Sub regular tablet (non-ODT) if insurance does not cover ODT. 20 tablet 0    albuterol sulfate  (90 Base) MCG/ACT inhaler INHALE 2 PUFFS INTO THE LUNGS EVERY 6 HOURS AS NEEDED FOR WHEEZING 18 g 2    fluticasone (FLONASE) 50 MCG/ACT nasal spray USE 2 SPRAYS BY NASAL ROUTE AS NEEDED FOR RHINITIS OR ALLERGIES 16 g 5    traZODone (DESYREL) 50 MG tablet Take 1 tablet by mouth nightly 90 tablet 1    atorvastatin (LIPITOR) 80 MG tablet Take 1 tablet by mouth daily 90 tablet 1    potassium chloride (KLOR-CON M) 10 MEQ extended release tablet Take 1 tablet by mouth 2 times daily (with meals) 60 tablet 3    tiZANidine (ZANAFLEX) 2 MG tablet TAKE 1 TABLET BY MOUTH 2 TIMES DAILY AS NEEDED (MUSCLE SPASM AND PAIN) 90 tablet 1    SPIRIVA HANDIHALER 18 MCG inhalation capsule INHALE 1 CAPSULE INTO THE LUNGS DAILY 90 capsule 1    Blood Pressure KIT 1 kit by Does not apply route 2 times daily (Patient not taking: Reported on 3/29/2021) 1 kit 0    aspirin 81 MG EC tablet Take 81 mg by mouth daily      vitamin D (ERGOCALCIFEROL) 1.25 MG (29708 UT) CAPS capsule Take 1 capsule by mouth once a week (Patient not taking: Reported on 3/29/2021) 12 capsule 0     No current facility-administered medications for this visit. Patient's past medical history, surgical history, family history, medications,  and allergies  were all reviewed and updated as appropriate today. Review of Systems   All other systems reviewed and are negative.       Physical Exam Vitals signs and nursing note reviewed. Constitutional:       Appearance: Normal appearance. She is well-developed. HENT:      Head: Normocephalic. Right Ear: Tympanic membrane and external ear normal.      Left Ear: Tympanic membrane and external ear normal.      Nose: Nose normal.      Mouth/Throat:      Mouth: Mucous membranes are moist.      Pharynx: Oropharynx is clear. No oropharyngeal exudate or posterior oropharyngeal erythema. Eyes:      Conjunctiva/sclera: Conjunctivae normal.      Pupils: Pupils are equal, round, and reactive to light. Neck:      Musculoskeletal: Normal range of motion and neck supple. Thyroid: No thyromegaly. Cardiovascular:      Rate and Rhythm: Normal rate and regular rhythm. Heart sounds: Normal heart sounds. No murmur. Pulmonary:      Effort: Pulmonary effort is normal.      Breath sounds: Normal breath sounds. No wheezing. Abdominal:      General: Bowel sounds are normal.      Palpations: Abdomen is soft. There is no mass. Musculoskeletal: Normal range of motion. Lymphadenopathy:      Cervical: No cervical adenopathy. Skin:     General: Skin is warm and dry. Neurological:      Mental Status: She is alert and oriented to person, place, and time. Psychiatric:         Mood and Affect: Mood normal.         Behavior: Behavior normal.         Thought Content: Thought content normal.         Judgment: Judgment normal.       Vitals:    03/29/21 1349   BP: 136/80   Pulse: 84   Temp: 97 °F (36.1 °C)   SpO2: 98%       Assessment:  Encounter Diagnoses   Name Primary?     Essential hypertension Yes    Neuropathy     Encounter for smoking cessation counseling     Encounter for screening mammogram for malignant neoplasm of breast        Controlled SubstancesMonitoring: Periodic Controlled Substance Monitoring: Possible medication side effects, risk of tolerance/dependence & alternative treatments discussed., No signs of potential drug abuse or diversion identified. (Sheeba James, APRN - CNP)    Plan:  1. Essential hypertension  Uncontrolled  - valsartan (DIOVAN) 80 MG tablet; Take 1 tablet by mouth daily  Dispense: 30 tablet; Refill: 3  - Blood Pressure KIT; Take BP at least daily  Dispense: 1 kit; Refill: 0  Instructed to call me in a week with blood pressure measurements may have to adjust medication    2. Neuropathy  Stable  - gabapentin (NEURONTIN) 400 MG capsule; Take 1 capsule by mouth 3 times daily for 90 days. Dispense: 90 capsule; Refill: 2    3. Encounter for smoking cessation counseling  Problem  - nicotine (NICODERM CQ) 7 MG/24HR; PLACE 1 PATCH ONTO THE SKIN DAILY (ROTATE SITES)  Dispense: 14 patch; Refill: 0    4. Encounter for screening mammogram for malignant neoplasm of breast  - ASHLEY DIGITAL SCREEN W OR WO CAD BILATERAL; Future      MIK Esqueda    Reviewed treatment plan with patient. Patient verbalized understanding to treatment plan and questions were answered. [facility] Antony Chandler.  [address] [place] [address]

## 2021-04-08 ENCOUNTER — CARE COORDINATION (OUTPATIENT)
Dept: CASE MANAGEMENT | Age: 66
End: 2021-04-08

## 2021-04-08 NOTE — CARE COORDINATION
[facility] Transitions Follow Up Call    2021    Patient: Saritha Dillard  Patient : 1955   MRN: [id number]  Reason for Admission: encephalopathy  Discharge Date: 3/15/21 RARS: Readmission Risk Score: 37         Spoke with: Saritha Dillard    Patient answered call and verified . Patient pleasant and agreeable to transition call. Patient feeling better this week compared to last week. Patient has been seen by PCP and medication adjustment. Patient has received blood pressure cuff and monitoring bp on routine. Last reading was 144/86 this morning before medications were taken. Patient taking all medication as directed. Patient more easily focused on conversation and appropriately answered questions. Denies any acute needs at present time. Agreeable to f/u calls. Educated on the use of urgent care or physicians 24 hr access line if assistance is needed after hours. Care Transitions Subsequent and Final Call    Subsequent and Final Calls  Do you have any ongoing symptoms?: No  Have your medications changed?: Yes  Do you have any questions related to your medications?: No  Do you currently have any active services?: No  Do you have any needs or concerns that I can assist you with?: No  Identified Barriers: None  Care Transitions Interventions  Other Interventions:            Follow Up  Future Appointments   Date Time Provider Elia Pulliam   2021 10:30 AM HOSEA Burger RN

## 2021-04-15 ENCOUNTER — CARE COORDINATION (OUTPATIENT)
Dept: CASE MANAGEMENT | Age: 66
End: 2021-04-15

## 2021-04-15 NOTE — CARE COORDINATION
[facility] Transitions Follow Up Call    4/15/2021    Patient: Sruthi Wayne  Patient : 1955   MRN: [id number]  Reason for Admission: encephalopathy  Discharge Date: 3/15/21 RARS: Readmission Risk Score: 37         Patient answered call and stated that she was at the bank and unable to talk to CTN at time of call. Patient request a call back at later day/time. CTN rescheduled per patient's request    Care Transitions Subsequent and Final Call    Subsequent and Final Calls  Care Transitions Interventions  Other Interventions:            Follow Up  Future Appointments   Date Time Provider Elia Pulliam   2021 10:30 AM RUFINOZ EG CT HOSEA EG CT [facility]       Thanh Woo RN'

## 2021-04-21 ENCOUNTER — CARE COORDINATION (OUTPATIENT)
Dept: CASE MANAGEMENT | Age: 66
End: 2021-04-21

## 2021-04-21 ENCOUNTER — HOSPITAL ENCOUNTER (OUTPATIENT)
Dept: CT IMAGING | Age: 66
Discharge: HOME | End: 2021-04-21
Payer: COMMERCIAL

## 2021-04-21 ENCOUNTER — TELEPHONE (OUTPATIENT)
Dept: FAMILY MEDICINE CLINIC | Age: 66
End: 2021-04-21

## 2021-04-21 DIAGNOSIS — C85.13 B-CELL LYMPHOMA OF INTRA-ABDOMINAL LYMPH NODES, UNSPECIFIED B-CELL LYMPHOMA TYPE (HCC): ICD-10-CM

## 2021-04-21 DIAGNOSIS — C34.12 SQUAMOUS CELL CARCINOMA OF BRONCHUS IN LEFT UPPER LOBE (HCC): ICD-10-CM

## 2021-04-21 PROCEDURE — 6360000004 HC RX CONTRAST MEDICATION: Performed by: NURSE PRACTITIONER

## 2021-04-21 PROCEDURE — 74177 CT ABD & PELVIS W/CONTRAST: CPT

## 2021-04-21 RX ORDER — HYDROCHLOROTHIAZIDE 25 MG/1
25 TABLET ORAL EVERY MORNING
Qty: 30 TABLET | Refills: 1 | Status: SHIPPED | OUTPATIENT
Start: 2021-04-21

## 2021-04-21 RX ADMIN — IOHEXOL 50 ML: 240 INJECTION, SOLUTION INTRATHECAL; INTRAVASCULAR; INTRAVENOUS; ORAL at 12:38

## 2021-04-21 RX ADMIN — IOPAMIDOL 75 ML: 755 INJECTION, SOLUTION INTRAVENOUS at 12:38

## 2021-04-21 NOTE — TELEPHONE ENCOUNTER
Patient states her BP has been running high the last 4-5 days. 160-170 over 100-105. Today her BP was 157/101, 155/102 & 172/104. She taking her medication as prescribed. Advise.

## 2021-04-21 NOTE — TELEPHONE ENCOUNTER
Spoke with patient an informed her. Patient stated she thinks she was on the dual medication in the past but unsure why she was taken off, she said maybe it was another medicine. Patient will start medicine and call the office to follow up.

## 2021-04-21 NOTE — TELEPHONE ENCOUNTER
Pls tell pt I sent in rx for hctz 25 1qd. She'll take this in addition to diovan. If her bp comes under control, the 2 pills can be combined into a single combo pill. Pls ask pt to f/u with [facility] in approx 4 wk for f/u htn.  Thx.

## 2021-04-26 ENCOUNTER — TELEPHONE (OUTPATIENT)
Dept: FAMILY MEDICINE CLINIC | Age: 66
End: 2021-04-26

## 2021-04-26 DIAGNOSIS — Z71.6 ENCOUNTER FOR SMOKING CESSATION COUNSELING: ICD-10-CM

## 2021-04-26 DIAGNOSIS — C34.90 MALIGNANT NEOPLASM OF LUNG, UNSPECIFIED LATERALITY, UNSPECIFIED PART OF LUNG (HCC): Primary | ICD-10-CM

## 2021-04-26 DIAGNOSIS — J45.20 MILD INTERMITTENT ASTHMA WITHOUT COMPLICATION: ICD-10-CM

## 2021-04-26 RX ORDER — FAMOTIDINE 20 MG/1
TABLET, FILM COATED ORAL
Qty: 180 TABLET | Refills: 0 | Status: SHIPPED | OUTPATIENT
Start: 2021-04-26

## 2021-04-26 RX ORDER — ALBUTEROL SULFATE 90 UG/1
AEROSOL, METERED RESPIRATORY (INHALATION)
Qty: 18 G | Refills: 2 | Status: SHIPPED | OUTPATIENT
Start: 2021-04-26

## 2021-04-26 NOTE — TELEPHONE ENCOUNTER
The patient would like a referral to a Pulmonologist- Dr. Beatris Del Real. The patient stated Lio Rose already knows she needs to see a Pulmonologist and she had 3/4 of her lung taken out and she is having a hard time breathing due to a pocket of water in her lungs and she needs it drained.        Burgess Lynne [phone]    Fax # to Dr. Manuel Sullivan office  [phone]   Chevy Salazar

## 2021-04-27 RX ORDER — TIZANIDINE 2 MG/1
TABLET ORAL
Qty: 90 TABLET | Refills: 1 | Status: SHIPPED | OUTPATIENT
Start: 2021-04-27

## 2021-04-27 RX ORDER — ONDANSETRON HYDROCHLORIDE 8 MG/1
TABLET, FILM COATED ORAL
Qty: 30 TABLET | Refills: 0 | OUTPATIENT
Start: 2021-04-27

## 2021-04-27 RX ORDER — PROCHLORPERAZINE MALEATE 10 MG
TABLET ORAL
Qty: 30 TABLET | Refills: 0 | OUTPATIENT
Start: 2021-04-27

## 2021-04-27 RX ORDER — CLONAZEPAM 0.5 MG/1
TABLET ORAL
Qty: 15 TABLET | Refills: 0 | OUTPATIENT
Start: 2021-04-27 | End: 2021-05-27

## 2021-04-28 ENCOUNTER — CARE COORDINATION (OUTPATIENT)
Dept: CASE MANAGEMENT | Age: 66
End: 2021-04-28

## 2021-04-28 NOTE — CARE COORDINATION
[facility] Transitions Follow Up Call    2021    Patient: Rm Brady  Patient : 1955   MRN: [id number]  Reason for Admission: encephalopathy  Discharge Date: 3/15/21 RARS: Readmission Risk Score: 37         Attempted to reach patient via phone for  transition call. No option to leave message. Voicemail box has not been set up yet. Care Transitions Subsequent and Final Call    Subsequent and Final Calls  Care Transitions Interventions  Other Interventions: Follow Up  No future appointments.     Mati Del Valle RN

## 2021-05-03 ENCOUNTER — TELEPHONE (OUTPATIENT)
Dept: FAMILY MEDICINE CLINIC | Age: 66
End: 2021-05-03

## 2021-11-30 NOTE — TELEPHONE ENCOUNTER
Last seen-3/29/21  Last filled-  Zofran-2/21/21  Pepcid-11/2/20  Nicoderm-3/29/21  Albuterol-1/22/21  Compazine-unknown  Zanaflex-11/2/20  Klonopin- unknown-   Future appt-None since discharge? Improving   Does the patient have any of the following symptoms? None   Nursing Interventions Nurse provided patient education   Pulse Ox monitoring None   Is the patient/caregiver able to teach back steps to recovery at home? Set small, achievable goals for return to baseline health,  Weigh daily,  Eat a well-balance diet,  Make a list of questions for provider's appointment,  Practice good oral hygiene,  Rest and rebuild strength, gradually increase activity   If the patient is a current smoker, are they able to teach back resources for cessation? Not a smoker   Is the patient/caregiver able to teach back the hierarchy of who to call/visit for symptoms/problems? PCP, Specialist, Home health nurse, Urgent Care, ED, 911 Yes   COVID-19 call completed? Yes          Nichol Harrison RN

## 2024-03-27 NOTE — TELEPHONE ENCOUNTER
Error with the [employer]ers fax machine. Fax's would not go through.      Scanned and e-mailed records to [email or web address]
NICOLE Received call from [facility] office, [facility]. She states patient passed on 5/2/21. She will be faxing over a request for medical records.
Paperwork was in [employer] folder when I came into the office this AM. Paperwork stated records were due to be received prior to the hearing date or appearance was needed. I spoke to 's office to inquire if any records were sent. No records sent to their office.    Informed office patient had only been seen once with us so I would send them that visit, along with the two telephone notes, and medication list.     Records faxed to:  Moshe Briseno M.D.-[facility] [phone]
3/4/2024

## 2024-08-05 NOTE — TELEPHONE ENCOUNTER
These symptoms were not previously mentioned. The hemoptysis and melena concern me. She needs to stop taking all NSAID and ASA products. Tylenol only for pain and with the bleeding, I would caution that at this time. I want to switch her pepcid to protonix to help decrease acid production there. I will send this to the pharmacy. I will also CC GI to see if they can see her sooner. Please let her know to go to the ER immediately with any of these Sxs:  -persistent or worsening bleeding, dark stools  -worsening abdominal pain  -fevers  - if she cannot tolerate any food or drink intake  - confusion or lethargy     Thank you. PAST SURGICAL HISTORY:  No significant past surgical history

## 2025-03-27 NOTE — PROGRESS NOTES
Pt admitted to room 219 from ED. Pt A/O to self only. Pt unaware of where she is or surroundings. Hassan catheter intact draining anshu, cloudy urine. 4 eye skin assessment completed with Kait Conner RN. Scattered bruises and abrasions. Bed alarm active. Bed locked and in lowest position. Non skid socks on. Call light and bedside table within reach. Will continue to monitor. [FreeTextEntry2] : new [facility] pt here for lower left side  back, left knee, left hip

## (undated) DEVICE — ELECTRODE PT RET AD L9FT HI MOIST COND ADH HYDRGEL CORDED

## (undated) DEVICE — SOLUTION IV IRRIG POUR BRL 0.9% SODIUM CHL 2F7124

## (undated) DEVICE — TIP SUCT DIA12FR W STYL CTRL VENT DISPOSABLE FRAZ

## (undated) DEVICE — RELOAD STPL 2.5MM L60MM 0DEG VASC TISS TAN TI 6 ROW LIN

## (undated) DEVICE — SUTURE NONABSORBABLE MONOFILAMENT 4-0 RB-1 36 IN BLU PROLENE 8557H

## (undated) DEVICE — SUTURE ETHLN SZ 3-0 L18IN NONABSORBABLE BLK PS-2 L19MM 3/8 1669H

## (undated) DEVICE — SUTURE VCRL SZ 4-0 L18IN ABSRB UD L19MM PS-2 3/8 CIR PRIM J496H

## (undated) DEVICE — GLIDESCOPE BFLEX 3.8 BRONCHOSCOPE

## (undated) DEVICE — GOWN SIRUS NONREIN XL W/TWL: Brand: MEDLINE INDUSTRIES, INC.

## (undated) DEVICE — SUTURE ETHLN SZ 3-0 L18IN NONABSORBABLE BLK L24MM PS-1 3/8 1663G

## (undated) DEVICE — CANNULA SEAL

## (undated) DEVICE — GAUZE,SPONGE,4"X4",8PLY,STRL,LF,10/TRAY: Brand: MEDLINE

## (undated) DEVICE — SOLUTION IV 1000ML LAC RINGERS PH 6.5 INJ USP VIAFLX PLAS

## (undated) DEVICE — DRAPE EQUIP C ARM MINI 10000100] TIDI PRODUCTS INC]

## (undated) DEVICE — SUTURE VCRL SZ 0 L27IN ABSRB UD L36MM CT-1 1/2 CIR J260H

## (undated) DEVICE — SET ADMIN PRIMING 7ML L30IN 7.35LB 20 GTT 2ND RLER CLMP

## (undated) DEVICE — TUBING INSUF ISO CONN DISP

## (undated) DEVICE — SUTURE PERMA-HAND SZ 0 L18IN NONABSORBABLE BLK L30MM FSL 678G

## (undated) DEVICE — ANTI-FOG SOLUTION WITH FOAM PAD: Brand: DEVON

## (undated) DEVICE — PUMP SUC IRR TBNG L10FT W/ HNDPC ASSEMB STRYKEFLOW 2

## (undated) DEVICE — STAPLER INT L16CM STD UNIV RELD DISP TRI-STAPLE ENDO GIA

## (undated) DEVICE — REDUCER: Brand: ENDOWRIST

## (undated) DEVICE — SYRINGE MED 50ML LUERLOCK TIP

## (undated) DEVICE — PACK EXTREMITY XR

## (undated) DEVICE — ARM DRAPE

## (undated) DEVICE — GLOVE SURG SZ 8 L12IN FNGR THK94MIL STD WHT LTX FREE

## (undated) DEVICE — RELOAD STPL 45MM THCK TISS GRN W/ GRIPPING SURF TECHNOLOGY

## (undated) DEVICE — BLADELESS OBTURATOR: Brand: WECK VISTA

## (undated) DEVICE — SOLUTION IV IRRIG 500ML 0.9% SODIUM CHL 2F7123

## (undated) DEVICE — CANNULA NSL 13FT TUBE AD ETCO2 DIV SAMP M

## (undated) DEVICE — SINGLE USE DEVICE INTENDED TO COVER EXPOSED ENDS OF ORTHOPEDIC PIN AND K-WIRES TO HELP PROTECT THE EXPOSED WIRE FROM SNAGGING ON CLOTHING.: Brand: OXBORO™ PIN COVER

## (undated) DEVICE — PENCIL ES CRD L10FT HND SWCHING ROCK SWCH W/ EDGE COAT BLDE

## (undated) DEVICE — APPLICATOR PREP 26ML 0.7% IOD POVACRYLEX 74% ISO ALC ST

## (undated) DEVICE — Z CONVERTED USE 2271043 CONTAINER SPEC COLL 4OZ SCR ON LID PEEL PCH

## (undated) DEVICE — INTENDED FOR TISSUE SEPARATION, AND OTHER PROCEDURES THAT REQUIRE A SHARP SURGICAL BLADE TO PUNCTURE OR CUT.: Brand: BARD-PARKER ® STAINLESS STEEL BLADES

## (undated) DEVICE — CATHETER THOR L21IN DIA28FR R ANG SFT RADPQ STRP SIL

## (undated) DEVICE — TROCAR: Brand: KII FIOS FIRST ENTRY

## (undated) DEVICE — MAJOR SET UP PK

## (undated) DEVICE — CATHETER THOR 28FR SIL 6 EYE STR HI TEAR STRENGTH

## (undated) DEVICE — SUTURE MCRYL + SZ 4-0 L18IN ABSRB UD L19MM PS-2 3/8 CIR MCP496G

## (undated) DEVICE — SUTURE MCRYL SZ 3-0 L27IN ABSRB UD L26MM SH 1/2 CIR Y416H

## (undated) DEVICE — NEEDLE HYPO 25GA L1.5IN BLU POLYPR HUB S STL REG BVL STR

## (undated) DEVICE — GLOVE,SURG,SENSICARE SLT,LF,PF,7.5: Brand: MEDLINE

## (undated) DEVICE — SUTURE MCRYL SZ 2-0 L18IN ABSRB VLT L36MM CT-1 1/2 CIR Y739D

## (undated) DEVICE — GLOVE ORANGE PI 7 1/2   MSG9075

## (undated) DEVICE — PADDING CAST W4INXL4YD NONSTERILE COT RAYON MICROPLEATED

## (undated) DEVICE — CONNECTOR PERF W0.25XH3/8IN BASE Y SHP REDUC W/O LUERLOCK

## (undated) DEVICE — ZIMMER® STERILE DISPOSABLE TOURNIQUET CUFF WITH PLC, DUAL PORT, SINGLE BLADDER, 30 IN. (76 CM)

## (undated) DEVICE — DRESSING,GAUZE,XEROFORM,CURAD,1"X8",ST: Brand: CURAD

## (undated) DEVICE — DRAIN SURG SGL COLL PT TB FOR ATS BG OASIS

## (undated) DEVICE — Z INACTIVE USE 2641839 CLIP INT M L POLYMER LOK LIG HEM O LOK

## (undated) DEVICE — Device

## (undated) DEVICE — SUTURE VCRL SZ 3-0 L18IN ABSRB UD L26MM SH 1/2 CIR J864D

## (undated) DEVICE — GLOVE SURG SZ 75 L12IN FNGR THK94MIL STD WHT LTX FREE

## (undated) DEVICE — CHLORAPREP 26ML ORANGE

## (undated) DEVICE — MICRO SAGITTAL BLADE (9.5 X 0.4 X 25.5MM)

## (undated) DEVICE — SYRINGE MED 10ML LUERLOCK TIP W/O SFTY DISP

## (undated) DEVICE — SKIN MARKER,REGULAR TIP WITH RULER AND LABELS: Brand: DEVON

## (undated) DEVICE — SET GRAV VENT NVENT CK VLV 3 NDL FREE PRT 10 GTT

## (undated) DEVICE — BAG SPEC RETRIEVALXL C2000ML MOUTH 14MM L27CM SHFT 15MM NYL

## (undated) DEVICE — RELOAD STPL L45MM VASCULAR/MEDIUM TISS TAN CRV TIP ARTC

## (undated) DEVICE — SUTURE ETHBND EXCEL SZ 0 L18IN NONABSORBABLE GRN L22MM MO-7 CX41D

## (undated) DEVICE — 3M™ TEGADERM™ TRANSPARENT FILM DRESSING FRAME STYLE, 1624W, 2-3/8 IN X 2-3/4 IN (6 CM X 7 CM), 100/CT 4CT/CASE: Brand: 3M™ TEGADERM™

## (undated) DEVICE — TROCAR: Brand: KII SLEEVE

## (undated) DEVICE — SUTURE VCRL SZ 3-0 L144IN ABSRB UD LIGAPAK REEL NDL J885G

## (undated) DEVICE — CATHETER IV 20GA L1.25IN PNK FEP SFTY STR HUB RADPQ DISP

## (undated) DEVICE — SEAL